# Patient Record
Sex: MALE | Race: WHITE | NOT HISPANIC OR LATINO | Employment: OTHER | URBAN - METROPOLITAN AREA
[De-identification: names, ages, dates, MRNs, and addresses within clinical notes are randomized per-mention and may not be internally consistent; named-entity substitution may affect disease eponyms.]

---

## 2018-01-19 PROCEDURE — 85025 COMPLETE CBC W/AUTO DIFF WBC: CPT

## 2018-01-19 PROCEDURE — 80053 COMPREHEN METABOLIC PANEL: CPT

## 2018-01-20 ENCOUNTER — LAB REQUISITION (OUTPATIENT)
Dept: LAB | Facility: HOSPITAL | Age: 68
End: 2018-01-20

## 2018-01-20 DIAGNOSIS — Z00.00 ROUTINE GENERAL MEDICAL EXAMINATION AT A HEALTH CARE FACILITY: ICD-10-CM

## 2018-01-20 LAB
ALBUMIN SERPL-MCNC: 3.3 G/DL (ref 3.2–4.8)
ALBUMIN/GLOB SERPL: 1.6 G/DL (ref 1.5–2.5)
ALP SERPL-CCNC: 92 U/L (ref 25–100)
ALT SERPL W P-5'-P-CCNC: 17 U/L (ref 7–40)
ANION GAP SERPL CALCULATED.3IONS-SCNC: 4 MMOL/L (ref 3–11)
AST SERPL-CCNC: 13 U/L (ref 0–33)
BASOPHILS # BLD AUTO: 0.02 10*3/MM3 (ref 0–0.2)
BASOPHILS NFR BLD AUTO: 0.3 % (ref 0–1)
BILIRUB SERPL-MCNC: 0.2 MG/DL (ref 0.3–1.2)
BUN BLD-MCNC: 52 MG/DL (ref 9–23)
BUN/CREAT SERPL: 20.8 (ref 7–25)
CALCIUM SPEC-SCNC: 9 MG/DL (ref 8.7–10.4)
CHLORIDE SERPL-SCNC: 115 MMOL/L (ref 99–109)
CO2 SERPL-SCNC: 24 MMOL/L (ref 20–31)
CREAT BLD-MCNC: 2.5 MG/DL (ref 0.6–1.3)
DEPRECATED RDW RBC AUTO: 49.1 FL (ref 37–54)
EOSINOPHIL # BLD AUTO: 0.15 10*3/MM3 (ref 0–0.3)
EOSINOPHIL NFR BLD AUTO: 2 % (ref 0–3)
ERYTHROCYTE [DISTWIDTH] IN BLOOD BY AUTOMATED COUNT: 14.4 % (ref 11.3–14.5)
GFR SERPL CREATININE-BSD FRML MDRD: 26 ML/MIN/1.73
GFR SERPL CREATININE-BSD FRML MDRD: 31 ML/MIN/1.73
GLOBULIN UR ELPH-MCNC: 2.1 GM/DL
GLUCOSE BLD-MCNC: 98 MG/DL (ref 70–100)
HCT VFR BLD AUTO: 35.7 % (ref 38.9–50.9)
HGB BLD-MCNC: 11.5 G/DL (ref 13.1–17.5)
IMM GRANULOCYTES # BLD: 0.14 10*3/MM3 (ref 0–0.03)
IMM GRANULOCYTES NFR BLD: 1.8 % (ref 0–0.6)
LYMPHOCYTES # BLD AUTO: 1.84 10*3/MM3 (ref 0.6–4.8)
LYMPHOCYTES NFR BLD AUTO: 24.1 % (ref 24–44)
MCH RBC QN AUTO: 30.3 PG (ref 27–31)
MCHC RBC AUTO-ENTMCNC: 32.2 G/DL (ref 32–36)
MCV RBC AUTO: 93.9 FL (ref 80–99)
MONOCYTES # BLD AUTO: 1.26 10*3/MM3 (ref 0–1)
MONOCYTES NFR BLD AUTO: 16.5 % (ref 0–12)
NEUTROPHILS # BLD AUTO: 4.22 10*3/MM3 (ref 1.5–8.3)
NEUTROPHILS NFR BLD AUTO: 55.3 % (ref 41–71)
PLATELET # BLD AUTO: 75 10*3/MM3 (ref 150–450)
PMV BLD AUTO: 11.6 FL (ref 6–12)
POTASSIUM BLD-SCNC: 5.3 MMOL/L (ref 3.5–5.5)
PROT SERPL-MCNC: 5.4 G/DL (ref 5.7–8.2)
RBC # BLD AUTO: 3.8 10*6/MM3 (ref 4.2–5.76)
SODIUM BLD-SCNC: 143 MMOL/L (ref 132–146)
WBC NRBC COR # BLD: 7.63 10*3/MM3 (ref 3.5–10.8)

## 2018-04-16 ENCOUNTER — LAB REQUISITION (OUTPATIENT)
Dept: LAB | Facility: HOSPITAL | Age: 68
End: 2018-04-16

## 2018-04-16 DIAGNOSIS — Z00.00 ROUTINE GENERAL MEDICAL EXAMINATION AT A HEALTH CARE FACILITY: ICD-10-CM

## 2018-04-16 PROCEDURE — 85007 BL SMEAR W/DIFF WBC COUNT: CPT

## 2018-04-16 PROCEDURE — 81003 URINALYSIS AUTO W/O SCOPE: CPT

## 2018-04-16 PROCEDURE — 80053 COMPREHEN METABOLIC PANEL: CPT

## 2018-04-16 PROCEDURE — 85025 COMPLETE CBC W/AUTO DIFF WBC: CPT

## 2018-04-17 LAB
ALBUMIN SERPL-MCNC: 2.9 G/DL (ref 3.5–5.2)
ALBUMIN/GLOB SERPL: 1.4 G/DL
ALP SERPL-CCNC: 80 U/L (ref 39–117)
ALT SERPL W P-5'-P-CCNC: 19 U/L (ref 1–41)
ANION GAP SERPL CALCULATED.3IONS-SCNC: 9.4 MMOL/L
AST SERPL-CCNC: 12 U/L (ref 1–40)
BASOPHILS # BLD AUTO: 0.02 10*3/MM3 (ref 0–0.2)
BASOPHILS NFR BLD AUTO: 0.5 % (ref 0–1.5)
BILIRUB SERPL-MCNC: <0.2 MG/DL (ref 0.1–1.2)
BILIRUB UR QL STRIP: NEGATIVE
BUN BLD-MCNC: 34 MG/DL (ref 8–23)
BUN/CREAT SERPL: 18.6 (ref 7–25)
CALCIUM SPEC-SCNC: 8.7 MG/DL (ref 8.6–10.5)
CHLORIDE SERPL-SCNC: 103 MMOL/L (ref 98–107)
CLARITY UR: CLEAR
CO2 SERPL-SCNC: 28.6 MMOL/L (ref 22–29)
COLOR UR: YELLOW
CREAT BLD-MCNC: 1.83 MG/DL (ref 0.76–1.27)
DEPRECATED RDW RBC AUTO: 47.5 FL (ref 37–54)
EOSINOPHIL # BLD AUTO: 0.11 10*3/MM3 (ref 0–0.7)
EOSINOPHIL NFR BLD AUTO: 2.6 % (ref 0.3–6.2)
ERYTHROCYTE [DISTWIDTH] IN BLOOD BY AUTOMATED COUNT: 13.9 % (ref 11.5–14.5)
GFR SERPL CREATININE-BSD FRML MDRD: 37 ML/MIN/1.73
GFR SERPL CREATININE-BSD FRML MDRD: 45 ML/MIN/1.73
GLOBULIN UR ELPH-MCNC: 2.1 GM/DL
GLUCOSE BLD-MCNC: 105 MG/DL (ref 65–99)
GLUCOSE UR STRIP-MCNC: NEGATIVE MG/DL
HCT VFR BLD AUTO: 35.2 % (ref 40.4–52.2)
HGB BLD-MCNC: 11.2 G/DL (ref 13.7–17.6)
HGB UR QL STRIP.AUTO: NEGATIVE
IMM GRANULOCYTES # BLD: 0.23 10*3/MM3 (ref 0–0.03)
IMM GRANULOCYTES NFR BLD: 5.5 % (ref 0–0.5)
KETONES UR QL STRIP: NEGATIVE
LEUKOCYTE ESTERASE UR QL STRIP.AUTO: NEGATIVE
LYMPHOCYTES # BLD AUTO: 1.67 10*3/MM3 (ref 0.9–4.8)
LYMPHOCYTES NFR BLD AUTO: 40 % (ref 19.6–45.3)
MCH RBC QN AUTO: 30.4 PG (ref 27–32.7)
MCHC RBC AUTO-ENTMCNC: 31.8 G/DL (ref 32.6–36.4)
MCV RBC AUTO: 95.4 FL (ref 79.8–96.2)
MONOCYTES # BLD AUTO: 0.46 10*3/MM3 (ref 0.2–1.2)
MONOCYTES NFR BLD AUTO: 11 % (ref 5–12)
NEUTROPHILS # BLD AUTO: 1.68 10*3/MM3 (ref 1.9–8.1)
NEUTROPHILS NFR BLD AUTO: 40.4 % (ref 42.7–76)
NITRITE UR QL STRIP: NEGATIVE
PH UR STRIP.AUTO: 5.5 [PH] (ref 5–8)
PLAT MORPH BLD: NORMAL
PLATELET # BLD AUTO: 134 10*3/MM3 (ref 140–500)
PMV BLD AUTO: 11.2 FL (ref 6–12)
POTASSIUM BLD-SCNC: 5.9 MMOL/L (ref 3.5–5.2)
PROT SERPL-MCNC: 5 G/DL (ref 6–8.5)
PROT UR QL STRIP: NEGATIVE
RBC # BLD AUTO: 3.69 10*6/MM3 (ref 4.6–6)
RBC MORPH BLD: NORMAL
SODIUM BLD-SCNC: 141 MMOL/L (ref 136–145)
SP GR UR STRIP: 1.01 (ref 1–1.03)
UROBILINOGEN UR QL STRIP: NORMAL
WBC MORPH BLD: NORMAL
WBC NRBC COR # BLD: 4.17 10*3/MM3 (ref 4.5–10.7)

## 2018-04-19 ENCOUNTER — HOSPITAL ENCOUNTER (INPATIENT)
Facility: HOSPITAL | Age: 68
LOS: 7 days | Discharge: NURSING, REHAB OR LONG-TERM CARE | End: 2018-04-26
Attending: FAMILY MEDICINE | Admitting: FAMILY MEDICINE

## 2018-04-19 DIAGNOSIS — Z74.09 IMPAIRED FUNCTIONAL MOBILITY, BALANCE, GAIT, AND ENDURANCE: Primary | ICD-10-CM

## 2018-04-19 PROBLEM — A41.9 SEPSIS AFFECTING SKIN: Status: ACTIVE | Noted: 2018-04-19

## 2018-04-19 PROBLEM — G93.41 ACUTE METABOLIC ENCEPHALOPATHY: Status: ACTIVE | Noted: 2018-04-19

## 2018-04-19 PROBLEM — L03.115 CELLULITIS OF LEG, RIGHT: Status: ACTIVE | Noted: 2018-04-19

## 2018-04-19 PROBLEM — I10 CHRONIC HYPERTENSION: Status: ACTIVE | Noted: 2018-04-19

## 2018-04-19 PROBLEM — F20.89 OTHER SCHIZOPHRENIA (HCC): Status: ACTIVE | Noted: 2018-04-19

## 2018-04-19 PROBLEM — A41.9 SEPSIS DUE TO CELLULITIS (HCC): Status: ACTIVE | Noted: 2018-04-19

## 2018-04-19 PROBLEM — R33.9 URINARY RETENTION: Status: ACTIVE | Noted: 2018-04-19

## 2018-04-19 PROBLEM — L03.90 SEPSIS DUE TO CELLULITIS (HCC): Status: ACTIVE | Noted: 2018-04-19

## 2018-04-19 PROBLEM — J96.02 ACUTE RESPIRATORY FAILURE WITH HYPERCAPNIA (HCC): Status: ACTIVE | Noted: 2018-04-19

## 2018-04-19 LAB
ARTERIAL PATENCY WRIST A: POSITIVE
ATMOSPHERIC PRESS: 742 MMHG
BASE EXCESS BLDA CALC-SCNC: -1.8 MMOL/L
BDY SITE: ABNORMAL
HCO3 BLDA-SCNC: 24 MMOL/L (ref 22–28)
HGB BLDA-MCNC: 10.4 G/DL (ref 12–18)
HOROWITZ INDEX BLD+IHG-RTO: 21 %
INR PPP: 1.01 (ref 0.9–1.1)
MODALITY: ABNORMAL
PCO2 BLDA: 44.4 MM HG (ref 35–45)
PH BLDA: 7.34 PH UNITS (ref 7.3–7.5)
PO2 BLDA: 80.2 MM HG (ref 75–100)
PROTHROMBIN TIME: 11.3 SECONDS (ref 9.3–12.1)
SAO2 % BLDCOA: 95.7 %

## 2018-04-19 PROCEDURE — 83735 ASSAY OF MAGNESIUM: CPT | Performed by: FAMILY MEDICINE

## 2018-04-19 PROCEDURE — 87081 CULTURE SCREEN ONLY: CPT | Performed by: FAMILY MEDICINE

## 2018-04-19 PROCEDURE — 36600 WITHDRAWAL OF ARTERIAL BLOOD: CPT

## 2018-04-19 PROCEDURE — 80164 ASSAY DIPROPYLACETIC ACD TOT: CPT | Performed by: FAMILY MEDICINE

## 2018-04-19 PROCEDURE — 94660 CPAP INITIATION&MGMT: CPT

## 2018-04-19 PROCEDURE — 82550 ASSAY OF CK (CPK): CPT | Performed by: FAMILY MEDICINE

## 2018-04-19 PROCEDURE — 94799 UNLISTED PULMONARY SVC/PX: CPT

## 2018-04-19 PROCEDURE — 85610 PROTHROMBIN TIME: CPT | Performed by: FAMILY MEDICINE

## 2018-04-19 PROCEDURE — 82805 BLOOD GASES W/O2 SATURATION: CPT

## 2018-04-19 PROCEDURE — 80053 COMPREHEN METABOLIC PANEL: CPT | Performed by: FAMILY MEDICINE

## 2018-04-19 PROCEDURE — 99223 1ST HOSP IP/OBS HIGH 75: CPT | Performed by: FAMILY MEDICINE

## 2018-04-19 RX ORDER — DIPHENHYDRAMINE HCL 25 MG
25 CAPSULE ORAL DAILY PRN
COMMUNITY
End: 2018-04-26 | Stop reason: HOSPADM

## 2018-04-19 RX ORDER — DIVALPROEX SODIUM 250 MG/1
500 TABLET, DELAYED RELEASE ORAL 2 TIMES DAILY
Status: DISCONTINUED | OUTPATIENT
Start: 2018-04-20 | End: 2018-04-20

## 2018-04-19 RX ORDER — GUAIFENESIN 600 MG/1
1200 TABLET, EXTENDED RELEASE ORAL 2 TIMES DAILY
Status: ON HOLD | COMMUNITY
End: 2018-04-26

## 2018-04-19 RX ORDER — RISPERIDONE 1 MG/1
1 TABLET ORAL EVERY 12 HOURS SCHEDULED
Status: DISCONTINUED | OUTPATIENT
Start: 2018-04-20 | End: 2018-04-20

## 2018-04-19 RX ORDER — BENZTROPINE MESYLATE 1 MG/1
1 TABLET ORAL 2 TIMES DAILY
COMMUNITY
End: 2018-04-26 | Stop reason: HOSPADM

## 2018-04-19 RX ORDER — HYDRALAZINE HYDROCHLORIDE 20 MG/ML
20 INJECTION INTRAMUSCULAR; INTRAVENOUS EVERY 4 HOURS PRN
Status: DISCONTINUED | OUTPATIENT
Start: 2018-04-19 | End: 2018-04-25

## 2018-04-19 RX ORDER — RISPERIDONE 2 MG/1
2 TABLET ORAL 2 TIMES DAILY
COMMUNITY
End: 2018-04-26 | Stop reason: HOSPADM

## 2018-04-19 RX ORDER — GUAIFENESIN 600 MG/1
1200 TABLET, EXTENDED RELEASE ORAL EVERY 12 HOURS SCHEDULED
Status: DISCONTINUED | OUTPATIENT
Start: 2018-04-20 | End: 2018-04-26 | Stop reason: HOSPADM

## 2018-04-19 RX ORDER — SODIUM CHLORIDE 0.9 % (FLUSH) 0.9 %
1-10 SYRINGE (ML) INJECTION AS NEEDED
Status: DISCONTINUED | OUTPATIENT
Start: 2018-04-19 | End: 2018-04-26 | Stop reason: HOSPADM

## 2018-04-19 RX ORDER — ASPIRIN 81 MG/1
81 TABLET, CHEWABLE ORAL DAILY
Status: DISCONTINUED | OUTPATIENT
Start: 2018-04-20 | End: 2018-04-26 | Stop reason: HOSPADM

## 2018-04-19 RX ORDER — IPRATROPIUM BROMIDE AND ALBUTEROL SULFATE 2.5; .5 MG/3ML; MG/3ML
3 SOLUTION RESPIRATORY (INHALATION)
Status: ON HOLD | COMMUNITY
End: 2021-07-08

## 2018-04-19 RX ORDER — ONDANSETRON 2 MG/ML
4 INJECTION INTRAMUSCULAR; INTRAVENOUS EVERY 6 HOURS PRN
Status: DISCONTINUED | OUTPATIENT
Start: 2018-04-19 | End: 2018-04-26 | Stop reason: HOSPADM

## 2018-04-19 RX ORDER — AMLODIPINE BESYLATE 5 MG/1
5 TABLET ORAL DAILY
COMMUNITY
End: 2018-04-26 | Stop reason: HOSPADM

## 2018-04-19 RX ORDER — AZELASTINE 1 MG/ML
2 SPRAY, METERED NASAL 2 TIMES DAILY PRN
Status: ON HOLD | COMMUNITY
End: 2021-07-07

## 2018-04-19 RX ORDER — GUAIFENESIN/DEXTROMETHORPHAN 100-10MG/5
10 SYRUP ORAL EVERY 6 HOURS PRN
Status: DISCONTINUED | OUTPATIENT
Start: 2018-04-19 | End: 2018-04-26 | Stop reason: HOSPADM

## 2018-04-19 RX ORDER — ALBUTEROL SULFATE 90 UG/1
2 AEROSOL, METERED RESPIRATORY (INHALATION) EVERY 4 HOURS PRN
Status: ON HOLD | COMMUNITY
End: 2021-08-02

## 2018-04-19 RX ORDER — IPRATROPIUM BROMIDE AND ALBUTEROL SULFATE 2.5; .5 MG/3ML; MG/3ML
3 SOLUTION RESPIRATORY (INHALATION)
Status: DISCONTINUED | OUTPATIENT
Start: 2018-04-20 | End: 2018-04-23

## 2018-04-19 RX ORDER — ACETAMINOPHEN 325 MG/1
650 TABLET ORAL EVERY 4 HOURS PRN
COMMUNITY

## 2018-04-19 RX ORDER — QUETIAPINE FUMARATE 100 MG/1
100 TABLET, FILM COATED ORAL 2 TIMES DAILY
COMMUNITY
End: 2018-04-26 | Stop reason: HOSPADM

## 2018-04-19 RX ORDER — BISACODYL 10 MG
10 SUPPOSITORY, RECTAL RECTAL DAILY PRN
Status: ON HOLD | COMMUNITY
End: 2021-07-08

## 2018-04-19 RX ORDER — HYDROXYZINE HYDROCHLORIDE 25 MG/1
25 TABLET, FILM COATED ORAL 2 TIMES DAILY
COMMUNITY
End: 2018-04-26 | Stop reason: HOSPADM

## 2018-04-19 RX ORDER — FERROUS SULFATE 325(65) MG
300 TABLET ORAL 2 TIMES DAILY
Status: ON HOLD | COMMUNITY
End: 2021-07-07

## 2018-04-19 RX ORDER — TRAZODONE HYDROCHLORIDE 100 MG/1
100 TABLET ORAL NIGHTLY
Status: ON HOLD | COMMUNITY
End: 2018-04-26

## 2018-04-19 RX ORDER — BISACODYL 10 MG
10 SUPPOSITORY, RECTAL RECTAL DAILY PRN
Status: DISCONTINUED | OUTPATIENT
Start: 2018-04-19 | End: 2018-04-26 | Stop reason: HOSPADM

## 2018-04-19 RX ORDER — MONTELUKAST SODIUM 10 MG/1
10 TABLET ORAL NIGHTLY
Status: DISCONTINUED | OUTPATIENT
Start: 2018-04-20 | End: 2018-04-25

## 2018-04-19 RX ORDER — ACETAMINOPHEN 325 MG/1
650 TABLET ORAL EVERY 4 HOURS PRN
Status: DISCONTINUED | OUTPATIENT
Start: 2018-04-19 | End: 2018-04-26 | Stop reason: HOSPADM

## 2018-04-19 RX ORDER — IPRATROPIUM BROMIDE AND ALBUTEROL SULFATE 2.5; .5 MG/3ML; MG/3ML
3 SOLUTION RESPIRATORY (INHALATION) EVERY 4 HOURS PRN
Status: DISCONTINUED | OUTPATIENT
Start: 2018-04-19 | End: 2018-04-26 | Stop reason: HOSPADM

## 2018-04-19 RX ORDER — MONTELUKAST SODIUM 10 MG/1
10 TABLET ORAL NIGHTLY
COMMUNITY
End: 2018-04-26 | Stop reason: HOSPADM

## 2018-04-19 RX ORDER — ASPIRIN 81 MG/1
81 TABLET, CHEWABLE ORAL DAILY
Status: ON HOLD | COMMUNITY
End: 2021-07-07

## 2018-04-19 RX ORDER — OLANZAPINE 10 MG/1
10 TABLET ORAL NIGHTLY
COMMUNITY
End: 2018-04-26 | Stop reason: HOSPADM

## 2018-04-19 RX ORDER — METOPROLOL TARTRATE 50 MG/1
50 TABLET, FILM COATED ORAL 2 TIMES DAILY
COMMUNITY
End: 2018-04-26 | Stop reason: HOSPADM

## 2018-04-19 RX ORDER — LORATADINE 10 MG/1
10 CAPSULE, LIQUID FILLED ORAL DAILY
COMMUNITY
End: 2018-04-26 | Stop reason: HOSPADM

## 2018-04-19 RX ORDER — ACETAMINOPHEN 650 MG/1
650 SUPPOSITORY RECTAL EVERY 4 HOURS PRN
Status: DISCONTINUED | OUTPATIENT
Start: 2018-04-19 | End: 2018-04-26 | Stop reason: HOSPADM

## 2018-04-19 RX ORDER — DIVALPROEX SODIUM 500 MG/1
500 TABLET, DELAYED RELEASE ORAL 2 TIMES DAILY
COMMUNITY
End: 2018-04-26 | Stop reason: HOSPADM

## 2018-04-19 RX ORDER — QUETIAPINE FUMARATE 25 MG/1
50 TABLET, FILM COATED ORAL EVERY 12 HOURS SCHEDULED
Status: DISCONTINUED | OUTPATIENT
Start: 2018-04-20 | End: 2018-04-20

## 2018-04-20 ENCOUNTER — APPOINTMENT (OUTPATIENT)
Dept: GENERAL RADIOLOGY | Facility: HOSPITAL | Age: 68
End: 2018-04-20

## 2018-04-20 ENCOUNTER — APPOINTMENT (OUTPATIENT)
Dept: MRI IMAGING | Facility: HOSPITAL | Age: 68
End: 2018-04-20
Attending: INTERNAL MEDICINE

## 2018-04-20 ENCOUNTER — APPOINTMENT (OUTPATIENT)
Dept: ULTRASOUND IMAGING | Facility: HOSPITAL | Age: 68
End: 2018-04-20

## 2018-04-20 LAB
ALBUMIN SERPL-MCNC: 2.5 G/DL (ref 3.5–5)
ALBUMIN SERPL-MCNC: 2.5 G/DL (ref 3.5–5)
ALBUMIN/GLOB SERPL: 1 G/DL (ref 1–2)
ALBUMIN/GLOB SERPL: 1 G/DL (ref 1–2)
ALP SERPL-CCNC: 73 U/L (ref 38–126)
ALP SERPL-CCNC: 87 U/L (ref 38–126)
ALT SERPL W P-5'-P-CCNC: 35 U/L (ref 13–69)
ALT SERPL W P-5'-P-CCNC: 38 U/L (ref 13–69)
AMMONIA BLD-SCNC: <9 UMOL/L (ref 9–30)
ANION GAP SERPL CALCULATED.3IONS-SCNC: 12.6 MMOL/L (ref 10–20)
ANION GAP SERPL CALCULATED.3IONS-SCNC: 9.9 MMOL/L (ref 10–20)
ARTERIAL PATENCY WRIST A: POSITIVE
AST SERPL-CCNC: 21 U/L (ref 15–46)
AST SERPL-CCNC: 30 U/L (ref 15–46)
ATMOSPHERIC PRESS: 744 MMHG
BACTERIA UR QL AUTO: ABNORMAL /HPF
BASE EXCESS BLDA CALC-SCNC: -1.3 MMOL/L
BASOPHILS # BLD AUTO: 0.02 10*3/MM3 (ref 0–0.2)
BASOPHILS NFR BLD AUTO: 0.5 % (ref 0–2.5)
BDY SITE: ABNORMAL
BILIRUB SERPL-MCNC: 0.4 MG/DL (ref 0.2–1.3)
BILIRUB SERPL-MCNC: 0.4 MG/DL (ref 0.2–1.3)
BILIRUB UR QL STRIP: NEGATIVE
BUN BLD-MCNC: 32 MG/DL (ref 7–20)
BUN BLD-MCNC: 35 MG/DL (ref 7–20)
BUN/CREAT SERPL: 16 (ref 6.3–21.9)
BUN/CREAT SERPL: 18.4 (ref 6.3–21.9)
CALCIUM SPEC-SCNC: 8.1 MG/DL (ref 8.4–10.2)
CALCIUM SPEC-SCNC: 8.3 MG/DL (ref 8.4–10.2)
CHLORIDE SERPL-SCNC: 116 MMOL/L (ref 98–107)
CHLORIDE SERPL-SCNC: 118 MMOL/L (ref 98–107)
CK SERPL-CCNC: 47 U/L (ref 30–170)
CLARITY UR: CLEAR
CO2 SERPL-SCNC: 23 MMOL/L (ref 26–30)
CO2 SERPL-SCNC: 24 MMOL/L (ref 26–30)
COLOR UR: ABNORMAL
CREAT BLD-MCNC: 1.9 MG/DL (ref 0.6–1.3)
CREAT BLD-MCNC: 2 MG/DL (ref 0.6–1.3)
DEPRECATED RDW RBC AUTO: 45.6 FL (ref 37–54)
EOSINOPHIL # BLD AUTO: 0.06 10*3/MM3 (ref 0–0.7)
EOSINOPHIL NFR BLD AUTO: 1.4 % (ref 0–7)
ERYTHROCYTE [DISTWIDTH] IN BLOOD BY AUTOMATED COUNT: 13.5 % (ref 11.5–14.5)
GFR SERPL CREATININE-BSD FRML MDRD: 34 ML/MIN/1.73
GFR SERPL CREATININE-BSD FRML MDRD: 36 ML/MIN/1.73
GLOBULIN UR ELPH-MCNC: 2.6 GM/DL
GLOBULIN UR ELPH-MCNC: 2.6 GM/DL
GLUCOSE BLD-MCNC: 58 MG/DL (ref 74–98)
GLUCOSE BLD-MCNC: 78 MG/DL (ref 74–98)
GLUCOSE BLDC GLUCOMTR-MCNC: 103 MG/DL (ref 70–130)
GLUCOSE UR STRIP-MCNC: NEGATIVE MG/DL
HCO3 BLDA-SCNC: 24.1 MMOL/L (ref 22–28)
HCT VFR BLD AUTO: 31.8 % (ref 42–52)
HGB BLD-MCNC: 10 G/DL (ref 14–18)
HGB BLDA-MCNC: 9.8 G/DL (ref 12–18)
HGB UR QL STRIP.AUTO: ABNORMAL
HOROWITZ INDEX BLD+IHG-RTO: 21 %
HYALINE CASTS UR QL AUTO: ABNORMAL /LPF
IMM GRANULOCYTES # BLD: 0.12 10*3/MM3 (ref 0–0.06)
IMM GRANULOCYTES NFR BLD: 2.7 % (ref 0–0.6)
KETONES UR QL STRIP: NEGATIVE
LEUKOCYTE ESTERASE UR QL STRIP.AUTO: ABNORMAL
LYMPHOCYTES # BLD AUTO: 1.2 10*3/MM3 (ref 0.6–3.4)
LYMPHOCYTES NFR BLD AUTO: 27.1 % (ref 10–50)
MAGNESIUM SERPL-MCNC: 1.7 MG/DL (ref 1.6–2.3)
MAGNESIUM SERPL-MCNC: 1.7 MG/DL (ref 1.6–2.3)
MCH RBC QN AUTO: 29.6 PG (ref 27–31)
MCHC RBC AUTO-ENTMCNC: 31.4 G/DL (ref 30–37)
MCV RBC AUTO: 94.1 FL (ref 80–94)
MODALITY: ABNORMAL
MONOCYTES # BLD AUTO: 0.78 10*3/MM3 (ref 0–0.9)
MONOCYTES NFR BLD AUTO: 17.6 % (ref 0–12)
NEUTROPHILS # BLD AUTO: 2.25 10*3/MM3 (ref 2–6.9)
NEUTROPHILS NFR BLD AUTO: 50.7 % (ref 37–80)
NITRITE UR QL STRIP: NEGATIVE
NRBC BLD MANUAL-RTO: 0.9 /100 WBC (ref 0–0)
PCO2 BLDA: 42.7 MM HG (ref 35–45)
PH BLDA: 7.36 PH UNITS (ref 7.3–7.5)
PH UR STRIP.AUTO: 6.5 [PH] (ref 5–8)
PLATELET # BLD AUTO: 83 10*3/MM3 (ref 130–400)
PMV BLD AUTO: 11.2 FL (ref 6–12)
PO2 BLDA: 68.2 MM HG (ref 75–100)
POTASSIUM BLD-SCNC: 5.4 MMOL/L (ref 3.5–5.1)
POTASSIUM BLD-SCNC: 5.6 MMOL/L (ref 3.5–5.1)
POTASSIUM BLD-SCNC: 5.9 MMOL/L (ref 3.5–5.1)
PROT SERPL-MCNC: 5.1 G/DL (ref 6.3–8.2)
PROT SERPL-MCNC: 5.1 G/DL (ref 6.3–8.2)
PROT UR QL STRIP: NEGATIVE
RBC # BLD AUTO: 3.38 10*6/MM3 (ref 4.7–6.1)
RBC # UR: ABNORMAL /HPF
REF LAB TEST METHOD: ABNORMAL
SAO2 % BLDCOA: 93.6 %
SODIUM BLD-SCNC: 143 MMOL/L (ref 137–145)
SODIUM BLD-SCNC: 149 MMOL/L (ref 137–145)
SP GR UR STRIP: 1.01 (ref 1–1.03)
SQUAMOUS #/AREA URNS HPF: ABNORMAL /HPF
TSH SERPL DL<=0.05 MIU/L-ACNC: 1.82 MIU/ML (ref 0.47–4.68)
UROBILINOGEN UR QL STRIP: ABNORMAL
VALPROATE SERPL-MCNC: 27.3 MCG/ML (ref 50–100)
WBC NRBC COR # BLD: 4.43 10*3/MM3 (ref 4.8–10.8)
WBC UR QL AUTO: ABNORMAL /HPF

## 2018-04-20 PROCEDURE — 80053 COMPREHEN METABOLIC PANEL: CPT | Performed by: FAMILY MEDICINE

## 2018-04-20 PROCEDURE — 81001 URINALYSIS AUTO W/SCOPE: CPT | Performed by: INTERNAL MEDICINE

## 2018-04-20 PROCEDURE — 84443 ASSAY THYROID STIM HORMONE: CPT | Performed by: INTERNAL MEDICINE

## 2018-04-20 PROCEDURE — 84132 ASSAY OF SERUM POTASSIUM: CPT | Performed by: INTERNAL MEDICINE

## 2018-04-20 PROCEDURE — 85025 COMPLETE CBC W/AUTO DIFF WBC: CPT | Performed by: FAMILY MEDICINE

## 2018-04-20 PROCEDURE — 25010000002 VANCOMYCIN PER 500 MG: Performed by: FAMILY MEDICINE

## 2018-04-20 PROCEDURE — 99233 SBSQ HOSP IP/OBS HIGH 50: CPT | Performed by: INTERNAL MEDICINE

## 2018-04-20 PROCEDURE — 94799 UNLISTED PULMONARY SVC/PX: CPT

## 2018-04-20 PROCEDURE — 36600 WITHDRAWAL OF ARTERIAL BLOOD: CPT

## 2018-04-20 PROCEDURE — 74018 RADEX ABDOMEN 1 VIEW: CPT

## 2018-04-20 PROCEDURE — 25010000002 PIPERACILLIN SOD-TAZOBACTAM PER 1 G: Performed by: FAMILY MEDICINE

## 2018-04-20 PROCEDURE — 76775 US EXAM ABDO BACK WALL LIM: CPT

## 2018-04-20 PROCEDURE — 70551 MRI BRAIN STEM W/O DYE: CPT

## 2018-04-20 PROCEDURE — 94660 CPAP INITIATION&MGMT: CPT

## 2018-04-20 PROCEDURE — 71045 X-RAY EXAM CHEST 1 VIEW: CPT

## 2018-04-20 PROCEDURE — 82140 ASSAY OF AMMONIA: CPT | Performed by: INTERNAL MEDICINE

## 2018-04-20 PROCEDURE — 83735 ASSAY OF MAGNESIUM: CPT | Performed by: FAMILY MEDICINE

## 2018-04-20 PROCEDURE — 82805 BLOOD GASES W/O2 SATURATION: CPT

## 2018-04-20 PROCEDURE — 25010000002 ENOXAPARIN PER 10 MG: Performed by: FAMILY MEDICINE

## 2018-04-20 PROCEDURE — 70250 X-RAY EXAM OF SKULL: CPT

## 2018-04-20 PROCEDURE — 87086 URINE CULTURE/COLONY COUNT: CPT | Performed by: INTERNAL MEDICINE

## 2018-04-20 PROCEDURE — 94640 AIRWAY INHALATION TREATMENT: CPT

## 2018-04-20 PROCEDURE — 25010000002 MAGNESIUM SULFATE IN D5W 1G/100ML (PREMIX) 1-5 GM/100ML-% SOLUTION: Performed by: FAMILY MEDICINE

## 2018-04-20 PROCEDURE — 82962 GLUCOSE BLOOD TEST: CPT

## 2018-04-20 RX ORDER — ZIPRASIDONE MESYLATE 20 MG/ML
10 INJECTION, POWDER, LYOPHILIZED, FOR SOLUTION INTRAMUSCULAR EVERY 6 HOURS PRN
Status: DISCONTINUED | OUTPATIENT
Start: 2018-04-20 | End: 2018-04-22

## 2018-04-20 RX ORDER — DEXTROSE AND SODIUM CHLORIDE 5; .45 G/100ML; G/100ML
50 INJECTION, SOLUTION INTRAVENOUS CONTINUOUS
Status: DISCONTINUED | OUTPATIENT
Start: 2018-04-20 | End: 2018-04-24

## 2018-04-20 RX ORDER — MAGNESIUM SULFATE 1 G/100ML
1 INJECTION INTRAVENOUS ONCE
Status: COMPLETED | OUTPATIENT
Start: 2018-04-20 | End: 2018-04-20

## 2018-04-20 RX ADMIN — MAGNESIUM SULFATE HEPTAHYDRATE 1 G: 1 INJECTION, SOLUTION INTRAVENOUS at 07:23

## 2018-04-20 RX ADMIN — VANCOMYCIN HYDROCHLORIDE 1500 MG: 500 INJECTION, POWDER, LYOPHILIZED, FOR SOLUTION INTRAVENOUS at 22:33

## 2018-04-20 RX ADMIN — TAZOBACTAM SODIUM AND PIPERACILLIN SODIUM 3.38 G: 375; 3 INJECTION, SOLUTION INTRAVENOUS at 09:04

## 2018-04-20 RX ADMIN — MONTELUKAST SODIUM 10 MG: 10 TABLET, FILM COATED ORAL at 00:36

## 2018-04-20 RX ADMIN — QUETIAPINE FUMARATE 50 MG: 25 TABLET ORAL at 00:36

## 2018-04-20 RX ADMIN — TAZOBACTAM SODIUM AND PIPERACILLIN SODIUM 3.38 G: 375; 3 INJECTION, SOLUTION INTRAVENOUS at 17:00

## 2018-04-20 RX ADMIN — VALPROATE SODIUM 500 MG: 100 INJECTION, SOLUTION INTRAVENOUS at 16:00

## 2018-04-20 RX ADMIN — ENOXAPARIN SODIUM 40 MG: 40 INJECTION SUBCUTANEOUS at 00:36

## 2018-04-20 RX ADMIN — DEXTROSE AND SODIUM CHLORIDE 125 ML/HR: 5; 450 INJECTION, SOLUTION INTRAVENOUS at 07:50

## 2018-04-20 RX ADMIN — IPRATROPIUM BROMIDE AND ALBUTEROL SULFATE 3 ML: .5; 3 SOLUTION RESPIRATORY (INHALATION) at 20:37

## 2018-04-20 RX ADMIN — IPRATROPIUM BROMIDE AND ALBUTEROL SULFATE 3 ML: .5; 3 SOLUTION RESPIRATORY (INHALATION) at 00:00

## 2018-04-20 RX ADMIN — RISPERIDONE 1 MG: 1 TABLET ORAL at 00:36

## 2018-04-20 RX ADMIN — IPRATROPIUM BROMIDE AND ALBUTEROL SULFATE 3 ML: .5; 3 SOLUTION RESPIRATORY (INHALATION) at 06:54

## 2018-04-20 RX ADMIN — IPRATROPIUM BROMIDE AND ALBUTEROL SULFATE 3 ML: .5; 3 SOLUTION RESPIRATORY (INHALATION) at 13:06

## 2018-04-20 RX ADMIN — DIVALPROEX SODIUM 500 MG: 250 TABLET, DELAYED RELEASE ORAL at 00:36

## 2018-04-20 RX ADMIN — VANCOMYCIN HYDROCHLORIDE 1000 MG: 1 INJECTION, POWDER, LYOPHILIZED, FOR SOLUTION INTRAVENOUS at 00:34

## 2018-04-20 RX ADMIN — TAZOBACTAM SODIUM AND PIPERACILLIN SODIUM 3.38 G: 375; 3 INJECTION, SOLUTION INTRAVENOUS at 02:31

## 2018-04-20 RX ADMIN — GUAIFENESIN 1200 MG: 600 TABLET, EXTENDED RELEASE ORAL at 00:36

## 2018-04-20 NOTE — PROGRESS NOTES
Discharge Planning Assessment   [place]     Patient Name: Braulio Schwartz  MRN: [id number]  Today's Date: 4/20/2018    Admit Date: 4/19/2018          Discharge Needs Assessment     Row Name 04/20/18 1043       Living Environment    Name(s) of Who Lives With Patient [facility]    Unique Family Situation state dor[place] rosen michele [phone]    Primary Care Provided by other (see comments)    Provides Primary Care For no one, unable/limited ability to care for self    Caregiving Concerns ltc       Discharge Needs Assessment    Readmission Within the Last 30 Days no previous admission in last 30 days    Concerns to be Addressed discharge planning            Discharge Plan     Row Name 04/20/18 1044       Plan    Plan confirmed patient is ltc at [facility]. state doron jhaveri michele [phone]        Destination     No service coordination in this encounter.      Durable Medical Equipment     No service coordination in this encounter.      Dialysis/Infusion     No service coordination in this encounter.      Home Medical Care     No service coordination in this encounter.      Social Care     No service coordination in this encounter.                Demographic Summary     Row Name 04/20/18 1042       General Information    Admission Type inpatient    Arrived From long term care    Referral Source admission list    Reason for Consult discharge planning    Preferred Language English            Functional Status     Row Name 04/20/18 1043       Functional Status, IADL    Medications other (see comments)    Meal Preparation other (see comments)    Housekeeping other (see comments)    Laundry other (see comments)    Shopping other (see comments)       Employment/    Employment Status disabled            Psychosocial    No documentation.           Abuse/Neglect    No documentation.           Legal    No documentation.           Substance Abuse    No documentation.           Patient  Forms    No documentation.         Junie Fagan

## 2018-04-20 NOTE — PROGRESS NOTES
"Pharmacokinetic Initial Note - Vancomycin    Braulio Schwartz is a 67 y.o. male  180.3 cm (71\") 87.9 kg (193 lb 11.2 oz)    Indication for use: sepsis/cellulitis      Results from last 7 days     Lab Units 04/19/18  2334 04/16/18  2100   WBC 10*3/mm3 --  4.17*   CREATININE mg/dL 1.90* 1.83*      Estimated Creatinine Clearance: 47.5 mL/min (by C-G formula based on SCr of 1.9 mg/dL (H)).  Temp Readings from Last 1 Encounters:   04/20/18 97.3 °F (36.3 °C) (Axillary)       Culture results  Microbiology Results (last 10 days)       ** No results found for the last 240 hours. **            Other Antimicrobials   Piperacillin-tazobactam 3.375 g every 8 hour, extended infusion (first dose given at other facility)    Assessment/Plan  Initiated Vancomycin 1000 mg IVPB once (1000 mg given at another facility)  followed by 1500 mg Q24H. Vancomycin trough ordered for 4/22/2018 @2130.  Pharmacy will monitor renal function and adjust dose accordingly.    Paty Pinto RPH  04/20/18 2:25 AM    "

## 2018-04-20 NOTE — PROGRESS NOTES
[facility]    Name:  Braulio Schwartz   Age:  67 y.o.  Sex:  male  :  1950  MRN:  [id number]   Visit Number:  [id number]  Admission Date:  2018  Date Of Service:  18  Primary Care Physician:  Yandel Steward MD     LOS: 1 day :  Patient Care Team:  Yandel Steward MD as PCP - General (Internal Medicine):    History taken from:     chart    Chief Complaint:      Metabolic encephalopathy    Subjective     Interval History:     Patient seen today.  Reviewed history and physical, x-rays, lab work, nursing care on this patient.    Patient came from [facility] emergency room via a nursing home as he was found asleep on the floor.  He was noted to be hypothermic with a temperature 90.  He was given  3 L boluses his systolic blood pressure was in the 80s.  His blood pressure is now stable.  He was also noted to have a PCO2 of 64 on admission.  He was placed on BiPAP, PCO2 is now normal.  He was given multiple doses of Narcan without any result.  Urine drug screen was positive for tricyclic antidepressants.  He also had urinary retention.  Ruby catheter was placed for this.  Will check a renal ultrasound.  He was placed on a bear hugger/warming blanket.  His temperature has improved.  Vancomycin and Zosyn were initiated.  He appears to have a right anterior lower extremity redness and swelling.  CT of the head, chest, abdomen were all negative yesterday.  Lactic acid was normal.    He was initially in restraints but is not requiring this at present.  He is very somnolent, though awakens to voice for about 5 seconds.  MRI was ordered this morning, this was negative.  He is on many medications that could sedate him including several antipsychotics, will hold all of these.  They can be added back and his mental status improves.  Patient is also noted to have significant dental caries, this could be the source of his infection as well.  Blood  cultures are pending at present.  He has been afebrile since he has been here.  Unable to obtain any information from the patient given his mental status at present.    Review of Systems:     Unable to obtain any information from the patient given his mental status at present.      Objective     Vital Signs:    Temp:  [94.3 °F (34.6 °C)-97.4 °F (36.3 °C)] 97 °F (36.1 °C)  Heart Rate:  [66-78] 77  Resp:  [11-18] 13  BP: ()/() 110/72  FiO2 (%):  [21 %] 21 %    Physical Exam:      General Appearance:    Somnolent.  Response to pain and voice at present.     Head:    Normocephalic, without obvious abnormality, atraumatic   Eyes:            Lids and lashes normal, conjunctivae and sclerae normal, no   icterus, no pallor, corneas clear, PERRLA   Ears:    Ears appear intact with no abnormalities noted   Throat:   No oral lesions, no thrush, oral mucosa moist   Neck:   No adenopathy, supple, trachea midline, no thyromegaly, no   carotid bruit, no JVD   Back:     No kyphosis present, no scoliosis present, no skin lesions,      erythema or scars, no tenderness to percussion or                   palpation,   range of motion normal   Lungs:     Clear to auscultation,respirations regular, even and                  unlabored    Heart:    Regular rhythm and normal rate, normal S1 and S2, no            murmur, no gallop, no rub, no click   Chest Wall:    No abnormalities observed   Abdomen:     Normal bowel sounds, no masses, no organomegaly, soft        non-tender, non-distended, no guarding, no rebound                tenderness   Rectal:     Deferred   Extremities:   Moves all extremities,no edema, no cyanosis,             Redness noted on anterior of right shin.     Pulses:   Pulses palpable and equal bilaterally   Skin:   No bleeding, bruising or rash   Lymph nodes:   No palpable adenopathy   Neurologic:   Cranial nerves 2 - 12 grossly intact, sensation intact, DTR       present and equal bilaterally        Results  Review:      I reviewed the patient's new clinical results.    Labs:    Lab Results (last 24 hours)     Procedure Component Value Units Date/Time    Blood Gas, Arterial [[id number]]  (Abnormal) Collected:  04/20/18 0941    Specimen:  Arterial Blood Updated:  04/20/18 0941     Site Arterial: right radial     Gonzalo's Test Positive     pH, Arterial 7.361 pH units      pCO2, Arterial 42.7 mm Hg      pO2, Arterial 68.2 (L) mm Hg      HCO3, Arterial 24.1 mmol/L      Base Excess, Arterial -1.3 mmol/L      O2 Saturation, Arterial 93.6 %      Hemoglobin, Blood Gas 9.8 (L) g/dL      Barometric Pressure for Blood Gas 744 mmHg      Modality Room Air     FIO2 21 %     POC Glucose Once [[id number]]  (Normal) Collected:  04/20/18 0926    Specimen:  Blood Updated:  04/20/18 0930     Glucose 103 mg/dL      Comment: Serial Number: [id number]:  [id number]       Comprehensive Metabolic Panel [[id number]]  (Abnormal) Collected:  04/20/18 0409    Specimen:  Blood Updated:  04/20/18 0543     Glucose 58 (L) mg/dL      BUN 32 (H) mg/dL      Creatinine 2.00 (H) mg/dL      Sodium 149 (H) mmol/L      Potassium 5.6 (C) mmol/L      Chloride 118 (H) mmol/L      CO2 24.0 (L) mmol/L      Calcium 8.3 (L) mg/dL      Total Protein 5.1 (L) g/dL      Albumin 2.50 (L) g/dL      ALT (SGPT) 35 U/L      AST (SGOT) 21 U/L      Alkaline Phosphatase 87 U/L      Total Bilirubin 0.4 mg/dL      eGFR Non African Amer 34 (L) mL/min/1.73      Globulin 2.6 gm/dL      A/G Ratio 1.0 g/dL      BUN/Creatinine Ratio 16.0     Anion Gap 12.6 mmol/L     Narrative:       Abnormal estimated GFR should be followed by more specific studies to confirm end stage chronic renal disease. The equation used for calculation may not be accurate for patients less than 19 years old, greater than 70 years old, patients at extremes of weight, malnutrition, or with acute renal dysfunction.    Magnesium [[id number]]  (Normal) Collected:  04/20/18 0409    Specimen:  Blood Updated:  04/20/18  0534     Magnesium 1.7 mg/dL     CBC & Differential [[id number]] Collected:  04/20/18 0409    Specimen:  Blood Updated:  04/20/18 0526    Narrative:       The following orders were created for panel order CBC & Differential.  Procedure                               Abnormality         Status                     ---------                               -----------         ------                     CBC Auto Differential[[id number]]        Abnormal            Final result                 Please view results for these tests on the individual orders.    CBC Auto Differential [[id number]]  (Abnormal) Collected:  04/20/18 0409    Specimen:  Blood Updated:  04/20/18 0526     WBC 4.43 (L) 10*3/mm3      RBC 3.38 (L) 10*6/mm3      Hemoglobin 10.0 (L) g/dL      Hematocrit 31.8 (L) %      MCV 94.1 (H) fL      MCH 29.6 pg      MCHC 31.4 g/dL      RDW 13.5 %      RDW-SD 45.6 fl      MPV 11.2 fL      Platelets 83 (L) 10*3/mm3      Neutrophil % 50.7 %      Lymphocyte % 27.1 %      Monocyte % 17.6 (H) %      Eosinophil % 1.4 %      Basophil % 0.5 %      Immature Grans % 2.7 (H) %      Neutrophils, Absolute 2.25 10*3/mm3      Lymphocytes, Absolute 1.20 10*3/mm3      Monocytes, Absolute 0.78 10*3/mm3      Eosinophils, Absolute 0.06 10*3/mm3      Basophils, Absolute 0.02 10*3/mm3      Immature Grans, Absolute 0.12 (H) 10*3/mm3      nRBC 0.9 (H) /100 WBC     VRE Culture - Swab, Per Rectum [[id number]] Collected:  04/19/18 2338    Specimen:  Swab from Per Rectum Updated:  04/20/18 0054    MRSA Screen Culture - Swab, Nares [[id number]] Collected:  04/19/18 2338    Specimen:  Swab from Nares Updated:  04/20/18 0054    Acinetobacter Screen - Swab, Nares [[id number]] Collected:  04/19/18 2337    Specimen:  Swab from Axilla, Left Updated:  04/20/18 0054    Magnesium [[id number]]  (Normal) Collected:  04/19/18 2334    Specimen:  Blood Updated:  04/20/18 0006     Magnesium 1.7 mg/dL      Comment: Specimen hemolyzed.  Results may be affected.        Comprehensive Metabolic Panel [[id number]]  (Abnormal) Collected:  04/19/18 2334    Specimen:  Blood Updated:  04/20/18 0006     Glucose 78 mg/dL      BUN 35 (H) mg/dL      Comment: Specimen hemolyzed. Results may be affected.        Creatinine 1.90 (H) mg/dL      Sodium 143 mmol/L      Potassium 5.9 (C) mmol/L      Chloride 116 (H) mmol/L      CO2 23.0 (L) mmol/L      Calcium 8.1 (L) mg/dL      Total Protein 5.1 (L) g/dL      Albumin 2.50 (L) g/dL      ALT (SGPT) 38 U/L      Comment: Specimen hemolyzed.  Results may be affected.        AST (SGOT) 30 U/L      Comment: Specimen hemolyzed.  Results may be affected.        Alkaline Phosphatase 73 U/L      Comment: Specimen hemolyzed. Results may be affected.        Total Bilirubin 0.4 mg/dL      eGFR Non African Amer 36 (L) mL/min/1.73      Globulin 2.6 gm/dL      A/G Ratio 1.0 g/dL      BUN/Creatinine Ratio 18.4     Anion Gap 9.9 (L) mmol/L     Narrative:       Abnormal estimated GFR should be followed by more specific studies to confirm end stage chronic renal disease. The equation used for calculation may not be accurate for patients less than 19 years old, greater than 70 years old, patients at extremes of weight, malnutrition, or with acute renal dysfunction.    CK [[id number]]  (Normal) Collected:  04/19/18 2334    Specimen:  Blood Updated:  04/20/18 0002     Creatine Kinase 47 U/L     Valproic Acid Level, Total [[id number]]  (Abnormal) Collected:  04/19/18 2334    Specimen:  Blood Updated:  04/20/18 0002     Valproic Acid 27.3 (L) mcg/mL     Protime-INR [[id number]]  (Normal) Collected:  04/19/18 2334    Specimen:  Blood Updated:  04/19/18 2359     Protime 11.3 Seconds      INR 1.01    Blood Gas, Arterial [[id number]]  (Abnormal) Collected:  04/19/18 2354    Specimen:  Arterial Blood Updated:  04/19/18 2353     Site Arterial: left radial     Gonzalo's Test Positive     pH, Arterial 7.341 pH units      pCO2, Arterial 44.4 mm Hg      pO2, Arterial 80.2 mm Hg      HCO3,  Arterial 24.0 mmol/L      Base Excess, Arterial -1.8 mmol/L      O2 Saturation, Arterial 95.7 %      Hemoglobin, Blood Gas 10.4 (L) g/dL      Barometric Pressure for Blood Gas 742 mmHg      Modality BiPap     FIO2 21 %            Radiology:    Imaging Results (last 24 hours)     Procedure Component Value Units Date/Time    MRI Brain Without Contrast [[id number]] Collected:  04/20/18 1136     Updated:  04/20/18 1305    Narrative:       PROCEDURE: MRI BRAIN WO CONTRAST-     HISTORY: Syncope/fainting     PROCEDURE: Multiplanar multisequence imaging of the brain was performed  without the use of intravenous contrast.     COMPARISON: None.     FINDINGS: There is generalized cerebral volume loss. No significant  white matter lesions are identified. There is no mass, mass effect or  midline shift. There is no hydrocephalus. There are no areas of  restricted diffusion.      The midbrain, arelis, cerebellum and craniocervical junction are  unremarkable. The sella and pituitary gland are within normal limits.  The major intracranial vasculature demonstrates the expected flow  related signal. There is mucosal thickening in the left maxillary sinus  and there is a small mastoid effusions.       Impression:       No acute intracranial abnormality.             This report was finalized on 4/20/2018 11:38 AM by Leonela Tapia M.D..    XR Abdomen KUB [[id number]] Collected:  04/20/18 1117     Updated:  04/20/18 1120    Narrative:       PROCEDURE: XR ABDOMEN KUB-     HISTORY: PRE MRI     COMPARISON: None.     FINDINGS: An AP view of the abdomen and pelvis demonstrates an  unremarkable bowel gas pattern with no evidence of obstruction. There is  stool throughout the colon which may reflect constipation. There is no  evidence of an implanted device or radiopaque foreign body.       Impression:       No radiopaque foreign bodies.             This report was finalized on 4/20/2018 11:18 AM by Leonela Tapia M.D..    XR Skull  Lateral & Ap [[id number]] Collected:  04/20/18 1116     Updated:  04/20/18 1119    Narrative:       PROCEDURE: XR SKULL LATERAL AND AP-     HISTORY: PRE MRI     FINDINGS:  2 views were obtained. There is an opacity in the left  maxillary sinus which may reflect mucosal thickening. There is no  evidence of air-fluid level. There is no evidence of an acute, displaced  fracture or dislocation of the visualized bony architecture.   There are  no radiopaque foreign bodies.       Impression:       No radiopaque foreign bodies.            This report was finalized on 4/20/2018 11:17 AM by Leonela Tapia M.D..    XR Chest 1 View [[id number]] Collected:  04/20/18 0806     Updated:  04/20/18 0809    Narrative:       PROCEDURE: XR CHEST 1 VW-     HISTORY: respiratory failure, altered mentation, COPD     COMPARISON: None.     FINDINGS: The heart is normal in size. The mediastinum is unremarkable.  There are low lung volumes. No focal opacities were effusions are  identified. There is no pneumothorax.  There are no acute osseous  abnormalities.           Impression:       No acute cardiopulmonary process.     Continued followup is recommended.     This report was finalized on 4/20/2018 8:07 AM by Leonela Tapia M.D..          Medication Review:       aspirin 81 mg Oral Daily   enoxaparin 40 mg Subcutaneous Q24H   guaiFENesin 1,200 mg Oral Q12H   ipratropium-albuterol 3 mL Nebulization Q6H - RT   montelukast 10 mg Oral Nightly   piperacillin-tazobactam 3.375 g Intravenous Q8H   valproate sodium 500 mg Intravenous Q12H   vancomycin 1,500 mg Intravenous Q24H         dextrose 5 % and sodium chloride 0.45 % 125 mL/hr Last Rate: 125 mL/hr (04/20/18 0750)   Pharmacy to dose vancomycin     Pharmacy Consult         Assessment/Plan     Problem List Items Addressed This Visit     None      Visit Diagnoses    None.             Sepsis affecting skin, with shock.    Dental caries    Acute metabolic encephalopathy, may be related to  sepsis versus medication.  This is multifactorial.    Acute respiratory failure with hypercapnia, resolved   schizophrenia    Chronic hypertension    Urinary retention    Cellulitis of leg, right  Hyperkalemia  Suspect LARISSA  CKD baseline creatinine 1.9-2.2  Hypothermia, improved  Bradycardia, improved  Possible fall prior to admission, unwitnessed and found down on floor  Polypharmacy  Leukopenia  Chronic thrombocytopenia  Chronic tremors  Debility  COPD  Peripheral vascular disease       Plan:    We will hold Seroquel and Risperdal at the present time.  Give Geodon when necessary.  MRI was ordered this morning which was negative.  We'll check ammonia, TSH, B12, folate.  Recheck potassium.  ABG was checked as well, patient is no longer hypercapnic.  He with vancomycin and Zosyn for the cellulitis on the leg.  Patient may have a dental infection as well.  Continue on IV fluids.  We'll check lab work in the a.m. recheck urinalysis.  Continue to monitor in the intensive care unit.  Wear BiPAP at night.  Would add back antipsychotics as the patient wakes up.  We'll place him on IV Depakote at the present time.  Depakote level was within normal limits.  Continue to hold olanzapine, trazodone, Benadryl, hydroxyzine.  These may need to be added back slowly as the patient wakes up.  Will order renal ultrasound.  Patient is a vargas of the [facility] and is a full code.  Further recommendations will depend on the clinical course.    Gus Marie,   04/20/18  1:30 PM

## 2018-04-20 NOTE — PLAN OF CARE
Problem: Patient Care Overview  Goal: Plan of Care Review  Outcome: Ongoing (interventions implemented as appropriate)      Problem: Sepsis/Septic Shock (Adult)  Goal: Signs and Symptoms of Listed Potential Problems Will be Absent, Minimized or Managed (Sepsis/Septic Shock)  Outcome: Ongoing (interventions implemented as appropriate)   04/20/18 0254   Goal/Outcome Evaluation   Problems Assessed (Sepsis) all   Problems Present (Sepsis) (IMPROVEMENT SEEN SEE VITAL SIGNS)       Problem: Restraint, Nonbehavioral (Nonviolent)  Goal: Rationale and Justification  Outcome: Outcome(s) achieved Date Met: 04/20/18 04/20/18 0254   Restraint, Nonbehavioral (Nonviolent)   Rationale and Justification prevent harm to self;prevent line/tube removal;failure of less restrictive safety measures     Goal: Nonbehavioral (Nonviolent) Restraint: Absence of Injury/Harm  Outcome: Ongoing (interventions implemented as appropriate)      Problem: Fall Risk (Adult)  Goal: Identify Related Risk Factors and Signs and Symptoms  Outcome: Outcome(s) achieved Date Met: 04/20/18 04/20/18 0254   Fall Risk (Adult)   Related Risk Factors (Fall Risk) environment unfamiliar;sleep pattern alteration;sensory deficits;neuro disease/injury;inadequate lighting;homeostatic imbalance;history of falls;gait/mobility problems;fatigue/slow reaction;depression/anxiety;culprit medication(s)   Signs and Symptoms (Fall Risk) presence of risk factors     Goal: Absence of Fall  Outcome: Ongoing (interventions implemented as appropriate)   04/20/18 0254   Fall Risk (Adult)   Absence of Fall making progress toward outcome  (NO FALLS THIS SHIFT)       Problem: Skin Injury Risk (Adult)  Goal: Identify Related Risk Factors and Signs and Symptoms  Outcome: Outcome(s) achieved Date Met: 04/20/18 04/20/18 0254   Skin Injury Risk (Adult)   Related Risk Factors (Skin Injury Risk) advanced age;body weight extremes;cognitive impairment;critical care admission;edema;fluid intake  inadequate;infection;mechanical forces;medical devices;mobility impaired;moisture;nutritional deficiencies;tissue perfusion altered     Goal: Skin Health and Integrity  Outcome: Ongoing (interventions implemented as appropriate)   04/20/18 0254   Skin Injury Risk (Adult)   Skin Health and Integrity making progress toward outcome       Problem: NPPV/CPAP (Adult)  Goal: Signs and Symptoms of Listed Potential Problems Will be Absent, Minimized or Managed (NPPV/CPAP)  Outcome: Ongoing (interventions implemented as appropriate)   04/20/18 0254   Goal/Outcome Evaluation   Problems Assessed (NPPV/CPAP) all   Problems Present (NPPV/CPAP) dry mucous membranes

## 2018-04-20 NOTE — H&P
[facility]   HISTORY AND PHYSICAL      Name:  Braulio Schwartz   Age:  67 y.o.  Sex:  male  :  1950  MRN:  [id number]   Visit Number:  [id number]  Admission Date:  2018  Date Of Service:  18  Primary Care Physician:  Yandel Steward MD    Chief Complaint: altered mental status        History Of Presenting Illness:  Patient is a 67-year-old  gentleman with paranoid schizophrenia and psychosis, chronic hypertension, COPD, CKD, vargas of the [facility], who presented to [facility] emergency room today for altered mental status.  The patient cannot provide history so it is obtained from the medical chart.  Reportedly the nursing home bound the patient asleep on the floor, unknown if he fell or just lay down to sleep.  His temperature was found to be 90° and EMS was called to transfer the patient to the emergency room.  Normally, the patient enjoys reading books and can walk some without assistance.  He would not answer any questions and was found to be extremely hypersomnolent but did respond to pain and did attempt to speak but his words were garbled.  He was having apnea spells and was placed on a CPAP.    In the emergency room, the patient's vital signs show initially hypotension in the 70s.  His temperature was 90.  He was initially started on 2 L bolus with improvement of his systolic blood pressure to the 80s and bradycardia in 50s.  He was then given a third liter for sepsis weight-based bolus.  He was placed on a bear hugger/warming blanket.  He was changed from CPAP to BiPAP.  Initial ABG was pH 7.26/64/116/28.2.  With adjustment of settings, repeat ABG showed pH 7.29/83/115 on 21% FiO2 with BiPAP settings of 20/8.  The patient started to become more awake and was pulling off the CPAP.  He was started on bilateral soft wrist restraints.  A Ruby catheter was placed within the immediate 1800 mL's out +375 prior to transfer.  His EKG showed sinus 52 with  left bundle branch block with nonspecific ST changes.GCS documented as 10. He had an area of right anterior lower extremity with warmth, erythema no drainage with cellulitis. Vancomycin and Zosyn were initiated after blood cultures were obtained.  He was given Narcan without improvement.  CT head, chest, and abdomen were negative. Labs showed WBC low at 3, hemoglobin 10, creatinine 2.0, potassium 5.9. UDS positive for TCA. Lactic acid 1.8, CK normal. UA negative. Baseline hemoglobin was 10 and baseline creatinine is 1.9-2.2. His blood pressure improved to 90s systolic and heart rate 80s. Patient required ICU care, so he was transferred to hospitalist service at [facility].     On arrival, blood pressure 116 systolic with heart rate 80s sinus. He is no longer in restraints, not currently pulling off the mask, but was starting to pull at landers catheter and blankets readjusted. He was awake, quickly falling back to sleep, tried to speak but speech was garbled. He was trying to follow commands for muscle strength testing. He moves his head and opens eyes easily to your voice. Temp has improved to 97. He is currently getting his 4th liter normal saline.         Review Of Systems: Unable to obtain from patient due to encephalopathy     General ROS: Patient denies any fevers, chills or loss of consciousness. Denies  generalized weakness.   Psychological ROS: Denies any hallucinations and delusions.  Ophthalmic ROS: Denies any diplopia or transient loss of vision.  ENT ROS: Denies sore throat, nasal congestion or ear pain.   Allergy and Immunology ROS: Denies rash or itching.  Hematological and Lymphatic ROS: Denies neck swelling or easy bleeding.  Endocrine ROS: Denies any recent unintentional weight gain or loss.  Breast ROS: Denies any pain or swelling.  Respiratory ROS: Denies cough or shortness of breath.   Cardiovascular ROS: Denies chest pain or palpitations. No history of exertional chest  pain.  Gastrointestinal ROS: Denies nausea and vomiting. Denies any abdominal pain. No diarrhea.  Genito-Urinary ROS: Denies dysuria or hematuria.  Musculoskeletal ROS: denies chronic back pain. No muscle pain. No calf pain.   Neurological ROS: Denies any focal weakness. No loss of consciousness. Denies any numbness. Denies neck pain.   Dermatological ROS: Denies any redness or pruritis.       Past Medical History: CK D, COPD, suspected sleep apnea, chronic hypertension, anxiety, dysphagia, history of sepsis, generalized muscle weakness, unspecified psychosis with auditory hallucinations, paranoid schizophrenia, vitamin D deficiency, thrombocytopenia      Past Surgical history: Right knee surgery replacement      Social History:  Pediatric History   Patient Guardian Status   • Not on file.     Other Topics Concern   • Not on file     Social History [facility]. Lives at [facility]. Unknown history of tobacco, alcohol, or drug use       Family History:Unknown    No family history on file.    Allergies:      Haldol [haloperidol] and Ibuprofen    Home Medications: See list from nursing facility:  Norvasc, Provera, aspirin, Singulair, vitamin D, benztropine, iron, Seroquel, Risperdal, divalproex, hydroxyzine, metoprolol, olanzapine, trazodone, DuoNeb    Prior to Admission Medications     None             ED Medications:See history    Medications - No data to display    Vital Signs:    Temp:  [97.4 °F (36.3 °C)] 97.4 °F (36.3 °C)  Heart Rate:  [67-69] 67  Resp:  [17-18] 17  BP: (111-114)/(78-79) 111/78                   04/19/18 2201  --  67  17  111/78  NPPV/NIV  95   04/19/18 2153  97.4 (36.3)  69  18  114/79  NPPV/NIV  93       Physical Exam:      General Appearance:    Alert and cooperative as possible, no acute distress, limited speech garbled, on continuous bipap without visible apnea   Head:    Atraumatic and normocephalic, without obvious defect.   Eyes:            PERRLA,  conjunctivae and sclerae normal, no icterus. No pallor. Extra-occular movements are within normal limits.   Ears:    Ears appear intact with no abnormalities noted.   Throat:   No oral lesions, no thrush, oral mucosa dry   Neck:   Supple, trachea midline, no thyromegaly, no carotid bruit.   Back:     No kyphoscoliosis present. No tenderness to palpation,   range of motion normal.   Lungs:     Chest shape is normal. Breath sounds heard bilaterally equally reduced.  No crackles or wheezing. No Pleural rub or bronchial breathing.      Heart:    Normal S1 and S2, no murmur, no gallop, no rub. No JVD   Abdomen:     Normal bowel sounds, no masses, no organomegaly. Soft        non-tender, non-distended, no guarding, no rebound                Tenderness, obese   Extremities:   Moves all extremities well,trace edema, no cyanosis, no             clubbing   Pulses:   Pulses palpable and equal bilaterally   Skin:   No bleeding, bruising or rash ; right angie medial lower leg warm to touch and erythematous without discharge; diffuse feet/lower legs/toes appear to have chronic vascular changes with diffuse flaking and bilateral diffuse toenail onychomycosis   Lymph nodes:   No palpable adenopathy   Neurologic:  No current tremor, increased agitation, sensation intact, Motor power is 4/5 and equal bilaterally. Difficult to test due to cooperation difficulty due to sleepiness       EKG:    See history    Labs: see history     Lab Results (last 24 hours)     ** No results found for the last 24 hours. **          Radiology:    Imaging Results (last 72 hours)     ** No results found for the last 72 hours. **          Assessment:  Principal Problem:    Sepsis affecting skin  Active Problems:    Acute metabolic encephalopathy    Acute respiratory failure with hypercapnia    Other schizophrenia    Chronic hypertension    Urinary retention    Cellulitis of leg, right  Hyperkalemia  Suspect OCA  CKD baseline creatinine  1.9-2.2  Hypothermia, improved  Bradycardia, improved  Possible fall prior to admission, unwitnessed and found down on floor  Polypharmacy  Leukopenia  Chronic thrombocytopenia  Chronic tremors  Debility  COPD  Peripheral vascular disease          Plan:    Admit to ICU. Continue normal saline. Monitor hypotension closely. Currently he is improved and will not require vasopressor therapy. Consider MRI tomorrow if not further clinically improved.  He takes many psychotic medications. With hypotension, sedation, urinary retention, will reduce antipsychotics for now until clinically improved. Discussed with nursing to closely monitor. Will hold off and not restart the restraints at this time. Keep the newly placed landers catheter. He will no longer require warming blanket now. Continue Zosyn, Vancomycin. Repeat labs and monitor potassium ,electrolytes.     Hold off on the metoprolol and norvasc and benztropine tonight. Consider restarting lower dose metoprolol tomorrow, depending on clinical improvement.    Hold off on the olanzepine, trazodone, benadryl, and hydroxyzine tonight. Reduce the risperdal for renal failure. Reduce the seroquel with CKD and hypersomnolence. Further daily titration of his medications will likely be needed.     Follow cultures from Tamika and Primitivo, as well as final radiologic readings.     Prognosis poor. Condition guarded to critical. He is vargas of state. Full code status ordered. Pepcid. Lovenox. Expect 2-3 days will be needed.     Berenice Marie DO  04/19/18  10:22 PM    Addendum: Patient pulling at mask and tubes and lines. I examined him at bedside. Bilateral soft wrist restraints placed.

## 2018-04-20 NOTE — PLAN OF CARE
Problem: Patient Care Overview  Goal: Plan of Care Review  Outcome: Ongoing (interventions implemented as appropriate)      Problem: Sepsis/Septic Shock (Adult)  Goal: Signs and Symptoms of Listed Potential Problems Will be Absent, Minimized or Managed (Sepsis/Septic Shock)  Outcome: Ongoing (interventions implemented as appropriate)      Problem: Fall Risk (Adult)  Goal: Absence of Fall  Outcome: Ongoing (interventions implemented as appropriate)

## 2018-04-20 NOTE — PROGRESS NOTES
Adult Nutrition  Assessment/PES    Patient Name:  Braulio Schwartz  YOB: 1950  MRN: [id number]  Admit Date:  4/19/2018    Assessment Date:  4/20/2018    Comments:    Rec #1: Consider adding cardiac and renal modifications to current diet order; Encouraging intake.  Rec #2: Consider renal MVI with minerals daily.   Will follow patient. Consult RD PRN.           Adult Nutrition Assessment     Row Name 04/20/18 1347       Nutrition Prescription PO    Current PO Diet (P)  Soft Texture    Texture (P)  Ground    Fluid Consistency (P)  Thin    Row Name 04/20/18 1346       Calculation Measurements    Weight Used For Calculations (P)  87.9 kg (193 lb 12.6 oz)       Estimated/Assessed Needs    Additional Documentation (P)  Fluid Requirements (Group);Reading-St. Jeor Equation (Group);Protein Requirements (Group);Calorie Requirements (Group)       Calorie Requirements    Estimated Calorie Need Method (P)  Reading-St Jeor    Estimated Calorie Requirement Comment (P)  ~2965-3558       KCAL/KG    14 Kcal/Kg (kcal) (P)  1230.6    15 Kcal/Kg (kcal) (P)  1318.5    18 Kcal/Kg (kcal) (P)  1582.2    20 Kcal/Kg (kcal) (P)  1758    25 Kcal/Kg (kcal) (P)  2197.5    30 Kcal/Kg (kcal) (P)  2637    35 Kcal/Kg (kcal) (P)  3076.5    40 Kcal/Kg (kcal) (P)  3516    45 Kcal/Kg (kcal) (P)  3955.5    50 Kcal/Kg (kcal) (P)  4395       Reading-St. Jeor Equation    RMR (Reading-St. Jeor Equation) (P)  1681.13       Protein Requirements    Est Protein Requirement Amount (gms/kg) (P)  0.6 gm protein   ~53-70    Estimated Protein Requirements (gms/day) (P)  52.74       Fluid Requirements    Estimated Fluid Requirement Method (P)  Boston-Segar Formula    Emigdio-Segar Method (over 20 kg) (P)  3258       PO Evaluation    Number of Days PO Intake Evaluated (P)  Insufficient Data    Row Name 04/20/18 1348       Labs/Procedures/Meds    Lab Results Reviewed (P)  reviewed, pertinent    Lab Results Comments (P)  High: Na, K+, Chloride, Cr, BUN   "Low: GLUC, Ca, GFR, TP, Alb        Physical Findings    Skin (P)  other (see comments)   right lower leg blisters    Row Name 04/20/18 1344       Anthropometrics    Height Method (P)  stated    Height (P)  180.3 cm (71\")    Current Weight Method (P)  measured    Weight (P)  87.9 kg (193 lb 12.6 oz)       Ideal Body Weight (IBW)    Ideal Body Weight (IBW) (kg) (P)  79.27    % Ideal Body Weight (P)  110.88       Body Mass Index (BMI)    BMI (kg/m2) (P)  27.08    BMI Assessment (P)  BMI 25-29.9: overweight       IBW Adjustment, Para/Tetraplegia    5% Adjustment, Para (IBW) (P)  75.31    10% Adjustment, Para (IBW) (P)  71.34    10% Adjustment, Tetra (IBW) (P)  71.34    15% Adjustment, Tetra (IBW) (P)  67.38    Row Name 04/20/18 1338       Reason for Assessment    Reason For Assessment (P)  diagnosis/disease state    Diagnosis (P)  neurologic conditions;renal disease;pulmonary disease;cardiac disease;other (see comments)   Sepsis, hypokalemia, PVD, schizophrenia, psychosis with auditory halllucinations, HTN, AMS, dysphagia, Vit D def, resp failure, metabolic encephalopathy, cellulitis, leukopenia, tremor, susp. sleep apnea, thrombocytopenia     Identified At Risk by Screening Criteria (P)  BMI          Problem/Interventions:        Problem 1     Row Name 04/20/18 1348       Nutrition Diagnoses Problem 1    Problem 1 (P)  Overweight/Obesity    Etiology (related to) (P)  Factors Affecting Nutrition    Food Habit/Preferences (P)  Large Meals    Signs/Symptoms (evidenced by) (P)  BMI    BMI (P)  25 - 29.9            Problem 2     Row Name 04/20/18 1349       Nutrition Diagnoses Problem 2    Problem 2 (P)  Altered Nutrition Related to Labs    Etiology (related to) (P)  Medical Diagnosis    Infectious Disease (P)  Sepsis    Pulmonary/Critical Care (P)  COPD    Renal (P)  CKD    Signs/Symptoms (evidenced by) (P)  Biochemical    Labs Reviewed (P)  Done    Specific Labs Noted (P)  " Albumin;BUN;Chloride;Creatinine;Glucose;GFR;K+;Na+            Problem 3     Row Name 04/20/18 1357       Nutrition Diagnoses Problem 3    Problem 3 (P)  Swallowing Difficulty    Etiology (related to) (P)  Medical Diagnosis    Neurological (P)  Dysphagia    Signs/Symptoms (evidenced by) (P)  Other (comment)   diet soft texture; ground                Intervention Goal     Row Name 04/20/18 1351       Intervention Goal    General (P)  Meet nutritional needs for age/condition;Disease management/therapy;Improved nutrition related lab(s)    PO (P)  Meet estimated needs;Establish PO;PO intake (%)    PO Intake % (P)  50 %    Weight (P)  No significant weight loss            Nutrition Intervention     Row Name 04/20/18 1351       Nutrition Intervention    RD/Tech Action (P)  Encourage intake;Follow Tx progress;Recommend/ordered    Recommended/Ordered (P)  Diet            Nutrition Prescription     Row Name 04/20/18 1354       Nutrition Prescription PO    PO Prescription (P)  Begin/change diet   consider adding renal and cardiac modifications to current diet order    Begin/Change Diet to (P)  Soft Texture    Texture (P)  Ground    Fluid Consistency (P)  Thin    Common Modifiers (P)  Cardiac;Renal    New PO Prescription Ordered? (P)  No, recommended       Other Orders    Supplement (P)  Vitamin mineral supplement    Supplement Ordered? (P)  No, recommended            Education/Evaluation     Row Name 04/20/18 4059       Education    Education (P)  Education not appropriate at this time    Please explain (P)  Other (comment)   patient very somnolent - schizophrenia, pyschosis with auditory hallucinations       Monitor/Evaluation    Monitor (P)  Per protocol;I&O;PO intake;Pertinent labs;Weight        Electronically signed by:  Sabi Jaramillo  04/20/18 1:58 PM

## 2018-04-21 LAB
ALBUMIN SERPL-MCNC: 2.4 G/DL (ref 3.5–5)
ANION GAP SERPL CALCULATED.3IONS-SCNC: 17 MMOL/L (ref 10–20)
BASOPHILS # BLD AUTO: 0.03 10*3/MM3 (ref 0–0.2)
BASOPHILS NFR BLD AUTO: 0.7 % (ref 0–2.5)
BUN BLD-MCNC: 26 MG/DL (ref 7–20)
BUN/CREAT SERPL: 10.8 (ref 6.3–21.9)
CALCIUM SPEC-SCNC: 8.6 MG/DL (ref 8.4–10.2)
CHLORIDE SERPL-SCNC: 118 MMOL/L (ref 98–107)
CO2 SERPL-SCNC: 26 MMOL/L (ref 26–30)
CREAT BLD-MCNC: 2.4 MG/DL (ref 0.6–1.3)
DEPRECATED RDW RBC AUTO: 50.1 FL (ref 37–54)
EOSINOPHIL # BLD AUTO: 0.04 10*3/MM3 (ref 0–0.7)
EOSINOPHIL NFR BLD AUTO: 1 % (ref 0–7)
ERYTHROCYTE [DISTWIDTH] IN BLOOD BY AUTOMATED COUNT: 14.2 % (ref 11.5–14.5)
GFR SERPL CREATININE-BSD FRML MDRD: 27 ML/MIN/1.73
GLUCOSE BLD-MCNC: 92 MG/DL (ref 74–98)
HCT VFR BLD AUTO: 30.9 % (ref 42–52)
HGB BLD-MCNC: 9.7 G/DL (ref 14–18)
IMM GRANULOCYTES # BLD: 0.19 10*3/MM3 (ref 0–0.06)
IMM GRANULOCYTES NFR BLD: 4.7 % (ref 0–0.6)
LYMPHOCYTES # BLD AUTO: 1.58 10*3/MM3 (ref 0.6–3.4)
LYMPHOCYTES NFR BLD AUTO: 39.3 % (ref 10–50)
MAGNESIUM SERPL-MCNC: 2 MG/DL (ref 1.6–2.3)
MCH RBC QN AUTO: 30.4 PG (ref 27–31)
MCHC RBC AUTO-ENTMCNC: 31.4 G/DL (ref 30–37)
MCV RBC AUTO: 96.9 FL (ref 80–94)
MONOCYTES # BLD AUTO: 0.77 10*3/MM3 (ref 0–0.9)
MONOCYTES NFR BLD AUTO: 19.2 % (ref 0–12)
NEUTROPHILS # BLD AUTO: 1.41 10*3/MM3 (ref 2–6.9)
NEUTROPHILS NFR BLD AUTO: 35.1 % (ref 37–80)
NRBC BLD MANUAL-RTO: 1.7 /100 WBC (ref 0–0)
PHOSPHATE SERPL-MCNC: 4.6 MG/DL (ref 2.5–4.5)
PLATELET # BLD AUTO: 85 10*3/MM3 (ref 130–400)
PMV BLD AUTO: 10.7 FL (ref 6–12)
POTASSIUM BLD-SCNC: 5 MMOL/L (ref 3.5–5.1)
RBC # BLD AUTO: 3.19 10*6/MM3 (ref 4.7–6.1)
SODIUM BLD-SCNC: 156 MMOL/L (ref 137–145)
WBC NRBC COR # BLD: 4.02 10*3/MM3 (ref 4.8–10.8)

## 2018-04-21 PROCEDURE — 94660 CPAP INITIATION&MGMT: CPT

## 2018-04-21 PROCEDURE — 94799 UNLISTED PULMONARY SVC/PX: CPT

## 2018-04-21 PROCEDURE — 82607 VITAMIN B-12: CPT | Performed by: INTERNAL MEDICINE

## 2018-04-21 PROCEDURE — 25010000002 ENOXAPARIN PER 10 MG: Performed by: FAMILY MEDICINE

## 2018-04-21 PROCEDURE — 25010000002 VANCOMYCIN PER 500 MG: Performed by: FAMILY MEDICINE

## 2018-04-21 PROCEDURE — 99233 SBSQ HOSP IP/OBS HIGH 50: CPT | Performed by: INTERNAL MEDICINE

## 2018-04-21 PROCEDURE — 25010000002 ZIPRASIDONE MESYLATE PER 10 MG: Performed by: INTERNAL MEDICINE

## 2018-04-21 PROCEDURE — 25010000002 PIPERACILLIN SOD-TAZOBACTAM PER 1 G: Performed by: FAMILY MEDICINE

## 2018-04-21 PROCEDURE — 85025 COMPLETE CBC W/AUTO DIFF WBC: CPT | Performed by: INTERNAL MEDICINE

## 2018-04-21 PROCEDURE — 82746 ASSAY OF FOLIC ACID SERUM: CPT | Performed by: INTERNAL MEDICINE

## 2018-04-21 PROCEDURE — 80069 RENAL FUNCTION PANEL: CPT | Performed by: INTERNAL MEDICINE

## 2018-04-21 PROCEDURE — 83735 ASSAY OF MAGNESIUM: CPT | Performed by: INTERNAL MEDICINE

## 2018-04-21 RX ORDER — DEXTROSE MONOHYDRATE 50 MG/ML
125 INJECTION, SOLUTION INTRAVENOUS CONTINUOUS
Status: ACTIVE | OUTPATIENT
Start: 2018-04-21 | End: 2018-04-22

## 2018-04-21 RX ADMIN — NOREPINEPHRINE BITARTRATE 0.02 MCG/KG/MIN: 1 INJECTION INTRAVENOUS at 11:11

## 2018-04-21 RX ADMIN — DEXTROSE MONOHYDRATE 125 ML/HR: 50 INJECTION, SOLUTION INTRAVENOUS at 06:19

## 2018-04-21 RX ADMIN — ENOXAPARIN SODIUM 40 MG: 40 INJECTION SUBCUTANEOUS at 01:00

## 2018-04-21 RX ADMIN — IPRATROPIUM BROMIDE AND ALBUTEROL SULFATE 3 ML: .5; 3 SOLUTION RESPIRATORY (INHALATION) at 07:40

## 2018-04-21 RX ADMIN — TAZOBACTAM SODIUM AND PIPERACILLIN SODIUM 3.38 G: 375; 3 INJECTION, SOLUTION INTRAVENOUS at 11:12

## 2018-04-21 RX ADMIN — IPRATROPIUM BROMIDE AND ALBUTEROL SULFATE 3 ML: .5; 3 SOLUTION RESPIRATORY (INHALATION) at 01:06

## 2018-04-21 RX ADMIN — IPRATROPIUM BROMIDE AND ALBUTEROL SULFATE 3 ML: .5; 3 SOLUTION RESPIRATORY (INHALATION) at 12:49

## 2018-04-21 RX ADMIN — ZIPRASIDONE MESYLATE 10 MG: 20 INJECTION, POWDER, LYOPHILIZED, FOR SOLUTION INTRAMUSCULAR at 23:58

## 2018-04-21 RX ADMIN — VANCOMYCIN HYDROCHLORIDE 1500 MG: 500 INJECTION, POWDER, LYOPHILIZED, FOR SOLUTION INTRAVENOUS at 22:24

## 2018-04-21 RX ADMIN — VALPROATE SODIUM 500 MG: 100 INJECTION, SOLUTION INTRAVENOUS at 15:08

## 2018-04-21 RX ADMIN — IPRATROPIUM BROMIDE AND ALBUTEROL SULFATE 3 ML: .5; 3 SOLUTION RESPIRATORY (INHALATION) at 19:32

## 2018-04-21 RX ADMIN — TAZOBACTAM SODIUM AND PIPERACILLIN SODIUM 3.38 G: 375; 3 INJECTION, SOLUTION INTRAVENOUS at 17:33

## 2018-04-21 RX ADMIN — VALPROATE SODIUM 500 MG: 100 INJECTION, SOLUTION INTRAVENOUS at 03:18

## 2018-04-21 RX ADMIN — TAZOBACTAM SODIUM AND PIPERACILLIN SODIUM 3.38 G: 375; 3 INJECTION, SOLUTION INTRAVENOUS at 02:24

## 2018-04-21 NOTE — PLAN OF CARE
Problem: NPPV/CPAP (Adult)  Intervention: Monitor and Manage Anxiety Related to NPPV/CPAP   04/20/18 2054   Interventions   Trust Relationship/Rapport care explained;choices provided     Intervention: Prevent Aspiration During Therapy   04/20/18 2054   Positioning   Head of Bed (HOB) HOB elevated       Goal: Signs and Symptoms of Listed Potential Problems Will be Absent, Minimized or Managed (NPPV/CPAP)  Outcome: Ongoing (interventions implemented as appropriate)   04/20/18 2054   Goal/Outcome Evaluation   Problems Assessed (NPPV/CPAP) hypoxia/hypoxemia;dry mucous membranes;situational response;skin breakdown   Problems Present (NPPV/CPAP) none

## 2018-04-21 NOTE — PLAN OF CARE
Problem: Patient Care Overview  Goal: Plan of Care Review  Outcome: Ongoing (interventions implemented as appropriate)   04/21/18 1728   Coping/Psychosocial   Plan of Care Reviewed With patient   Plan of Care Review   Progress no change       Problem: NPPV/CPAP (Adult)  Goal: Signs and Symptoms of Listed Potential Problems Will be Absent, Minimized or Managed (NPPV/CPAP)  Outcome: Ongoing (interventions implemented as appropriate)   04/21/18 1728   Goal/Outcome Evaluation   Problems Assessed (NPPV/CPAP) hypoxia/hypoxemia   Problems Present (NPPV/CPAP) hypoxia/hypoxemia

## 2018-04-21 NOTE — PROGRESS NOTES
[facility]    Name:  Braulio Schwartz   Age:  67 y.o.  Sex:  male  :  1950  MRN:  [id number]   Visit Number:  [id number]  Admission Date:  2018  Date Of Service:  18  Primary Care Physician:  Yandel Steward MD     LOS: 2 days :  Patient Care Team:  Yandel Steward MD as PCP - General (Internal Medicine):    History taken from:     chart    Chief Complaint:      Metabolic encephalopathy    Subjective     Interval History:     Patient seen today.  Reviewed history and physical, x-rays, lab work, nursing care on this patient.    Patient came from [facility] emergency room via a nursing home as he was found asleep on the floor.  He was noted to be hypothermic with a temperature 90.  He was given  3 L boluses his systolic blood pressure was in the 80s.  His blood pressure is now stable.  He was also noted to have a PCO2 of 64 on admission.  He was placed on BiPAP, PCO2 is now normal.  He was given multiple doses of Narcan without any result.  Urine drug screen was positive for tricyclic antidepressants.  He also had urinary retention.  Ruby catheter was placed for this.  Will check a renal ultrasound.  He was placed on a bear hugger/warming blanket.  His temperature has improved.  Vancomycin and Zosyn were initiated.  He appears to have a right anterior lower extremity redness and swelling.  CT of the head, chest, abdomen were all negative yesterday.  Lactic acid was normal.    He was initially in restraints but is not requiring this at present.  He is very somnolent, though awakens to voice for about 5 seconds.  MRI was negative.  He is on many medications that could sedate him including several antipsychotics, will hold all of these.  They can be added back and his mental status improves.  Patient is also noted to have significant dental caries, this could be the source of his infection as well.  Blood cultures are pending at present.   He has been afebrile since he has been here.  Unable to obtain any information from the patient given his mental status at present.  Today the patient has been seen by me and he is still very lethargic and the would open eyes on stimulation but the goes back to sleep.  No other verbal response    Also stated that his pressures are still today morning have been in the 80s and he is on has enough IV fluids and hydration and still staying on the lower range.    Review of Systems:     Unable to obtain any information from the patient given his mental status at present.      Objective     Vital Signs:    Temp:  [97 °F (36.1 °C)-98.9 °F (37.2 °C)] 97.5 °F (36.4 °C)  Heart Rate:  [67-90] 72  Resp:  [10-19] 18  BP: ()/(48-88) 91/56  FiO2 (%):  [21 %] 21 %    Physical Exam:      General Appearance:    Somnolent.  Response to pain and voice at present.     Head:    Normocephalic, without obvious abnormality, atraumatic   Eyes:            Lids and lashes normal, conjunctivae and sclerae normal, no   icterus, no pallor, corneas clear, PERRLA   Ears:    Ears appear intact with no abnormalities noted   Throat:   No oral lesions, no thrush, oral mucosa moist   Neck:   No adenopathy, supple, trachea midline, no thyromegaly, no   carotid bruit, no JVD   Back:     No kyphosis present, no scoliosis present, no skin lesions,      erythema or scars, no tenderness to percussion or                   palpation,   range of motion normal   Lungs:     Clear to auscultation,respirations regular, even and                  unlabored    Heart:    Regular rhythm and normal rate, normal S1 and S2, no            murmur, no gallop, no rub, no click   Chest Wall:    No abnormalities observed   Abdomen:     Normal bowel sounds, no masses, no organomegaly, soft        non-tender, non-distended, no guarding, no rebound                tenderness   Rectal:     Deferred   Extremities:   Moves all extremities,no edema, no cyanosis,             Redness  noted on anterior of right shin.     Pulses:   Pulses palpable and equal bilaterally   Skin:   No bleeding, bruising or rash   Lymph nodes:   No palpable adenopathy   Neurologic:   Cranial nerves 2 - 12 grossly intact, sensation intact, DTR       present and equal bilaterally        Results Review:      I reviewed the patient's new clinical results.    Labs:    Lab Results (last 24 hours)     Procedure Component Value Units Date/Time    Vitamin B12 [[id number]] Collected:  04/21/18 0436    Specimen:  Blood Updated:  04/21/18 0630    Folate [[id number]] Collected:  04/21/18 0436    Specimen:  Blood Updated:  04/21/18 0630    Renal Function Panel [[id number]]  (Abnormal) Collected:  04/21/18 0436    Specimen:  Blood Updated:  04/21/18 0530     Glucose 92 mg/dL      BUN 26 (H) mg/dL      Creatinine 2.40 (H) mg/dL      Sodium 156 (C) mmol/L      Potassium 5.0 mmol/L      Chloride 118 (H) mmol/L      CO2 26.0 mmol/L      Calcium 8.6 mg/dL      Albumin 2.40 (L) g/dL      Phosphorus 4.6 (H) mg/dL      Anion Gap 17.0 mmol/L      BUN/Creatinine Ratio 10.8     eGFR Non African Amer 27 (L) mL/min/1.73     Narrative:       Abnormal estimated GFR should be followed by more specific studies to confirm end stage chronic renal disease. The equation used for calculation may not be accurate for patients less than 19 years old, greater than 70 years old, patients at extremes of weight, malnutrition, or with acute renal dysfunction.    Magnesium [[id number]]  (Normal) Collected:  04/21/18 0436    Specimen:  Blood Updated:  04/21/18 0529     Magnesium 2.0 mg/dL     CBC & Differential [[id number]] Collected:  04/21/18 0436    Specimen:  Blood Updated:  04/21/18 0504    Narrative:       The following orders were created for panel order CBC & Differential.  Procedure                               Abnormality         Status                     ---------                               -----------         ------                     CBC Auto  Differential[[id number]]        Abnormal            Final result                 Please view results for these tests on the individual orders.    CBC Auto Differential [[id number]]  (Abnormal) Collected:  04/21/18 0436    Specimen:  Blood Updated:  04/21/18 0504     WBC 4.02 (L) 10*3/mm3      RBC 3.19 (L) 10*6/mm3      Hemoglobin 9.7 (L) g/dL      Hematocrit 30.9 (L) %      MCV 96.9 (H) fL      MCH 30.4 pg      MCHC 31.4 g/dL      RDW 14.2 %      RDW-SD 50.1 fl      MPV 10.7 fL      Platelets 85 (L) 10*3/mm3      Neutrophil % 35.1 (L) %      Lymphocyte % 39.3 %      Monocyte % 19.2 (H) %      Eosinophil % 1.0 %      Basophil % 0.7 %      Immature Grans % 4.7 (H) %      Neutrophils, Absolute 1.41 (L) 10*3/mm3      Lymphocytes, Absolute 1.58 10*3/mm3      Monocytes, Absolute 0.77 10*3/mm3      Eosinophils, Absolute 0.04 10*3/mm3      Basophils, Absolute 0.03 10*3/mm3      Immature Grans, Absolute 0.19 (H) 10*3/mm3      nRBC 1.7 (H) /100 WBC     Urinalysis, Microscopic Only - Urine, Clean Catch [[id number]]  (Abnormal) Collected:  04/20/18 1556    Specimen:  Urine from Urine, Clean Catch Updated:  04/20/18 1632     RBC, UA 3-5 (A) /HPF      WBC, UA 6-12 (A) /HPF      Bacteria, UA None Seen /HPF      Squamous Epithelial Cells, UA None Seen /HPF      Hyaline Casts, UA None Seen /LPF      Methodology Manual Light Microscopy    Urinalysis With / Culture If Indicated - Urine, Clean Catch [[id number]]  (Abnormal) Collected:  04/20/18 1556    Specimen:  Urine from Urine, Clean Catch Updated:  04/20/18 1623     Color, UA Straw     Appearance, UA Clear     pH, UA 6.5     Specific Gravity, UA 1.015     Glucose, UA Negative     Ketones, UA Negative     Bilirubin, UA Negative     Blood, UA Trace (A)     Protein, UA Negative     Leuk Esterase, UA Small (1+) (A)     Nitrite, UA Negative     Urobilinogen, UA 0.2 E.U./dL    Urine Culture - Urine, Urine, Clean Catch [[id number]] Collected:  04/20/18 1556    Specimen:  Urine from Urine,  Clean Catch Updated:  04/20/18 1617    TSH [[id number]]  (Normal) Collected:  04/20/18 1352    Specimen:  Blood Updated:  04/20/18 1516     TSH 1.820 mIU/mL     Ammonia [[id number]]  (Abnormal) Collected:  04/20/18 1351    Specimen:  Blood Updated:  04/20/18 1414     Ammonia <9 (L) umol/L     Potassium [[id number]]  (Abnormal) Collected:  04/20/18 1351    Specimen:  Blood Updated:  04/20/18 1412     Potassium 5.4 (H) mmol/L            Radiology:    Imaging Results (last 24 hours)     Procedure Component Value Units Date/Time    US Renal Limited [[id number]] Collected:  04/21/18 0950     Updated:  04/21/18 0950    Narrative:       RENAL ULTRASOUND     INDICATION: Urinary retention.     FINDINGS: The sonographer notes that the examination was technically  difficult with suboptimal visualization of the kidneys. On the provided  images, the kidneys are largely obscured by artifact. Limited  visualization demonstrates no obvious hydronephrosis. Exact size  measurement is difficult due to the limitations but the right kidney  measures approximately 10 cm in length and the left kidney measures  approximately 10.9 cm. No obvious perinephric fluid collection, cyst or  obvious solid renal mass. There is probable renal cortical thinning  bilaterally raising the question of chronic medical renal disease.       Impression:       Limited examination of the kidneys without obvious  hydronephrosis.    MRI Brain Without Contrast [[id number]] Collected:  04/20/18 1136     Updated:  04/20/18 1305    Narrative:       PROCEDURE: MRI BRAIN WO CONTRAST-     HISTORY: Syncope/fainting     PROCEDURE: Multiplanar multisequence imaging of the brain was performed  without the use of intravenous contrast.     COMPARISON: None.     FINDINGS: There is generalized cerebral volume loss. No significant  white matter lesions are identified. There is no mass, mass effect or  midline shift. There is no hydrocephalus. There are no areas of  restricted  diffusion.      The midbrain, arelis, cerebellum and craniocervical junction are  unremarkable. The sella and pituitary gland are within normal limits.  The major intracranial vasculature demonstrates the expected flow  related signal. There is mucosal thickening in the left maxillary sinus  and there is a small mastoid effusions.       Impression:       No acute intracranial abnormality.             This report was finalized on 4/20/2018 11:38 AM by Leonela Tapia M.D..    XR Abdomen KUB [[id number]] Collected:  04/20/18 1117     Updated:  04/20/18 1120    Narrative:       PROCEDURE: XR ABDOMEN KUB-     HISTORY: PRE MRI     COMPARISON: None.     FINDINGS: An AP view of the abdomen and pelvis demonstrates an  unremarkable bowel gas pattern with no evidence of obstruction. There is  stool throughout the colon which may reflect constipation. There is no  evidence of an implanted device or radiopaque foreign body.       Impression:       No radiopaque foreign bodies.             This report was finalized on 4/20/2018 11:18 AM by Leonela Tapia M.D..    XR Skull Lateral & Ap [[id number]] Collected:  04/20/18 1116     Updated:  04/20/18 1119    Narrative:       PROCEDURE: XR SKULL LATERAL AND AP-     HISTORY: PRE MRI     FINDINGS:  2 views were obtained. There is an opacity in the left  maxillary sinus which may reflect mucosal thickening. There is no  evidence of air-fluid level. There is no evidence of an acute, displaced  fracture or dislocation of the visualized bony architecture.   There are  no radiopaque foreign bodies.       Impression:       No radiopaque foreign bodies.            This report was finalized on 4/20/2018 11:17 AM by Leonela Tapia M.D..          Medication Review:       aspirin 81 mg Oral Daily   enoxaparin 40 mg Subcutaneous Q24H   guaiFENesin 1,200 mg Oral Q12H   ipratropium-albuterol 3 mL Nebulization Q6H - RT   montelukast 10 mg Oral Nightly   piperacillin-tazobactam 3.375 g  Intravenous Q8H   valproate sodium 500 mg Intravenous Q12H   vancomycin 1,500 mg Intravenous Q24H         dextrose 125 mL/hr Last Rate: 125 mL/hr (04/21/18 0619)   dextrose 5 % and sodium chloride 0.45 % 125 mL/hr Last Rate: Stopped (04/21/18 0620)   Pharmacy to dose vancomycin     Pharmacy Consult         Assessment/Plan     Problem List Items Addressed This Visit     None      Visit Diagnoses    None.             Sepsis affecting skin, with shock.    Dental caries    Acute metabolic encephalopathy, may be related to sepsis versus medication.  This is multifactorial.    Acute respiratory failure with hypercapnia, resolved   schizophrenia    Chronic hypertension    Urinary retention    Cellulitis of leg, right  Hyperkalemia  Suspect LARISSA  CKD baseline creatinine 1.9-2.2  Hypothermia, improved  Bradycardia, improved  Possible fall prior to admission, unwitnessed and found down on floor  Polypharmacy  Leukopenia  Chronic thrombocytopenia  Chronic tremors  Debility  COPD  Peripheral vascular disease       Plan:  Patient has been admitted because of acute metabolic encephalopathy which could be multifactorial.  He is being treated with sepsis as well as being withheld on on his antipsychotic medications.  Patient has not been appropriately responding and his MRI has been negative.  At present would to start on Levophed to maintain systolic pressure more than 100 as it is ranging in the 80s and still low cerebral perfusion could be causing his pathology.  He is a temperature he is getting corrected with the hyperthermic blanket and the the current antibiotics supportive care.  I'll woke up till now has been negative will continue very close follow-up and the for the next 2448 hrs.  If he does not respond though we will have to speak with the state  as he is a state of the wart about her further DNR status as it would be futile to code him.    Kyree Oviedo MD  04/21/18  10:23 AM

## 2018-04-21 NOTE — PLAN OF CARE
Problem: Patient Care Overview  Goal: Plan of Care Review  Outcome: Ongoing (interventions implemented as appropriate)      Problem: Sepsis/Septic Shock (Adult)  Goal: Signs and Symptoms of Listed Potential Problems Will be Absent, Minimized or Managed (Sepsis/Septic Shock)  Outcome: Ongoing (interventions implemented as appropriate)      Problem: Fall Risk (Adult)  Goal: Absence of Fall  Outcome: Ongoing (interventions implemented as appropriate)      Problem: Skin Injury Risk (Adult)  Goal: Skin Health and Integrity  Outcome: Ongoing (interventions implemented as appropriate)

## 2018-04-21 NOTE — PLAN OF CARE
Problem: NPPV/CPAP (Adult)  Goal: Signs and Symptoms of Listed Potential Problems Will be Absent, Minimized or Managed (NPPV/CPAP)  Outcome: Ongoing (interventions implemented as appropriate)   04/21/18 0244   Goal/Outcome Evaluation   Problems Assessed (NPPV/CPAP) all   Problems Present (NPPV/CPAP) dry mucous membranes;situational response;other (see comments)  (Pt does not tolerate bipap. Only worn 1 hour this shift.)

## 2018-04-22 LAB
ACINETOBACTER SCREEN CX: NORMAL
ALBUMIN SERPL-MCNC: 2.6 G/DL (ref 3.5–5)
ALBUMIN/GLOB SERPL: 1 G/DL (ref 1–2)
ALP SERPL-CCNC: 109 U/L (ref 38–126)
ALT SERPL W P-5'-P-CCNC: 29 U/L (ref 13–69)
ANION GAP SERPL CALCULATED.3IONS-SCNC: 12.2 MMOL/L (ref 10–20)
AST SERPL-CCNC: 20 U/L (ref 15–46)
BACTERIA SPEC AEROBE CULT: NO GROWTH
BILIRUB SERPL-MCNC: 0.3 MG/DL (ref 0.2–1.3)
BUN BLD-MCNC: 22 MG/DL (ref 7–20)
BUN/CREAT SERPL: 8.5 (ref 6.3–21.9)
CALCIUM SPEC-SCNC: 9.1 MG/DL (ref 8.4–10.2)
CHLORIDE SERPL-SCNC: 119 MMOL/L (ref 98–107)
CO2 SERPL-SCNC: 27 MMOL/L (ref 26–30)
CREAT BLD-MCNC: 2.6 MG/DL (ref 0.6–1.3)
DEPRECATED RDW RBC AUTO: 50.6 FL (ref 37–54)
ERYTHROCYTE [DISTWIDTH] IN BLOOD BY AUTOMATED COUNT: 14.3 % (ref 11.5–14.5)
GFR SERPL CREATININE-BSD FRML MDRD: 25 ML/MIN/1.73
GLOBULIN UR ELPH-MCNC: 2.7 GM/DL
GLUCOSE BLD-MCNC: 84 MG/DL (ref 74–98)
HCT VFR BLD AUTO: 31.1 % (ref 42–52)
HGB BLD-MCNC: 9.7 G/DL (ref 14–18)
MCH RBC QN AUTO: 30.2 PG (ref 27–31)
MCHC RBC AUTO-ENTMCNC: 31.2 G/DL (ref 30–37)
MCV RBC AUTO: 96.9 FL (ref 80–94)
PLATELET # BLD AUTO: 78 10*3/MM3 (ref 130–400)
PMV BLD AUTO: 10.8 FL (ref 6–12)
POTASSIUM BLD-SCNC: 5.2 MMOL/L (ref 3.5–5.1)
PROT SERPL-MCNC: 5.3 G/DL (ref 6.3–8.2)
RBC # BLD AUTO: 3.21 10*6/MM3 (ref 4.7–6.1)
SODIUM BLD-SCNC: 153 MMOL/L (ref 137–145)
VANCOMYCIN TROUGH SERPL-MCNC: 27.23 MCG/ML (ref 5–15)
WBC NRBC COR # BLD: 3.85 10*3/MM3 (ref 4.8–10.8)

## 2018-04-22 PROCEDURE — 94660 CPAP INITIATION&MGMT: CPT

## 2018-04-22 PROCEDURE — 25010000002 PIPERACILLIN SOD-TAZOBACTAM PER 1 G: Performed by: FAMILY MEDICINE

## 2018-04-22 PROCEDURE — 99232 SBSQ HOSP IP/OBS MODERATE 35: CPT | Performed by: INTERNAL MEDICINE

## 2018-04-22 PROCEDURE — 25010000002 ENOXAPARIN PER 10 MG: Performed by: FAMILY MEDICINE

## 2018-04-22 PROCEDURE — 94799 UNLISTED PULMONARY SVC/PX: CPT

## 2018-04-22 PROCEDURE — 25010000002 VANCOMYCIN PER 500 MG: Performed by: FAMILY MEDICINE

## 2018-04-22 PROCEDURE — 80053 COMPREHEN METABOLIC PANEL: CPT | Performed by: INTERNAL MEDICINE

## 2018-04-22 PROCEDURE — 85027 COMPLETE CBC AUTOMATED: CPT | Performed by: INTERNAL MEDICINE

## 2018-04-22 PROCEDURE — 80202 ASSAY OF VANCOMYCIN: CPT | Performed by: FAMILY MEDICINE

## 2018-04-22 RX ORDER — WHITE PETROLATUM 450 MG/G
1 STICK TOPICAL
Status: DISCONTINUED | OUTPATIENT
Start: 2018-04-22 | End: 2018-04-26 | Stop reason: HOSPADM

## 2018-04-22 RX ADMIN — IPRATROPIUM BROMIDE AND ALBUTEROL SULFATE 3 ML: .5; 3 SOLUTION RESPIRATORY (INHALATION) at 12:12

## 2018-04-22 RX ADMIN — VALPROATE SODIUM 500 MG: 100 INJECTION, SOLUTION INTRAVENOUS at 14:41

## 2018-04-22 RX ADMIN — GUAIFENESIN 1200 MG: 600 TABLET, EXTENDED RELEASE ORAL at 21:34

## 2018-04-22 RX ADMIN — BISACODYL 10 MG: 10 SUPPOSITORY RECTAL at 17:35

## 2018-04-22 RX ADMIN — GUAIFENESIN AND DEXTROMETHORPHAN 10 ML: 100; 10 SYRUP ORAL at 21:20

## 2018-04-22 RX ADMIN — DEXTROSE AND SODIUM CHLORIDE 125 ML/HR: 5; 450 INJECTION, SOLUTION INTRAVENOUS at 15:22

## 2018-04-22 RX ADMIN — ENOXAPARIN SODIUM 40 MG: 40 INJECTION SUBCUTANEOUS at 01:14

## 2018-04-22 RX ADMIN — TAZOBACTAM SODIUM AND PIPERACILLIN SODIUM 3.38 G: 375; 3 INJECTION, SOLUTION INTRAVENOUS at 01:14

## 2018-04-22 RX ADMIN — MONTELUKAST SODIUM 10 MG: 10 TABLET, FILM COATED ORAL at 21:20

## 2018-04-22 RX ADMIN — TAZOBACTAM SODIUM AND PIPERACILLIN SODIUM 3.38 G: 375; 3 INJECTION, SOLUTION INTRAVENOUS at 11:19

## 2018-04-22 RX ADMIN — IPRATROPIUM BROMIDE AND ALBUTEROL SULFATE 3 ML: .5; 3 SOLUTION RESPIRATORY (INHALATION) at 06:39

## 2018-04-22 RX ADMIN — Medication 1 EACH: at 11:27

## 2018-04-22 RX ADMIN — Medication 1 EACH: at 14:41

## 2018-04-22 RX ADMIN — TAZOBACTAM SODIUM AND PIPERACILLIN SODIUM 3.38 G: 375; 3 INJECTION, SOLUTION INTRAVENOUS at 17:35

## 2018-04-22 RX ADMIN — IPRATROPIUM BROMIDE AND ALBUTEROL SULFATE 3 ML: .5; 3 SOLUTION RESPIRATORY (INHALATION) at 18:26

## 2018-04-22 RX ADMIN — VALPROATE SODIUM 500 MG: 100 INJECTION, SOLUTION INTRAVENOUS at 04:38

## 2018-04-22 RX ADMIN — IPRATROPIUM BROMIDE AND ALBUTEROL SULFATE 3 ML: .5; 3 SOLUTION RESPIRATORY (INHALATION) at 01:20

## 2018-04-22 RX ADMIN — Medication 1 EACH: at 15:38

## 2018-04-22 NOTE — PLAN OF CARE
Problem: Patient Care Overview  Goal: Plan of Care Review  Outcome: Ongoing (interventions implemented as appropriate)   04/22/18 1212   Coping/Psychosocial   Plan of Care Reviewed With patient   Plan of Care Review   Progress improving   OTHER   Outcome Summary VSS, weaned from 3 to 2L NC, not awake at this time       Problem: Sepsis/Septic Shock (Adult)  Goal: Signs and Symptoms of Listed Potential Problems Will be Absent, Minimized or Managed (Sepsis/Septic Shock)  Outcome: Ongoing (interventions implemented as appropriate)      Problem: Fall Risk (Adult)  Goal: Absence of Fall  Outcome: Ongoing (interventions implemented as appropriate)      Problem: Skin Injury Risk (Adult)  Goal: Skin Health and Integrity  Outcome: Ongoing (interventions implemented as appropriate)

## 2018-04-22 NOTE — PROGRESS NOTES
[facility]    Name:  Braulio Schwartz   Age:  67 y.o.  Sex:  male  :  1950  MRN:  [id number]   Visit Number:  [id number]  Admission Date:  2018  Date Of Service:  18  Primary Care Physician:  Yandel Steward MD     LOS: 3 days :  Patient Care Team:  Yandel Steward MD as PCP - General (Internal Medicine):    History taken from:     chart    Chief Complaint:      Metabolic encephalopathy    Subjective     Interval History:     Patient seen today.  Reviewed history and physical, x-rays, lab work, nursing care on this patient.    Patient came from [facility] emergency room via a nursing home as he was found asleep on the floor.  He was noted to be hypothermic with a temperature 90.  He was given  3 L boluses his systolic blood pressure was in the 80s.  His blood pressure is now stable.  He was also noted to have a PCO2 of 64 on admission.  He was placed on BiPAP, PCO2 is now normal.  He was given multiple doses of Narcan without any result.  Urine drug screen was positive for tricyclic antidepressants.  He also had urinary retention.  Ruby catheter was placed for this.    He was placed on a bear hugger/warming blanket.  His temperature has improved.  Vancomycin and Zosyn were initiated.  He appears to have a right anterior lower extremity redness and swelling.  CT of the head, chest, abdomen were all negative yesterday.  Lactic acid was normal.    He was initially in restraints but is not requiring this at present.  He is very somnolent, though awakens to voice for about 5 seconds.  MRI was negative.  He is on many medications that could sedate him including several antipsychotics, will hold all of these.  They can be added back and his mental status improves.  Patient is also noted to have significant dental caries, this could be the source of his infection as well.  Blood cultures are pending at present.  He has been afebrile since he  has been here.  Unable to obtain any information from the patient given his mental status at present.  Today the patient has been seen by me and he is still very lethargic and the would open eyes on stimulation but the goes back to sleep.  No other verbal response    He was started on Levophed to yesterday as his pressures were in the 80s and have been stable and he's been able to be tapered off the Levophed.  His pressures are staying in the 110 range systolic at present.    Review of Systems:     Unable to obtain any information from the patient given his mental status at present.      Objective     Vital Signs:    Temp:  [97.6 °F (36.4 °C)-98.9 °F (37.2 °C)] 97.6 °F (36.4 °C)  Heart Rate:  [57-94] 64  Resp:  [14-20] 17  BP: ()/(53-88) 92/55    Physical Exam:      General Appearance:    Somnolent.  Response to pain and voice at present.     Head:    Normocephalic, without obvious abnormality, atraumatic   Eyes:            Lids and lashes normal, conjunctivae and sclerae normal, no   icterus, no pallor, corneas clear, PERRLA   Ears:    Ears appear intact with no abnormalities noted   Throat:   No oral lesions, no thrush, oral mucosa moist   Neck:   No adenopathy, supple, trachea midline, no thyromegaly, no   carotid bruit, no JVD   Back:     No kyphosis present, no scoliosis present, no skin lesions,      erythema or scars, no tenderness to percussion or                   palpation,   range of motion normal   Lungs:     Clear to auscultation,respirations regular, even and                  unlabored    Heart:    Regular rhythm and normal rate, normal S1 and S2, no            murmur, no gallop, no rub, no click   Chest Wall:    No abnormalities observed   Abdomen:     Normal bowel sounds, no masses, no organomegaly, soft        non-tender, non-distended, no guarding, no rebound                tenderness   Rectal:     Deferred   Extremities:   Moves all extremities,no edema, no cyanosis,             Redness  noted on anterior of right shin.     Pulses:   Pulses palpable and equal bilaterally   Skin:   No bleeding, bruising or rash   Lymph nodes:   No palpable adenopathy   Neurologic:   Cranial nerves 2 - 12 grossly intact, sensation intact, DTR       present and equal bilaterally        Results Review:      I reviewed the patient's new clinical results.    Labs:    Lab Results (last 24 hours)     Procedure Component Value Units Date/Time    Acinetobacter Screen - Swab, Nares [[id number]]  (Normal) Collected:  04/19/18 2337    Specimen:  Swab from Axilla, Left Updated:  04/22/18 0807     ACINETOBACTER SCREEN CX No Acinetobacter isolated    Urine Culture - Urine, Urine, Clean Catch [[id number]]  (Normal) Collected:  04/20/18 1556    Specimen:  Urine from Urine, Clean Catch Updated:  04/22/18 0545     Urine Culture No growth    Comprehensive Metabolic Panel [[id number]]  (Abnormal) Collected:  04/22/18 0451    Specimen:  Blood Updated:  04/22/18 0539     Glucose 84 mg/dL      BUN 22 (H) mg/dL      Creatinine 2.60 (H) mg/dL      Sodium 153 (C) mmol/L      Potassium 5.2 (H) mmol/L      Chloride 119 (H) mmol/L      CO2 27.0 mmol/L      Calcium 9.1 mg/dL      Total Protein 5.3 (L) g/dL      Albumin 2.60 (L) g/dL      ALT (SGPT) 29 U/L      AST (SGOT) 20 U/L      Alkaline Phosphatase 109 U/L      Total Bilirubin 0.3 mg/dL      eGFR Non African Amer 25 (L) mL/min/1.73      Globulin 2.7 gm/dL      A/G Ratio 1.0 g/dL      BUN/Creatinine Ratio 8.5     Anion Gap 12.2 mmol/L     Narrative:       Abnormal estimated GFR should be followed by more specific studies to confirm end stage chronic renal disease. The equation used for calculation may not be accurate for patients less than 19 years old, greater than 70 years old, patients at extremes of weight, malnutrition, or with acute renal dysfunction.    CBC (No Diff) [[id number]]  (Abnormal) Collected:  04/22/18 0451    Specimen:  Blood Updated:  04/22/18 0512     WBC 3.85 (L) 10*3/mm3       RBC 3.21 (L) 10*6/mm3      Hemoglobin 9.7 (L) g/dL      Hematocrit 31.1 (L) %      MCV 96.9 (H) fL      MCH 30.2 pg      MCHC 31.2 g/dL      RDW 14.3 %      RDW-SD 50.6 fl      MPV 10.8 fL      Platelets 78 (L) 10*3/mm3            Radiology:    Imaging Results (last 24 hours)     Procedure Component Value Units Date/Time    US Renal Limited [[id number]] Collected:  04/21/18 0950     Updated:  04/21/18 1028    Narrative:       RENAL ULTRASOUND     INDICATION: Urinary retention.     FINDINGS: The sonographer notes that the examination was technically  difficult with suboptimal visualization of the kidneys. On the provided  images, the kidneys are largely obscured by artifact. Limited  visualization demonstrates no obvious hydronephrosis. Exact size  measurement is difficult due to the limitations but the right kidney  measures approximately 10 cm in length and the left kidney measures  approximately 10.9 cm. No obvious perinephric fluid collection, cyst or  obvious solid renal mass. There is probable renal cortical thinning  bilaterally raising the question of chronic medical renal disease.       Impression:       Limited examination of the kidneys without obvious  hydronephrosis.     This report was finalized on 4/21/2018 10:26 AM by [place] MD.          Medication Review:       aspirin 81 mg Oral Daily   enoxaparin 40 mg Subcutaneous Q24H   guaiFENesin 1,200 mg Oral Q12H   ipratropium-albuterol 3 mL Nebulization Q6H - RT   montelukast 10 mg Oral Nightly   piperacillin-tazobactam 3.375 g Intravenous Q8H   valproate sodium 500 mg Intravenous Q12H   vancomycin 1,500 mg Intravenous Q24H         dextrose 5 % and sodium chloride 0.45 % 125 mL/hr Last Rate: Stopped (04/21/18 0620)   norepinephrine 0.02-0.3 mcg/kg/min Last Rate: 0.02 mcg/kg/min (04/21/18 1111)   Pharmacy to dose vancomycin     Pharmacy Consult         Assessment/Plan     Problem List Items Addressed This Visit     None      Visit Diagnoses    None.              Sepsis affecting skin, with shock.    Dental caries    Acute metabolic encephalopathy, may be related to sepsis versus medication.  This is multifactorial.    Acute respiratory failure with hypercapnia, resolved   schizophrenia    Chronic hypertension    Urinary retention    Cellulitis of leg, right  Hyperkalemia  Suspect LARISSA  CKD baseline creatinine 1.9-2.2  Hypothermia, improved  Bradycardia, improved  Possible fall prior to admission, unwitnessed and found down on floor  Polypharmacy  Leukopenia  Chronic thrombocytopenia  Chronic tremors  Debility  COPD  Peripheral vascular disease       Plan:  Patient has been admitted because of acute metabolic encephalopathy which could be multifactorial.  He is being treated with sepsis as well as being withheld on on his antipsychotic medications.  Patient has not been appropriately responding and his MRI has been negative.  At present he is off the Levophed and his pressures are stable now.  Also his temperature has pain in the normal range now.  Admitted to wake up and drink her juice yesterday but has not been able to take any other oral medications today.  Though of note he did get his Geodon last night and will stop that also completely and will also try to taper Depakote and see if the minimal effect of the medications that he has been on.  Continue his current antibiotic IV fluids and other supportive care.  If he does not respond though we will have to speak with the state  as he is a state of the wart about her further DNR status as it would be futile to code him.    Also of note his a platelet count has dropped to 74,008 was 85,000 yesterday.  So will stop the Lovenox for DVT prophylaxis and continue with the tia Oviedo MD  04/22/18  10:05 AM

## 2018-04-22 NOTE — PLAN OF CARE
Problem: NPPV/CPAP (Adult)  Goal: Signs and Symptoms of Listed Potential Problems Will be Absent, Minimized or Managed (NPPV/CPAP)  Outcome: Ongoing (interventions implemented as appropriate)   04/22/18 0649   Goal/Outcome Evaluation   Problems Assessed (NPPV/CPAP) all   Problems Present (NPPV/CPAP) other (see comments)  (Pt does not tolerate bipap.)

## 2018-04-22 NOTE — PLAN OF CARE
Problem: NPPV/CPAP (Adult)  Goal: Signs and Symptoms of Listed Potential Problems Will be Absent, Minimized or Managed (NPPV/CPAP)  Outcome: Ongoing (interventions implemented as appropriate)   04/22/18 8597   Goal/Outcome Evaluation   Problems Assessed (NPPV/CPAP) all   Problems Present (NPPV/CPAP) none

## 2018-04-23 ENCOUNTER — APPOINTMENT (OUTPATIENT)
Dept: GENERAL RADIOLOGY | Facility: HOSPITAL | Age: 68
End: 2018-04-23
Attending: FAMILY MEDICINE

## 2018-04-23 LAB
ALBUMIN SERPL-MCNC: 2.3 G/DL (ref 3.5–5)
ALBUMIN/GLOB SERPL: 0.9 G/DL (ref 1–2)
ALP SERPL-CCNC: 93 U/L (ref 38–126)
ALT SERPL W P-5'-P-CCNC: 30 U/L (ref 13–69)
ANION GAP SERPL CALCULATED.3IONS-SCNC: 12.6 MMOL/L (ref 10–20)
AST SERPL-CCNC: 16 U/L (ref 15–46)
BILIRUB SERPL-MCNC: 0.2 MG/DL (ref 0.2–1.3)
BUN BLD-MCNC: 19 MG/DL (ref 7–20)
BUN/CREAT SERPL: 8.3 (ref 6.3–21.9)
CALCIUM SPEC-SCNC: 8.7 MG/DL (ref 8.4–10.2)
CHLORIDE SERPL-SCNC: 117 MMOL/L (ref 98–107)
CO2 SERPL-SCNC: 25 MMOL/L (ref 26–30)
CREAT BLD-MCNC: 2.3 MG/DL (ref 0.6–1.3)
DEPRECATED RDW RBC AUTO: 49.4 FL (ref 37–54)
ERYTHROCYTE [DISTWIDTH] IN BLOOD BY AUTOMATED COUNT: 14.1 % (ref 11.5–14.5)
FOLATE SERPL-MCNC: 5.78 NG/ML
GFR SERPL CREATININE-BSD FRML MDRD: 29 ML/MIN/1.73
GLOBULIN UR ELPH-MCNC: 2.6 GM/DL
GLUCOSE BLD-MCNC: 112 MG/DL (ref 74–98)
GLUCOSE BLDC GLUCOMTR-MCNC: 100 MG/DL (ref 70–130)
GLUCOSE BLDC GLUCOMTR-MCNC: 122 MG/DL (ref 70–130)
GLUCOSE BLDC GLUCOMTR-MCNC: 139 MG/DL (ref 70–130)
GLUCOSE BLDC GLUCOMTR-MCNC: 150 MG/DL (ref 70–130)
HCT VFR BLD AUTO: 29.9 % (ref 42–52)
HGB BLD-MCNC: 9.4 G/DL (ref 14–18)
MCH RBC QN AUTO: 30.2 PG (ref 27–31)
MCHC RBC AUTO-ENTMCNC: 31.4 G/DL (ref 30–37)
MCV RBC AUTO: 96.1 FL (ref 80–94)
MRSA SPEC QL CULT: NORMAL
PLATELET # BLD AUTO: 64 10*3/MM3 (ref 130–400)
PMV BLD AUTO: 10.9 FL (ref 6–12)
POTASSIUM BLD-SCNC: 4.6 MMOL/L (ref 3.5–5.1)
PROT SERPL-MCNC: 4.9 G/DL (ref 6.3–8.2)
RBC # BLD AUTO: 3.11 10*6/MM3 (ref 4.7–6.1)
SODIUM BLD-SCNC: 150 MMOL/L (ref 137–145)
VANCOMYCIN SERPL-MCNC: 25.29 MCG/ML (ref 5–40)
VIT B12 BLD-MCNC: >1000 PG/ML (ref 239–931)
VRE SPEC QL CULT: NORMAL
WBC NRBC COR # BLD: 3.97 10*3/MM3 (ref 4.8–10.8)

## 2018-04-23 PROCEDURE — 25010000002 ONDANSETRON PER 1 MG: Performed by: FAMILY MEDICINE

## 2018-04-23 PROCEDURE — 71045 X-RAY EXAM CHEST 1 VIEW: CPT

## 2018-04-23 PROCEDURE — 82962 GLUCOSE BLOOD TEST: CPT

## 2018-04-23 PROCEDURE — 87040 BLOOD CULTURE FOR BACTERIA: CPT | Performed by: FAMILY MEDICINE

## 2018-04-23 PROCEDURE — 94660 CPAP INITIATION&MGMT: CPT

## 2018-04-23 PROCEDURE — 80053 COMPREHEN METABOLIC PANEL: CPT | Performed by: INTERNAL MEDICINE

## 2018-04-23 PROCEDURE — 25010000002 METHYLPREDNISOLONE PER 40 MG: Performed by: FAMILY MEDICINE

## 2018-04-23 PROCEDURE — 94799 UNLISTED PULMONARY SVC/PX: CPT

## 2018-04-23 PROCEDURE — 80202 ASSAY OF VANCOMYCIN: CPT | Performed by: INTERNAL MEDICINE

## 2018-04-23 PROCEDURE — 25010000002 AZITHROMYCIN: Performed by: FAMILY MEDICINE

## 2018-04-23 PROCEDURE — 25010000002 PIPERACILLIN SOD-TAZOBACTAM PER 1 G: Performed by: FAMILY MEDICINE

## 2018-04-23 PROCEDURE — 85027 COMPLETE CBC AUTOMATED: CPT | Performed by: INTERNAL MEDICINE

## 2018-04-23 PROCEDURE — 99232 SBSQ HOSP IP/OBS MODERATE 35: CPT | Performed by: FAMILY MEDICINE

## 2018-04-23 RX ORDER — IPRATROPIUM BROMIDE AND ALBUTEROL SULFATE 2.5; .5 MG/3ML; MG/3ML
3 SOLUTION RESPIRATORY (INHALATION)
Status: DISCONTINUED | OUTPATIENT
Start: 2018-04-23 | End: 2018-04-25

## 2018-04-23 RX ORDER — METHYLPREDNISOLONE SODIUM SUCCINATE 40 MG/ML
40 INJECTION, POWDER, LYOPHILIZED, FOR SOLUTION INTRAMUSCULAR; INTRAVENOUS EVERY 12 HOURS
Status: DISCONTINUED | OUTPATIENT
Start: 2018-04-23 | End: 2018-04-25

## 2018-04-23 RX ORDER — DEXTROSE MONOHYDRATE 25 G/50ML
25 INJECTION, SOLUTION INTRAVENOUS
Status: DISCONTINUED | OUTPATIENT
Start: 2018-04-23 | End: 2018-04-26 | Stop reason: HOSPADM

## 2018-04-23 RX ORDER — NICOTINE POLACRILEX 4 MG
1 LOZENGE BUCCAL
Status: DISCONTINUED | OUTPATIENT
Start: 2018-04-23 | End: 2018-04-26 | Stop reason: HOSPADM

## 2018-04-23 RX ORDER — IPRATROPIUM BROMIDE AND ALBUTEROL SULFATE 2.5; .5 MG/3ML; MG/3ML
3 SOLUTION RESPIRATORY (INHALATION)
Status: DISCONTINUED | OUTPATIENT
Start: 2018-04-23 | End: 2018-04-23

## 2018-04-23 RX ADMIN — IPRATROPIUM BROMIDE AND ALBUTEROL SULFATE 3 ML: .5; 3 SOLUTION RESPIRATORY (INHALATION) at 00:00

## 2018-04-23 RX ADMIN — TAZOBACTAM SODIUM AND PIPERACILLIN SODIUM 3.38 G: 375; 3 INJECTION, SOLUTION INTRAVENOUS at 10:00

## 2018-04-23 RX ADMIN — ASPIRIN 81 MG 81 MG: 81 TABLET ORAL at 09:00

## 2018-04-23 RX ADMIN — METHYLPREDNISOLONE SODIUM SUCCINATE 40 MG: 40 INJECTION, POWDER, FOR SOLUTION INTRAMUSCULAR; INTRAVENOUS at 14:00

## 2018-04-23 RX ADMIN — DEXTROSE AND SODIUM CHLORIDE 100 ML/HR: 5; 450 INJECTION, SOLUTION INTRAVENOUS at 13:33

## 2018-04-23 RX ADMIN — ONDANSETRON 4 MG: 2 INJECTION INTRAMUSCULAR; INTRAVENOUS at 19:17

## 2018-04-23 RX ADMIN — IPRATROPIUM BROMIDE AND ALBUTEROL SULFATE 3 ML: .5; 3 SOLUTION RESPIRATORY (INHALATION) at 18:56

## 2018-04-23 RX ADMIN — TAZOBACTAM SODIUM AND PIPERACILLIN SODIUM 3.38 G: 375; 3 INJECTION, SOLUTION INTRAVENOUS at 01:22

## 2018-04-23 RX ADMIN — DEXTROSE AND SODIUM CHLORIDE 100 ML/HR: 5; 450 INJECTION, SOLUTION INTRAVENOUS at 01:22

## 2018-04-23 RX ADMIN — IPRATROPIUM BROMIDE AND ALBUTEROL SULFATE 3 ML: .5; 3 SOLUTION RESPIRATORY (INHALATION) at 13:36

## 2018-04-23 RX ADMIN — GUAIFENESIN 1200 MG: 600 TABLET, EXTENDED RELEASE ORAL at 20:55

## 2018-04-23 RX ADMIN — IPRATROPIUM BROMIDE AND ALBUTEROL SULFATE 3 ML: .5; 3 SOLUTION RESPIRATORY (INHALATION) at 07:28

## 2018-04-23 RX ADMIN — AZITHROMYCIN 500 MG: 500 INJECTION, POWDER, LYOPHILIZED, FOR SOLUTION INTRAVENOUS at 14:30

## 2018-04-23 RX ADMIN — TAZOBACTAM SODIUM AND PIPERACILLIN SODIUM 3.38 G: 375; 3 INJECTION, SOLUTION INTRAVENOUS at 18:21

## 2018-04-23 RX ADMIN — MONTELUKAST SODIUM 10 MG: 10 TABLET, FILM COATED ORAL at 20:55

## 2018-04-23 RX ADMIN — VALPROATE SODIUM 500 MG: 100 INJECTION, SOLUTION INTRAVENOUS at 09:00

## 2018-04-23 RX ADMIN — GUAIFENESIN 1200 MG: 600 TABLET, EXTENDED RELEASE ORAL at 09:00

## 2018-04-23 NOTE — PLAN OF CARE
Problem: Patient Care Overview  Goal: Individualization and Mutuality   18   Individualization   Patient Specific Preferences likes coke to drink       Problem: Sepsis/Septic Shock (Adult)  Goal: Signs and Symptoms of Listed Potential Problems Will be Absent, Minimized or Managed (Sepsis/Septic Shock)  Outcome: Ongoing (interventions implemented as appropriate)   18 1212   Goal/Outcome Evaluation   Problems Assessed (Sepsis) all   Problems Present (Sepsis) situational response       Problem: Fall Risk (Adult)  Intervention: Monitor/Assist with Self Care   18 0300   Activity   Activity Assistance Provided assistance, 2 people     Intervention: Reduce Risk/Promote Restraint Free Environment   18 0200 18 0300   Safety Management   Environmental Safety Modification --  assistive device/personal items within reach;clutter free environment maintained;room organization consistent   Safety Promotion/Fall Prevention --  fall prevention program maintained;safety round/check completed   Prevent  Drop/Fall   Safety/Security Measures bed alarm set --      Intervention: Review Medications/Identify Contributors to Fall Risk   18 0413   Safety Management   Medication Review/Management medications reviewed       Goal: Absence of Fall  Outcome: Ongoing (interventions implemented as appropriate)   18 0413   Fall Risk (Adult)   Absence of Fall making progress toward outcome       Problem: Skin Injury Risk (Adult)  Goal: Skin Health and Integrity  Outcome: Ongoing (interventions implemented as appropriate)   18   Skin Injury Risk (Adult)   Skin Health and Integrity making progress toward outcome

## 2018-04-23 NOTE — PROGRESS NOTES
Adult Nutrition  Assessment/PES    Patient Name:  Braulio Schwartz  YOB: 1950  MRN: [id number]  Admit Date:  4/19/2018    Assessment Date:  4/23/2018    Comments:  Rec. #1: Continue with diet as ordered. RD added Nepro BID. RD to follow pt. Consult RD PRN.           Adult Nutrition Assessment     Row Name 04/23/18 1355       Nutrition Prescription PO    Current PO Diet Pureed    Fluid Consistency Thin       PO Evaluation    Number of Days PO Intake Evaluated Insufficient Data    Row Name 04/23/18 1346       Reason for Assessment    Reason For Assessment follow-up protocol    Diagnosis --   Schizophrenia, CKD, Sepsis, Dysphagia, Metabolic encephalopathy          Problem/Interventions:        Problem 1     Row Name 04/23/18 1356       Nutrition Diagnoses Problem 1    Problem 1 Overweight/Obesity    Etiology (related to) Factors Affecting Nutrition    Food Habit/Preferences Large Meals    Signs/Symptoms (evidenced by) BMI    BMI 25 - 29.9            Problem 2     Row Name 04/23/18 1356       Nutrition Diagnoses Problem 2    Problem 2 Altered Nutrition Related to Labs    Etiology (related to) Medical Diagnosis    Renal CKD    Signs/Symptoms (evidenced by) Biochemical    Labs Reviewed Done    Specific Labs Noted Chloride;Creatinine;Na+            Problem 3     Row Name 04/23/18 1356       Nutrition Diagnoses Problem 3    Problem 3 Swallowing Difficulty    Etiology (related to) Medical Diagnosis    Neurological Dysphagia    Signs/Symptoms (evidenced by) Other (comment)   Continued need for texture altered solids                Intervention Goal     Row Name 04/23/18 1357       Intervention Goal    General Meet nutritional needs for age/condition    PO PO intake (%)    PO Intake % 50 %            Nutrition Intervention     Row Name 04/23/18 1351       Nutrition Intervention    RD/Tech Action Recommend/ordered;Supplement provided;Follow Tx progress    Recommended/Ordered Supplement            Nutrition  Prescription     Row Name 04/23/18 1357       Nutrition Prescription PO    PO Prescription Begin/change supplement   Continue with current diet as ordered    Supplement Nepro    Supplement Frequency 2 times a day    New PO Prescription Ordered? Yes            Education/Evaluation     Row Name 04/23/18 1359       Education    Education Education not appropriate at this time    Please explain --   Pt. with schizophrenia diagnosis with auditory hallucinations noted       Monitor/Evaluation    Monitor Per protocol;PO intake;Supplement intake;Pertinent labs;Weight;Skin status        Electronically signed by:  Lottie Bhatt RD  04/23/18 2:00 PM

## 2018-04-23 NOTE — PROGRESS NOTES
[facility]    Name:  Braulio Schwartz   Age:  67 y.o.  Sex:  male  :  1950  MRN:  [id number]   Visit Number:  [id number]  Admission Date:  2018  Date Of Service:  18  Primary Care Physician:  Yandel Steward MD     LOS: 4 days :      Chief Complaint:  More alert. Follow up septic shock        Subjective / Interval History: Patient seen this morning. He was admitted prior with sepsis, thought to be cellulitis but the cellulitis did not seem significant. The patient was altered prior to evaluation with current illness and may have aspirated. Here, is is requiring pureed diet. Chest xray repeated compared to admission, shows bilateral pneumonia, and that appears significant enough to have led to his septic shock.     He is requiring levophed. Patient more awake, alert, and laughing and smiling. He is cooperative. No acute events occurred overnight.      Review of Systems:  Difficult to obtain    General ROS: Patient denies any fevers, chills or loss of consciousness.  Ophthalmic ROS: Denies any diplopia or transient loss of vision.  ENT ROS: Denies sore throat, nasal congestion or ear pain.   Respiratory ROS: Denies cough or shortness of breath.  Cardiovascular ROS: Denies chest pain or palpitations. No history of exertional chest pain.   Gastrointestinal ROS: Denies nausea and vomiting. Denies any abdominal pain. No diarrhea.  Genito-Urinary ROS: Denies dysuria or hematuria.  Musculoskeletal ROS: Denies chronic back pain. No muscle pain. No calf pain.  Neurological ROS: Denies any focal weakness. No loss of consciousness. Denies any numbness. Denies neck pain.   Dermatological ROS: Denies any redness or pruritis.    Vital Signs:    Temp:  [97.3 °F (36.3 °C)-97.8 °F (36.6 °C)] 97.4 °F (36.3 °C)  Heart Rate:  [51-78] 56  Resp:  [11-18] 14  BP: (100-148)/(61-90) 121/68    Intake and output:    I/O last 3 completed shifts:  In: 3980 [P.O.:462;  I.V.:3518]  Out: 2920 [Urine:2920]  No intake/output data recorded.    Physical Examination:    General Appearance:    Alert and cooperative, not in any acute distress.   Head:    Atraumatic and normocephalic, without obvious abnormality.   Eyes:            PERRLA, conjunctivae and sclerae normal, no Icterus. No pallor. Extra-occular movements are within normal limits.   Throat:   No oral lesions, no thrush, oral mucosa moist.   Neck:   Supple, trachea midline, no thyromegaly   Lungs:     Chest shape is normal. Breath sounds heard bilaterally equally.  No crackles or wheezing. No Pleural rub or bronchial breathing.    Heart:    Normal S1 and S2, no murmur, no gallop   Abdomen:     Normal bowel sounds, no masses, no organomegaly. Soft        non-tender, non-distended, no guarding, no rebound                tenderness   Extremities:   Moves all extremities well, trace edema, no cyanosis, no             clubbing   Skin:   No bleeding, bruising or rash; stable right greater than left bilateral venous stasis with mild cellulitis right lower leg    Neurologic:   No tremor, Gross sensation intact, Motor power is 4/5 and equal bilaterally. Diffuse muscle atrophy   Laboratory results:    Lab Results (last 24 hours)     Procedure Component Value Units Date/Time    POC Glucose Once [[id number]]  (Abnormal) Collected:  04/23/18 1043    Specimen:  Blood Updated:  04/23/18 1050     Glucose 150 (H) mg/dL      Comment: Serial Number: [id number]:  [id number]       VRE Culture - Swab, Per Rectum [[id number]]  (Normal) Collected:  04/19/18 2338    Specimen:  Swab from Per Rectum Updated:  04/23/18 1031     VRE SCREEN CX No Vancomycin Resistant Enterococcus Isolated    POC Glucose Once [[id number]]  (Abnormal) Collected:  04/23/18 0836    Specimen:  Blood Updated:  04/23/18 0840     Glucose 139 (H) mg/dL      Comment: Serial Number: [id number]:  [id number]       MRSA Screen Culture - Swab, Nares [[id number]]  (Normal) Collected:   04/19/18 2338    Specimen:  Swab from Nares Updated:  04/23/18 0823     MRSA SCREEN CX No Methicillin Resistant Staphylococcus aureus isolated    Vitamin B12 [[id number]]  (Abnormal) Collected:  04/21/18 0436    Specimen:  Blood Updated:  04/23/18 0751     Vitamin B-12 >1,000 (H) pg/mL     Folate [[id number]]  (Normal) Collected:  04/21/18 0436    Specimen:  Blood Updated:  04/23/18 0745     Folate 5.78 ng/mL     Vancomycin, Random [[id number]]  (Normal) Collected:  04/23/18 0358    Specimen:  Blood Updated:  04/23/18 0626     Vancomycin Random 25.29 mcg/mL     Comprehensive Metabolic Panel [[id number]]  (Abnormal) Collected:  04/23/18 0358    Specimen:  Blood Updated:  04/23/18 0551     Glucose 112 (H) mg/dL      BUN 19 mg/dL      Creatinine 2.30 (H) mg/dL      Sodium 150 (H) mmol/L      Potassium 4.6 mmol/L      Chloride 117 (H) mmol/L      CO2 25.0 (L) mmol/L      Calcium 8.7 mg/dL      Total Protein 4.9 (L) g/dL      Albumin 2.30 (L) g/dL      ALT (SGPT) 30 U/L      AST (SGOT) 16 U/L      Alkaline Phosphatase 93 U/L      Total Bilirubin 0.2 mg/dL      eGFR Non African Amer 29 (L) mL/min/1.73      Globulin 2.6 gm/dL      A/G Ratio 0.9 (L) g/dL      BUN/Creatinine Ratio 8.3     Anion Gap 12.6 mmol/L     Narrative:       Abnormal estimated GFR should be followed by more specific studies to confirm end stage chronic renal disease. The equation used for calculation may not be accurate for patients less than 19 years old, greater than 70 years old, patients at extremes of weight, malnutrition, or with acute renal dysfunction.    CBC (No Diff) [[id number]]  (Abnormal) Collected:  04/23/18 0358    Specimen:  Blood Updated:  04/23/18 0423     WBC 3.97 (L) 10*3/mm3      RBC 3.11 (L) 10*6/mm3      Hemoglobin 9.4 (L) g/dL      Hematocrit 29.9 (L) %      MCV 96.1 (H) fL      MCH 30.2 pg      MCHC 31.4 g/dL      RDW 14.1 %      RDW-SD 49.4 fl      MPV 10.9 fL      Platelets 64 (L) 10*3/mm3     Vancomycin, Trough [[id number]]   (Abnormal) Collected:  04/22/18 2128    Specimen:  Blood Updated:  04/22/18 2233     Vancomycin Trough 27.23 (C) mcg/mL           I have reviewed the patient's laboratory results.    Radiology results:    Imaging Results (last 24 hours)     ** No results found for the last 24 hours. **          I have reviewed the patient's radiology reports.    Medication Review:     I have reviewed the patients active and prn medications.     Assessment:  1. Septic shock secondary to pneumonia, prior to admission, requiring vasopressor therapy  2. Bilateral aspiration pneumonia, prior to admission, not seen until hydrated, stable  3. Right leg cellulitis, improved, unlikely source of sepsis  4. Acute metabolic and toxic encephalopathy, multifactoral with sepsis and pneumonia, as well as polypharmacy for psychosis chronic  5. Acute on chronic kidney injury, improving  6. Urinary retention, requiring landers catheter placement  7. Chronic debility  8. Chronic paranoid schizophrenia without acute psychosis current  9. Hypersomnolence, avoid sedation  10. Acute repiratory failure with hypercapnia, prior to admission, improved  11. Hypothermia with sepsis, treated  12. Hyperglycemia, with A1c pending  13. Thrombocytopenia, with history of the same chronically      Plan:  Continue ICU monitoring, with levophed drip and warming blanket. With aspiration pneumonia noted, solumedrol added with zosyn continued, added azithromycin. Will hold off on further vancomycin at this time. Continue to monitor daily labs and titrate accordingly. Duonebs, oxygen continued. Blood culture VENKATA repeated. Added fingerstick glucose monitoring with sliding scale insulin as needed. Currently the patient is more awake, and improved after being taken off all antipsychotic medications at this time. Due to sedation, would continue to avoid further medications including geodon as this previously caused severe daytime sleepiness. Currently the patient is laughing and  happy and cooperating with treatment plan. He is still requiring bedrest in current state, so recommended continue landers catheter.     Continue PT, OT, Speech therapy. Patient is slowly improving, but expect he will still require another 2-3 days inpatient stay.    Continue to hold lovenox, contraindicated with thrombocytopenia.     Medication risks and benefits were discussed in detail. Patient reported satisfaction with care delivered and treatment plan.     Berenice Marie DO  04/23/18  11:15 AM

## 2018-04-23 NOTE — PROGRESS NOTES
"Pharmacokinetic Follow-up Note - Vancomycin     Braulio Schwartz is a 67 y.o. male  180.3 cm (71\") 86 kg (189 lb 9.6 oz)     Indication for use: sepsis/cellulitis      Results from last 7 days     Lab Units 04/22/18  0451 04/21/18  0436 04/20/18  0409   WBC 10*3/mm3 3.85* 4.02* 4.43*   CREATININE mg/dL 2.60* 2.40* 2.00*      Estimated Creatinine Clearance: 34 mL/min (by C-G formula based on SCr of 2.6 mg/dL (H)).  Temp Readings from Last 1 Encounters:   04/23/18 97.8 °F (36.6 °C) (Axillary)     Lab Results   Component Value Date    [facility] 27.23 (C) 04/22/2018       Microbiology:  Microbiology Results (last 10 days)       Procedure Component Value - Date/Time    Urine Culture - Urine, Urine, Clean Catch [[id number]]  (Normal) Collected:  04/20/18 1556    Lab Status:  Final result Specimen:  Urine from Urine, Clean Catch Updated:  04/22/18 0545     Urine Culture No growth    Acinetobacter Screen - Swab, Nares [[id number]]  (Normal) Collected:  04/19/18 2337    Lab Status:  Final result Specimen:  Swab from Axilla, Left Updated:  04/22/18 0807     ACINETOBACTER SCREEN CX No Acinetobacter isolated            Current Vancomycin Dose:  1500 mg IVPB every 24 hours, day 3 of therapy.    Other Antimicrobials: Piperacillin-tazobactam 3.375 g every 8 hours, extended infusion    Assessment/Plan:  Vancomycin 1500 mg iv every 36 hours with random vancomycin level  4/23/ 2018 with am labs prior to beginning. Pharmacy will continue to monitor renal function and adjust dose accordingly.    Paty Pinto RPH   04/23/18 2:20 AM    "

## 2018-04-23 NOTE — PLAN OF CARE
Problem: Patient Care Overview  Goal: Plan of Care Review  Outcome: Ongoing (interventions implemented as appropriate)   04/23/18 3138   Coping/Psychosocial   Plan of Care Reviewed With patient     Goal: Individualization and Mutuality  Outcome: Ongoing (interventions implemented as appropriate)      Problem: Sepsis/Septic Shock (Adult)  Goal: Signs and Symptoms of Listed Potential Problems Will be Absent, Minimized or Managed (Sepsis/Septic Shock)  Outcome: Ongoing (interventions implemented as appropriate)      Problem: Fall Risk (Adult)  Goal: Absence of Fall  Outcome: Ongoing (interventions implemented as appropriate)

## 2018-04-23 NOTE — PLAN OF CARE
Problem: NPPV/CPAP (Adult)  Goal: Signs and Symptoms of Listed Potential Problems Will be Absent, Minimized or Managed (NPPV/CPAP)   04/23/18 8690   Goal/Outcome Evaluation   Problems Assessed (NPPV/CPAP) all   Problems Present (NPPV/CPAP) situational response   Pt has refused bipap for 2 days

## 2018-04-24 ENCOUNTER — APPOINTMENT (OUTPATIENT)
Dept: CARDIOLOGY | Facility: HOSPITAL | Age: 68
End: 2018-04-24
Attending: FAMILY MEDICINE

## 2018-04-24 ENCOUNTER — APPOINTMENT (OUTPATIENT)
Dept: GENERAL RADIOLOGY | Facility: HOSPITAL | Age: 68
End: 2018-04-24

## 2018-04-24 LAB
ALBUMIN SERPL-MCNC: 2.7 G/DL (ref 3.5–5)
ALBUMIN/GLOB SERPL: 1 G/DL (ref 1–2)
ALP SERPL-CCNC: 106 U/L (ref 38–126)
ALT SERPL W P-5'-P-CCNC: 30 U/L (ref 13–69)
ANION GAP SERPL CALCULATED.3IONS-SCNC: 14.5 MMOL/L (ref 10–20)
ANION GAP SERPL CALCULATED.3IONS-SCNC: 17.2 MMOL/L (ref 10–20)
AST SERPL-CCNC: 16 U/L (ref 15–46)
BASOPHILS # BLD AUTO: 0.03 10*3/MM3 (ref 0–0.2)
BASOPHILS NFR BLD AUTO: 0.6 % (ref 0–2.5)
BH CV ECHO MEAS - EF(MOD-BP): 60 %
BH CV ECHO MEAS - RAP SYSTOLE: 10 MMHG
BH CV ECHO MEAS - RVSP: 48 MMHG
BH CV ECHO MEAS - TAPSE (>1.6): 1.5 CM2
BILIRUB SERPL-MCNC: 0.4 MG/DL (ref 0.2–1.3)
BUN BLD-MCNC: 15 MG/DL (ref 7–20)
BUN BLD-MCNC: 16 MG/DL (ref 7–20)
BUN/CREAT SERPL: 6.2 (ref 6.3–21.9)
BUN/CREAT SERPL: 6.3 (ref 6.3–21.9)
CALCIUM SPEC-SCNC: 8.9 MG/DL (ref 8.4–10.2)
CALCIUM SPEC-SCNC: 9 MG/DL (ref 8.4–10.2)
CHLORIDE SERPL-SCNC: 112 MMOL/L (ref 98–107)
CHLORIDE SERPL-SCNC: 116 MMOL/L (ref 98–107)
CO2 SERPL-SCNC: 25 MMOL/L (ref 26–30)
CO2 SERPL-SCNC: 26 MMOL/L (ref 26–30)
CREAT BLD-MCNC: 2.4 MG/DL (ref 0.6–1.3)
CREAT BLD-MCNC: 2.6 MG/DL (ref 0.6–1.3)
DEPRECATED RDW RBC AUTO: 50.4 FL (ref 37–54)
EOSINOPHIL # BLD AUTO: 0.16 10*3/MM3 (ref 0–0.7)
EOSINOPHIL NFR BLD AUTO: 3.1 % (ref 0–7)
ERYTHROCYTE [DISTWIDTH] IN BLOOD BY AUTOMATED COUNT: 14.2 % (ref 11.5–14.5)
GFR SERPL CREATININE-BSD FRML MDRD: 25 ML/MIN/1.73
GFR SERPL CREATININE-BSD FRML MDRD: 27 ML/MIN/1.73
GLOBULIN UR ELPH-MCNC: 2.8 GM/DL
GLUCOSE BLD-MCNC: 102 MG/DL (ref 74–98)
GLUCOSE BLD-MCNC: 138 MG/DL (ref 74–98)
GLUCOSE BLDC GLUCOMTR-MCNC: 105 MG/DL (ref 70–130)
GLUCOSE BLDC GLUCOMTR-MCNC: 128 MG/DL (ref 70–130)
GLUCOSE BLDC GLUCOMTR-MCNC: 143 MG/DL (ref 70–130)
GLUCOSE BLDC GLUCOMTR-MCNC: 183 MG/DL (ref 70–130)
HBA1C MFR BLD: 5.7 % (ref 3–6)
HCT VFR BLD AUTO: 31.1 % (ref 42–52)
HGB BLD-MCNC: 9.8 G/DL (ref 14–18)
IMM GRANULOCYTES # BLD: 0.41 10*3/MM3 (ref 0–0.06)
IMM GRANULOCYTES NFR BLD: 7.9 % (ref 0–0.6)
LV EF 2D ECHO EST: 60 %
LYMPHOCYTES # BLD AUTO: 1.06 10*3/MM3 (ref 0.6–3.4)
LYMPHOCYTES NFR BLD AUTO: 20.4 % (ref 10–50)
MAGNESIUM SERPL-MCNC: 1.7 MG/DL (ref 1.6–2.3)
MAXIMAL PREDICTED HEART RATE: 154 BPM
MCH RBC QN AUTO: 30.5 PG (ref 27–31)
MCHC RBC AUTO-ENTMCNC: 31.5 G/DL (ref 30–37)
MCV RBC AUTO: 96.9 FL (ref 80–94)
MONOCYTES # BLD AUTO: 0.37 10*3/MM3 (ref 0–0.9)
MONOCYTES NFR BLD AUTO: 7.1 % (ref 0–12)
NEUTROPHILS # BLD AUTO: 3.16 10*3/MM3 (ref 2–6.9)
NEUTROPHILS NFR BLD AUTO: 60.9 % (ref 37–80)
NRBC BLD MANUAL-RTO: 0.4 /100 WBC (ref 0–0)
PLATELET # BLD AUTO: 78 10*3/MM3 (ref 130–400)
PMV BLD AUTO: 11.9 FL (ref 6–12)
POTASSIUM BLD-SCNC: 5.2 MMOL/L (ref 3.5–5.1)
POTASSIUM BLD-SCNC: 5.5 MMOL/L (ref 3.5–5.1)
PROT SERPL-MCNC: 5.5 G/DL (ref 6.3–8.2)
RBC # BLD AUTO: 3.21 10*6/MM3 (ref 4.7–6.1)
RBC MORPH BLD: NORMAL
SMALL PLATELETS BLD QL SMEAR: NORMAL
SODIUM BLD-SCNC: 150 MMOL/L (ref 137–145)
SODIUM BLD-SCNC: 150 MMOL/L (ref 137–145)
STRESS TARGET HR: 131 BPM
TOXIC GRANULATION: NORMAL
WBC NRBC COR # BLD: 5.19 10*3/MM3 (ref 4.8–10.8)

## 2018-04-24 PROCEDURE — 93306 TTE W/DOPPLER COMPLETE: CPT | Performed by: INTERNAL MEDICINE

## 2018-04-24 PROCEDURE — 63710000001 INSULIN ASPART PER 5 UNITS: Performed by: FAMILY MEDICINE

## 2018-04-24 PROCEDURE — 80053 COMPREHEN METABOLIC PANEL: CPT | Performed by: INTERNAL MEDICINE

## 2018-04-24 PROCEDURE — 93306 TTE W/DOPPLER COMPLETE: CPT

## 2018-04-24 PROCEDURE — 25010000002 FUROSEMIDE PER 20 MG: Performed by: FAMILY MEDICINE

## 2018-04-24 PROCEDURE — 83036 HEMOGLOBIN GLYCOSYLATED A1C: CPT | Performed by: FAMILY MEDICINE

## 2018-04-24 PROCEDURE — 94799 UNLISTED PULMONARY SVC/PX: CPT

## 2018-04-24 PROCEDURE — 99232 SBSQ HOSP IP/OBS MODERATE 35: CPT | Performed by: FAMILY MEDICINE

## 2018-04-24 PROCEDURE — 83735 ASSAY OF MAGNESIUM: CPT | Performed by: FAMILY MEDICINE

## 2018-04-24 PROCEDURE — 85025 COMPLETE CBC W/AUTO DIFF WBC: CPT | Performed by: FAMILY MEDICINE

## 2018-04-24 PROCEDURE — 25010000002 PIPERACILLIN SOD-TAZOBACTAM PER 1 G: Performed by: FAMILY MEDICINE

## 2018-04-24 PROCEDURE — 71045 X-RAY EXAM CHEST 1 VIEW: CPT

## 2018-04-24 PROCEDURE — P9046 ALBUMIN (HUMAN), 25%, 20 ML: HCPCS | Performed by: FAMILY MEDICINE

## 2018-04-24 PROCEDURE — 25010000002 ALBUMIN HUMAN 25% PER 50 ML: Performed by: FAMILY MEDICINE

## 2018-04-24 PROCEDURE — 25010000002 AZITHROMYCIN: Performed by: FAMILY MEDICINE

## 2018-04-24 PROCEDURE — 85007 BL SMEAR W/DIFF WBC COUNT: CPT | Performed by: FAMILY MEDICINE

## 2018-04-24 PROCEDURE — 25010000002 METHYLPREDNISOLONE PER 40 MG: Performed by: FAMILY MEDICINE

## 2018-04-24 PROCEDURE — 94660 CPAP INITIATION&MGMT: CPT

## 2018-04-24 PROCEDURE — 82962 GLUCOSE BLOOD TEST: CPT

## 2018-04-24 RX ORDER — QUETIAPINE FUMARATE 25 MG/1
25 TABLET, FILM COATED ORAL NIGHTLY
Status: DISCONTINUED | OUTPATIENT
Start: 2018-04-24 | End: 2018-04-26 | Stop reason: HOSPADM

## 2018-04-24 RX ORDER — ALBUMIN (HUMAN) 12.5 G/50ML
25 SOLUTION INTRAVENOUS ONCE
Status: COMPLETED | OUTPATIENT
Start: 2018-04-24 | End: 2018-04-24

## 2018-04-24 RX ORDER — DIVALPROEX SODIUM 250 MG/1
500 TABLET, EXTENDED RELEASE ORAL DAILY
Status: DISCONTINUED | OUTPATIENT
Start: 2018-04-25 | End: 2018-04-26 | Stop reason: HOSPADM

## 2018-04-24 RX ORDER — TAMSULOSIN HYDROCHLORIDE 0.4 MG/1
0.4 CAPSULE ORAL DAILY
Status: DISCONTINUED | OUTPATIENT
Start: 2018-04-24 | End: 2018-04-26 | Stop reason: HOSPADM

## 2018-04-24 RX ORDER — FUROSEMIDE 10 MG/ML
20 INJECTION INTRAMUSCULAR; INTRAVENOUS ONCE
Status: COMPLETED | OUTPATIENT
Start: 2018-04-24 | End: 2018-04-24

## 2018-04-24 RX ORDER — BISACODYL 10 MG
10 SUPPOSITORY, RECTAL RECTAL DAILY PRN
Status: DISCONTINUED | OUTPATIENT
Start: 2018-04-24 | End: 2018-04-26 | Stop reason: HOSPADM

## 2018-04-24 RX ADMIN — AZITHROMYCIN 500 MG: 500 INJECTION, POWDER, LYOPHILIZED, FOR SOLUTION INTRAVENOUS at 15:25

## 2018-04-24 RX ADMIN — TAZOBACTAM SODIUM AND PIPERACILLIN SODIUM 3.38 G: 375; 3 INJECTION, SOLUTION INTRAVENOUS at 01:59

## 2018-04-24 RX ADMIN — FUROSEMIDE 20 MG: 10 INJECTION, SOLUTION INTRAVENOUS at 09:12

## 2018-04-24 RX ADMIN — INSULIN ASPART 2 UNITS: 100 INJECTION, SOLUTION INTRAVENOUS; SUBCUTANEOUS at 11:59

## 2018-04-24 RX ADMIN — GUAIFENESIN 1200 MG: 600 TABLET, EXTENDED RELEASE ORAL at 09:12

## 2018-04-24 RX ADMIN — ALBUMIN HUMAN 25 G: 0.25 SOLUTION INTRAVENOUS at 09:09

## 2018-04-24 RX ADMIN — IPRATROPIUM BROMIDE AND ALBUTEROL SULFATE 3 ML: .5; 3 SOLUTION RESPIRATORY (INHALATION) at 07:05

## 2018-04-24 RX ADMIN — MONTELUKAST SODIUM 10 MG: 10 TABLET, FILM COATED ORAL at 22:00

## 2018-04-24 RX ADMIN — METHYLPREDNISOLONE SODIUM SUCCINATE 40 MG: 40 INJECTION, POWDER, FOR SOLUTION INTRAMUSCULAR; INTRAVENOUS at 15:25

## 2018-04-24 RX ADMIN — IPRATROPIUM BROMIDE AND ALBUTEROL SULFATE 3 ML: .5; 3 SOLUTION RESPIRATORY (INHALATION) at 19:19

## 2018-04-24 RX ADMIN — TAMSULOSIN HYDROCHLORIDE 0.4 MG: 0.4 CAPSULE ORAL at 17:56

## 2018-04-24 RX ADMIN — Medication 400 MG: at 22:00

## 2018-04-24 RX ADMIN — TAZOBACTAM SODIUM AND PIPERACILLIN SODIUM 3.38 G: 375; 3 INJECTION, SOLUTION INTRAVENOUS at 10:44

## 2018-04-24 RX ADMIN — QUETIAPINE FUMARATE 25 MG: 25 TABLET ORAL at 22:00

## 2018-04-24 RX ADMIN — DEXTROSE AND SODIUM CHLORIDE 100 ML/HR: 5; 450 INJECTION, SOLUTION INTRAVENOUS at 00:05

## 2018-04-24 RX ADMIN — GUAIFENESIN 1200 MG: 600 TABLET, EXTENDED RELEASE ORAL at 21:59

## 2018-04-24 RX ADMIN — IPRATROPIUM BROMIDE AND ALBUTEROL SULFATE 3 ML: .5; 3 SOLUTION RESPIRATORY (INHALATION) at 00:25

## 2018-04-24 RX ADMIN — IPRATROPIUM BROMIDE AND ALBUTEROL SULFATE 3 ML: .5; 3 SOLUTION RESPIRATORY (INHALATION) at 13:14

## 2018-04-24 RX ADMIN — VALPROATE SODIUM 500 MG: 100 INJECTION, SOLUTION INTRAVENOUS at 09:16

## 2018-04-24 RX ADMIN — TAZOBACTAM SODIUM AND PIPERACILLIN SODIUM 3.38 G: 375; 3 INJECTION, SOLUTION INTRAVENOUS at 17:57

## 2018-04-24 RX ADMIN — ASPIRIN 81 MG 81 MG: 81 TABLET ORAL at 09:12

## 2018-04-24 RX ADMIN — METHYLPREDNISOLONE SODIUM SUCCINATE 40 MG: 40 INJECTION, POWDER, FOR SOLUTION INTRAMUSCULAR; INTRAVENOUS at 01:59

## 2018-04-24 NOTE — PLAN OF CARE
Problem: Patient Care Overview  Goal: Plan of Care Review   04/24/18 4507   Coping/Psychosocial   Plan of Care Reviewed With patient   Plan of Care Review   Progress improving   OTHER   Outcome Summary Pt moved from ICU to med-surg floor. Pt sleeping, but alert and able to answer questions. Pt did request a sprite to drink. Low heart rate regularly, MD aware. Will continue to monitor.

## 2018-04-24 NOTE — PLAN OF CARE
Problem: Patient Care Overview  Goal: Individualization and Mutuality  Outcome: Ongoing (interventions implemented as appropriate)      Problem: Fall Risk (Adult)  Goal: Absence of Fall  Outcome: Ongoing (interventions implemented as appropriate)

## 2018-04-24 NOTE — PROGRESS NOTES
[facility]    Name:  Braulio Schwartz   Age:  67 y.o.  Sex:  male  :  1950  MRN:  [id number]   Visit Number:  [id number]  Admission Date:  2018  Date Of Service:  18  Primary Care Physician:  Yandel Steward MD     LOS: 5 days :      Chief Complaint:  No complaints.  Follow up septic shock and pneumonia        Subjective / Interval History:   The patient was seen this morning.  He was awake and smiling and interacts appropriately.  He does not state any complaints.  He denies fever, chills, chest pain, shortness of breath.  His blood pressure has now improved and he has been weaned off Levophed drip.  He has had intermittent bradycardia since first developing septic shock.  He is slightly more bradycardic to the 40s today but he is without pauses and without symptoms.  The patient reports being chronically wheelchair-bound and normally spends his days reading. He was previously on many medications for psychosis and schizophrenia which had to be stopped here with acute on chronic kidney disease and hypersomnolence.     The patient has remains happy and cooperative. He has improved blood pressure today. He is mildly edematous. Chest xray yesterday with confirmed pneumonia. He is in no acute distress. He is eating and drinking some on his own.           Review of Systems:  Difficult to obtain daily    General ROS: Patient denies any fevers, chills or loss of consciousness.  Ophthalmic ROS: Denies any diplopia or transient loss of vision.  ENT ROS: Denies sore throat, nasal congestion or ear pain.   Respiratory ROS: Denies cough or shortness of breath.  Cardiovascular ROS: Denies chest pain or palpitations. No history of exertional chest pain.   Gastrointestinal ROS: Denies nausea and vomiting. Denies any abdominal pain. No diarrhea.  Genito-Urinary ROS: Denies dysuria or hematuria.  Musculoskeletal ROS: Denies chronic back pain. No muscle pain. No  calf pain.  Neurological ROS: Denies any focal weakness. No loss of consciousness. Denies any numbness. Denies neck pain.   Dermatological ROS: Denies any redness or pruritis.    Vital Signs:    Temp:  [97.4 °F (36.3 °C)-98.7 °F (37.1 °C)] 97.4 °F (36.3 °C)  Heart Rate:  [44-80] 44  Resp:  [9-16] 12  BP: ()/(58-84) 135/76    Intake and output:    I/O last 3 completed shifts:  In: 4449 [P.O.:342; I.V.:3207; Other:900]  Out: 1845 [Urine:1845]  I/O this shift:  In: 220 [P.O.:220]  Out: -     Physical Examination:    General Appearance:    Alert and cooperative, not in any acute distress.   Head:    Atraumatic and normocephalic, without obvious abnormality.   Eyes:            PERRLA, conjunctivae and sclerae normal, EOMI. Poor eye contact   Throat:   No oral lesions, no thrush, oral mucosa moist.   Neck:   Supple, trachea midline, no thyromegaly   Lungs:     Chest shape is normal. Breath sounds heard bilaterally equally.  No crackles or wheezing. No Pleural rub or bronchial breathing.    Heart:    Normal S1 and S2, no murmur, no gallop   Abdomen:     Soft, pos bowel sounds, nontender   Extremities:   Moves all extremities well, 1+= edema, no cyanosis, no             clubbing   Skin:   No bleeding, bruising or rash; improved right anterior lower leg erythema   Neurologic:  Diffuse muscle atrophy, with bilateral foot deformity, no tremor   Laboratory results:    Lab Results (last 24 hours)     Procedure Component Value Units Date/Time    POC Glucose Once [[id number]]  (Abnormal) Collected:  04/24/18 1122    Specimen:  Blood Updated:  04/24/18 1133     Glucose 183 (H) mg/dL      Comment: Serial Number: [id number]:  [id number]       Comprehensive Metabolic Panel [[id number]]  (Abnormal) Collected:  04/24/18 0623    Specimen:  Blood Updated:  04/24/18 0715     Glucose 138 (H) mg/dL      BUN 15 mg/dL      Creatinine 2.40 (H) mg/dL      Sodium 150 (H) mmol/L      Potassium 5.5 (H) mmol/L      Chloride 116 (H) mmol/L       CO2 25.0 (L) mmol/L      Calcium 8.9 mg/dL      Total Protein 5.5 (L) g/dL      Albumin 2.70 (L) g/dL      ALT (SGPT) 30 U/L      AST (SGOT) 16 U/L      Alkaline Phosphatase 106 U/L      Total Bilirubin 0.4 mg/dL      eGFR Non African Amer 27 (L) mL/min/1.73      Globulin 2.8 gm/dL      A/G Ratio 1.0 g/dL      BUN/Creatinine Ratio 6.3     Anion Gap 14.5 mmol/L     Narrative:       Abnormal estimated GFR should be followed by more specific studies to confirm end stage chronic renal disease. The equation used for calculation may not be accurate for patients less than 19 years old, greater than 70 years old, patients at extremes of weight, malnutrition, or with acute renal dysfunction.    POC Glucose Once [[id number]]  (Abnormal) Collected:  04/24/18 0639    Specimen:  Blood Updated:  04/24/18 0647     Glucose 143 (H) mg/dL      Comment: Serial Number: [id number]:  [id number]       CBC & Differential [[id number]] Collected:  04/24/18 0414    Specimen:  Blood Updated:  04/24/18 0529    Narrative:       The following orders were created for panel order CBC & Differential.  Procedure                               Abnormality         Status                     ---------                               -----------         ------                     Scan Slide[[id number]]                                       Final result               CBC Auto Differential[[id number]]        Abnormal            Final result                 Please view results for these tests on the individual orders.    CBC Auto Differential [[id number]]  (Abnormal) Collected:  04/24/18 0414    Specimen:  Blood Updated:  04/24/18 0529     WBC 5.19 10*3/mm3      RBC 3.21 (L) 10*6/mm3      Hemoglobin 9.8 (L) g/dL      Hematocrit 31.1 (L) %      MCV 96.9 (H) fL      MCH 30.5 pg      MCHC 31.5 g/dL      RDW 14.2 %      RDW-SD 50.4 fl      MPV 11.9 fL      Platelets 78 (L) 10*3/mm3      Neutrophil % 60.9 %      Lymphocyte % 20.4 %      Monocyte % 7.1 %       Eosinophil % 3.1 %      Basophil % 0.6 %      Immature Grans % 7.9 (H) %      Neutrophils, Absolute 3.16 10*3/mm3      Lymphocytes, Absolute 1.06 10*3/mm3      Monocytes, Absolute 0.37 10*3/mm3      Eosinophils, Absolute 0.16 10*3/mm3      Basophils, Absolute 0.03 10*3/mm3      Immature Grans, Absolute 0.41 (H) 10*3/mm3      nRBC 0.4 (H) /100 WBC     Scan Slide [[id number]] Collected:  04/24/18 0414    Specimen:  Blood Updated:  04/24/18 0529     RBC Morphology Normal     Toxic Granulation Slight/1+     Platelet Estimate Decreased    Hemoglobin A1c [[id number]] Collected:  04/24/18 0414    Specimen:  Blood Updated:  04/24/18 0454    Blood Culture With VENKATA - Blood, [[id number]]  (Normal) Collected:  04/23/18 1444    Specimen:  Blood from Arm, Left Updated:  04/24/18 0307     Blood Culture No growth at less than 24 hours    Blood Culture With VENKATA - Blood, [[id number]]  (Normal) Collected:  04/23/18 1453    Specimen:  Blood from Hand, Left Updated:  04/24/18 0307     Blood Culture No growth at less than 24 hours    POC Glucose Once [[id number]]  (Normal) Collected:  04/23/18 2038    Specimen:  Blood Updated:  04/23/18 2046     Glucose 122 mg/dL      Comment: Serial Number: [id number]:  [id number]       POC Glucose Once [[id number]]  (Normal) Collected:  04/23/18 1555    Specimen:  Blood Updated:  04/23/18 1600     Glucose 100 mg/dL      Comment: Serial Number: [id number]:  [id number]             I have reviewed the patient's laboratory results.    Radiology results:    Imaging Results (last 24 hours)     ** No results found for the last 24 hours. **          I have reviewed the patient's radiology reports.    Medication Review:     I have reviewed the patients active and prn medications.     Assessment:  1. Septic shock secondary to pneumonia, prior to admission, requiring vasopressor therapy, improved and weaned off vasopressor  2. Bilateral aspiration pneumonia, prior to admission, not seen until hydrated,  stable  3. Right leg cellulitis, improved  4. Acute metabolic and toxic encephalopathy, multifactoral with sepsis and pneumonia, as well as polypharmacy for psychosis chronic, improved  5. Acute on chronic kidney injury, improved  6. Urinary retention, requiring landers catheter placement  7. Chronic debility  8. Chronic paranoid schizophrenia stable  9. Hypersomnolence, avoid sedation, improved   10. Acute repiratory failure with hypercapnia, prior to admission, improved  11. Hypothermia with sepsis, treated and resolved  12. Hyperglycemia, with A1c 5.7, stress induced   13. Thrombocytopenia, with history of the same chronically  14. Bradycardia with suspect sick sinus without pauses    Plan:  He is improved to be transferred to the medical floor with telemetry. Continue aspiration precautions. See if PT can assist with any ambulation, though expect he may remain limited to wheelchair and bedside chair. He has had urinary retention, but will start flomax and give voiding trial in AM. His fluids were reduced and he was given albumin and lasix today. Check daily labs. He has clinically improved. He remains awake and alert. With chronic psychosis, to prevent acute psychosis will restart lower dose of seroquel tonight and see how he does.     Continue zosyn, azithromycin. Cultures negative thus far.     He is improved and consider discharge back to the nursing facility tomorrow.    Medication risks and benefits were discussed in detail. Patient reported satisfaction with care delivered and treatment plan.  He is [facility] of the [facility].     Berenice Marie DO  04/24/18  2:20 PM

## 2018-04-24 NOTE — PLAN OF CARE
Problem: NPPV/CPAP (Adult)  Goal: Signs and Symptoms of Listed Potential Problems Will be Absent, Minimized or Managed (NPPV/CPAP)  Outcome: Ongoing (interventions implemented as appropriate)   04/24/18 2491   Goal/Outcome Evaluation   Problems Assessed (NPPV/CPAP) all   Problems Present (NPPV/CPAP) none

## 2018-04-24 NOTE — DISCHARGE INSTR - OTHER ORDERS
If you have any questions about your recovery, please call the [facility] Nurse Call Center at [phone]. A registered nurse is available 24 hours a day 7 days a week to assist you.

## 2018-04-24 NOTE — PROGRESS NOTES
Continued Stay Note  JUAN Simoen     Patient Name: Braulio Schwartz  MRN: [id number]  Today's Date: 4/24/2018    Admit Date: 4/19/2018          Discharge Plan     Row Name 04/24/18 1009       Plan    Plan notified [facility] to return on wednesday.              Discharge Codes    No documentation.       Expected Discharge Date and Time     Expected Discharge Date Expected Discharge Time    Apr 25, 2018             Junie Fagan

## 2018-04-25 LAB
ANION GAP SERPL CALCULATED.3IONS-SCNC: 14.9 MMOL/L (ref 10–20)
BASOPHILS # BLD AUTO: 0.04 10*3/MM3 (ref 0–0.2)
BASOPHILS NFR BLD AUTO: 0.7 % (ref 0–2.5)
BUN BLD-MCNC: 20 MG/DL (ref 7–20)
BUN/CREAT SERPL: 8.3 (ref 6.3–21.9)
CALCIUM SPEC-SCNC: 9 MG/DL (ref 8.4–10.2)
CHLORIDE SERPL-SCNC: 113 MMOL/L (ref 98–107)
CO2 SERPL-SCNC: 27 MMOL/L (ref 26–30)
CREAT BLD-MCNC: 2.4 MG/DL (ref 0.6–1.3)
D-LACTATE SERPL-SCNC: 1 MMOL/L (ref 0.5–2)
DEPRECATED RDW RBC AUTO: 47.8 FL (ref 37–54)
EOSINOPHIL # BLD AUTO: 0.01 10*3/MM3 (ref 0–0.7)
EOSINOPHIL NFR BLD AUTO: 0.2 % (ref 0–7)
ERYTHROCYTE [DISTWIDTH] IN BLOOD BY AUTOMATED COUNT: 13.7 % (ref 11.5–14.5)
GFR SERPL CREATININE-BSD FRML MDRD: 27 ML/MIN/1.73
GLUCOSE BLD-MCNC: 113 MG/DL (ref 74–98)
GLUCOSE BLDC GLUCOMTR-MCNC: 119 MG/DL (ref 70–130)
GLUCOSE BLDC GLUCOMTR-MCNC: 124 MG/DL (ref 70–130)
GLUCOSE BLDC GLUCOMTR-MCNC: 125 MG/DL (ref 70–130)
GLUCOSE BLDC GLUCOMTR-MCNC: 171 MG/DL (ref 70–130)
HCT VFR BLD AUTO: 29.5 % (ref 42–52)
HGB BLD-MCNC: 9.3 G/DL (ref 14–18)
IMM GRANULOCYTES # BLD: 0.55 10*3/MM3 (ref 0–0.06)
IMM GRANULOCYTES NFR BLD: 10.3 % (ref 0–0.6)
LYMPHOCYTES # BLD AUTO: 1.05 10*3/MM3 (ref 0.6–3.4)
LYMPHOCYTES NFR BLD AUTO: 19.6 % (ref 10–50)
MAGNESIUM SERPL-MCNC: 1.7 MG/DL (ref 1.6–2.3)
MCH RBC QN AUTO: 30.1 PG (ref 27–31)
MCHC RBC AUTO-ENTMCNC: 31.5 G/DL (ref 30–37)
MCV RBC AUTO: 95.5 FL (ref 80–94)
MONOCYTES # BLD AUTO: 0.41 10*3/MM3 (ref 0–0.9)
MONOCYTES NFR BLD AUTO: 7.6 % (ref 0–12)
NEUTROPHILS # BLD AUTO: 3.3 10*3/MM3 (ref 2–6.9)
NEUTROPHILS NFR BLD AUTO: 61.6 % (ref 37–80)
NRBC BLD MANUAL-RTO: 0.4 /100 WBC (ref 0–0)
PLAT MORPH BLD: NORMAL
PLATELET # BLD AUTO: 61 10*3/MM3 (ref 130–400)
PMV BLD AUTO: 11.5 FL (ref 6–12)
POTASSIUM BLD-SCNC: 4.9 MMOL/L (ref 3.5–5.1)
RBC # BLD AUTO: 3.09 10*6/MM3 (ref 4.7–6.1)
RBC MORPH BLD: NORMAL
SMALL PLATELETS BLD QL SMEAR: NORMAL
SODIUM BLD-SCNC: 150 MMOL/L (ref 137–145)
WBC MORPH BLD: NORMAL
WBC NRBC COR # BLD: 5.36 10*3/MM3 (ref 4.8–10.8)

## 2018-04-25 PROCEDURE — 25010000002 PIPERACILLIN SOD-TAZOBACTAM PER 1 G: Performed by: FAMILY MEDICINE

## 2018-04-25 PROCEDURE — 63710000001 PREDNISONE PER 1 MG: Performed by: FAMILY MEDICINE

## 2018-04-25 PROCEDURE — 94799 UNLISTED PULMONARY SVC/PX: CPT

## 2018-04-25 PROCEDURE — 97162 PT EVAL MOD COMPLEX 30 MIN: CPT

## 2018-04-25 PROCEDURE — 83735 ASSAY OF MAGNESIUM: CPT | Performed by: FAMILY MEDICINE

## 2018-04-25 PROCEDURE — 85007 BL SMEAR W/DIFF WBC COUNT: CPT | Performed by: INTERNAL MEDICINE

## 2018-04-25 PROCEDURE — 25010000002 AZITHROMYCIN: Performed by: FAMILY MEDICINE

## 2018-04-25 PROCEDURE — 25010000002 METHYLPREDNISOLONE PER 40 MG: Performed by: FAMILY MEDICINE

## 2018-04-25 PROCEDURE — 63710000001 INSULIN ASPART PER 5 UNITS: Performed by: FAMILY MEDICINE

## 2018-04-25 PROCEDURE — 85025 COMPLETE CBC W/AUTO DIFF WBC: CPT | Performed by: INTERNAL MEDICINE

## 2018-04-25 PROCEDURE — 82962 GLUCOSE BLOOD TEST: CPT

## 2018-04-25 PROCEDURE — 99232 SBSQ HOSP IP/OBS MODERATE 35: CPT | Performed by: FAMILY MEDICINE

## 2018-04-25 PROCEDURE — 80048 BASIC METABOLIC PNL TOTAL CA: CPT | Performed by: INTERNAL MEDICINE

## 2018-04-25 PROCEDURE — 83605 ASSAY OF LACTIC ACID: CPT | Performed by: INTERNAL MEDICINE

## 2018-04-25 RX ORDER — FLUDROCORTISONE ACETATE 0.1 MG/1
100 TABLET ORAL DAILY
Status: DISCONTINUED | OUTPATIENT
Start: 2018-04-25 | End: 2018-04-26 | Stop reason: HOSPADM

## 2018-04-25 RX ORDER — IPRATROPIUM BROMIDE AND ALBUTEROL SULFATE 2.5; .5 MG/3ML; MG/3ML
3 SOLUTION RESPIRATORY (INHALATION)
Status: DISCONTINUED | OUTPATIENT
Start: 2018-04-25 | End: 2018-04-26 | Stop reason: HOSPADM

## 2018-04-25 RX ORDER — PREDNISONE 20 MG/1
20 TABLET ORAL
Status: DISCONTINUED | OUTPATIENT
Start: 2018-04-25 | End: 2018-04-26 | Stop reason: HOSPADM

## 2018-04-25 RX ADMIN — Medication 400 MG: at 20:48

## 2018-04-25 RX ADMIN — GUAIFENESIN 1200 MG: 600 TABLET, EXTENDED RELEASE ORAL at 09:12

## 2018-04-25 RX ADMIN — TAZOBACTAM SODIUM AND PIPERACILLIN SODIUM 3.38 G: 375; 3 INJECTION, SOLUTION INTRAVENOUS at 11:31

## 2018-04-25 RX ADMIN — IPRATROPIUM BROMIDE AND ALBUTEROL SULFATE 3 ML: .5; 3 SOLUTION RESPIRATORY (INHALATION) at 23:17

## 2018-04-25 RX ADMIN — INSULIN ASPART 2 UNITS: 100 INJECTION, SOLUTION INTRAVENOUS; SUBCUTANEOUS at 17:09

## 2018-04-25 RX ADMIN — TAZOBACTAM SODIUM AND PIPERACILLIN SODIUM 3.38 G: 375; 3 INJECTION, SOLUTION INTRAVENOUS at 17:09

## 2018-04-25 RX ADMIN — TAZOBACTAM SODIUM AND PIPERACILLIN SODIUM 3.38 G: 375; 3 INJECTION, SOLUTION INTRAVENOUS at 02:04

## 2018-04-25 RX ADMIN — PREDNISONE 20 MG: 20 TABLET ORAL at 09:13

## 2018-04-25 RX ADMIN — IPRATROPIUM BROMIDE AND ALBUTEROL SULFATE 3 ML: .5; 3 SOLUTION RESPIRATORY (INHALATION) at 00:30

## 2018-04-25 RX ADMIN — DIVALPROEX SODIUM 500 MG: 250 TABLET, EXTENDED RELEASE ORAL at 09:13

## 2018-04-25 RX ADMIN — IPRATROPIUM BROMIDE AND ALBUTEROL SULFATE 3 ML: .5; 3 SOLUTION RESPIRATORY (INHALATION) at 19:19

## 2018-04-25 RX ADMIN — IPRATROPIUM BROMIDE AND ALBUTEROL SULFATE 3 ML: .5; 3 SOLUTION RESPIRATORY (INHALATION) at 13:01

## 2018-04-25 RX ADMIN — METHYLPREDNISOLONE SODIUM SUCCINATE 40 MG: 40 INJECTION, POWDER, FOR SOLUTION INTRAMUSCULAR; INTRAVENOUS at 02:04

## 2018-04-25 RX ADMIN — Medication 400 MG: at 09:13

## 2018-04-25 RX ADMIN — QUETIAPINE FUMARATE 25 MG: 25 TABLET ORAL at 20:48

## 2018-04-25 RX ADMIN — ASPIRIN 81 MG 81 MG: 81 TABLET ORAL at 09:12

## 2018-04-25 RX ADMIN — ACETAMINOPHEN 650 MG: 325 TABLET, FILM COATED ORAL at 17:08

## 2018-04-25 RX ADMIN — IPRATROPIUM BROMIDE AND ALBUTEROL SULFATE 3 ML: .5; 3 SOLUTION RESPIRATORY (INHALATION) at 07:10

## 2018-04-25 RX ADMIN — GUAIFENESIN 1200 MG: 600 TABLET, EXTENDED RELEASE ORAL at 20:48

## 2018-04-25 RX ADMIN — FLUDROCORTISONE ACETATE 100 MCG: 0.1 TABLET ORAL at 09:13

## 2018-04-25 RX ADMIN — TAMSULOSIN HYDROCHLORIDE 0.4 MG: 0.4 CAPSULE ORAL at 09:13

## 2018-04-25 RX ADMIN — AZITHROMYCIN 500 MG: 500 INJECTION, POWDER, LYOPHILIZED, FOR SOLUTION INTRAVENOUS at 14:48

## 2018-04-25 NOTE — PROGRESS NOTES
[facility]    Name:  Braulio Schwartz   Age:  67 y.o.  Sex:  male  :  1950  MRN:  [id number]   Visit Number:  [id number]  Admission Date:  2018  Date Of Service:  18  Primary Care Physician:  Yandel Steward MD     LOS: 6 days :      Chief Complaint:  No complaints.  Follow up septic shock and pneumonia        Subjective / Interval History:   The patient was seen this morning, sitting up awake in bed smiling. He had recurrent hypothermia overnight but sepsis has resolved since. Blood pressure remains low normal, with anticipated orthostasis with  PT today fludrocortisone added. With improvement today and normalization of his body temperature, he has improved heart rate today.     Overall he remains improved after surviving severe septic shock, with pneumonia and right lower leg cellulitis. The patient denies chest pain, shortness of breath, abdominal pain. He is happy and smiling and reading the newspaper.      Review of Systems:     General ROS: Patient denies any fevers, chills or loss of consciousness.  Ophthalmic ROS: Denies any diplopia or transient loss of vision.  ENT ROS: Denies sore throat, nasal congestion or ear pain.   Respiratory ROS: Denies cough or shortness of breath.  Cardiovascular ROS: Denies chest pain or palpitations. No history of exertional chest pain.   Gastrointestinal ROS: Denies nausea and vomiting. Denies any abdominal pain. No diarrhea.  Genito-Urinary ROS: Denies dysuria or hematuria.  Musculoskeletal ROS: Denies chronic back pain. No muscle pain. No calf pain.  Neurological ROS: Denies any focal weakness. No loss of consciousness. Denies any numbness. Denies neck pain.   Dermatological ROS: Denies any redness or pruritis.    Vital Signs:    Temp:  [94.6 °F (34.8 °C)-97.9 °F (36.6 °C)] 97.4 °F (36.3 °C)  Heart Rate:  [42-70] 68  Resp:  [14-20] 19  BP: (100-132)/(55-82) 100/55    Intake and output:    I/O last 3  completed shifts:  In: 3633 [P.O.:1242; I.V.:2291; IV Piggyback:100]  Out: 2375 [Urine:2375]  I/O this shift:  In: 360 [P.O.:360]  Out: 1150 [Urine:1150]    Physical Examination:    General Appearance:    Smiling. Sitting up in bed. Alert and cooperative, not in any acute distress.   Head:    Atraumatic and normocephalic, without obvious abnormality.   Eyes:            PERRLA, conjunctivae and sclerae normal, EOMI. Improved eye contact today.    Throat:   No oral lesions, no thrush, oral mucosa moist.. Poor oral dentition   Neck:   Supple, trachea midline, no thyromegaly   Lungs:    Reduced breath sounds bilaterally, but clear without crackles, rhonchi, or wheezing.    Heart:    Normal S1 and S2, no murmur, no gallop   Abdomen:     Soft, pos bowel sounds, nontender   Extremities:   Moves all 4 extremities. Bilateral chronic foot deformity. Diffuse muscle atrophy   Skin:   No bleeding, bruising or rash; right anterior lower leg cellulitis improved.   Neurologic:  Diffuse muscle atrophy, no acute weakness, no tremor     Laboratory results:    Lab Results (last 24 hours)     Procedure Component Value Units Date/Time    POC Glucose Once [[id number]]  (Normal) Collected:  04/25/18 1121    Specimen:  Blood Updated:  04/25/18 1130     Glucose 119 mg/dL      Comment: Serial Number: [id number]:  [id number]       POC Glucose Once [[id number]]  (Normal) Collected:  04/25/18 0555    Specimen:  Blood Updated:  04/25/18 0606     Glucose 125 mg/dL      Comment: Serial Number: [id number]:  [id number]       CBC & Differential [[id number]] Collected:  04/25/18 0523    Specimen:  Blood Updated:  04/25/18 0605    Narrative:       The following orders were created for panel order CBC & Differential.  Procedure                               Abnormality         Status                     ---------                               -----------         ------                     Scan Slide[[id number]]                                        Final result               CBC Auto Differential[[id number]]        Abnormal            Final result                 Please view results for these tests on the individual orders.    Scan Slide [[id number]] Collected:  04/25/18 0523    Specimen:  Blood Updated:  04/25/18 0605     RBC Morphology Normal     WBC Morphology Normal     Platelet Morphology Normal     Platelet Estimate --     Comment: decreased       CBC Auto Differential [[id number]]  (Abnormal) Collected:  04/25/18 0523    Specimen:  Blood Updated:  04/25/18 0604     WBC 5.36 10*3/mm3      RBC 3.09 (L) 10*6/mm3      Hemoglobin 9.3 (L) g/dL      Hematocrit 29.5 (L) %      MCV 95.5 (H) fL      MCH 30.1 pg      MCHC 31.5 g/dL      RDW 13.7 %      RDW-SD 47.8 fl      MPV 11.5 fL      Platelets 61 (L) 10*3/mm3      Neutrophil % 61.6 %      Lymphocyte % 19.6 %      Monocyte % 7.6 %      Eosinophil % 0.2 %      Basophil % 0.7 %      Immature Grans % 10.3 (H) %      Neutrophils, Absolute 3.30 10*3/mm3      Lymphocytes, Absolute 1.05 10*3/mm3      Monocytes, Absolute 0.41 10*3/mm3      Eosinophils, Absolute 0.01 10*3/mm3      Basophils, Absolute 0.04 10*3/mm3      Immature Grans, Absolute 0.55 (H) 10*3/mm3      nRBC 0.4 (H) /100 WBC     Magnesium [[id number]]  (Normal) Collected:  04/25/18 0523    Specimen:  Blood Updated:  04/25/18 0551     Magnesium 1.7 mg/dL     Basic Metabolic Panel [[id number]]  (Abnormal) Collected:  04/25/18 0523    Specimen:  Blood Updated:  04/25/18 0551     Glucose 113 (H) mg/dL      BUN 20 mg/dL      Creatinine 2.40 (H) mg/dL      Sodium 150 (H) mmol/L      Potassium 4.9 mmol/L      Chloride 113 (H) mmol/L      CO2 27.0 mmol/L      Calcium 9.0 mg/dL      eGFR Non African Amer 27 (L) mL/min/1.73      BUN/Creatinine Ratio 8.3     Anion Gap 14.9 mmol/L     Narrative:       Abnormal estimated GFR should be followed by more specific studies to confirm end stage chronic renal disease. The equation used for calculation may not be accurate for  patients less than 19 years old, greater than 70 years old, patients at extremes of weight, malnutrition, or with acute renal dysfunction.    Lactic Acid, Plasma [[id number]]  (Normal) Collected:  04/25/18 0523    Specimen:  Blood Updated:  04/25/18 0550     Lactate 1.0 mmol/L     POC Glucose Once [[id number]]  (Normal) Collected:  04/24/18 1957    Specimen:  Blood Updated:  04/24/18 2131     Glucose 128 mg/dL      Comment: Serial Number: [id number]:  [id number]       POC Glucose Once [[id number]]  (Normal) Collected:  04/24/18 1716    Specimen:  Blood Updated:  04/24/18 1720     Glucose 105 mg/dL      Comment: Serial Number: [id number]:  [id number]       Basic Metabolic Panel [[id number]]  (Abnormal) Collected:  04/24/18 1627    Specimen:  Blood Updated:  04/24/18 1651     Glucose 102 (H) mg/dL      BUN 16 mg/dL      Creatinine 2.60 (H) mg/dL      Sodium 150 (H) mmol/L      Potassium 5.2 (H) mmol/L      Chloride 112 (H) mmol/L      CO2 26.0 mmol/L      Calcium 9.0 mg/dL      eGFR Non African Amer 25 (L) mL/min/1.73      BUN/Creatinine Ratio 6.2 (L)     Anion Gap 17.2 mmol/L     Narrative:       Abnormal estimated GFR should be followed by more specific studies to confirm end stage chronic renal disease. The equation used for calculation may not be accurate for patients less than 19 years old, greater than 70 years old, patients at extremes of weight, malnutrition, or with acute renal dysfunction.    Magnesium [[id number]]  (Normal) Collected:  04/24/18 1627    Specimen:  Blood Updated:  04/24/18 1651     Magnesium 1.7 mg/dL     Hemoglobin A1c [[id number]]  (Normal) Collected:  04/24/18 0414    Specimen:  Blood Updated:  04/24/18 1601     Hemoglobin A1C 5.7 %     Narrative:       Expected HgbA1C results:     3% to 6% HgbA1C      HgbA1C                 Estimated Average Glucose     5%                              97 mg/dl   6%                             126 mg/dl   7%                             154 mg/dl   8%                              183 mg/dl   9%                             212 mg/dl  >10%                           >240 mg/dl      Blood Culture With VENKATA - Blood, [[id number]]  (Normal) Collected:  04/23/18 1444    Specimen:  Blood from Arm, Left Updated:  04/24/18 1515     Blood Culture No growth at 24 hours    Blood Culture With VENKATA - Blood, [[id number]]  (Normal) Collected:  04/23/18 1453    Specimen:  Blood from Hand, Left Updated:  04/24/18 1515     Blood Culture No growth at 24 hours          I have reviewed the patient's laboratory results.    Radiology results:    Imaging Results (last 24 hours)     Procedure Component Value Units Date/Time    XR Chest 1 View [[id number]] Collected:  04/24/18 1650     Updated:  04/24/18 1651    Narrative:       FINAL REPORT    CLINICAL HISTORY:  respiratory failure, pneumonia, bradycardia    FINDINGS:  The heart is mildly enlarged.  The mediastinum is within normal  limits.  The lungs are underinflated.  There is hazy opacity in  the left lung base probably due to a combination of atelectasis  and a small pleural effusion.  There is no pneumothorax.  The  bony thorax is intact.      Impression:       Left base atelectasis and a small left pleural effusion.    Authenticated by Agustin Burleson MD on 04/24/2018 04:50:09 PM          I have reviewed the patient's radiology reports.    Medication Review:     I have reviewed the patients active and prn medications.     Assessment:  1. Severe Septic shock secondary to pneumonia, prior to admission, requiring vasopressor therapy, improved and weaned off vasopressor  2. Bilateral aspiration pneumonia, prior to admission, not seen until hydrated, improving  3. Right leg cellulitis, improved, prior to admission, uncertain organism  4. Acute metabolic and toxic encephalopathy, multifactoral with sepsis and pneumonia, as well as polypharmacy for psychosis chronic, improved  5. Acute on chronic kidney injury, improved and stable  6. Urinary  retention, requiring landers catheter placement, given voiding trial 4-25-18  7. Chronic debility, mostly wheelchair bound  8. Chronic paranoid schizophrenia stable  9. Hypersomnolence, avoid sedation, resolved  10. Acute repiratory failure with hypercapnia, prior to admission, improved  11. Hypothermia with sepsis, recurrent with resolved sepsis  12. Hyperglycemia, with A1c 5.7, stress induced   13. Thrombocytopenia, with history of the same chronically  14. Bradycardia with suspect sick sinus without pauses, stable   15. Orthostasis    Plan:  He remains improved now. With low temp last night, will monitor for another day. Continue to keep him covered with blankets, monitoring blood pressure and heart rate. Fludrocortisone added. Continue very low dose seroquel and continue to hold off on the  Many other antipsychotics he had prior to admission, as they are currently unnecessary and contraindicated with his renal failure.     PT evaluation pending to see if patient may qualify for any acute  Rehab services.     Voiding trial today, with removal of landers catheter attempted.     Continue zosyn, azithromycin with negative culture, to complete zosyn tomorrow.       Anticipate discharge back to his nursing facility tomorrow.   He is [facility] of the [facility].     Berenice Marie,   04/25/18  1:39 PM

## 2018-04-25 NOTE — PLAN OF CARE
Problem: Patient Care Overview  Goal: Plan of Care Review  Outcome: Ongoing (interventions implemented as appropriate)   04/25/18 1524   Coping/Psychosocial   Plan of Care Reviewed With patient   Plan of Care Review   Progress improving       Problem: NPPV/CPAP (Adult)  Goal: Signs and Symptoms of Listed Potential Problems Will be Absent, Minimized or Managed (NPPV/CPAP)  Outcome: Ongoing (interventions implemented as appropriate)   04/25/18 1524   Goal/Outcome Evaluation   Problems Assessed (NPPV/CPAP) all   Problems Present (NPPV/CPAP) none

## 2018-04-25 NOTE — PLAN OF CARE
Problem: Sepsis/Septic Shock (Adult)  Goal: Signs and Symptoms of Listed Potential Problems Will be Absent, Minimized or Managed (Sepsis/Septic Shock)  Outcome: Ongoing (interventions implemented as appropriate)   04/25/18 0242   Goal/Outcome Evaluation   Problems Assessed (Sepsis) infection progression;situational response   Problems Present (Sepsis) infection progression;situational response       Problem: Fall Risk (Adult)  Goal: Absence of Fall  Outcome: Ongoing (interventions implemented as appropriate)   04/25/18 0242   Fall Risk (Adult)   Absence of Fall making progress toward outcome       Problem: Skin Injury Risk (Adult)  Goal: Skin Health and Integrity  Outcome: Ongoing (interventions implemented as appropriate)   04/25/18 0242   Skin Injury Risk (Adult)   Skin Health and Integrity making progress toward outcome

## 2018-04-25 NOTE — PLAN OF CARE
Problem: Patient Care Overview  Goal: Plan of Care Review  Outcome: Ongoing (interventions implemented as appropriate)    Goal: Discharge Needs Assessment  Outcome: Ongoing (interventions implemented as appropriate)      Problem: Sepsis/Septic Shock (Adult)  Goal: Signs and Symptoms of Listed Potential Problems Will be Absent, Minimized or Managed (Sepsis/Septic Shock)  Outcome: Ongoing (interventions implemented as appropriate)      Problem: Fall Risk (Adult)  Goal: Absence of Fall  Outcome: Ongoing (interventions implemented as appropriate)      Problem: Skin Injury Risk (Adult)  Goal: Skin Health and Integrity  Outcome: Ongoing (interventions implemented as appropriate)      Problem: NPPV/CPAP (Adult)  Goal: Signs and Symptoms of Listed Potential Problems Will be Absent, Minimized or Managed (NPPV/CPAP)  Outcome: Ongoing (interventions implemented as appropriate)

## 2018-04-26 VITALS
HEART RATE: 66 BPM | HEIGHT: 71 IN | RESPIRATION RATE: 18 BRPM | WEIGHT: 206 LBS | BODY MASS INDEX: 28.84 KG/M2 | DIASTOLIC BLOOD PRESSURE: 72 MMHG | OXYGEN SATURATION: 100 % | TEMPERATURE: 97.6 F | SYSTOLIC BLOOD PRESSURE: 124 MMHG

## 2018-04-26 LAB
ALBUMIN SERPL-MCNC: 2.6 G/DL (ref 3.5–5)
ANION GAP SERPL CALCULATED.3IONS-SCNC: 13.1 MMOL/L (ref 10–20)
BUN BLD-MCNC: 25 MG/DL (ref 7–20)
BUN/CREAT SERPL: 10 (ref 6.3–21.9)
CALCIUM SPEC-SCNC: 8.7 MG/DL (ref 8.4–10.2)
CHLORIDE SERPL-SCNC: 114 MMOL/L (ref 98–107)
CO2 SERPL-SCNC: 27 MMOL/L (ref 26–30)
CREAT BLD-MCNC: 2.5 MG/DL (ref 0.6–1.3)
DEPRECATED RDW RBC AUTO: 49.4 FL (ref 37–54)
ERYTHROCYTE [DISTWIDTH] IN BLOOD BY AUTOMATED COUNT: 13.9 % (ref 11.5–14.5)
GFR SERPL CREATININE-BSD FRML MDRD: 26 ML/MIN/1.73
GLUCOSE BLD-MCNC: 78 MG/DL (ref 74–98)
GLUCOSE BLDC GLUCOMTR-MCNC: 128 MG/DL (ref 70–130)
GLUCOSE BLDC GLUCOMTR-MCNC: 136 MG/DL (ref 70–130)
HCT VFR BLD AUTO: 28.5 % (ref 42–52)
HGB BLD-MCNC: 8.9 G/DL (ref 14–18)
LYMPHOCYTES # BLD MANUAL: 1.89 10*3/MM3 (ref 0.6–3.4)
LYMPHOCYTES NFR BLD MANUAL: 12 % (ref 0–12)
LYMPHOCYTES NFR BLD MANUAL: 41 % (ref 10–50)
MAGNESIUM SERPL-MCNC: 1.9 MG/DL (ref 1.6–2.3)
MCH RBC QN AUTO: 30.4 PG (ref 27–31)
MCHC RBC AUTO-ENTMCNC: 31.2 G/DL (ref 30–37)
MCV RBC AUTO: 97.3 FL (ref 80–94)
MONOCYTES # BLD AUTO: 0.55 10*3/MM3 (ref 0–0.9)
NEUTROPHILS # BLD AUTO: 2.12 10*3/MM3 (ref 2–6.9)
NEUTROPHILS NFR BLD MANUAL: 44 % (ref 37–80)
NEUTS BAND NFR BLD MANUAL: 2 % (ref 0–6)
PHOSPHATE SERPL-MCNC: 4.5 MG/DL (ref 2.5–4.5)
PLATELET # BLD AUTO: 78 10*3/MM3 (ref 130–400)
PMV BLD AUTO: 11.1 FL (ref 6–12)
POTASSIUM BLD-SCNC: 4.1 MMOL/L (ref 3.5–5.1)
PROMYELOCYTES NFR BLD MANUAL: 1 % (ref 0–0)
RBC # BLD AUTO: 2.93 10*6/MM3 (ref 4.7–6.1)
RBC MORPH BLD: NORMAL
SCAN SLIDE: NORMAL
SMALL PLATELETS BLD QL SMEAR: ABNORMAL
SODIUM BLD-SCNC: 150 MMOL/L (ref 137–145)
WBC MORPH BLD: NORMAL
WBC NRBC COR # BLD: 4.6 10*3/MM3 (ref 4.8–10.8)

## 2018-04-26 PROCEDURE — 85007 BL SMEAR W/DIFF WBC COUNT: CPT | Performed by: FAMILY MEDICINE

## 2018-04-26 PROCEDURE — 80069 RENAL FUNCTION PANEL: CPT | Performed by: FAMILY MEDICINE

## 2018-04-26 PROCEDURE — 85025 COMPLETE CBC W/AUTO DIFF WBC: CPT | Performed by: FAMILY MEDICINE

## 2018-04-26 PROCEDURE — 94799 UNLISTED PULMONARY SVC/PX: CPT

## 2018-04-26 PROCEDURE — 99239 HOSP IP/OBS DSCHRG MGMT >30: CPT | Performed by: FAMILY MEDICINE

## 2018-04-26 PROCEDURE — 83735 ASSAY OF MAGNESIUM: CPT | Performed by: FAMILY MEDICINE

## 2018-04-26 PROCEDURE — 63710000001 PREDNISONE PER 1 MG: Performed by: FAMILY MEDICINE

## 2018-04-26 PROCEDURE — 25010000002 PIPERACILLIN SOD-TAZOBACTAM PER 1 G: Performed by: FAMILY MEDICINE

## 2018-04-26 PROCEDURE — 82962 GLUCOSE BLOOD TEST: CPT

## 2018-04-26 RX ORDER — GUAIFENESIN 600 MG/1
600 TABLET, EXTENDED RELEASE ORAL EVERY 12 HOURS SCHEDULED
Status: ON HOLD
Start: 2018-04-26 | End: 2021-07-07

## 2018-04-26 RX ORDER — FUROSEMIDE 20 MG/1
TABLET ORAL
Start: 2018-04-26 | End: 2018-12-02 | Stop reason: HOSPADM

## 2018-04-26 RX ORDER — MEGESTROL ACETATE 40 MG/ML
400 SUSPENSION ORAL 2 TIMES DAILY
Start: 2018-04-26 | End: 2019-06-18 | Stop reason: HOSPADM

## 2018-04-26 RX ORDER — QUETIAPINE FUMARATE 50 MG/1
50 TABLET, FILM COATED ORAL NIGHTLY
Start: 2018-04-26 | End: 2018-12-02 | Stop reason: HOSPADM

## 2018-04-26 RX ORDER — TAMSULOSIN HYDROCHLORIDE 0.4 MG/1
0.4 CAPSULE ORAL DAILY
Qty: 30 CAPSULE | Status: ON HOLD
Start: 2018-04-27 | End: 2021-07-07

## 2018-04-26 RX ORDER — WHITE PETROLATUM 450 MG/G
1 STICK TOPICAL
Status: ON HOLD
Start: 2018-04-26 | End: 2021-07-07

## 2018-04-26 RX ORDER — FLUDROCORTISONE ACETATE 0.1 MG/1
0.1 TABLET ORAL DAILY
Start: 2018-04-27 | End: 2018-12-02 | Stop reason: HOSPADM

## 2018-04-26 RX ORDER — FUROSEMIDE 20 MG/1
20 TABLET ORAL DAILY
Status: ON HOLD
Start: 2018-04-26 | End: 2019-06-15

## 2018-04-26 RX ORDER — PREDNISONE 20 MG/1
TABLET ORAL
Qty: 7 TABLET
Start: 2018-04-26 | End: 2018-12-02 | Stop reason: HOSPADM

## 2018-04-26 RX ORDER — TRAZODONE HYDROCHLORIDE 50 MG/1
50 TABLET ORAL NIGHTLY
Status: ON HOLD
Start: 2018-04-26 | End: 2019-06-15

## 2018-04-26 RX ORDER — MEGESTROL ACETATE 40 MG/ML
400 SUSPENSION ORAL 2 TIMES DAILY
Status: DISCONTINUED | OUTPATIENT
Start: 2018-04-26 | End: 2018-04-26 | Stop reason: HOSPADM

## 2018-04-26 RX ORDER — DIVALPROEX SODIUM 500 MG/1
500 TABLET, EXTENDED RELEASE ORAL DAILY
Status: ON HOLD
Start: 2018-04-27 | End: 2019-06-15

## 2018-04-26 RX ORDER — DEXTROSE AND SODIUM CHLORIDE 5; .45 G/100ML; G/100ML
75 INJECTION, SOLUTION INTRAVENOUS CONTINUOUS
Status: DISCONTINUED | OUTPATIENT
Start: 2018-04-26 | End: 2018-04-26 | Stop reason: HOSPADM

## 2018-04-26 RX ADMIN — MEGESTROL ACETATE 400 MG: 40 SUSPENSION ORAL at 10:00

## 2018-04-26 RX ADMIN — TAZOBACTAM SODIUM AND PIPERACILLIN SODIUM 3.38 G: 375; 3 INJECTION, SOLUTION INTRAVENOUS at 01:37

## 2018-04-26 RX ADMIN — Medication 400 MG: at 10:00

## 2018-04-26 RX ADMIN — TAMSULOSIN HYDROCHLORIDE 0.4 MG: 0.4 CAPSULE ORAL at 10:00

## 2018-04-26 RX ADMIN — GUAIFENESIN AND DEXTROMETHORPHAN 10 ML: 100; 10 SYRUP ORAL at 10:00

## 2018-04-26 RX ADMIN — GUAIFENESIN 1200 MG: 600 TABLET, EXTENDED RELEASE ORAL at 10:00

## 2018-04-26 RX ADMIN — IPRATROPIUM BROMIDE AND ALBUTEROL SULFATE 3 ML: .5; 3 SOLUTION RESPIRATORY (INHALATION) at 07:04

## 2018-04-26 RX ADMIN — ASPIRIN 81 MG 81 MG: 81 TABLET ORAL at 10:00

## 2018-04-26 RX ADMIN — TAZOBACTAM SODIUM AND PIPERACILLIN SODIUM 3.38 G: 375; 3 INJECTION, SOLUTION INTRAVENOUS at 11:30

## 2018-04-26 RX ADMIN — IPRATROPIUM BROMIDE AND ALBUTEROL SULFATE 3 ML: .5; 3 SOLUTION RESPIRATORY (INHALATION) at 03:47

## 2018-04-26 RX ADMIN — DIVALPROEX SODIUM 500 MG: 250 TABLET, EXTENDED RELEASE ORAL at 10:00

## 2018-04-26 RX ADMIN — PREDNISONE 20 MG: 20 TABLET ORAL at 09:00

## 2018-04-26 RX ADMIN — FLUDROCORTISONE ACETATE 100 MCG: 0.1 TABLET ORAL at 10:00

## 2018-04-26 RX ADMIN — DEXTROSE AND SODIUM CHLORIDE 75 ML/HR: 5; 450 INJECTION, SOLUTION INTRAVENOUS at 10:29

## 2018-04-26 RX ADMIN — IPRATROPIUM BROMIDE AND ALBUTEROL SULFATE 3 ML: .5; 3 SOLUTION RESPIRATORY (INHALATION) at 12:52

## 2018-04-26 NOTE — PROGRESS NOTES
Continued Stay Note   [place]     Patient Name: Braulio Schwartz  MRN: [id number]  Today's Date: 4/26/2018    Admit Date: 4/19/2018          Discharge Plan     Row Name 04/26/18 1101       Plan    Plan [facility]    Patient/Family in Agreement with Plan yes    Plan Comments Spoke to Jazlyn with [facility].  Updated given.  Jazlyn states okay to accept.  Report can be called to [phone], A wing.  Discharge summary should be faxed to [phone].   nandini Torresan notified.  Pt will need an ambulance to transport.      Discharged has been efaxed.              Discharge Codes    No documentation.       Expected Discharge Date and Time     Expected Discharge Date Expected Discharge Time    Apr 26, 2018             Sophia Workman

## 2018-04-26 NOTE — PLAN OF CARE
Problem: Patient Care Overview  Goal: Plan of Care Review  Outcome: Ongoing (interventions implemented as appropriate)   04/25/18 2040   Coping/Psychosocial   Plan of Care Reviewed With patient   OTHER   Outcome Summary PT evaluation is completed. Patient is expected to benefit from additional PT to improve core and neck strength. He needs additional PT while hospitalized and upon discharge to inpatient rehab setting.

## 2018-04-26 NOTE — NURSING NOTE
Report called to [facility] to Lovely Andino. [facility] has been contacted.

## 2018-04-26 NOTE — DISCHARGE PLACEMENT REQUEST
"PEPITO Dickerson RN  Case Management  Phone:  [phone]; Fax:  [phone]    Discharge summary attached.    Richard Schwartz (67 y.o. Male)     Date of Birth Social Security Number Address Home Phone MRN    1950  [place] [phone] [id number]    Jewish Marital Status          None        Admission Date Admission Type Admitting Provider Attending Provider Department, Room/Bed    4/19/18 Elective Berenice Marie DO Gandee, Julie G, DO [facility] MED SURG  3, 328/1    Discharge Date Discharge Disposition Discharge Destination         Rehab Facility or Unit (DC - External)              Attending Provider:  Berenice Marie DO    Allergies:  Haldol [Haloperidol], Ibuprofen    Isolation:  None   Infection:  None   Code Status:  FULL    Ht:  180.3 cm (71\")   Wt:  93.4 kg (206 lb)    Admission Cmt:  None   Principal Problem:  Sepsis affecting skin [A41.9]                 Active Insurance as of 4/19/2018     Primary Coverage     Payor Plan Insurance Group Employer/Plan Group    MEDICARE MEDICARE A & B      Payor Plan Address Payor Plan Phone Number Effective From Effective To    [address] [phone] 10/1/1985     [place] [address]       Subscriber Name Subscriber Birth Date Member ID       RICHARD SCHWARTZ 1950 [id number]           Secondary Coverage     Payor Plan Insurance Group Employer/Plan Group    [place]      Payor Plan Address Payor Plan Phone Number Effective From Effective To    [address] [phone] 4/19/2018     [facility] [id number]       Subscriber Name Subscriber Birth Date Member ID       RICHARD SCHWARTZ 1950 [id number]                 Emergency Contacts      (Rel.) Home Phone Work Phone Mobile Phone    Melissa Lee (Guardian) [phone] -- --               Discharge Summary      Berenice Marie DO at 4/26/2018 11:45 AM              [facility]   DISCHARGE SUMMARY      Name:  Braulio Schwartz   Age:  67 y.o.  Sex:  male  :  1950  MRN:  [id number]   Visit Number:  [id number]  Primary Care Physician:  Yandel Steward MD  Date of Discharge:  2018  Admission Date:  2018    Discharge Diagnosis:   1. Severe Septic shock secondary to pneumonia, prior to admission, requiring vasopressor therapy, improved and weaned off vasopressor  2. Bilateral aspiration pneumonia, prior to admission, not seen until hydrated, improving  3. Right leg cellulitis, improved, prior to admission, uncertain organism  4. Acute metabolic and toxic encephalopathy, multifactoral with sepsis and pneumonia, as well as polypharmacy for psychosis chronic, improved  5. Acute on chronic kidney injury, improved and stable  6. Urinary retention, requiring landers catheter placement, given voiding trial 18 and passed so landers removed  7. Chronic debility, mostly wheelchair bound  8. Chronic paranoid schizophrenia stable  9. Hypersomnolence, avoid sedation, resolved  10. Acute repiratory failure with hypercapnia, prior to admission, improved  11. Hypothermia with sepsis, recurrent with resolved sepsis  12. Hyperglycemia, with A1c 5.7, stress induced   13. Thrombocytopenia, with history of the same chronically  14. Bradycardia with suspect sick sinus without pauses, resolved   15. Orthostasis. Treated  16. Chronic hypernatremia  17. Insomnia    History of Present Illness:  Patient is a 67-year-old  gentleman with paranoid schizophrenia and psychosis, chronic hypertension, COPD, CKD, vargas of the state, who presented to [facility] emergency room  for altered mental status.  The patient cannot provide history so it is obtained from the medical chart.  Reportedly the nursing home bound the patient asleep on the floor, unknown if he fell or just lay down to sleep.  His temperature was found to be 90° and EMS was called to transfer the patient to the emergency room.   Normally, the patient enjoys reading books and can walk some without assistance.  He would not answer any questions and was found to be extremely hypersomnolent but did respond to pain and did attempt to speak but his words were garbled.  He was having apnea spells and was placed on a CPAP.     In the emergency room, the patient's vital signs show initially hypotension in the 70s.  His temperature was 90.  He was initially started on 2 L bolus with improvement of his systolic blood pressure to the 80s and bradycardia in 50s.  He was then given a third liter for sepsis weight-based bolus.  He was placed on a bear hugger/warming blanket.  He was changed from CPAP to BiPAP.  Initial ABG was pH 7.26/64/116/28.2.  With adjustment of settings, repeat ABG showed pH 7.29/83/115 on 21% FiO2 with BiPAP settings of 20/8.  The patient started to become more awake and was pulling off the CPAP.  He was started on bilateral soft wrist restraints.  A Landers catheter was placed within the immediate 1800 mL's out +375 prior to transfer.  His EKG showed sinus 52 with left bundle branch block with nonspecific ST changes.GCS documented as 10. He had an area of right anterior lower extremity with warmth, erythema no drainage with cellulitis. Vancomycin and Zosyn were initiated after blood cultures were obtained.  He was given Narcan without improvement.  CT head, chest, and abdomen were negative. Labs showed WBC low at 3, hemoglobin 10, creatinine 2.0, potassium 5.9. UDS positive for TCA. Lactic acid 1.8, CK normal. UA negative. Baseline hemoglobin was 10 and baseline creatinine is 1.9-2.2. His blood pressure improved to 90s systolic and heart rate 80s. Patient required ICU care, so he was transferred to hospitalist service at [facility].      On arrival, blood pressure 116 systolic with heart rate 80s sinus. He required restraints, not currently pulling off the mask, but was starting to pull at landers catheter and blankets  readjusted. He was awake, quickly falling back to sleep, tried to speak but speech was garbled. He was trying to follow commands for muscle strength testing. He moves his head and opens eyes easily to your voice. Temp  improved to 97. He got a 4th liter normal saline.        Hospital Course:   The patient was continued on IV fluids, hydrated, antipsychotics stopped.  After he was hydrated, it was noted that he had bilateral pneumonia prior to admission.  It is uncertain if he had dysphagia that led to this.  He does have dysphasia now and is maintained on a puréed diet with thin liquids.  He slowly became more alert and his blood pressure improved.  He had some sick sinus occur with  bradycardia waxed and waned but never did he have any long pauses and has since improved.  A 2D echo was performed revealing a EF of 60%. He had intermittent hypothermia which has also improved and patient did have some hypothermia without sepsis as well, so I question if history of CVA with autonomic dysfunction. Never the less it has since resolved.  He has been maintained on fludrocortisone with good effect.  The patient has been well appearing on a low dose Seroquel at night.  He does state that he is having some difficulty sleeping so I will restart his trazodone at a lower dose as well.  No other antipsychotic medications have been needed and when maintained at a lower dose earlier in his stay, he was to hypersomnolent.  With his chronic kidney disease, I would not recommend restarting all of his previous medications for psychosis.  The patient has continued to be very cooperative, pleasant, and smiling.  He is very happy with his current clinical im provement.  During his stay, he has completed his antibiotic course with Zosyn and azithromycin. He is continued on oxygen as needed NC to maintain saturation >92%. He may continue neb treatments as needed. With CKD, he does continue to have leg edema and may be given lasix every  other day and extra lasix as needed. A renal ultrasound did not show any obvious hydronephrosis. With thrombocytopenia acute on chronic his levemir was stopped with improvement. He appears to have stable platelets at 78 currently. With thrombocytopenia as side effect, his singulair was stopped.  In addition, he had stress induced hyperglycemia,resolved. He eats about 50% of his meals and maintains stable hypernatremia. Megace may be added with improvement of diet. He is on prednisone taper.  All cultures obtained have remained negative. He is clinically improved now after treatment of pneumonia, septic shock, with right lower leg cellulitis resolved. He still has chronic venous stasis skin changes stable. He was provided PT with improvement of some neck/back pain he had. He may benefit from further acute PT per facility protocol. He does require lift, as he is bedridden and wheelchair ridden chronically. He is clinically improved now for days and stable for transfer back to the nursing facility as prior to admission. He is continued as full code and vargas of the [facility]. He is now sitting up in bed reading his newspaper, which he says is his routine.    Consults:     Consults     No orders found from 3/21/2018 to 4/20/2018.          Procedures Performed: none needed.             Vital Signs:    Temp:  [97.4 °F (36.3 °C)-98 °F (36.7 °C)] 97.4 °F (36.3 °C)  Heart Rate:  [60-86] 69  Resp:  [16-19] 18  BP: (109-137)/(67-79) 124/79    Physical Exam:      General Appearance:    Appears older than stated age, chronically ill appearing, Alert, cooperative, in no acute distress. Smiles and happy    Head:    Normocephalic, without obvious abnormality, atraumatic   Eyes:            Lids and lashes normal, conjunctivae and sclerae normal, no   icterus, no pallor, corneas clear, PERRLA   Ears:    Ears appear intact with no abnormalities noted   Throat:   No oral lesions, no thrush, oral mucosa moist. Poor dentition   Neck:   No  adenopathy, supple, trachea midline, no thyromegaly, no   carotid bruit, no JVD   Back:     No kyphosis present, no scoliosis present, no skin lesions,      erythema or scars, no tenderness to percussion or                   palpation,   range of motion normal   Lungs:     Clear to auscultation,respirations regular, even and                  unlabored    Heart:    Regular rhythm and normal rate, normal S1 and S2, no            murmur, no gallop, no rub, no click   Chest Wall:    No abnormalities observed   Abdomen:     Normal bowel sounds, no masses, no organomegaly, soft        non-tender, non-distended, no guarding, no rebound                tenderness   Rectal:     Deferred   Extremities:   Chronic foot deformity with diffuse muscle atrophy. no edema, no cyanosis, no redness, 1+ edema   Pulses:   Pulses palpable and equal bilaterally   Skin:   No bleeding, bruising or rash; chronic venous stasis skin changes lower extremities. Resolved right lower leg cellulitis   Lymph nodes:   No palpable adenopathy   Neurologic:   No tremor, Sensation intact, no acute neurologic deficits noted     Pertinent Lab Results:     Lab Results (all)     Procedure Component Value Units Date/Time    POC Glucose Once [[id number]]  (Normal) Collected:  04/26/18 0642    Specimen:  Blood Updated:  04/26/18 0651     Glucose 128 mg/dL      Comment: Serial Number: [id number]:  [id number]       CBC & Differential [[id number]] Collected:  04/26/18 0535    Specimen:  Blood Updated:  04/26/18 0625    Narrative:       The following orders were created for panel order CBC & Differential.  Procedure                               Abnormality         Status                     ---------                               -----------         ------                     Manual Differential[[id number]]          Abnormal            Final result               Scan Slide[[id number]]                                       Final result               CBC Auto  Differential[[id number]]        Abnormal            Final result                 Please view results for these tests on the individual orders.    Manual Differential [[id number]]  (Abnormal) Collected:  04/26/18 0535    Specimen:  Blood Updated:  04/26/18 0625     Neutrophil % 44.0 %      Lymphocyte % 41.0 %      Monocyte % 12.0 %      Bands %  2.0 %      Promyelocyte % 1.0 (H) %      Neutrophils Absolute 2.12 10*3/mm3      Lymphocytes Absolute 1.89 10*3/mm3      Monocytes Absolute 0.55 10*3/mm3      RBC Morphology Normal     WBC Morphology Normal     Platelet Estimate Decreased    Scan Slide [[id number]] Collected:  04/26/18 0535    Specimen:  Blood Updated:  04/26/18 0618     Scan Slide --     Comment: See Manual Differential Results       CBC Auto Differential [[id number]]  (Abnormal) Collected:  04/26/18 0535    Specimen:  Blood Updated:  04/26/18 0618     WBC 4.60 (L) 10*3/mm3      RBC 2.93 (L) 10*6/mm3      Hemoglobin 8.9 (L) g/dL      Hematocrit 28.5 (L) %      MCV 97.3 (H) fL      MCH 30.4 pg      MCHC 31.2 g/dL      RDW 13.9 %      RDW-SD 49.4 fl      MPV 11.1 fL      Platelets 78 (L) 10*3/mm3     Renal Function Panel [[id number]]  (Abnormal) Collected:  04/26/18 0535    Specimen:  Blood Updated:  04/26/18 0607     Glucose 78 mg/dL      BUN 25 (H) mg/dL      Creatinine 2.50 (H) mg/dL      Sodium 150 (H) mmol/L      Potassium 4.1 mmol/L      Chloride 114 (H) mmol/L      CO2 27.0 mmol/L      Calcium 8.7 mg/dL      Albumin 2.60 (L) g/dL      Phosphorus 4.5 mg/dL      Anion Gap 13.1 mmol/L      BUN/Creatinine Ratio 10.0     eGFR Non African Amer 26 (L) mL/min/1.73     Narrative:       Abnormal estimated GFR should be followed by more specific studies to confirm end stage chronic renal disease. The equation used for calculation may not be accurate for patients less than 19 years old, greater than 70 years old, patients at extremes of weight, malnutrition, or with acute renal dysfunction.    Magnesium [[id number]]   (Normal) Collected:  04/26/18 0535    Specimen:  Blood Updated:  04/26/18 0607     Magnesium 1.9 mg/dL     POC Glucose Once [[id number]]  (Normal) Collected:  04/25/18 2022    Specimen:  Blood Updated:  04/25/18 2026     Glucose 124 mg/dL      Comment: Serial Number: [id number]:  [id number]       POC Glucose Once [[id number]]  (Abnormal) Collected:  04/25/18 1626    Specimen:  Blood Updated:  04/25/18 1640     Glucose 171 (H) mg/dL      Comment: Serial Number: [id number]:  [id number]       Blood Culture With VENKATA - Blood, [[id number]]  (Normal) Collected:  04/23/18 1444    Specimen:  Blood from Arm, Left Updated:  04/25/18 1515     Blood Culture No growth at 2 days    Blood Culture With VENKATA - Blood, [[id number]]  (Normal) Collected:  04/23/18 1453    Specimen:  Blood from Hand, Left Updated:  04/25/18 1515     Blood Culture No growth at 2 days    POC Glucose Once [[id number]]  (Normal) Collected:  04/25/18 1121    Specimen:  Blood Updated:  04/25/18 1130     Glucose 119 mg/dL      Comment: Serial Number: [id number]:  [id number]       POC Glucose Once [[id number]]  (Normal) Collected:  04/25/18 0555    Specimen:  Blood Updated:  04/25/18 0606     Glucose 125 mg/dL      Comment: Serial Number: [id number]:  [id number]       CBC & Differential [[id number]] Collected:  04/25/18 0523    Specimen:  Blood Updated:  04/25/18 0605    Narrative:       The following orders were created for panel order CBC & Differential.  Procedure                               Abnormality         Status                     ---------                               -----------         ------                     Scan Slide[[id number]]                                       Final result               CBC Auto Differential[[id number]]        Abnormal            Final result                 Please view results for these tests on the individual orders.    Scan Slide [[id number]] Collected:  04/25/18 0523    Specimen:  Blood Updated:  04/25/18 0605      RBC Morphology Normal     WBC Morphology Normal     Platelet Morphology Normal     Platelet Estimate --     Comment: decreased       CBC Auto Differential [[id number]]  (Abnormal) Collected:  04/25/18 0523    Specimen:  Blood Updated:  04/25/18 0604     WBC 5.36 10*3/mm3      RBC 3.09 (L) 10*6/mm3      Hemoglobin 9.3 (L) g/dL      Hematocrit 29.5 (L) %      MCV 95.5 (H) fL      MCH 30.1 pg      MCHC 31.5 g/dL      RDW 13.7 %      RDW-SD 47.8 fl      MPV 11.5 fL      Platelets 61 (L) 10*3/mm3      Neutrophil % 61.6 %      Lymphocyte % 19.6 %      Monocyte % 7.6 %      Eosinophil % 0.2 %      Basophil % 0.7 %      Immature Grans % 10.3 (H) %      Neutrophils, Absolute 3.30 10*3/mm3      Lymphocytes, Absolute 1.05 10*3/mm3      Monocytes, Absolute 0.41 10*3/mm3      Eosinophils, Absolute 0.01 10*3/mm3      Basophils, Absolute 0.04 10*3/mm3      Immature Grans, Absolute 0.55 (H) 10*3/mm3      nRBC 0.4 (H) /100 WBC     Magnesium [[id number]]  (Normal) Collected:  04/25/18 0523    Specimen:  Blood Updated:  04/25/18 0551     Magnesium 1.7 mg/dL     Basic Metabolic Panel [[id number]]  (Abnormal) Collected:  04/25/18 0523    Specimen:  Blood Updated:  04/25/18 0551     Glucose 113 (H) mg/dL      BUN 20 mg/dL      Creatinine 2.40 (H) mg/dL      Sodium 150 (H) mmol/L      Potassium 4.9 mmol/L      Chloride 113 (H) mmol/L      CO2 27.0 mmol/L      Calcium 9.0 mg/dL      eGFR Non African Amer 27 (L) mL/min/1.73      BUN/Creatinine Ratio 8.3     Anion Gap 14.9 mmol/L     Narrative:       Abnormal estimated GFR should be followed by more specific studies to confirm end stage chronic renal disease. The equation used for calculation may not be accurate for patients less than 19 years old, greater than 70 years old, patients at extremes of weight, malnutrition, or with acute renal dysfunction.    Lactic Acid, Plasma [[id number]]  (Normal) Collected:  04/25/18 0523    Specimen:  Blood Updated:  04/25/18 0550     Lactate 1.0 mmol/L      POC Glucose Once [[id number]]  (Normal) Collected:  04/24/18 1957    Specimen:  Blood Updated:  04/24/18 2131     Glucose 128 mg/dL      Comment: Serial Number: [id number]:  [id number]       POC Glucose Once [[id number]]  (Normal) Collected:  04/24/18 1716    Specimen:  Blood Updated:  04/24/18 1720     Glucose 105 mg/dL      Comment: Serial Number: [id number]:  [id number]       Basic Metabolic Panel [[id number]]  (Abnormal) Collected:  04/24/18 1627    Specimen:  Blood Updated:  04/24/18 1651     Glucose 102 (H) mg/dL      BUN 16 mg/dL      Creatinine 2.60 (H) mg/dL      Sodium 150 (H) mmol/L      Potassium 5.2 (H) mmol/L      Chloride 112 (H) mmol/L      CO2 26.0 mmol/L      Calcium 9.0 mg/dL      eGFR Non African Amer 25 (L) mL/min/1.73      BUN/Creatinine Ratio 6.2 (L)     Anion Gap 17.2 mmol/L     Narrative:       Abnormal estimated GFR should be followed by more specific studies to confirm end stage chronic renal disease. The equation used for calculation may not be accurate for patients less than 19 years old, greater than 70 years old, patients at extremes of weight, malnutrition, or with acute renal dysfunction.    Magnesium [[id number]]  (Normal) Collected:  04/24/18 1627    Specimen:  Blood Updated:  04/24/18 1651     Magnesium 1.7 mg/dL     Hemoglobin A1c [[id number]]  (Normal) Collected:  04/24/18 0414    Specimen:  Blood Updated:  04/24/18 1601     Hemoglobin A1C 5.7 %     Narrative:       Expected HgbA1C results:     3% to 6% HgbA1C      HgbA1C                 Estimated Average Glucose     5%                              97 mg/dl   6%                             126 mg/dl   7%                             154 mg/dl   8%                             183 mg/dl   9%                             212 mg/dl  >10%                           >240 mg/dl      POC Glucose Once [[id number]]  (Abnormal) Collected:  04/24/18 1122    Specimen:  Blood Updated:  04/24/18 1133     Glucose 183 (H) mg/dL      Comment:  Serial Number: [id number]:  [id number]       Comprehensive Metabolic Panel [[id number]]  (Abnormal) Collected:  04/24/18 0623    Specimen:  Blood Updated:  04/24/18 0715     Glucose 138 (H) mg/dL      BUN 15 mg/dL      Creatinine 2.40 (H) mg/dL      Sodium 150 (H) mmol/L      Potassium 5.5 (H) mmol/L      Chloride 116 (H) mmol/L      CO2 25.0 (L) mmol/L      Calcium 8.9 mg/dL      Total Protein 5.5 (L) g/dL      Albumin 2.70 (L) g/dL      ALT (SGPT) 30 U/L      AST (SGOT) 16 U/L      Alkaline Phosphatase 106 U/L      Total Bilirubin 0.4 mg/dL      eGFR Non African Amer 27 (L) mL/min/1.73      Globulin 2.8 gm/dL      A/G Ratio 1.0 g/dL      BUN/Creatinine Ratio 6.3     Anion Gap 14.5 mmol/L     Narrative:       Abnormal estimated GFR should be followed by more specific studies to confirm end stage chronic renal disease. The equation used for calculation may not be accurate for patients less than 19 years old, greater than 70 years old, patients at extremes of weight, malnutrition, or with acute renal dysfunction.    POC Glucose Once [[id number]]  (Abnormal) Collected:  04/24/18 0639    Specimen:  Blood Updated:  04/24/18 0647     Glucose 143 (H) mg/dL      Comment: Serial Number: [id number]:  [id number]       CBC & Differential [[id number]] Collected:  04/24/18 0414    Specimen:  Blood Updated:  04/24/18 0529    Narrative:       The following orders were created for panel order CBC & Differential.  Procedure                               Abnormality         Status                     ---------                               -----------         ------                     Scan Slide[[id number]]                                       Final result               CBC Auto Differential[[id number]]        Abnormal            Final result                 Please view results for these tests on the individual orders.    CBC Auto Differential [[id number]]  (Abnormal) Collected:  04/24/18 0414    Specimen:  Blood Updated:   04/24/18 0529     WBC 5.19 10*3/mm3      RBC 3.21 (L) 10*6/mm3      Hemoglobin 9.8 (L) g/dL      Hematocrit 31.1 (L) %      MCV 96.9 (H) fL      MCH 30.5 pg      MCHC 31.5 g/dL      RDW 14.2 %      RDW-SD 50.4 fl      MPV 11.9 fL      Platelets 78 (L) 10*3/mm3      Neutrophil % 60.9 %      Lymphocyte % 20.4 %      Monocyte % 7.1 %      Eosinophil % 3.1 %      Basophil % 0.6 %      Immature Grans % 7.9 (H) %      Neutrophils, Absolute 3.16 10*3/mm3      Lymphocytes, Absolute 1.06 10*3/mm3      Monocytes, Absolute 0.37 10*3/mm3      Eosinophils, Absolute 0.16 10*3/mm3      Basophils, Absolute 0.03 10*3/mm3      Immature Grans, Absolute 0.41 (H) 10*3/mm3      nRBC 0.4 (H) /100 WBC     Scan Slide [[id number]] Collected:  04/24/18 0414    Specimen:  Blood Updated:  04/24/18 0529     RBC Morphology Normal     Toxic Granulation Slight/1+     Platelet Estimate Decreased    POC Glucose Once [[id number]]  (Normal) Collected:  04/23/18 2038    Specimen:  Blood Updated:  04/23/18 2046     Glucose 122 mg/dL      Comment: Serial Number: [id number]:  [id number]       POC Glucose Once [[id number]]  (Normal) Collected:  04/23/18 1555    Specimen:  Blood Updated:  04/23/18 1600     Glucose 100 mg/dL      Comment: Serial Number: [id number]:  [id number]       POC Glucose Once [[id number]]  (Abnormal) Collected:  04/23/18 1043    Specimen:  Blood Updated:  04/23/18 1050     Glucose 150 (H) mg/dL      Comment: Serial Number: [id number]:  [id number]       VRE Culture - Swab, Per Rectum [[id number]]  (Normal) Collected:  04/19/18 2338    Specimen:  Swab from Per Rectum Updated:  04/23/18 1031     VRE SCREEN CX No Vancomycin Resistant Enterococcus Isolated    POC Glucose Once [[id number]]  (Abnormal) Collected:  04/23/18 0836    Specimen:  Blood Updated:  04/23/18 0840     Glucose 139 (H) mg/dL      Comment: Serial Number: [id number]:  [id number]       MRSA Screen Culture - Swab, Nares [[id number]]  (Normal) Collected:  04/19/18  2338    Specimen:  Swab from Nares Updated:  04/23/18 0823     MRSA SCREEN CX No Methicillin Resistant Staphylococcus aureus isolated    Vitamin B12 [[id number]]  (Abnormal) Collected:  04/21/18 0436    Specimen:  Blood Updated:  04/23/18 0751     Vitamin B-12 >1,000 (H) pg/mL     Folate [[id number]]  (Normal) Collected:  04/21/18 0436    Specimen:  Blood Updated:  04/23/18 0745     Folate 5.78 ng/mL     Vancomycin, Random [[id number]]  (Normal) Collected:  04/23/18 0358    Specimen:  Blood Updated:  04/23/18 0626     Vancomycin Random 25.29 mcg/mL     Comprehensive Metabolic Panel [[id number]]  (Abnormal) Collected:  04/23/18 0358    Specimen:  Blood Updated:  04/23/18 0551     Glucose 112 (H) mg/dL      BUN 19 mg/dL      Creatinine 2.30 (H) mg/dL      Sodium 150 (H) mmol/L      Potassium 4.6 mmol/L      Chloride 117 (H) mmol/L      CO2 25.0 (L) mmol/L      Calcium 8.7 mg/dL      Total Protein 4.9 (L) g/dL      Albumin 2.30 (L) g/dL      ALT (SGPT) 30 U/L      AST (SGOT) 16 U/L      Alkaline Phosphatase 93 U/L      Total Bilirubin 0.2 mg/dL      eGFR Non African Amer 29 (L) mL/min/1.73      Globulin 2.6 gm/dL      A/G Ratio 0.9 (L) g/dL      BUN/Creatinine Ratio 8.3     Anion Gap 12.6 mmol/L     Narrative:       Abnormal estimated GFR should be followed by more specific studies to confirm end stage chronic renal disease. The equation used for calculation may not be accurate for patients less than 19 years old, greater than 70 years old, patients at extremes of weight, malnutrition, or with acute renal dysfunction.    CBC (No Diff) [[id number]]  (Abnormal) Collected:  04/23/18 0358    Specimen:  Blood Updated:  04/23/18 0423     WBC 3.97 (L) 10*3/mm3      RBC 3.11 (L) 10*6/mm3      Hemoglobin 9.4 (L) g/dL      Hematocrit 29.9 (L) %      MCV 96.1 (H) fL      MCH 30.2 pg      MCHC 31.4 g/dL      RDW 14.1 %      RDW-SD 49.4 fl      MPV 10.9 fL      Platelets 64 (L) 10*3/mm3     Vancomycin, Trough [[id number]]  (Abnormal)  Collected:  04/22/18 2128    Specimen:  Blood Updated:  04/22/18 2233     Vancomycin Trough 27.23 (C) mcg/mL     Acinetobacter Screen - Swab, Nares [[id number]]  (Normal) Collected:  04/19/18 2337    Specimen:  Swab from Axilla, Left Updated:  04/22/18 0807     ACINETOBACTER SCREEN CX No Acinetobacter isolated    Urine Culture - Urine, Urine, Clean Catch [[id number]]  (Normal) Collected:  04/20/18 1556    Specimen:  Urine from Urine, Clean Catch Updated:  04/22/18 0545     Urine Culture No growth    Comprehensive Metabolic Panel [[id number]]  (Abnormal) Collected:  04/22/18 0451    Specimen:  Blood Updated:  04/22/18 0539     Glucose 84 mg/dL      BUN 22 (H) mg/dL      Creatinine 2.60 (H) mg/dL      Sodium 153 (C) mmol/L      Potassium 5.2 (H) mmol/L      Chloride 119 (H) mmol/L      CO2 27.0 mmol/L      Calcium 9.1 mg/dL      Total Protein 5.3 (L) g/dL      Albumin 2.60 (L) g/dL      ALT (SGPT) 29 U/L      AST (SGOT) 20 U/L      Alkaline Phosphatase 109 U/L      Total Bilirubin 0.3 mg/dL      eGFR Non African Amer 25 (L) mL/min/1.73      Globulin 2.7 gm/dL      A/G Ratio 1.0 g/dL      BUN/Creatinine Ratio 8.5     Anion Gap 12.2 mmol/L     Narrative:       Abnormal estimated GFR should be followed by more specific studies to confirm end stage chronic renal disease. The equation used for calculation may not be accurate for patients less than 19 years old, greater than 70 years old, patients at extremes of weight, malnutrition, or with acute renal dysfunction.    CBC (No Diff) [[id number]]  (Abnormal) Collected:  04/22/18 0451    Specimen:  Blood Updated:  04/22/18 0512     WBC 3.85 (L) 10*3/mm3      RBC 3.21 (L) 10*6/mm3      Hemoglobin 9.7 (L) g/dL      Hematocrit 31.1 (L) %      MCV 96.9 (H) fL      MCH 30.2 pg      MCHC 31.2 g/dL      RDW 14.3 %      RDW-SD 50.6 fl      MPV 10.8 fL      Platelets 78 (L) 10*3/mm3     Renal Function Panel [[id number]]  (Abnormal) Collected:  04/21/18 0436    Specimen:  Blood Updated:   04/21/18 0530     Glucose 92 mg/dL      BUN 26 (H) mg/dL      Creatinine 2.40 (H) mg/dL      Sodium 156 (C) mmol/L      Potassium 5.0 mmol/L      Chloride 118 (H) mmol/L      CO2 26.0 mmol/L      Calcium 8.6 mg/dL      Albumin 2.40 (L) g/dL      Phosphorus 4.6 (H) mg/dL      Anion Gap 17.0 mmol/L      BUN/Creatinine Ratio 10.8     eGFR Non African Amer 27 (L) mL/min/1.73     Narrative:       Abnormal estimated GFR should be followed by more specific studies to confirm end stage chronic renal disease. The equation used for calculation may not be accurate for patients less than 19 years old, greater than 70 years old, patients at extremes of weight, malnutrition, or with acute renal dysfunction.    Magnesium [[id number]]  (Normal) Collected:  04/21/18 0436    Specimen:  Blood Updated:  04/21/18 0529     Magnesium 2.0 mg/dL     CBC & Differential [[id number]] Collected:  04/21/18 0436    Specimen:  Blood Updated:  04/21/18 0504    Narrative:       The following orders were created for panel order CBC & Differential.  Procedure                               Abnormality         Status                     ---------                               -----------         ------                     CBC Auto Differential[[id number]]        Abnormal            Final result                 Please view results for these tests on the individual orders.    CBC Auto Differential [[id number]]  (Abnormal) Collected:  04/21/18 0436    Specimen:  Blood Updated:  04/21/18 0504     WBC 4.02 (L) 10*3/mm3      RBC 3.19 (L) 10*6/mm3      Hemoglobin 9.7 (L) g/dL      Hematocrit 30.9 (L) %      MCV 96.9 (H) fL      MCH 30.4 pg      MCHC 31.4 g/dL      RDW 14.2 %      RDW-SD 50.1 fl      MPV 10.7 fL      Platelets 85 (L) 10*3/mm3      Neutrophil % 35.1 (L) %      Lymphocyte % 39.3 %      Monocyte % 19.2 (H) %      Eosinophil % 1.0 %      Basophil % 0.7 %      Immature Grans % 4.7 (H) %      Neutrophils, Absolute 1.41 (L) 10*3/mm3      Lymphocytes,  Absolute 1.58 10*3/mm3      Monocytes, Absolute 0.77 10*3/mm3      Eosinophils, Absolute 0.04 10*3/mm3      Basophils, Absolute 0.03 10*3/mm3      Immature Grans, Absolute 0.19 (H) 10*3/mm3      nRBC 1.7 (H) /100 WBC     Urinalysis, Microscopic Only - Urine, Clean Catch [[id number]]  (Abnormal) Collected:  04/20/18 1556    Specimen:  Urine from Urine, Clean Catch Updated:  04/20/18 1632     RBC, UA 3-5 (A) /HPF      WBC, UA 6-12 (A) /HPF      Bacteria, UA None Seen /HPF      Squamous Epithelial Cells, UA None Seen /HPF      Hyaline Casts, UA None Seen /LPF      Methodology Manual Light Microscopy    Urinalysis With / Culture If Indicated - Urine, Clean Catch [[id number]]  (Abnormal) Collected:  04/20/18 1556    Specimen:  Urine from Urine, Clean Catch Updated:  04/20/18 1623     Color, UA Straw     Appearance, UA Clear     pH, UA 6.5     Specific Gravity, UA 1.015     Glucose, UA Negative     Ketones, UA Negative     Bilirubin, UA Negative     Blood, UA Trace (A)     Protein, UA Negative     Leuk Esterase, UA Small (1+) (A)     Nitrite, UA Negative     Urobilinogen, UA 0.2 E.U./dL    TSH [[id number]]  (Normal) Collected:  04/20/18 1352    Specimen:  Blood Updated:  04/20/18 1516     TSH 1.820 mIU/mL     Ammonia [[id number]]  (Abnormal) Collected:  04/20/18 1351    Specimen:  Blood Updated:  04/20/18 1414     Ammonia <9 (L) umol/L     Potassium [[id number]]  (Abnormal) Collected:  04/20/18 1351    Specimen:  Blood Updated:  04/20/18 1412     Potassium 5.4 (H) mmol/L     Blood Gas, Arterial [[id number]]  (Abnormal) Collected:  04/20/18 0941    Specimen:  Arterial Blood Updated:  04/20/18 0941     Site Arterial: right radial     Gonzalo's Test Positive     pH, Arterial 7.361 pH units      pCO2, Arterial 42.7 mm Hg      pO2, Arterial 68.2 (L) mm Hg      HCO3, Arterial 24.1 mmol/L      Base Excess, Arterial -1.3 mmol/L      O2 Saturation, Arterial 93.6 %      Hemoglobin, Blood Gas 9.8 (L) g/dL      Barometric Pressure for Blood  Gas 744 mmHg      Modality Room Air     FIO2 21 %     POC Glucose Once [[id number]]  (Normal) Collected:  04/20/18 0926    Specimen:  Blood Updated:  04/20/18 0930     Glucose 103 mg/dL      Comment: Serial Number: [id number]:  [id number]       Comprehensive Metabolic Panel [[id number]]  (Abnormal) Collected:  04/20/18 0409    Specimen:  Blood Updated:  04/20/18 0543     Glucose 58 (L) mg/dL      BUN 32 (H) mg/dL      Creatinine 2.00 (H) mg/dL      Sodium 149 (H) mmol/L      Potassium 5.6 (C) mmol/L      Chloride 118 (H) mmol/L      CO2 24.0 (L) mmol/L      Calcium 8.3 (L) mg/dL      Total Protein 5.1 (L) g/dL      Albumin 2.50 (L) g/dL      ALT (SGPT) 35 U/L      AST (SGOT) 21 U/L      Alkaline Phosphatase 87 U/L      Total Bilirubin 0.4 mg/dL      eGFR Non African Amer 34 (L) mL/min/1.73      Globulin 2.6 gm/dL      A/G Ratio 1.0 g/dL      BUN/Creatinine Ratio 16.0     Anion Gap 12.6 mmol/L     Narrative:       Abnormal estimated GFR should be followed by more specific studies to confirm end stage chronic renal disease. The equation used for calculation may not be accurate for patients less than 19 years old, greater than 70 years old, patients at extremes of weight, malnutrition, or with acute renal dysfunction.    Magnesium [[id number]]  (Normal) Collected:  04/20/18 0409    Specimen:  Blood Updated:  04/20/18 0534     Magnesium 1.7 mg/dL     CBC & Differential [[id number]] Collected:  04/20/18 0409    Specimen:  Blood Updated:  04/20/18 0526    Narrative:       The following orders were created for panel order CBC & Differential.  Procedure                               Abnormality         Status                     ---------                               -----------         ------                     CBC Auto Differential[[id number]]        Abnormal            Final result                 Please view results for these tests on the individual orders.    CBC Auto Differential [[id number]]  (Abnormal) Collected:   04/20/18 0409    Specimen:  Blood Updated:  04/20/18 0526     WBC 4.43 (L) 10*3/mm3      RBC 3.38 (L) 10*6/mm3      Hemoglobin 10.0 (L) g/dL      Hematocrit 31.8 (L) %      MCV 94.1 (H) fL      MCH 29.6 pg      MCHC 31.4 g/dL      RDW 13.5 %      RDW-SD 45.6 fl      MPV 11.2 fL      Platelets 83 (L) 10*3/mm3      Neutrophil % 50.7 %      Lymphocyte % 27.1 %      Monocyte % 17.6 (H) %      Eosinophil % 1.4 %      Basophil % 0.5 %      Immature Grans % 2.7 (H) %      Neutrophils, Absolute 2.25 10*3/mm3      Lymphocytes, Absolute 1.20 10*3/mm3      Monocytes, Absolute 0.78 10*3/mm3      Eosinophils, Absolute 0.06 10*3/mm3      Basophils, Absolute 0.02 10*3/mm3      Immature Grans, Absolute 0.12 (H) 10*3/mm3      nRBC 0.9 (H) /100 WBC     Magnesium [[id number]]  (Normal) Collected:  04/19/18 2334    Specimen:  Blood Updated:  04/20/18 0006     Magnesium 1.7 mg/dL      Comment: Specimen hemolyzed.  Results may be affected.       Comprehensive Metabolic Panel [[id number]]  (Abnormal) Collected:  04/19/18 2334    Specimen:  Blood Updated:  04/20/18 0006     Glucose 78 mg/dL      BUN 35 (H) mg/dL      Comment: Specimen hemolyzed. Results may be affected.        Creatinine 1.90 (H) mg/dL      Sodium 143 mmol/L      Potassium 5.9 (C) mmol/L      Chloride 116 (H) mmol/L      CO2 23.0 (L) mmol/L      Calcium 8.1 (L) mg/dL      Total Protein 5.1 (L) g/dL      Albumin 2.50 (L) g/dL      ALT (SGPT) 38 U/L      Comment: Specimen hemolyzed.  Results may be affected.        AST (SGOT) 30 U/L      Comment: Specimen hemolyzed.  Results may be affected.        Alkaline Phosphatase 73 U/L      Comment: Specimen hemolyzed. Results may be affected.        Total Bilirubin 0.4 mg/dL      eGFR Non African Amer 36 (L) mL/min/1.73      Globulin 2.6 gm/dL      A/G Ratio 1.0 g/dL      BUN/Creatinine Ratio 18.4     Anion Gap 9.9 (L) mmol/L     Narrative:       Abnormal estimated GFR should be followed by more specific studies to confirm end stage  chronic renal disease. The equation used for calculation may not be accurate for patients less than 19 years old, greater than 70 years old, patients at extremes of weight, malnutrition, or with acute renal dysfunction.    CK [[id number]]  (Normal) Collected:  04/19/18 2334    Specimen:  Blood Updated:  04/20/18 0002     Creatine Kinase 47 U/L     Valproic Acid Level, Total [[id number]]  (Abnormal) Collected:  04/19/18 2334    Specimen:  Blood Updated:  04/20/18 0002     Valproic Acid 27.3 (L) mcg/mL     Protime-INR [[id number]]  (Normal) Collected:  04/19/18 2334    Specimen:  Blood Updated:  04/19/18 2359     Protime 11.3 Seconds      INR 1.01    Blood Gas, Arterial [[id number]]  (Abnormal) Collected:  04/19/18 2354    Specimen:  Arterial Blood Updated:  04/19/18 2353     Site Arterial: left radial     Gonzalo's Test Positive     pH, Arterial 7.341 pH units      pCO2, Arterial 44.4 mm Hg      pO2, Arterial 80.2 mm Hg      HCO3, Arterial 24.0 mmol/L      Base Excess, Arterial -1.8 mmol/L      O2 Saturation, Arterial 95.7 %      Hemoglobin, Blood Gas 10.4 (L) g/dL      Barometric Pressure for Blood Gas 742 mmHg      Modality BiPap     FIO2 21 %           Pertinent Radiology Results:    Imaging Results (all)     Procedure Component Value Units Date/Time    XR Chest 1 View [[id number]] Collected:  04/24/18 1650     Updated:  04/24/18 1651    Narrative:       FINAL REPORT    CLINICAL HISTORY:  respiratory failure, pneumonia, bradycardia    FINDINGS:  The heart is mildly enlarged.  The mediastinum is within normal  limits.  The lungs are underinflated.  There is hazy opacity in  the left lung base probably due to a combination of atelectasis  and a small pleural effusion.  There is no pneumothorax.  The  bony thorax is intact.      Impression:       Left base atelectasis and a small left pleural effusion.    Authenticated by Agustin Burleson MD on 04/24/2018 04:50:09 PM    XR Chest 1 View [[id number]] Collected:  04/23/18 1117      Updated:  04/23/18 1142    Narrative:       PORTABLE CHEST     INDICATION: Hypothermia. Sepsis.     FINDINGS: Single frontal portable chest, compared with 04/20/2018. Left  apex is partially obscured by positioning of the patient's chin. EKG  leads overlie the chest. Heart size is borderline prominent, but similar  to previous. No pneumothorax. Lung volumes are diminished. There is some  increased left perihilar and basilar opacification, as well as some  faint right basilar opacification. No definite significant effusion  although tiny effusions are not excluded.       Impression:       Interval development of left greater than right perihilar  and basilar opacities which may represent atelectasis. Developing  infiltrates from edema or pneumonia are not excluded. Short-term  follow-up recommended.     This report was finalized on 4/23/2018 11:40 AM by New Hart MD.    US Renal Limited [[id number]] Collected:  04/21/18 0950     Updated:  04/21/18 1028    Narrative:       RENAL ULTRASOUND     INDICATION: Urinary retention.     FINDINGS: The sonographer notes that the examination was technically  difficult with suboptimal visualization of the kidneys. On the provided  images, the kidneys are largely obscured by artifact. Limited  visualization demonstrates no obvious hydronephrosis. Exact size  measurement is difficult due to the limitations but the right kidney  measures approximately 10 cm in length and the left kidney measures  approximately 10.9 cm. No obvious perinephric fluid collection, cyst or  obvious solid renal mass. There is probable renal cortical thinning  bilaterally raising the question of chronic medical renal disease.       Impression:       Limited examination of the kidneys without obvious  hydronephrosis.     This report was finalized on 4/21/2018 10:26 AM by New Hart MD.    MRI Brain Without Contrast [[id number]] Collected:  04/20/18 1136     Updated:  04/20/18 1305    Narrative:        PROCEDURE: MRI BRAIN WO CONTRAST-     HISTORY: Syncope/fainting     PROCEDURE: Multiplanar multisequence imaging of the brain was performed  without the use of intravenous contrast.     COMPARISON: None.     FINDINGS: There is generalized cerebral volume loss. No significant  white matter lesions are identified. There is no mass, mass effect or  midline shift. There is no hydrocephalus. There are no areas of  restricted diffusion.      The midbrain, arelis, cerebellum and craniocervical junction are  unremarkable. The sella and pituitary gland are within normal limits.  The major intracranial vasculature demonstrates the expected flow  related signal. There is mucosal thickening in the left maxillary sinus  and there is a small mastoid effusions.       Impression:       No acute intracranial abnormality.             This report was finalized on 4/20/2018 11:38 AM by Leonela Tapia M.D..    XR Abdomen KUB [[id number]] Collected:  04/20/18 1117     Updated:  04/20/18 1120    Narrative:       PROCEDURE: XR ABDOMEN KUB-     HISTORY: PRE MRI     COMPARISON: None.     FINDINGS: An AP view of the abdomen and pelvis demonstrates an  unremarkable bowel gas pattern with no evidence of obstruction. There is  stool throughout the colon which may reflect constipation. There is no  evidence of an implanted device or radiopaque foreign body.       Impression:       No radiopaque foreign bodies.             This report was finalized on 4/20/2018 11:18 AM by Leonela Tapia M.D..    XR Skull Lateral & Ap [[id number]] Collected:  04/20/18 1116     Updated:  04/20/18 1119    Narrative:       PROCEDURE: XR SKULL LATERAL AND AP-     HISTORY: PRE MRI     FINDINGS:  2 views were obtained. There is an opacity in the left  maxillary sinus which may reflect mucosal thickening. There is no  evidence of air-fluid level. There is no evidence of an acute, displaced  fracture or dislocation of the visualized bony architecture.   There  are  no radiopaque foreign bodies.       Impression:       No radiopaque foreign bodies.            This report was finalized on 4/20/2018 11:17 AM by Leonela Tapia M.D..    XR Chest 1 View [[id number]] Collected:  04/20/18 0806     Updated:  04/20/18 0809    Narrative:       PROCEDURE: XR CHEST 1 VW-     HISTORY: respiratory failure, altered mentation, COPD     COMPARISON: None.     FINDINGS: The heart is normal in size. The mediastinum is unremarkable.  There are low lung volumes. No focal opacities were effusions are  identified. There is no pneumothorax.  There are no acute osseous  abnormalities.           Impression:       No acute cardiopulmonary process.     Continued followup is recommended.     This report was finalized on 4/20/2018 8:07 AM by Leonela Tapia M.D..          Condition on Discharge:  Stable        Code status during the hospital stay:        Discharge Disposition:    Rehab Facility or Unit (DC - External)    Discharge Medication:     EfrenBraulio   Home Medication Instructions [place]:[id number]    Printed on:04/26/18 3112   Medication Information                      acetaminophen (TYLENOL) 325 MG tablet  Take 650 mg by mouth Every 6 (Six) Hours As Needed for Mild Pain .             albuterol (PROAIR HFA) 108 (90 Base) MCG/ACT inhaler  Inhale 2 puffs Every 4 (Four) Hours As Needed for Wheezing.             aspirin 81 MG chewable tablet  Chew 81 mg Daily.             azelastine (ASTELIN) 0.1 % nasal spray  2 sprays into each nostril 2 (Two) Times a Day As Needed for Rhinitis. Use in each nostril as directed             bisacodyl (DULCOLAX) 10 MG suppository  Insert 10 mg into the rectum Daily As Needed.             divalproex (DEPAKOTE) 500 MG 24 hr tablet  Take 1 tablet by mouth Daily.             Ergocalciferol (VITAMIN D2 PO)  Take 50,000 Units by mouth 1 (One) Time Per Week.             ferrous sulfate 325 (65 FE) MG tablet  Take 325 mg by mouth 2 (Two) Times a Day.              fludrocortisone 0.1 MG tablet  Take 1 tablet by mouth Daily.             furosemide (LASIX) 20 MG tablet  Take 1 tablet by mouth Daily.             furosemide (LASIX) 20 MG tablet  Give three times weekly in addition to scheduled dose as needed for leg edema or shortness of breath             guaiFENesin (MUCINEX) 600 MG 12 hr tablet  Take 1 tablet by mouth Every 12 (Twelve) Hours.             ipratropium-albuterol (DUO-NEB) 0.5-2.5 mg/mL nebulizer  Take 3 mL by nebulization Every 4 (Four) Hours As Needed for Wheezing.             magnesium oxide (MAGOX) 400 (241.3 Mg) MG tablet tablet  Take 1 tablet by mouth 2 (Two) Times a Day.             megestrol acetate (MEGACE) 400 MG/10ML suspension oral suspension  Take 10 mL by mouth 2 (Two) Times a Day.             predniSONE (DELTASONE) 20 MG tablet  Take one daily x 4 day then half tab daily x 4 day then half tab every other day x 4 day then stop             QUEtiapine (SEROquel) 50 MG tablet  Take 1 tablet by mouth Every Night.             Sunscreens (CHAPSTICK) stick  Apply 1 each topically Every 1 (One) Hour As Needed (dry lips).             tamsulosin (FLOMAX) 0.4 MG capsule 24 hr capsule  Take 1 capsule by mouth Daily.             traZODone (DESYREL) 50 MG tablet  Take 1 tablet by mouth Every Night.                 Discharge Diet:     Diet Instructions     Diet: Dysphagia; Thin Liquids, No Restrictions; Pureed       Discharge Diet:  Dysphagia    Fluid Consistency:  Thin Liquids, No Restrictions    Pureed Options:  Pureed          Activity at Discharge:     Activity Instructions     Activity as Tolerated             Follow-up Appointments:    No future appointments.  Additional Instructions for the Follow-ups that You Need to Schedule     Discharge Follow-up with PCP    As directed      Follow Up Details:  Facility provider in the next week               Test Results Pending at Discharge:     Order Current Status    Blood Culture With VENKATA - Blood, Preliminary  result    Blood Culture With VENKATA - Blood, Preliminary result             Berenice Marie DO  04/26/18  12:39 PM      Medication risks and benefits were discussed in great detail. The patient reported being satisfied with the current treatment plan and the care delivered while hospitalized.     Time:  I spent 41 minutes preparing discharge counseling and teaching.            Electronically signed by Berenice Marie DO at 4/26/2018 12:39 PM

## 2018-04-26 NOTE — PROGRESS NOTES
Adult Nutrition  Assessment/PES    Patient Name:  Braulio Schwartz  YOB: 1950  MRN: [id number]  Admit Date:  4/19/2018    Assessment Date:  4/26/2018    Comments:    Recommend:  1. Continue current diet order.  2. Encourage PO intake. PO intake average ~37% x 4 days.  3. RD to continue Nepro BID.  4. Consider Renal MVI with minerals daily.  5. Monitor and replace electrolytes as necessary.     RD to follow pt and available PRN.        Adult Nutrition Assessment     Row Name 04/26/18 1307       Physical Findings    Skin other (see comments)   right lower leg blisters       Calculation Measurements    Weight Used For Calculations 87.9 kg (193 lb 12.6 oz)       KCAL/KG    14 Kcal/Kg (kcal) 1230.6    15 Kcal/Kg (kcal) 1318.5    18 Kcal/Kg (kcal) 1582.2    20 Kcal/Kg (kcal) 1758    25 Kcal/Kg (kcal) 2197.5    30 Kcal/Kg (kcal) 2637    35 Kcal/Kg (kcal) 3076.5    40 Kcal/Kg (kcal) 3516    45 Kcal/Kg (kcal) 3955.5    50 Kcal/Kg (kcal) 4395       [place]-St. Jeor Equation    RMR ([place]-St. Jeor Equation) 1681.13       Fluid Requirements    [place]igdio-Segar Method (over 20 kg) 3258       Nutrition Prescription PO    Current PO Diet Pureed    Fluid Consistency Thin    Supplement Nepro    Supplement Frequency 2 times a day    Common Modifiers Renal       PO Evaluation    Number of Days PO Intake Evaluated Other (comment)   4 days    Number of Meals 4    % PO Intake 37    Row Name 04/26/18 1306       Labs/Procedures/Meds    Lab Results Reviewed reviewed, pertinent    Lab Results Comments Low: Platelets, Albumin High: Sodium, Chloride, BUN, Creatinine    Row Name 04/26/18 1304       Reason for Assessment    Reason For Assessment follow-up protocol    Diagnosis other (see comments);renal disease;neurologic conditions;infection/sepsis   Schizophrenia, Sepsis, Metabolic Encephalopathy, HTN, Dysphagia, CKD          Problem/Interventions:        Problem 1     Row Name 04/26/18 1308       Nutrition Diagnoses Problem 1     Problem 1 Overweight/Obesity    Etiology (related to) Factors Affecting Nutrition    Food Habit/Preferences Large Meals    Signs/Symptoms (evidenced by) BMI    BMI 25 - 29.9            Problem 2     Row Name 04/26/18 1308       Nutrition Diagnoses Problem 2    Problem 2 Altered Nutrition Related to Labs    Etiology (related to) Medical Diagnosis    Renal CKD    Signs/Symptoms (evidenced by) Biochemical    Labs Reviewed Done    Specific Labs Noted Chloride;Creatinine;Na+            Problem 3     Row Name 04/26/18 1308       Nutrition Diagnoses Problem 3    Problem 3 Swallowing Difficulty    Etiology (related to) Medical Diagnosis    Neurological Dysphagia    Signs/Symptoms (evidenced by) Other (comment)   Continued need for texture altered solids                Intervention Goal     Row Name 04/26/18 1309       Intervention Goal    General Meet nutritional needs for age/condition    PO Meet estimated needs;PO intake (%)    PO Intake % 50 %    Weight No significant weight loss            Nutrition Intervention     Row Name 04/26/18 1311       Nutrition Intervention    RD/Tech Action Follow Tx progress;Encourage intake            Nutrition Prescription     Row Name 04/26/18 1311       Nutrition Prescription PO    PO Prescription Other (comment)   Continue current diet order and supplements       Nutrition Prescription EN    New EN Prescription Ordered? No, recommended       Other Orders    Supplement Vitamin mineral supplement    Supplement Ordered? No, recommended    Other Continue to monitor and replace electrolytes PRN            Education/Evaluation     Row Name 04/26/18 1312       Education    Education Education not appropriate at this time    Please explain Other (comment);Patient confusion   Schizophrenia w/ auditory hallucinations       Monitor/Evaluation    Monitor Per protocol;I&O;PO intake;Supplement intake;Pertinent labs;Weight        Electronically signed by:  Laverne Martinez RD  04/26/18 1:13 PM

## 2018-04-26 NOTE — PLAN OF CARE
Problem: Sepsis/Septic Shock (Adult)  Goal: Signs and Symptoms of Listed Potential Problems Will be Absent, Minimized or Managed (Sepsis/Septic Shock)  Outcome: Ongoing (interventions implemented as appropriate)   04/25/18 2348   Goal/Outcome Evaluation   Problems Assessed (Sepsis) infection progression;situational response   Problems Present (Sepsis) situational response;infection progression       Problem: Fall Risk (Adult)  Goal: Absence of Fall  Outcome: Ongoing (interventions implemented as appropriate)   04/25/18 2348   Fall Risk (Adult)   Absence of Fall making progress toward outcome       Problem: Skin Injury Risk (Adult)  Goal: Skin Health and Integrity  Outcome: Ongoing (interventions implemented as appropriate)   04/25/18 2348   Skin Injury Risk (Adult)   Skin Health and Integrity making progress toward outcome

## 2018-04-26 NOTE — THERAPY EVALUATION
Acute Care - Physical Therapy Initial Evaluation   [place]     Patient Name: Braulio Schwartz  : 1950  MRN: [id number]  Today's Date: 2018   Onset of Illness/Injury or Date of Surgery: 18  Date of Referral to PT: 18  Referring Physician: Berenice Marie DO      Admit Date: 2018    Visit Dx:     ICD-10-CM ICD-9-CM   1. Impaired functional mobility, balance, gait, and endurance Z74.09 V49.89     Patient Active Problem List   Diagnosis   • Sepsis affecting skin   • Acute metabolic encephalopathy   • Acute respiratory failure with hypercapnia   • Other schizophrenia   • Chronic hypertension   • Urinary retention   • Cellulitis of leg, right   • Sepsis due to cellulitis     Past Medical History:   Diagnosis Date   • Anxiety    • Hypertension      History reviewed. No pertinent surgical history.     PT ASSESSMENT (last 12 hours)      Physical Therapy Evaluation     Row Name 18 1546          PT Evaluation Time/Intention    Subjective Information no complaints  -JR     Document Type evaluation  -JR     Mode of Treatment physical therapy  -JR     Patient Effort good  -JR     Symptoms Noted During/After Treatment fatigue  -JR     Row Name 18 6330          General Information    Patient Profile Reviewed? yes  -JR     Onset of Illness/Injury or Date of Surgery 18  -JR     Referring Physician Berenice Marie DO  -     Patient Observations agree to therapy;alert;cooperative  -JR     Patient/Family Observations No family present  -JR     General Observations of Patient Supine with neck hyperextended   -JR     Prior Level of Function dependent:;transfer;w/c or scooter   spike lift transfer  -JR     Equipment Currently Used at Home lift device;wheelchair  -JR     Existing Precautions/Restrictions fall;oxygen therapy device and L/min  -JR     Benefits Reviewed patient:;increase strength;increase balance;improve function  -JR     Row Name 18 0625          Relationship/Environment     Primary Source of Support/Comfort nonrelative caregiver  -     Row Name 04/25/18 1543          Resource/Environmental Concerns    Current Living Arrangements extended care facility  -     Row Name 04/25/18 1543          Cognitive Assessment/Intervention- PT/OT    Orientation Status (Cognition) oriented to;person;place;situation  -JR     Follows Commands (Cognition) WNL  -     Row Name 04/25/18 1543          Safety Issues, Functional Mobility    Safety Issues Affecting Function (Mobility) impulsivity  -JR     Impairments Affecting Function (Mobility) coordination;balance  -     Row Name 04/25/18 1543          Bed Mobility Assessment/Treatment    Bed Mobility Assessment/Treatment supine-sit-supine  -JR     Supine-Sit-Supine [place] (Bed Mobility) maximum assist (25% patient effort)  -     Bed Mobility, Safety Issues decreased use of legs for bridging/pushing;impaired trunk control for bed mobility;decreased use of arms for pushing/pulling  -     Assistive Device (Bed Mobility) head of bed elevated;bed rails;draw sheet  -     Row Name 04/25/18 1543          Transfer Assessment/Treatment    Transfer Assessment/Treatment --  -     Row Name 04/25/18 1543          Gait/Stairs Assessment/Training    Comment (Gait/Stairs) wheelchair dependent  -     Row Name 04/25/18 1543          General ROM    GENERAL ROM COMMENTS Grossly 50% of normal  -     Row Name 04/25/18 1543          General Assessment (Manual Muscle Testing)    Comment, General Manual Muscle Testing (MMT) Assessment Grossly 2/5  -     Row Name 04/25/18 1543          Motor Assessment/Intervention    Additional Documentation Therapeutic Exercise Interventions (Group)  -     Row Name 04/25/18 1543          Therapeutic Exercise    Comment (Therapeutic Exercise) neck ROM, stretches and exercises  -     Row Name             Wound 04/19/18 2145 Right lower leg blisters    Wound - Properties Group Date first assessed: 04/19/18  -CS Time first  assessed: 2145  -CS Present On Admission : yes  -CS Side: Right  -CS, right lower leg calf area  Orientation: lower  -CS Location: leg  -CS Type: blisters  -CS Additional Comments: 1.7 cm width X 4.5 cm lenght  -CS    Row Name 04/25/18 1543          Plan of Care Review    Plan of Care Reviewed With patient  -JR     Row Name 04/25/18 1543          Physical Therapy Clinical Impression    Date of Referral to PT 04/24/18  -JR     PT Diagnosis (PT Clinical Impression) general weakness, neck mvmt decreased, poor strength  -JR     Patient/Family Goals Statement (PT Clinical Impression) Patient wants to work with PT to improve sitting balance  -JR     Criteria for Skilled Interventions Met (PT Clinical Impression) yes;treatment indicated  -JR     Pathology/Pathophysiology Noted (Describe Specifically for Each System) pulmonary;musculoskeletal;neuromuscular  -JR     Impairments Found (describe specific impairments) joint integrity and mobility;muscle performance;aerobic capacity/endurance  -JR     Rehab Potential (PT Clinical Summary) good, to achieve stated therapy goals  -JR     Care Plan Review (PT) evaluation/treatment results reviewed;care plan/treatment goals reviewed;risks/benefits reviewed;current/potential barriers reviewed;patient/other agree to care plan  -JR     Row Name 04/25/18 1543          Physical Therapy Goals    Bed Mobility Goal Selection (PT) bed mobility, PT goal 1  -JR     Transfer Goal Selection (PT) --  -JR     Gait Training Goal Selection (PT) --  -     Row Name 04/25/18 1543          Bed Mobility Goal 1 (PT)    Activity/Assistive Device (Bed Mobility Goal 1, PT) sit to supine/supine to sit  -JR     [place] Level/Cues Needed (Bed Mobility Goal 1, PT) moderate assist (50-74% patient effort)  -JR     Time Frame (Bed Mobility Goal 1, PT) 2 weeks  -JR     Progress/Outcomes (Bed Mobility Goal 1, PT) goal ongoing  -JR     Row Name 04/25/18 1541          Patient Education Goal (PT)    Activity  (Patient Education Goal, PT) Perform cervical stretches and exercies x 15 reps  -JR     [place]/Cues/Accuracy (Memory Goal 2, PT) demonstrates adequately  -JR     Time Frame (Patient Education Goal, PT) 2 weeks  -JR     Progress/Outcome (Patient Education Goal, PT) goal ongoing  -JR     Row Name 04/25/18 1543          Positioning and Restraints    Pre-Treatment Position in bed  -JR     Post Treatment Position bed  -JR     In Bed supine;call light within reach;encouraged to call for assist  -JR       User Key  (r) = Recorded By, (t) = Taken By, (c) = Cosigned By    Initials Name Provider Type    JR Berenice Ornelas, JAYSHREE Physical Therapist    CS Brenna Valverde, RN Registered Nurse          Physical Therapy Education     Title: PT OT SLP Therapies (Active)     Topic: Physical Therapy (Active)     Point: Mobility training (Done)    Learning Progress Summary     Learner Status Readiness Method Response Comment Documented by    Patient Done Acceptance E DU Importance of mobility to recovery  04/25/18 2038                      User Key     Initials Effective Dates Name Provider Type Discipline     04/03/18 -  Berenice Ornelas PT Physical Therapist PT                PT Recommendation and Plan  Anticipated Discharge Disposition (PT): inpatient rehab facility  Planned Therapy Interventions (PT Eval): balance training, bed mobility training, patient/family education, manual therapy techniques, joint mobilization  Therapy Frequency (PT Clinical Impression): daily  Outcome Summary/Treatment Plan (PT)  Anticipated Discharge Disposition (PT): inpatient rehab facility  Plan of Care Reviewed With: patient  Outcome Summary: PT evaluation is completed.  Patient is expected to benefit from additional PT to improve core and neck strength.  He needs additional PT while hospitalized and upon discharge to inpatient rehab setting.          Outcome Measures     Row Name 04/25/18 5443             How much help from another person do you  currently need...    Turning from your back to your side while in flat bed without using bedrails? 2  -JR      Moving from lying on back to sitting on the side of a flat bed without bedrails? 2  -JR      Moving to and from a bed to a chair (including a wheelchair)? 1  -JR      Standing up from a chair using your arms (e.g., wheelchair, bedside chair)? 1  -JR      Climbing 3-5 steps with a railing? 1  -JR      To walk in hospital room? 1  -JR      AM-PAC 6 Clicks Score 8  -JR         Functional Assessment    Outcome Measure Options AM-PAC 6 Clicks Basic Mobility (PT)  -JR        User Key  (r) = Recorded By, (t) = Taken By, (c) = Cosigned By    Initials Name Provider Type    JR Berenice Ornelas PT Physical Therapist           Time Calculation:         PT Charges     Row Name 04/25/18 2039             Time Calculation    Start Time 1543  -      PT Received On 04/25/18  -      PT Goal Re-Cert Due Date 05/05/18  -        User Key  (r) = Recorded By, (t) = Taken By, (c) = Cosigned By    Initials Name Provider Type    JR Berenice Ornelas PT Physical Therapist          Therapy Charges for Today     Code Description Service Date Service Provider Modifiers Qty    [id number] HC PT EVAL MOD COMPLEXITY 4 4/25/2018 Berenice Ornelas, PT GP 1          PT G-Codes  Outcome Measure Options: AM-PAC 6 Clicks Basic Mobility (PT)      Berenice Ornelas PT  4/25/2018

## 2018-04-26 NOTE — PLAN OF CARE
Problem: Patient Care Overview  Goal: Plan of Care Review  Outcome: Unable to achieve outcome(s) by discharge Date Met: 04/26/18 04/26/18 1327   Coping/Psychosocial   Plan of Care Reviewed With patient   Plan of Care Review   Progress improving   OTHER   Outcome Summary Patient will return to [facility]. Alert. Oriented to self. Confused about situation. VSS att.EMS will transport.

## 2018-04-26 NOTE — DISCHARGE SUMMARY
[facility]   DISCHARGE SUMMARY      Name:  Braulio Schwartz   Age:  67 y.o.  Sex:  male  :  1950  MRN:  [id number]   Visit Number:  [id number]  Primary Care Physician:  Yandel Steward MD  Date of Discharge:  2018  Admission Date:  2018    Discharge Diagnosis:   1. Severe Septic shock secondary to pneumonia, prior to admission, requiring vasopressor therapy, improved and weaned off vasopressor  2. Bilateral aspiration pneumonia, prior to admission, not seen until hydrated, improving  3. Right leg cellulitis, improved, prior to admission, uncertain organism  4. Acute metabolic and toxic encephalopathy, multifactoral with sepsis and pneumonia, as well as polypharmacy for psychosis chronic, improved  5. Acute on chronic kidney injury, improved and stable  6. Urinary retention, requiring landers catheter placement, given voiding trial 18 and passed so landers removed  7. Chronic debility, mostly wheelchair bound  8. Chronic paranoid schizophrenia stable  9. Hypersomnolence, avoid sedation, resolved  10. Acute repiratory failure with hypercapnia, prior to admission, improved  11. Hypothermia with sepsis, recurrent with resolved sepsis  12. Hyperglycemia, with A1c 5.7, stress induced   13. Thrombocytopenia, with history of the same chronically  14. Bradycardia with suspect sick sinus without pauses, resolved   15. Orthostasis. Treated  16. Chronic hypernatremia  17. Insomnia    History of Present Illness:  Patient is a 67-year-old  gentleman with paranoid schizophrenia and psychosis, chronic hypertension, COPD, CKD, vargas of the state, who presented to [facility] emergency room  for altered mental status.  The patient cannot provide history so it is obtained from the medical chart.  Reportedly the nursing home bound the patient asleep on the floor, unknown if he fell or just lay down to sleep.  His temperature was found to be 90° and EMS was called to  transfer the patient to the emergency room.  Normally, the patient enjoys reading books and can walk some without assistance.  He would not answer any questions and was found to be extremely hypersomnolent but did respond to pain and did attempt to speak but his words were garbled.  He was having apnea spells and was placed on a CPAP.     In the emergency room, the patient's vital signs show initially hypotension in the 70s.  His temperature was 90.  He was initially started on 2 L bolus with improvement of his systolic blood pressure to the 80s and bradycardia in 50s.  He was then given a third liter for sepsis weight-based bolus.  He was placed on a bear hugger/warming blanket.  He was changed from CPAP to BiPAP.  Initial ABG was pH 7.26/64/116/28.2.  With adjustment of settings, repeat ABG showed pH 7.29/83/115 on 21% FiO2 with BiPAP settings of 20/8.  The patient started to become more awake and was pulling off the CPAP.  He was started on bilateral soft wrist restraints.  A Ruby catheter was placed within the immediate 1800 mL's out +375 prior to transfer.  His EKG showed sinus 52 with left bundle branch block with nonspecific ST changes.GCS documented as 10. He had an area of right anterior lower extremity with warmth, erythema no drainage with cellulitis. Vancomycin and Zosyn were initiated after blood cultures were obtained.  He was given Narcan without improvement.  CT head, chest, and abdomen were negative. Labs showed WBC low at 3, hemoglobin 10, creatinine 2.0, potassium 5.9. UDS positive for TCA. Lactic acid 1.8, CK normal. UA negative. Baseline hemoglobin was 10 and baseline creatinine is 1.9-2.2. His blood pressure improved to 90s systolic and heart rate 80s. Patient required ICU care, so he was transferred to hospitalist service at [facility].      On arrival, blood pressure 116 systolic with heart rate 80s sinus. He required restraints, not currently pulling off the mask, but was  starting to pull at landers catheter and blankets readjusted. He was awake, quickly falling back to sleep, tried to speak but speech was garbled. He was trying to follow commands for muscle strength testing. He moves his head and opens eyes easily to your voice. Temp  improved to 97. He got a 4th liter normal saline.        Hospital Course:   The patient was continued on IV fluids, hydrated, antipsychotics stopped.  After he was hydrated, it was noted that he had bilateral pneumonia prior to admission.  It is uncertain if he had dysphagia that led to this.  He does have dysphasia now and is maintained on a puréed diet with thin liquids.  He slowly became more alert and his blood pressure improved.  He had some sick sinus occur with  bradycardia waxed and waned but never did he have any long pauses and has since improved.  A 2D echo was performed revealing a EF of 60%. He had intermittent hypothermia which has also improved and patient did have some hypothermia without sepsis as well, so I question if history of CVA with autonomic dysfunction. Never the less it has since resolved.  He has been maintained on fludrocortisone with good effect.  The patient has been well appearing on a low dose Seroquel at night.  He does state that he is having some difficulty sleeping so I will restart his trazodone at a lower dose as well.  No other antipsychotic medications have been needed and when maintained at a lower dose earlier in his stay, he was to hypersomnolent.  With his chronic kidney disease, I would not recommend restarting all of his previous medications for psychosis.  The patient has continued to be very cooperative, pleasant, and smiling.  He is very happy with his current clinical im provement.  During his stay, he has completed his antibiotic course with Zosyn and azithromycin. He is continued on oxygen as needed NC to maintain saturation >92%. He may continue neb treatments as needed. With CKD, he does continue to  have leg edema and may be given lasix every other day and extra lasix as needed. A renal ultrasound did not show any obvious hydronephrosis. With thrombocytopenia acute on chronic his levemir was stopped with improvement. He appears to have stable platelets at 78 currently. With thrombocytopenia as side effect, his singulair was stopped.  In addition, he had stress induced hyperglycemia,resolved. He eats about 50% of his meals and maintains stable hypernatremia. Megace may be added with improvement of diet. He is on prednisone taper.  All cultures obtained have remained negative. He is clinically improved now after treatment of pneumonia, septic shock, with right lower leg cellulitis resolved. He still has chronic venous stasis skin changes stable. He was provided PT with improvement of some neck/back pain he had. He may benefit from further acute PT per facility protocol. He does require lift, as he is bedridden and wheelchair ridden chronically. He is clinically improved now for days and stable for transfer back to the nursing facility as prior to admission. He is continued as full code and vargas of the [facility]. He is now sitting up in bed reading his newspaper, which he says is his routine.    Consults:     Consults     No orders found from 3/21/2018 to 4/20/2018.          Procedures Performed: none needed.             Vital Signs:    Temp:  [97.4 °F (36.3 °C)-98 °F (36.7 °C)] 97.4 °F (36.3 °C)  Heart Rate:  [60-86] 69  Resp:  [16-19] 18  BP: (109-137)/(67-79) 124/79    Physical Exam:      General Appearance:    Appears older than stated age, chronically ill appearing, Alert, cooperative, in no acute distress. Smiles and happy    Head:    Normocephalic, without obvious abnormality, atraumatic   Eyes:            Lids and lashes normal, conjunctivae and sclerae normal, no   icterus, no pallor, corneas clear, PERRLA   Ears:    Ears appear intact with no abnormalities noted   Throat:   No oral lesions, no thrush, oral  mucosa moist. Poor dentition   Neck:   No adenopathy, supple, trachea midline, no thyromegaly, no   carotid bruit, no JVD   Back:     No kyphosis present, no scoliosis present, no skin lesions,      erythema or scars, no tenderness to percussion or                   palpation,   range of motion normal   Lungs:     Clear to auscultation,respirations regular, even and                  unlabored    Heart:    Regular rhythm and normal rate, normal S1 and S2, no            murmur, no gallop, no rub, no click   Chest Wall:    No abnormalities observed   Abdomen:     Normal bowel sounds, no masses, no organomegaly, soft        non-tender, non-distended, no guarding, no rebound                tenderness   Rectal:     Deferred   Extremities:   Chronic foot deformity with diffuse muscle atrophy. no edema, no cyanosis, no redness, 1+ edema   Pulses:   Pulses palpable and equal bilaterally   Skin:   No bleeding, bruising or rash; chronic venous stasis skin changes lower extremities. Resolved right lower leg cellulitis   Lymph nodes:   No palpable adenopathy   Neurologic:   No tremor, Sensation intact, no acute neurologic deficits noted     Pertinent Lab Results:     Lab Results (all)     Procedure Component Value Units Date/Time    POC Glucose Once [[id number]]  (Normal) Collected:  04/26/18 0642    Specimen:  Blood Updated:  04/26/18 0651     Glucose 128 mg/dL      Comment: Serial Number: [id number]:  [id number]       CBC & Differential [[id number]] Collected:  04/26/18 0535    Specimen:  Blood Updated:  04/26/18 0625    Narrative:       The following orders were created for panel order CBC & Differential.  Procedure                               Abnormality         Status                     ---------                               -----------         ------                     Manual Differential[[id number]]          Abnormal            Final result               Scan Slide[[id number]]                                        Final result               CBC Auto Differential[[id number]]        Abnormal            Final result                 Please view results for these tests on the individual orders.    Manual Differential [[id number]]  (Abnormal) Collected:  04/26/18 0535    Specimen:  Blood Updated:  04/26/18 0625     Neutrophil % 44.0 %      Lymphocyte % 41.0 %      Monocyte % 12.0 %      Bands %  2.0 %      Promyelocyte % 1.0 (H) %      Neutrophils Absolute 2.12 10*3/mm3      Lymphocytes Absolute 1.89 10*3/mm3      Monocytes Absolute 0.55 10*3/mm3      RBC Morphology Normal     WBC Morphology Normal     Platelet Estimate Decreased    Scan Slide [[id number]] Collected:  04/26/18 0535    Specimen:  Blood Updated:  04/26/18 0618     Scan Slide --     Comment: See Manual Differential Results       CBC Auto Differential [[id number]]  (Abnormal) Collected:  04/26/18 0535    Specimen:  Blood Updated:  04/26/18 0618     WBC 4.60 (L) 10*3/mm3      RBC 2.93 (L) 10*6/mm3      Hemoglobin 8.9 (L) g/dL      Hematocrit 28.5 (L) %      MCV 97.3 (H) fL      MCH 30.4 pg      MCHC 31.2 g/dL      RDW 13.9 %      RDW-SD 49.4 fl      MPV 11.1 fL      Platelets 78 (L) 10*3/mm3     Renal Function Panel [[id number]]  (Abnormal) Collected:  04/26/18 0535    Specimen:  Blood Updated:  04/26/18 0607     Glucose 78 mg/dL      BUN 25 (H) mg/dL      Creatinine 2.50 (H) mg/dL      Sodium 150 (H) mmol/L      Potassium 4.1 mmol/L      Chloride 114 (H) mmol/L      CO2 27.0 mmol/L      Calcium 8.7 mg/dL      Albumin 2.60 (L) g/dL      Phosphorus 4.5 mg/dL      Anion Gap 13.1 mmol/L      BUN/Creatinine Ratio 10.0     eGFR Non African Amer 26 (L) mL/min/1.73     Narrative:       Abnormal estimated GFR should be followed by more specific studies to confirm end stage chronic renal disease. The equation used for calculation may not be accurate for patients less than 19 years old, greater than 70 years old, patients at extremes of weight, malnutrition, or with acute renal  dysfunction.    Magnesium [[id number]]  (Normal) Collected:  04/26/18 0535    Specimen:  Blood Updated:  04/26/18 0607     Magnesium 1.9 mg/dL     POC Glucose Once [[id number]]  (Normal) Collected:  04/25/18 2022    Specimen:  Blood Updated:  04/25/18 2026     Glucose 124 mg/dL      Comment: Serial Number: [id number]:  [id number]       POC Glucose Once [[id number]]  (Abnormal) Collected:  04/25/18 1626    Specimen:  Blood Updated:  04/25/18 1640     Glucose 171 (H) mg/dL      Comment: Serial Number: [id number]:  [id number]       Blood Culture With VENKATA - Blood, [[id number]]  (Normal) Collected:  04/23/18 1444    Specimen:  Blood from Arm, Left Updated:  04/25/18 1515     Blood Culture No growth at 2 days    Blood Culture With VENKATA - Blood, [[id number]]  (Normal) Collected:  04/23/18 1453    Specimen:  Blood from Hand, Left Updated:  04/25/18 1515     Blood Culture No growth at 2 days    POC Glucose Once [[id number]]  (Normal) Collected:  04/25/18 1121    Specimen:  Blood Updated:  04/25/18 1130     Glucose 119 mg/dL      Comment: Serial Number: [id number]:  [id number]       POC Glucose Once [[id number]]  (Normal) Collected:  04/25/18 0555    Specimen:  Blood Updated:  04/25/18 0606     Glucose 125 mg/dL      Comment: Serial Number: [id number]:  [id number]       CBC & Differential [[id number]] Collected:  04/25/18 0523    Specimen:  Blood Updated:  04/25/18 0605    Narrative:       The following orders were created for panel order CBC & Differential.  Procedure                               Abnormality         Status                     ---------                               -----------         ------                     Scan Slide[[id number]]                                       Final result               CBC Auto Differential[[id number]]        Abnormal            Final result                 Please view results for these tests on the individual orders.    Scan Slide [[id number]] Collected:  04/25/18 0523     Specimen:  Blood Updated:  04/25/18 0605     RBC Morphology Normal     WBC Morphology Normal     Platelet Morphology Normal     Platelet Estimate --     Comment: decreased       CBC Auto Differential [[id number]]  (Abnormal) Collected:  04/25/18 0523    Specimen:  Blood Updated:  04/25/18 0604     WBC 5.36 10*3/mm3      RBC 3.09 (L) 10*6/mm3      Hemoglobin 9.3 (L) g/dL      Hematocrit 29.5 (L) %      MCV 95.5 (H) fL      MCH 30.1 pg      MCHC 31.5 g/dL      RDW 13.7 %      RDW-SD 47.8 fl      MPV 11.5 fL      Platelets 61 (L) 10*3/mm3      Neutrophil % 61.6 %      Lymphocyte % 19.6 %      Monocyte % 7.6 %      Eosinophil % 0.2 %      Basophil % 0.7 %      Immature Grans % 10.3 (H) %      Neutrophils, Absolute 3.30 10*3/mm3      Lymphocytes, Absolute 1.05 10*3/mm3      Monocytes, Absolute 0.41 10*3/mm3      Eosinophils, Absolute 0.01 10*3/mm3      Basophils, Absolute 0.04 10*3/mm3      Immature Grans, Absolute 0.55 (H) 10*3/mm3      nRBC 0.4 (H) /100 WBC     Magnesium [[id number]]  (Normal) Collected:  04/25/18 0523    Specimen:  Blood Updated:  04/25/18 0551     Magnesium 1.7 mg/dL     Basic Metabolic Panel [[id number]]  (Abnormal) Collected:  04/25/18 0523    Specimen:  Blood Updated:  04/25/18 0551     Glucose 113 (H) mg/dL      BUN 20 mg/dL      Creatinine 2.40 (H) mg/dL      Sodium 150 (H) mmol/L      Potassium 4.9 mmol/L      Chloride 113 (H) mmol/L      CO2 27.0 mmol/L      Calcium 9.0 mg/dL      eGFR Non African Amer 27 (L) mL/min/1.73      BUN/Creatinine Ratio 8.3     Anion Gap 14.9 mmol/L     Narrative:       Abnormal estimated GFR should be followed by more specific studies to confirm end stage chronic renal disease. The equation used for calculation may not be accurate for patients less than 19 years old, greater than 70 years old, patients at extremes of weight, malnutrition, or with acute renal dysfunction.    Lactic Acid, Plasma [[id number]]  (Normal) Collected:  04/25/18 0523    Specimen:  Blood Updated:   04/25/18 0550     Lactate 1.0 mmol/L     POC Glucose Once [[id number]]  (Normal) Collected:  04/24/18 1957    Specimen:  Blood Updated:  04/24/18 2131     Glucose 128 mg/dL      Comment: Serial Number: [id number]:  [id number]       POC Glucose Once [[id number]]  (Normal) Collected:  04/24/18 1716    Specimen:  Blood Updated:  04/24/18 1720     Glucose 105 mg/dL      Comment: Serial Number: [id number]:  [id number]       Basic Metabolic Panel [[id number]]  (Abnormal) Collected:  04/24/18 1627    Specimen:  Blood Updated:  04/24/18 1651     Glucose 102 (H) mg/dL      BUN 16 mg/dL      Creatinine 2.60 (H) mg/dL      Sodium 150 (H) mmol/L      Potassium 5.2 (H) mmol/L      Chloride 112 (H) mmol/L      CO2 26.0 mmol/L      Calcium 9.0 mg/dL      eGFR Non African Amer 25 (L) mL/min/1.73      BUN/Creatinine Ratio 6.2 (L)     Anion Gap 17.2 mmol/L     Narrative:       Abnormal estimated GFR should be followed by more specific studies to confirm end stage chronic renal disease. The equation used for calculation may not be accurate for patients less than 19 years old, greater than 70 years old, patients at extremes of weight, malnutrition, or with acute renal dysfunction.    Magnesium [[id number]]  (Normal) Collected:  04/24/18 1627    Specimen:  Blood Updated:  04/24/18 1651     Magnesium 1.7 mg/dL     Hemoglobin A1c [[id number]]  (Normal) Collected:  04/24/18 0414    Specimen:  Blood Updated:  04/24/18 1601     Hemoglobin A1C 5.7 %     Narrative:       Expected HgbA1C results:     3% to 6% HgbA1C      HgbA1C                 Estimated Average Glucose     5%                              97 mg/dl   6%                             126 mg/dl   7%                             154 mg/dl   8%                             183 mg/dl   9%                             212 mg/dl  >10%                           >240 mg/dl      POC Glucose Once [[id number]]  (Abnormal) Collected:  04/24/18 1122    Specimen:  Blood Updated:  04/24/18 1133      Glucose 183 (H) mg/dL      Comment: Serial Number: [id number]:  [id number]       Comprehensive Metabolic Panel [[id number]]  (Abnormal) Collected:  04/24/18 0623    Specimen:  Blood Updated:  04/24/18 0715     Glucose 138 (H) mg/dL      BUN 15 mg/dL      Creatinine 2.40 (H) mg/dL      Sodium 150 (H) mmol/L      Potassium 5.5 (H) mmol/L      Chloride 116 (H) mmol/L      CO2 25.0 (L) mmol/L      Calcium 8.9 mg/dL      Total Protein 5.5 (L) g/dL      Albumin 2.70 (L) g/dL      ALT (SGPT) 30 U/L      AST (SGOT) 16 U/L      Alkaline Phosphatase 106 U/L      Total Bilirubin 0.4 mg/dL      eGFR Non African Amer 27 (L) mL/min/1.73      Globulin 2.8 gm/dL      A/G Ratio 1.0 g/dL      BUN/Creatinine Ratio 6.3     Anion Gap 14.5 mmol/L     Narrative:       Abnormal estimated GFR should be followed by more specific studies to confirm end stage chronic renal disease. The equation used for calculation may not be accurate for patients less than 19 years old, greater than 70 years old, patients at extremes of weight, malnutrition, or with acute renal dysfunction.    POC Glucose Once [[id number]]  (Abnormal) Collected:  04/24/18 0639    Specimen:  Blood Updated:  04/24/18 0647     Glucose 143 (H) mg/dL      Comment: Serial Number: [id number]:  [id number]       CBC & Differential [[id number]] Collected:  04/24/18 0414    Specimen:  Blood Updated:  04/24/18 0529    Narrative:       The following orders were created for panel order CBC & Differential.  Procedure                               Abnormality         Status                     ---------                               -----------         ------                     Scan Slide[[id number]]                                       Final result               CBC Auto Differential[[id number]]        Abnormal            Final result                 Please view results for these tests on the individual orders.    CBC Auto Differential [[id number]]  (Abnormal) Collected:  04/24/18 0414     Specimen:  Blood Updated:  04/24/18 0529     WBC 5.19 10*3/mm3      RBC 3.21 (L) 10*6/mm3      Hemoglobin 9.8 (L) g/dL      Hematocrit 31.1 (L) %      MCV 96.9 (H) fL      MCH 30.5 pg      MCHC 31.5 g/dL      RDW 14.2 %      RDW-SD 50.4 fl      MPV 11.9 fL      Platelets 78 (L) 10*3/mm3      Neutrophil % 60.9 %      Lymphocyte % 20.4 %      Monocyte % 7.1 %      Eosinophil % 3.1 %      Basophil % 0.6 %      Immature Grans % 7.9 (H) %      Neutrophils, Absolute 3.16 10*3/mm3      Lymphocytes, Absolute 1.06 10*3/mm3      Monocytes, Absolute 0.37 10*3/mm3      Eosinophils, Absolute 0.16 10*3/mm3      Basophils, Absolute 0.03 10*3/mm3      Immature Grans, Absolute 0.41 (H) 10*3/mm3      nRBC 0.4 (H) /100 WBC     Scan Slide [[id number]] Collected:  04/24/18 0414    Specimen:  Blood Updated:  04/24/18 0529     RBC Morphology Normal     Toxic Granulation Slight/1+     Platelet Estimate Decreased    POC Glucose Once [[id number]]  (Normal) Collected:  04/23/18 2038    Specimen:  Blood Updated:  04/23/18 2046     Glucose 122 mg/dL      Comment: Serial Number: [id number]:  [id number]       POC Glucose Once [[id number]]  (Normal) Collected:  04/23/18 1555    Specimen:  Blood Updated:  04/23/18 1600     Glucose 100 mg/dL      Comment: Serial Number: [id number]:  [id number]       POC Glucose Once [[id number]]  (Abnormal) Collected:  04/23/18 1043    Specimen:  Blood Updated:  04/23/18 1050     Glucose 150 (H) mg/dL      Comment: Serial Number: [id number]:  [id number]       VRE Culture - Swab, Per Rectum [[id number]]  (Normal) Collected:  04/19/18 2338    Specimen:  Swab from Per Rectum Updated:  04/23/18 1031     VRE SCREEN CX No Vancomycin Resistant Enterococcus Isolated    POC Glucose Once [[id number]]  (Abnormal) Collected:  04/23/18 0836    Specimen:  Blood Updated:  04/23/18 0840     Glucose 139 (H) mg/dL      Comment: Serial Number: [id number]:  [id number]       MRSA Screen Culture - Swab, Nares [[id number]]   (Normal) Collected:  04/19/18 2338    Specimen:  Swab from Nares Updated:  04/23/18 0823     MRSA SCREEN CX No Methicillin Resistant Staphylococcus aureus isolated    Vitamin B12 [[id number]]  (Abnormal) Collected:  04/21/18 0436    Specimen:  Blood Updated:  04/23/18 0751     Vitamin B-12 >1,000 (H) pg/mL     Folate [[id number]]  (Normal) Collected:  04/21/18 0436    Specimen:  Blood Updated:  04/23/18 0745     Folate 5.78 ng/mL     Vancomycin, Random [[id number]]  (Normal) Collected:  04/23/18 0358    Specimen:  Blood Updated:  04/23/18 0626     Vancomycin Random 25.29 mcg/mL     Comprehensive Metabolic Panel [[id number]]  (Abnormal) Collected:  04/23/18 0358    Specimen:  Blood Updated:  04/23/18 0551     Glucose 112 (H) mg/dL      BUN 19 mg/dL      Creatinine 2.30 (H) mg/dL      Sodium 150 (H) mmol/L      Potassium 4.6 mmol/L      Chloride 117 (H) mmol/L      CO2 25.0 (L) mmol/L      Calcium 8.7 mg/dL      Total Protein 4.9 (L) g/dL      Albumin 2.30 (L) g/dL      ALT (SGPT) 30 U/L      AST (SGOT) 16 U/L      Alkaline Phosphatase 93 U/L      Total Bilirubin 0.2 mg/dL      eGFR Non African Amer 29 (L) mL/min/1.73      Globulin 2.6 gm/dL      A/G Ratio 0.9 (L) g/dL      BUN/Creatinine Ratio 8.3     Anion Gap 12.6 mmol/L     Narrative:       Abnormal estimated GFR should be followed by more specific studies to confirm end stage chronic renal disease. The equation used for calculation may not be accurate for patients less than 19 years old, greater than 70 years old, patients at extremes of weight, malnutrition, or with acute renal dysfunction.    CBC (No Diff) [[id number]]  (Abnormal) Collected:  04/23/18 0358    Specimen:  Blood Updated:  04/23/18 0423     WBC 3.97 (L) 10*3/mm3      RBC 3.11 (L) 10*6/mm3      Hemoglobin 9.4 (L) g/dL      Hematocrit 29.9 (L) %      MCV 96.1 (H) fL      MCH 30.2 pg      MCHC 31.4 g/dL      RDW 14.1 %      RDW-SD 49.4 fl      MPV 10.9 fL      Platelets 64 (L) 10*3/mm3     Vancomycin,  Trough [[id number]]  (Abnormal) Collected:  04/22/18 2128    Specimen:  Blood Updated:  04/22/18 2233     Vancomycin Trough 27.23 (C) mcg/mL     Acinetobacter Screen - Swab, Nares [[id number]]  (Normal) Collected:  04/19/18 2337    Specimen:  Swab from Axilla, Left Updated:  04/22/18 0807     ACINETOBACTER SCREEN CX No Acinetobacter isolated    Urine Culture - Urine, Urine, Clean Catch [[id number]]  (Normal) Collected:  04/20/18 1556    Specimen:  Urine from Urine, Clean Catch Updated:  04/22/18 0545     Urine Culture No growth    Comprehensive Metabolic Panel [[id number]]  (Abnormal) Collected:  04/22/18 0451    Specimen:  Blood Updated:  04/22/18 0539     Glucose 84 mg/dL      BUN 22 (H) mg/dL      Creatinine 2.60 (H) mg/dL      Sodium 153 (C) mmol/L      Potassium 5.2 (H) mmol/L      Chloride 119 (H) mmol/L      CO2 27.0 mmol/L      Calcium 9.1 mg/dL      Total Protein 5.3 (L) g/dL      Albumin 2.60 (L) g/dL      ALT (SGPT) 29 U/L      AST (SGOT) 20 U/L      Alkaline Phosphatase 109 U/L      Total Bilirubin 0.3 mg/dL      eGFR Non African Amer 25 (L) mL/min/1.73      Globulin 2.7 gm/dL      A/G Ratio 1.0 g/dL      BUN/Creatinine Ratio 8.5     Anion Gap 12.2 mmol/L     Narrative:       Abnormal estimated GFR should be followed by more specific studies to confirm end stage chronic renal disease. The equation used for calculation may not be accurate for patients less than 19 years old, greater than 70 years old, patients at extremes of weight, malnutrition, or with acute renal dysfunction.    CBC (No Diff) [[id number]]  (Abnormal) Collected:  04/22/18 0451    Specimen:  Blood Updated:  04/22/18 0512     WBC 3.85 (L) 10*3/mm3      RBC 3.21 (L) 10*6/mm3      Hemoglobin 9.7 (L) g/dL      Hematocrit 31.1 (L) %      MCV 96.9 (H) fL      MCH 30.2 pg      MCHC 31.2 g/dL      RDW 14.3 %      RDW-SD 50.6 fl      MPV 10.8 fL      Platelets 78 (L) 10*3/mm3     Renal Function Panel [[id number]]  (Abnormal) Collected:  04/21/18 0433     Specimen:  Blood Updated:  04/21/18 0530     Glucose 92 mg/dL      BUN 26 (H) mg/dL      Creatinine 2.40 (H) mg/dL      Sodium 156 (C) mmol/L      Potassium 5.0 mmol/L      Chloride 118 (H) mmol/L      CO2 26.0 mmol/L      Calcium 8.6 mg/dL      Albumin 2.40 (L) g/dL      Phosphorus 4.6 (H) mg/dL      Anion Gap 17.0 mmol/L      BUN/Creatinine Ratio 10.8     eGFR Non African Amer 27 (L) mL/min/1.73     Narrative:       Abnormal estimated GFR should be followed by more specific studies to confirm end stage chronic renal disease. The equation used for calculation may not be accurate for patients less than 19 years old, greater than 70 years old, patients at extremes of weight, malnutrition, or with acute renal dysfunction.    Magnesium [[id number]]  (Normal) Collected:  04/21/18 0436    Specimen:  Blood Updated:  04/21/18 0529     Magnesium 2.0 mg/dL     CBC & Differential [[id number]] Collected:  04/21/18 0436    Specimen:  Blood Updated:  04/21/18 0504    Narrative:       The following orders were created for panel order CBC & Differential.  Procedure                               Abnormality         Status                     ---------                               -----------         ------                     CBC Auto Differential[[id number]]        Abnormal            Final result                 Please view results for these tests on the individual orders.    CBC Auto Differential [[id number]]  (Abnormal) Collected:  04/21/18 0436    Specimen:  Blood Updated:  04/21/18 0504     WBC 4.02 (L) 10*3/mm3      RBC 3.19 (L) 10*6/mm3      Hemoglobin 9.7 (L) g/dL      Hematocrit 30.9 (L) %      MCV 96.9 (H) fL      MCH 30.4 pg      MCHC 31.4 g/dL      RDW 14.2 %      RDW-SD 50.1 fl      MPV 10.7 fL      Platelets 85 (L) 10*3/mm3      Neutrophil % 35.1 (L) %      Lymphocyte % 39.3 %      Monocyte % 19.2 (H) %      Eosinophil % 1.0 %      Basophil % 0.7 %      Immature Grans % 4.7 (H) %      Neutrophils, Absolute 1.41 (L)  10*3/mm3      Lymphocytes, Absolute 1.58 10*3/mm3      Monocytes, Absolute 0.77 10*3/mm3      Eosinophils, Absolute 0.04 10*3/mm3      Basophils, Absolute 0.03 10*3/mm3      Immature Grans, Absolute 0.19 (H) 10*3/mm3      nRBC 1.7 (H) /100 WBC     Urinalysis, Microscopic Only - Urine, Clean Catch [[id number]]  (Abnormal) Collected:  04/20/18 1556    Specimen:  Urine from Urine, Clean Catch Updated:  04/20/18 1632     RBC, UA 3-5 (A) /HPF      WBC, UA 6-12 (A) /HPF      Bacteria, UA None Seen /HPF      Squamous Epithelial Cells, UA None Seen /HPF      Hyaline Casts, UA None Seen /LPF      Methodology Manual Light Microscopy    Urinalysis With / Culture If Indicated - Urine, Clean Catch [[id number]]  (Abnormal) Collected:  04/20/18 1556    Specimen:  Urine from Urine, Clean Catch Updated:  04/20/18 1623     Color, UA Straw     Appearance, UA Clear     pH, UA 6.5     Specific Gravity, UA 1.015     Glucose, UA Negative     Ketones, UA Negative     Bilirubin, UA Negative     Blood, UA Trace (A)     Protein, UA Negative     Leuk Esterase, UA Small (1+) (A)     Nitrite, UA Negative     Urobilinogen, UA 0.2 E.U./dL    TSH [[id number]]  (Normal) Collected:  04/20/18 1352    Specimen:  Blood Updated:  04/20/18 1516     TSH 1.820 mIU/mL     Ammonia [[id number]]  (Abnormal) Collected:  04/20/18 1351    Specimen:  Blood Updated:  04/20/18 1414     Ammonia <9 (L) umol/L     Potassium [[id number]]  (Abnormal) Collected:  04/20/18 1351    Specimen:  Blood Updated:  04/20/18 1412     Potassium 5.4 (H) mmol/L     Blood Gas, Arterial [[id number]]  (Abnormal) Collected:  04/20/18 0941    Specimen:  Arterial Blood Updated:  04/20/18 0941     Site Arterial: right radial     Gonzalo's Test Positive     pH, Arterial 7.361 pH units      pCO2, Arterial 42.7 mm Hg      pO2, Arterial 68.2 (L) mm Hg      HCO3, Arterial 24.1 mmol/L      Base Excess, Arterial -1.3 mmol/L      O2 Saturation, Arterial 93.6 %      Hemoglobin, Blood Gas 9.8 (L) g/dL       Barometric Pressure for Blood Gas 744 mmHg      Modality Room Air     FIO2 21 %     POC Glucose Once [[id number]]  (Normal) Collected:  04/20/18 0926    Specimen:  Blood Updated:  04/20/18 0930     Glucose 103 mg/dL      Comment: Serial Number: [id number]:  [id number]       Comprehensive Metabolic Panel [[id number]]  (Abnormal) Collected:  04/20/18 0409    Specimen:  Blood Updated:  04/20/18 0543     Glucose 58 (L) mg/dL      BUN 32 (H) mg/dL      Creatinine 2.00 (H) mg/dL      Sodium 149 (H) mmol/L      Potassium 5.6 (C) mmol/L      Chloride 118 (H) mmol/L      CO2 24.0 (L) mmol/L      Calcium 8.3 (L) mg/dL      Total Protein 5.1 (L) g/dL      Albumin 2.50 (L) g/dL      ALT (SGPT) 35 U/L      AST (SGOT) 21 U/L      Alkaline Phosphatase 87 U/L      Total Bilirubin 0.4 mg/dL      eGFR Non African Amer 34 (L) mL/min/1.73      Globulin 2.6 gm/dL      A/G Ratio 1.0 g/dL      BUN/Creatinine Ratio 16.0     Anion Gap 12.6 mmol/L     Narrative:       Abnormal estimated GFR should be followed by more specific studies to confirm end stage chronic renal disease. The equation used for calculation may not be accurate for patients less than 19 years old, greater than 70 years old, patients at extremes of weight, malnutrition, or with acute renal dysfunction.    Magnesium [[id number]]  (Normal) Collected:  04/20/18 0409    Specimen:  Blood Updated:  04/20/18 0534     Magnesium 1.7 mg/dL     CBC & Differential [[id number]] Collected:  04/20/18 0409    Specimen:  Blood Updated:  04/20/18 0526    Narrative:       The following orders were created for panel order CBC & Differential.  Procedure                               Abnormality         Status                     ---------                               -----------         ------                     CBC Auto Differential[[id number]]        Abnormal            Final result                 Please view results for these tests on the individual orders.    CBC Auto Differential  [[id number]]  (Abnormal) Collected:  04/20/18 0409    Specimen:  Blood Updated:  04/20/18 0526     WBC 4.43 (L) 10*3/mm3      RBC 3.38 (L) 10*6/mm3      Hemoglobin 10.0 (L) g/dL      Hematocrit 31.8 (L) %      MCV 94.1 (H) fL      MCH 29.6 pg      MCHC 31.4 g/dL      RDW 13.5 %      RDW-SD 45.6 fl      MPV 11.2 fL      Platelets 83 (L) 10*3/mm3      Neutrophil % 50.7 %      Lymphocyte % 27.1 %      Monocyte % 17.6 (H) %      Eosinophil % 1.4 %      Basophil % 0.5 %      Immature Grans % 2.7 (H) %      Neutrophils, Absolute 2.25 10*3/mm3      Lymphocytes, Absolute 1.20 10*3/mm3      Monocytes, Absolute 0.78 10*3/mm3      Eosinophils, Absolute 0.06 10*3/mm3      Basophils, Absolute 0.02 10*3/mm3      Immature Grans, Absolute 0.12 (H) 10*3/mm3      nRBC 0.9 (H) /100 WBC     Magnesium [[id number]]  (Normal) Collected:  04/19/18 2334    Specimen:  Blood Updated:  04/20/18 0006     Magnesium 1.7 mg/dL      Comment: Specimen hemolyzed.  Results may be affected.       Comprehensive Metabolic Panel [[id number]]  (Abnormal) Collected:  04/19/18 2334    Specimen:  Blood Updated:  04/20/18 0006     Glucose 78 mg/dL      BUN 35 (H) mg/dL      Comment: Specimen hemolyzed. Results may be affected.        Creatinine 1.90 (H) mg/dL      Sodium 143 mmol/L      Potassium 5.9 (C) mmol/L      Chloride 116 (H) mmol/L      CO2 23.0 (L) mmol/L      Calcium 8.1 (L) mg/dL      Total Protein 5.1 (L) g/dL      Albumin 2.50 (L) g/dL      ALT (SGPT) 38 U/L      Comment: Specimen hemolyzed.  Results may be affected.        AST (SGOT) 30 U/L      Comment: Specimen hemolyzed.  Results may be affected.        Alkaline Phosphatase 73 U/L      Comment: Specimen hemolyzed. Results may be affected.        Total Bilirubin 0.4 mg/dL      eGFR Non African Amer 36 (L) mL/min/1.73      Globulin 2.6 gm/dL      A/G Ratio 1.0 g/dL      BUN/Creatinine Ratio 18.4     Anion Gap 9.9 (L) mmol/L     Narrative:       Abnormal estimated GFR should be followed by more  specific studies to confirm end stage chronic renal disease. The equation used for calculation may not be accurate for patients less than 19 years old, greater than 70 years old, patients at extremes of weight, malnutrition, or with acute renal dysfunction.    CK [[id number]]  (Normal) Collected:  04/19/18 2334    Specimen:  Blood Updated:  04/20/18 0002     Creatine Kinase 47 U/L     Valproic Acid Level, Total [[id number]]  (Abnormal) Collected:  04/19/18 2334    Specimen:  Blood Updated:  04/20/18 0002     Valproic Acid 27.3 (L) mcg/mL     Protime-INR [[id number]]  (Normal) Collected:  04/19/18 2334    Specimen:  Blood Updated:  04/19/18 2359     Protime 11.3 Seconds      INR 1.01    Blood Gas, Arterial [[id number]]  (Abnormal) Collected:  04/19/18 2354    Specimen:  Arterial Blood Updated:  04/19/18 2353     Site Arterial: left radial     Gonzalo's Test Positive     pH, Arterial 7.341 pH units      pCO2, Arterial 44.4 mm Hg      pO2, Arterial 80.2 mm Hg      HCO3, Arterial 24.0 mmol/L      Base Excess, Arterial -1.8 mmol/L      O2 Saturation, Arterial 95.7 %      Hemoglobin, Blood Gas 10.4 (L) g/dL      Barometric Pressure for Blood Gas 742 mmHg      Modality BiPap     FIO2 21 %           Pertinent Radiology Results:    Imaging Results (all)     Procedure Component Value Units Date/Time    XR Chest 1 View [[id number]] Collected:  04/24/18 1650     Updated:  04/24/18 1651    Narrative:       FINAL REPORT    CLINICAL HISTORY:  respiratory failure, pneumonia, bradycardia    FINDINGS:  The heart is mildly enlarged.  The mediastinum is within normal  limits.  The lungs are underinflated.  There is hazy opacity in  the left lung base probably due to a combination of atelectasis  and a small pleural effusion.  There is no pneumothorax.  The  bony thorax is intact.      Impression:       Left base atelectasis and a small left pleural effusion.    Authenticated by Agustin Burleson MD on 04/24/2018 04:50:09 PM    XR Chest 1 View  [[id number]] Collected:  04/23/18 1117     Updated:  04/23/18 1142    Narrative:       PORTABLE CHEST     INDICATION: Hypothermia. Sepsis.     FINDINGS: Single frontal portable chest, compared with 04/20/2018. Left  apex is partially obscured by positioning of the patient's chin. EKG  leads overlie the chest. Heart size is borderline prominent, but similar  to previous. No pneumothorax. Lung volumes are diminished. There is some  increased left perihilar and basilar opacification, as well as some  faint right basilar opacification. No definite significant effusion  although tiny effusions are not excluded.       Impression:       Interval development of left greater than right perihilar  and basilar opacities which may represent atelectasis. Developing  infiltrates from edema or pneumonia are not excluded. Short-term  follow-up recommended.     This report was finalized on 4/23/2018 11:40 AM by New Hart MD.    US Renal Limited [[id number]] Collected:  04/21/18 0950     Updated:  04/21/18 1028    Narrative:       RENAL ULTRASOUND     INDICATION: Urinary retention.     FINDINGS: The sonographer notes that the examination was technically  difficult with suboptimal visualization of the kidneys. On the provided  images, the kidneys are largely obscured by artifact. Limited  visualization demonstrates no obvious hydronephrosis. Exact size  measurement is difficult due to the limitations but the right kidney  measures approximately 10 cm in length and the left kidney measures  approximately 10.9 cm. No obvious perinephric fluid collection, cyst or  obvious solid renal mass. There is probable renal cortical thinning  bilaterally raising the question of chronic medical renal disease.       Impression:       Limited examination of the kidneys without obvious  hydronephrosis.     This report was finalized on 4/21/2018 10:26 AM by New Hart MD.    MRI Brain Without Contrast [[id number]] Collected:  04/20/18 1136     Updated:   04/20/18 1305    Narrative:       PROCEDURE: MRI BRAIN WO CONTRAST-     HISTORY: Syncope/fainting     PROCEDURE: Multiplanar multisequence imaging of the brain was performed  without the use of intravenous contrast.     COMPARISON: None.     FINDINGS: There is generalized cerebral volume loss. No significant  white matter lesions are identified. There is no mass, mass effect or  midline shift. There is no hydrocephalus. There are no areas of  restricted diffusion.      The midbrain, arelis, cerebellum and craniocervical junction are  unremarkable. The sella and pituitary gland are within normal limits.  The major intracranial vasculature demonstrates the expected flow  related signal. There is mucosal thickening in the left maxillary sinus  and there is a small mastoid effusions.       Impression:       No acute intracranial abnormality.             This report was finalized on 4/20/2018 11:38 AM by Leonela Tapia M.D..    XR Abdomen KUB [[id number]] Collected:  04/20/18 1117     Updated:  04/20/18 1120    Narrative:       PROCEDURE: XR ABDOMEN KUB-     HISTORY: PRE MRI     COMPARISON: None.     FINDINGS: An AP view of the abdomen and pelvis demonstrates an  unremarkable bowel gas pattern with no evidence of obstruction. There is  stool throughout the colon which may reflect constipation. There is no  evidence of an implanted device or radiopaque foreign body.       Impression:       No radiopaque foreign bodies.             This report was finalized on 4/20/2018 11:18 AM by Leonela Tapia M.D..    XR Skull Lateral & Ap [[id number]] Collected:  04/20/18 1116     Updated:  04/20/18 1119    Narrative:       PROCEDURE: XR SKULL LATERAL AND AP-     HISTORY: PRE MRI     FINDINGS:  2 views were obtained. There is an opacity in the left  maxillary sinus which may reflect mucosal thickening. There is no  evidence of air-fluid level. There is no evidence of an acute, displaced  fracture or dislocation of the visualized  bony architecture.   There are  no radiopaque foreign bodies.       Impression:       No radiopaque foreign bodies.            This report was finalized on 4/20/2018 11:17 AM by Leonela Tapia M.D..    XR Chest 1 View [[id number]] Collected:  04/20/18 0806     Updated:  04/20/18 0809    Narrative:       PROCEDURE: XR CHEST 1 VW-     HISTORY: respiratory failure, altered mentation, COPD     COMPARISON: None.     FINDINGS: The heart is normal in size. The mediastinum is unremarkable.  There are low lung volumes. No focal opacities were effusions are  identified. There is no pneumothorax.  There are no acute osseous  abnormalities.           Impression:       No acute cardiopulmonary process.     Continued followup is recommended.     This report was finalized on 4/20/2018 8:07 AM by Leonela Tapia M.D..          Condition on Discharge:  Stable        Code status during the hospital stay:        Discharge Disposition:    Rehab Facility or Unit (DC - External)    Discharge Medication:     Braulio Schwartz   Home Medication Instructions [place]AD[place]:[id number]    Printed on:04/26/18 1237   Medication Information                      acetaminophen (TYLENOL) 325 MG tablet  Take 650 mg by mouth Every 6 (Six) Hours As Needed for Mild Pain .             albuterol (PROAIR HFA) 108 (90 Base) MCG/ACT inhaler  Inhale 2 puffs Every 4 (Four) Hours As Needed for Wheezing.             aspirin 81 MG chewable tablet  Chew 81 mg Daily.             azelastine (ASTELIN) 0.1 % nasal spray  2 sprays into each nostril 2 (Two) Times a Day As Needed for Rhinitis. Use in each nostril as directed             bisacodyl (DULCOLAX) 10 MG suppository  Insert 10 mg into the rectum Daily As Needed.             divalproex (DEPAKOTE) 500 MG 24 hr tablet  Take 1 tablet by mouth Daily.             Ergocalciferol (VITAMIN D2 PO)  Take 50,000 Units by mouth 1 (One) Time Per Week.             ferrous sulfate 325 (65 FE) MG tablet  Take 325 mg by mouth 2 (Two)  Times a Day.             fludrocortisone 0.1 MG tablet  Take 1 tablet by mouth Daily.             furosemide (LASIX) 20 MG tablet  Take 1 tablet by mouth Daily.             furosemide (LASIX) 20 MG tablet  Give three times weekly in addition to scheduled dose as needed for leg edema or shortness of breath             guaiFENesin (MUCINEX) 600 MG 12 hr tablet  Take 1 tablet by mouth Every 12 (Twelve) Hours.             ipratropium-albuterol (DUO-NEB) 0.5-2.5 mg/mL nebulizer  Take 3 mL by nebulization Every 4 (Four) Hours As Needed for Wheezing.             magnesium oxide (MAGOX) 400 (241.3 Mg) MG tablet tablet  Take 1 tablet by mouth 2 (Two) Times a Day.             megestrol acetate (MEGACE) 400 MG/10ML suspension oral suspension  Take 10 mL by mouth 2 (Two) Times a Day.             predniSONE (DELTASONE) 20 MG tablet  Take one daily x 4 day then half tab daily x 4 day then half tab every other day x 4 day then stop             QUEtiapine (SEROquel) 50 MG tablet  Take 1 tablet by mouth Every Night.             Sunscreens (CHAPSTICK) stick  Apply 1 each topically Every 1 (One) Hour As Needed (dry lips).             tamsulosin (FLOMAX) 0.4 MG capsule 24 hr capsule  Take 1 capsule by mouth Daily.             traZODone (DESYREL) 50 MG tablet  Take 1 tablet by mouth Every Night.                 Discharge Diet:     Diet Instructions     Diet: Dysphagia; Thin Liquids, No Restrictions; Pureed       Discharge Diet:  Dysphagia    Fluid Consistency:  Thin Liquids, No Restrictions    Pureed Options:  Pureed          Activity at Discharge:     Activity Instructions     Activity as Tolerated             Follow-up Appointments:    No future appointments.  Additional Instructions for the Follow-ups that You Need to Schedule     Discharge Follow-up with PCP    As directed      Follow Up Details:  Facility provider in the next week               Test Results Pending at Discharge:     Order Current Status    Blood Culture With VENKATA -  Blood, Preliminary result    Blood Culture With VENKATA - Blood, Preliminary result             Berenice Marie DO  04/26/18  12:39 PM      Medication risks and benefits were discussed in great detail. The patient reported being satisfied with the current treatment plan and the care delivered while hospitalized.     Time:  I spent 41 minutes preparing discharge counseling and teaching.

## 2018-04-27 NOTE — PROGRESS NOTES
Case Management Discharge Note         Destination - Selection Complete     Service Request Status Selected Specialties Address Phone Number Fax Number    [facility] Selected Intermediate Care Facility [address] [place] [address] [phone]      Durable Medical Equipment     No service coordination in this encounter.      Dialysis/Infusion     No service coordination in this encounter.      Home Medical Care     No service coordination in this encounter.      Social Care     No service coordination in this encounter.        Ambulance: Other ([facility])    Final Discharge Disposition Code: 64 - nursing facility under Medicaid

## 2018-04-28 LAB
BACTERIA SPEC AEROBE CULT: NORMAL
BACTERIA SPEC AEROBE CULT: NORMAL

## 2018-05-17 ENCOUNTER — TRANSCRIBE ORDERS (OUTPATIENT)
Dept: ADMINISTRATIVE | Facility: HOSPITAL | Age: 68
End: 2018-05-17

## 2018-05-17 DIAGNOSIS — R13.12 OROPHARYNGEAL DYSPHAGIA: Primary | ICD-10-CM

## 2018-05-23 ENCOUNTER — HOSPITAL ENCOUNTER (OUTPATIENT)
Dept: GENERAL RADIOLOGY | Facility: HOSPITAL | Age: 68
Discharge: HOME | End: 2018-05-23

## 2018-05-25 ENCOUNTER — HOSPITAL ENCOUNTER (OUTPATIENT)
Dept: GENERAL RADIOLOGY | Facility: HOSPITAL | Age: 68
Discharge: HOME | End: 2018-05-25
Admitting: INTERNAL MEDICINE

## 2018-05-25 DIAGNOSIS — R13.12 OROPHARYNGEAL DYSPHAGIA: ICD-10-CM

## 2018-05-25 PROCEDURE — G8997 SWALLOW GOAL STATUS: HCPCS

## 2018-05-25 PROCEDURE — G8996 SWALLOW CURRENT STATUS: HCPCS

## 2018-05-25 PROCEDURE — 74230 X-RAY XM SWLNG FUNCJ C+: CPT

## 2018-05-25 PROCEDURE — G8998 SWALLOW D/C STATUS: HCPCS

## 2018-05-25 PROCEDURE — 92611 MOTION FLUOROSCOPY/SWALLOW: CPT

## 2018-05-25 NOTE — MBS/VFSS/FEES
Outpatient Modified Barium Swallow Study - Speech Language Pathology   Swallow Initial Evaluation JUAN Simeon     Patient Name: Braulio Schwartz  : 1950  MRN: [id number]  Today's Date: 2018               Admit Date: 2018    Visit Dx:     ICD-10-CM ICD-9-CM   1. Oropharyngeal dysphagia R13.12 787.22     Patient Active Problem List   Diagnosis   • Sepsis affecting skin   • Acute metabolic encephalopathy   • Acute respiratory failure with hypercapnia   • Other schizophrenia   • Chronic hypertension   • Urinary retention   • Cellulitis of leg, right   • Sepsis due to cellulitis     Past Medical History:   Diagnosis Date   • Anxiety    • Hypertension      No past surgical history on file.       SWALLOW EVALUATION (last 72 hours)      SLP Adult Swallow Evaluation     Row Name 18 6020                   Rehab Evaluation    Document Type other (see comments)   MBSS  -TM        Total Evaluation Minutes, SLP 17  -TM        Subjective Information no complaints  -TM        Patient Observations alert;cooperative  -TM        Patient/Family Observations pleasant and cooperative  -TM        Patient Effort good  -TM        Symptoms Noted During/After Treatment none  -TM           General Information    Patient Profile Reviewed yes  -TM        Pertinent History Of Current Problem dysphagia (pt. on thickened liquids)  -TM        Current Method of Nutrition regular textures;other (see comments)   thickened liquids (unsure of thickness)  -TM        Precautions/Limitations, Vision WFL;for purposes of eval  -TM        Precautions/Limitations, Hearing WFL;for purposes of eval  -TM        Prior Level of Function-Communication WFL  -TM        Prior Level of Function-Swallowing other (see comments)   pt. on thickened liquids but limited medical hx  -TM        Plans/Goals Discussed with patient  -TM        Barriers to Rehab previous functional deficit  -TM        Patient's Goals for Discharge patient did not state  -TM            Pain Assessment    Additional Documentation Pain Scale: Numbers Pre/Post-Treatment (Group)  -TM           Pain Scale: Numbers Pre/Post-Treatment    Pain Scale: Numbers, Pretreatment 0/10 - no pain  -TM           Oral Motor and Function    Oral Lesions or Structural Abnormalities and/or variants none identified  -TM        Dentition Assessment natural, present and adequate  -TM        Secretion Management WNL/WFL  -TM        Mucosal Quality moist, healthy  -TM        Volitional Swallow WFL  -TM           Oral Musculature and Cranial Nerve Assessment    Oral Motor General Assessment WFL  -TM           General Eating/Swallowing Observations    Respiratory Support Currently in Use room air  -TM        Eating/Swallowing Skills fed by SLP  -TM        Positioning During Eating upright in chair  -TM        Utensils Used spoon;cup;straw  -TM        Consistencies Trialed regular textures;soft textures;thin liquids;nectar/syrup-thick liquids;honey-thick liquids;pudding thick  -TM           Respiratory    Respiratory Status room air  -TM           MBS/VFSS    Utensils Used spoon;straw;cup  -TM        Consistencies Trialed regular textures;soft textures;thin liquids;nectar/syrup-thick liquids;honey-thick liquids;pudding thick  -TM           MBS/VFSS Interpretation    Oral Prep Phase other (see comments)   extended bolus prep time w/solids; decreased bolus control   -TM        Oral Transit Phase WFL  -TM        Oral Residue WFL  -TM        VFSS Summary Oral phase remarkable for extended bolus prep time w/solids, but adequate, and decreased bolus control with thin with premature loss to pharynx resulting in penetration prior to swallow with thin liq.  Pharyngeal phase was remarkable for decreased tongue base retraction with vallecular residue/stasis with honey (mild) and solids (moderate), clearing with liquid wash.  Pt. exhibited deep penetration with thin liquid (prior to swallow) that was deep and pt. was not sensate,  increasing his risk of silent aspiration with thin liquids.  He exhibited trace, superior penetration intermittently with nectar thick liquids during the swallow due to mildly decreased laryngeal elevation, but not a signifcant aspiration risk.  Pt. is recommended the followin. regular diet with nectar-thick liq as frank,  2. meds with applesauce/pudding,  3. aspiration precautions,  4. Speech therapy for strengthening and education.    -TM           Initiation of Pharyngeal Swallow    Initiation of Pharyngeal Swallow bolus in valleculae  -TM        Pharyngeal Phase impaired pharyngeal phase of swallowing  -TM        Penetration Before the Swallow thin liquids;secondary to reduced back of tongue control;secondary to delayed swallow initiation or mistiming  -TM        Penetration During the Swallow nectar-thick liquids;other (see comments);secondary to reduced laryngeal elevation   trace, superior, intermittent  -TM        Response to Penetration deep;shallow;other (see comments)   deep with thin, shallow with nectar  -TM        Response to Aspiration other (see comments)   no sensation w/deep penetration  -TM        Rosenbek's Scale 3--->level 3  -TM        Pharyngeal Residue honey-thick liquids;regular textures;mechanical soft;valleculae;secondary to reduced base of tongue retraction  -TM        Response to Residue cleared residue with liquid wash  -TM        Attempted Compensatory Maneuvers chin tuck  -TM        Response to Attempted Compensatory Maneuvers other (see comments)   somewhat effective w/thin  -TM           SLP Communication to Radiology    Severity Level of Dysphagia mild dysphagia;pharyngeal dysfunction;oral dysfunction  -TM        Consistencies Aspirated/Penetrated penetrated;thin liquids;nectar-thick liquids  -TM           Clinical Impression    SLP Swallowing Diagnosis mild  -TM        Functional Impact risk of aspiration/pneumonia  -TM        Rehab Potential/Prognosis, Swallowing good, to  achieve stated therapy goals  -TM           Recommendations    Therapy Frequency (Swallow) evaluation only  -        SLP Diet Recommendation regular textures;nectar thick liquids  -        Recommended Precautions and Strategies upright posture during/after eating;alternate between small bites of food and sips of liquid  -TM        SLP Rec. for Method of Medication Administration with pudding or applesauce;with thick liquids  -TM        Monitor for Signs of Aspiration notify SLP if any concerns  -TM        Anticipated Dischage Disposition skilled nursing facility  -TM        Demonstrates Need for Referral to Another Service other (see comments)   speech therapy  -          User Key  (r) = Recorded By, (t) = Taken By, (c) = Cosigned By    Initials Name Effective Dates     Jil Aguilar 03/07/18 -         EDUCATION  The patient has been educated in the following areas:   Dysphagia (Swallowing Impairment) Modified Diet Instruction.    SLP Recommendation and Plan  SLP Swallowing Diagnosis: mild  SLP Diet Recommendation: regular textures, nectar thick liquids  Recommended Precautions and Strategies: upright posture during/after eating, alternate between small bites of food and sips of liquid     Monitor for Signs of Aspiration: notify SLP if any concerns        Anticipated Dischage Disposition: skilled nursing facility  Rehab Potential/Prognosis, Swallowing: good, to achieve stated therapy goals  Therapy Frequency (Swallow): evaluation only     Demonstrates Need for Referral to Another Service: other (see comments) (speech therapy)                     Time Calculation:         Time Calculation- SLP     Row Name 05/25/18 1357             Time Calculation- SLP    SLP Start Time 1250  -TM      SLP Stop Time 1307  -      SLP Time Calculation (min) 17 min  -      SLP Received On 05/25/18  -        User Key  (r) = Recorded By, (t) = Taken By, (c) = Cosigned By    Initials Name Provider Type     Jil Aranacalfe  Speech and Language Pathologist          Therapy Charges for Today     Code Description Service Date Service Provider Modifiers Qty    23772361575 HC ST SWALLOWING CURRENT STATUS 5/25/2018 Jil Jeff GN, CI 1    66476406344 HC ST SWALLOWING PROJECTED 5/25/2018 Jil Jeff GN, CI 1    61968702846 HC ST SWALLOWING DISCHARGE 5/25/2018 Jil Sweetwater GN, CI 1    25250774642 HC ST MOTION FLUORO EVAL SWALLOW 6 5/25/2018 Jil Sweetwater GN 1          SLP G-Codes  Functional Limitations: Swallowing  Swallow Current Status (): At least 1 percent but less than 20 percent impaired, limited or restricted  Swallow Goal Status (): At least 1 percent but less than 20 percent impaired, limited or restricted  Swallow Discharge Status (): At least 1 percent but less than 20 percent impaired, limited or restricted    Jil  Sweetwater  5/25/2018

## 2018-09-05 ENCOUNTER — APPOINTMENT (OUTPATIENT)
Dept: CT IMAGING | Facility: HOSPITAL | Age: 68
DRG: 093 | End: 2018-09-05
Payer: MEDICARE

## 2018-09-05 ENCOUNTER — APPOINTMENT (OUTPATIENT)
Dept: MRI IMAGING | Facility: HOSPITAL | Age: 68
DRG: 093 | End: 2018-09-05
Payer: MEDICARE

## 2018-09-05 ENCOUNTER — HOSPITAL ENCOUNTER (INPATIENT)
Facility: HOSPITAL | Age: 68
LOS: 1 days | Discharge: NURSING, REHAB OR LONG-TERM CARE | DRG: 093 | End: 2018-09-06
Attending: EMERGENCY MEDICINE | Admitting: INTERNAL MEDICINE
Payer: MEDICARE

## 2018-09-05 ENCOUNTER — APPOINTMENT (OUTPATIENT)
Dept: ULTRASOUND IMAGING | Facility: HOSPITAL | Age: 68
DRG: 093 | End: 2018-09-05
Payer: MEDICARE

## 2018-09-05 ENCOUNTER — APPOINTMENT (OUTPATIENT)
Dept: GENERAL RADIOLOGY | Facility: HOSPITAL | Age: 68
DRG: 093 | End: 2018-09-05
Payer: MEDICARE

## 2018-09-05 DIAGNOSIS — R47.01 APHASIA: ICD-10-CM

## 2018-09-05 DIAGNOSIS — N18.9 CHRONIC RENAL IMPAIRMENT, UNSPECIFIED CKD STAGE: ICD-10-CM

## 2018-09-05 DIAGNOSIS — R41.82 ALTERED MENTAL STATUS, UNSPECIFIED ALTERED MENTAL STATUS TYPE: Primary | ICD-10-CM

## 2018-09-05 LAB
A/G RATIO: 1 (ref 0.8–2)
ALBUMIN SERPL-MCNC: 3 G/DL (ref 3.4–4.8)
ALP BLD-CCNC: 111 U/L (ref 25–100)
ALT SERPL-CCNC: 9 U/L (ref 4–36)
AMMONIA: 52 MCG/DL (ref 27–102)
ANION GAP SERPL CALCULATED.3IONS-SCNC: 11 MMOL/L (ref 3–16)
AST SERPL-CCNC: 10 U/L (ref 8–33)
BASE EXCESS VENOUS: 2.3 MMOL/L (ref -3–3)
BASOPHILS ABSOLUTE: 0 K/UL (ref 0–0.1)
BASOPHILS RELATIVE PERCENT: 0.4 %
BILIRUB SERPL-MCNC: <0.2 MG/DL (ref 0.3–1.2)
BILIRUBIN URINE: NEGATIVE
BLOOD, URINE: NEGATIVE
BUN BLDV-MCNC: 21 MG/DL (ref 6–20)
CALCIUM SERPL-MCNC: 8.8 MG/DL (ref 8.5–10.5)
CHLORIDE BLD-SCNC: 104 MMOL/L (ref 98–107)
CHP ED QC CHECK: NORMAL
CLARITY: CLEAR
CO2: 25 MMOL/L (ref 20–30)
COLOR: YELLOW
CREAT SERPL-MCNC: 2.1 MG/DL (ref 0.4–1.2)
EOSINOPHILS ABSOLUTE: 0.3 K/UL (ref 0–0.4)
EOSINOPHILS RELATIVE PERCENT: 4.1 %
FIO2: 0.3 %
FOLATE: 8.82 NG/ML
GFR AFRICAN AMERICAN: 38
GFR NON-AFRICAN AMERICAN: 32
GLOBULIN: 3.1 G/DL
GLUCOSE BLD-MCNC: 95 MG/DL (ref 74–106)
GLUCOSE BLD-MCNC: 99 MG/DL
GLUCOSE BLD-MCNC: 99 MG/DL (ref 74–106)
GLUCOSE URINE: NEGATIVE MG/DL
HCO3 VENOUS: 28.8 MMOL/L (ref 23–29)
HCT VFR BLD CALC: 35.1 % (ref 40–54)
HEMOGLOBIN: 10.8 G/DL (ref 13–18)
IMMATURE GRANULOCYTES #: 0.1 K/UL
IMMATURE GRANULOCYTES %: 1 % (ref 0–5)
KETONES, URINE: NEGATIVE MG/DL
LEUKOCYTE ESTERASE, URINE: NEGATIVE
LYMPHOCYTES ABSOLUTE: 1.6 K/UL (ref 1.5–4)
LYMPHOCYTES RELATIVE PERCENT: 19.8 %
MCH RBC QN AUTO: 29.6 PG (ref 27–32)
MCHC RBC AUTO-ENTMCNC: 30.8 G/DL (ref 31–35)
MCV RBC AUTO: 96.2 FL (ref 80–100)
MICROSCOPIC EXAMINATION: NORMAL
MONOCYTES ABSOLUTE: 0.9 K/UL (ref 0.2–0.8)
MONOCYTES RELATIVE PERCENT: 11.3 %
NEUTROPHILS ABSOLUTE: 5.2 K/UL (ref 2–7.5)
NEUTROPHILS RELATIVE PERCENT: 63.4 %
NITRITE, URINE: NEGATIVE
O2 SAT, VEN: 76 %
O2 THERAPY: ABNORMAL
PCO2, VEN: 53.4 MMHG (ref 40–50)
PDW BLD-RTO: 13 % (ref 11–16)
PERFORMED ON: NORMAL
PH UA: 6.5
PH VENOUS: 7.35 (ref 7.35–7.45)
PLATELET # BLD: 171 K/UL (ref 150–400)
PMV BLD AUTO: 10.2 FL (ref 6–10)
PO2, VEN: 45.3 MMHG (ref 25–40)
POTASSIUM REFLEX MAGNESIUM: 4.5 MMOL/L (ref 3.4–5.1)
PRO-BNP: 256 PG/ML (ref 0–1800)
PROTEIN UA: NEGATIVE MG/DL
RBC # BLD: 3.65 M/UL (ref 4.5–6)
SODIUM BLD-SCNC: 140 MMOL/L (ref 136–145)
SPECIFIC GRAVITY UA: 1.01
TCO2 CALC VENOUS: 30 MMOL/L
TOTAL CK: 51 U/L (ref 26–174)
TOTAL PROTEIN: 6.1 G/DL (ref 6.4–8.3)
TROPONIN: <0.3 NG/ML
TSH REFLEX: 0.51 UIU/ML (ref 0.35–5.5)
URINE REFLEX TO CULTURE: NORMAL
URINE TYPE: NORMAL
UROBILINOGEN, URINE: 0.2 E.U./DL
VITAMIN B-12: 643 PG/ML (ref 211–911)
WBC # BLD: 8.2 K/UL (ref 4–11)

## 2018-09-05 PROCEDURE — 94640 AIRWAY INHALATION TREATMENT: CPT

## 2018-09-05 PROCEDURE — 81003 URINALYSIS AUTO W/O SCOPE: CPT

## 2018-09-05 PROCEDURE — G8996 SWALLOW CURRENT STATUS: HCPCS

## 2018-09-05 PROCEDURE — 93005 ELECTROCARDIOGRAM TRACING: CPT

## 2018-09-05 PROCEDURE — G8998 SWALLOW D/C STATUS: HCPCS

## 2018-09-05 PROCEDURE — 82607 VITAMIN B-12: CPT

## 2018-09-05 PROCEDURE — 83880 ASSAY OF NATRIURETIC PEPTIDE: CPT

## 2018-09-05 PROCEDURE — 84443 ASSAY THYROID STIM HORMONE: CPT

## 2018-09-05 PROCEDURE — 92610 EVALUATE SWALLOWING FUNCTION: CPT

## 2018-09-05 PROCEDURE — 2500000003 HC RX 250 WO HCPCS: Performed by: NURSE PRACTITIONER

## 2018-09-05 PROCEDURE — 82550 ASSAY OF CK (CPK): CPT

## 2018-09-05 PROCEDURE — 84484 ASSAY OF TROPONIN QUANT: CPT

## 2018-09-05 PROCEDURE — G9176 SPEECH LANG D/C STATUS: HCPCS

## 2018-09-05 PROCEDURE — 82140 ASSAY OF AMMONIA: CPT

## 2018-09-05 PROCEDURE — 82746 ASSAY OF FOLIC ACID SERUM: CPT

## 2018-09-05 PROCEDURE — 71045 X-RAY EXAM CHEST 1 VIEW: CPT

## 2018-09-05 PROCEDURE — 92523 SPEECH SOUND LANG COMPREHEN: CPT

## 2018-09-05 PROCEDURE — 2580000003 HC RX 258: Performed by: NURSE PRACTITIONER

## 2018-09-05 PROCEDURE — 85025 COMPLETE CBC W/AUTO DIFF WBC: CPT

## 2018-09-05 PROCEDURE — 70551 MRI BRAIN STEM W/O DYE: CPT

## 2018-09-05 PROCEDURE — 94760 N-INVAS EAR/PLS OXIMETRY 1: CPT

## 2018-09-05 PROCEDURE — G9170 MEMORY D/C STATUS: HCPCS

## 2018-09-05 PROCEDURE — 1200000000 HC SEMI PRIVATE

## 2018-09-05 PROCEDURE — 80164 ASSAY DIPROPYLACETIC ACD TOT: CPT

## 2018-09-05 PROCEDURE — 82803 BLOOD GASES ANY COMBINATION: CPT

## 2018-09-05 PROCEDURE — 36415 COLL VENOUS BLD VENIPUNCTURE: CPT

## 2018-09-05 PROCEDURE — 80053 COMPREHEN METABOLIC PANEL: CPT

## 2018-09-05 PROCEDURE — 99285 EMERGENCY DEPT VISIT HI MDM: CPT

## 2018-09-05 PROCEDURE — 93880 EXTRACRANIAL BILAT STUDY: CPT

## 2018-09-05 PROCEDURE — 6360000002 HC RX W HCPCS

## 2018-09-05 PROCEDURE — 70450 CT HEAD/BRAIN W/O DYE: CPT

## 2018-09-05 PROCEDURE — 6370000000 HC RX 637 (ALT 250 FOR IP): Performed by: NURSE PRACTITIONER

## 2018-09-05 PROCEDURE — G9168 MEMORY CURRENT STATUS: HCPCS

## 2018-09-05 PROCEDURE — G9174 SPEECH LANG CURRENT STATUS: HCPCS

## 2018-09-05 PROCEDURE — 6360000002 HC RX W HCPCS: Performed by: NURSE PRACTITIONER

## 2018-09-05 RX ORDER — FLUDROCORTISONE ACETATE 0.1 MG/1
0.1 TABLET ORAL DAILY
COMMUNITY
End: 2019-03-07

## 2018-09-05 RX ORDER — IPRATROPIUM BROMIDE AND ALBUTEROL SULFATE 2.5; .5 MG/3ML; MG/3ML
3 SOLUTION RESPIRATORY (INHALATION) EVERY 4 HOURS PRN
Status: DISCONTINUED | OUTPATIENT
Start: 2018-09-05 | End: 2018-09-06 | Stop reason: HOSPADM

## 2018-09-05 RX ORDER — FUROSEMIDE 20 MG/1
20 TABLET ORAL SEE ADMIN INSTRUCTIONS
COMMUNITY
End: 2018-11-26

## 2018-09-05 RX ORDER — GUAIFENESIN 600 MG/1
600 TABLET, EXTENDED RELEASE ORAL 2 TIMES DAILY
Status: DISCONTINUED | OUTPATIENT
Start: 2018-09-05 | End: 2018-09-06 | Stop reason: HOSPADM

## 2018-09-05 RX ORDER — AMLODIPINE BESYLATE 5 MG/1
5 TABLET ORAL DAILY
Status: DISCONTINUED | OUTPATIENT
Start: 2018-09-05 | End: 2018-09-06 | Stop reason: HOSPADM

## 2018-09-05 RX ORDER — SODIUM CHLORIDE 0.9 % (FLUSH) 0.9 %
10 SYRINGE (ML) INJECTION EVERY 12 HOURS SCHEDULED
Status: DISCONTINUED | OUTPATIENT
Start: 2018-09-05 | End: 2018-09-06 | Stop reason: HOSPADM

## 2018-09-05 RX ORDER — QUETIAPINE FUMARATE 25 MG/1
50 TABLET, FILM COATED ORAL NIGHTLY
Status: ON HOLD | COMMUNITY
End: 2018-09-06 | Stop reason: CLARIF

## 2018-09-05 RX ORDER — ERGOCALCIFEROL 1.25 MG/1
50000 CAPSULE ORAL WEEKLY
Status: DISCONTINUED | OUTPATIENT
Start: 2018-09-06 | End: 2018-09-06 | Stop reason: HOSPADM

## 2018-09-05 RX ORDER — FERROUS SULFATE TAB EC 324 MG (65 MG FE EQUIVALENT) 324 (65 FE) MG
324 TABLET DELAYED RESPONSE ORAL 2 TIMES DAILY
Status: DISCONTINUED | OUTPATIENT
Start: 2018-09-05 | End: 2018-09-06 | Stop reason: HOSPADM

## 2018-09-05 RX ORDER — ASPIRIN 81 MG/1
81 TABLET, CHEWABLE ORAL DAILY
Status: DISCONTINUED | OUTPATIENT
Start: 2018-09-05 | End: 2018-09-06 | Stop reason: HOSPADM

## 2018-09-05 RX ORDER — FLUDROCORTISONE ACETATE 0.1 MG/1
0.1 TABLET ORAL DAILY
Status: DISCONTINUED | OUTPATIENT
Start: 2018-09-05 | End: 2018-09-06 | Stop reason: HOSPADM

## 2018-09-05 RX ORDER — TAMSULOSIN HYDROCHLORIDE 0.4 MG/1
0.4 CAPSULE ORAL DAILY
Status: DISCONTINUED | OUTPATIENT
Start: 2018-09-05 | End: 2018-09-06 | Stop reason: HOSPADM

## 2018-09-05 RX ORDER — SODIUM CHLORIDE 0.9 % (FLUSH) 0.9 %
10 SYRINGE (ML) INJECTION PRN
Status: DISCONTINUED | OUTPATIENT
Start: 2018-09-05 | End: 2018-09-06 | Stop reason: HOSPADM

## 2018-09-05 RX ORDER — DIVALPROEX SODIUM 500 MG/1
500 TABLET, DELAYED RELEASE ORAL DAILY
Status: DISCONTINUED | OUTPATIENT
Start: 2018-09-05 | End: 2018-09-06 | Stop reason: HOSPADM

## 2018-09-05 RX ORDER — MAGNESIUM OXIDE 400 MG/1
400 TABLET ORAL 2 TIMES DAILY
COMMUNITY
End: 2019-06-14

## 2018-09-05 RX ORDER — FUROSEMIDE 20 MG/1
20 TABLET ORAL DAILY
COMMUNITY
End: 2019-03-07

## 2018-09-05 RX ORDER — FAMOTIDINE 20 MG/1
20 TABLET, FILM COATED ORAL DAILY
Status: DISCONTINUED | OUTPATIENT
Start: 2018-09-05 | End: 2018-09-06 | Stop reason: HOSPADM

## 2018-09-05 RX ORDER — ONDANSETRON 2 MG/ML
4 INJECTION INTRAMUSCULAR; INTRAVENOUS EVERY 6 HOURS PRN
Status: DISCONTINUED | OUTPATIENT
Start: 2018-09-05 | End: 2018-09-06 | Stop reason: HOSPADM

## 2018-09-05 RX ORDER — TAMSULOSIN HYDROCHLORIDE 0.4 MG/1
0.4 CAPSULE ORAL DAILY
COMMUNITY

## 2018-09-05 RX ORDER — HYDROXYZINE HYDROCHLORIDE 25 MG/1
25 TABLET, FILM COATED ORAL 2 TIMES DAILY
Status: DISCONTINUED | OUTPATIENT
Start: 2018-09-05 | End: 2018-09-06 | Stop reason: HOSPADM

## 2018-09-05 RX ORDER — LORAZEPAM 2 MG/ML
0.5 INJECTION INTRAMUSCULAR ONCE
Status: COMPLETED | OUTPATIENT
Start: 2018-09-05 | End: 2018-09-05

## 2018-09-05 RX ORDER — LORAZEPAM 2 MG/ML
INJECTION INTRAMUSCULAR
Status: COMPLETED
Start: 2018-09-05 | End: 2018-09-05

## 2018-09-05 RX ADMIN — FERROUS SULFATE TAB EC 324 MG (65 MG FE EQUIVALENT) 324 MG: 324 (65 FE) TABLET DELAYED RESPONSE at 16:21

## 2018-09-05 RX ADMIN — DIVALPROEX SODIUM 500 MG: 500 TABLET, DELAYED RELEASE ORAL at 16:20

## 2018-09-05 RX ADMIN — LORAZEPAM 0.5 MG: 2 INJECTION INTRAMUSCULAR; INTRAVENOUS at 15:28

## 2018-09-05 RX ADMIN — TAMSULOSIN HYDROCHLORIDE 0.4 MG: 0.4 CAPSULE ORAL at 16:20

## 2018-09-05 RX ADMIN — SILVER SULFADIAZINE: 10 CREAM TOPICAL at 20:26

## 2018-09-05 RX ADMIN — Medication 10 ML: at 20:24

## 2018-09-05 RX ADMIN — GUAIFENESIN 600 MG: 600 TABLET, EXTENDED RELEASE ORAL at 16:21

## 2018-09-05 RX ADMIN — LORAZEPAM 0.5 MG: 2 INJECTION INTRAMUSCULAR at 15:28

## 2018-09-05 RX ADMIN — FAMOTIDINE 20 MG: 20 TABLET ORAL at 16:20

## 2018-09-05 RX ADMIN — MAGNESIUM OXIDE TAB 400 MG (241.3 MG ELEMENTAL MG) 400 MG: 400 (241.3 MG) TAB at 20:24

## 2018-09-05 RX ADMIN — HYDROXYZINE HYDROCHLORIDE 25 MG: 25 TABLET, FILM COATED ORAL at 20:24

## 2018-09-05 RX ADMIN — FERROUS SULFATE TAB EC 324 MG (65 MG FE EQUIVALENT) 324 MG: 324 (65 FE) TABLET DELAYED RESPONSE at 20:24

## 2018-09-05 RX ADMIN — AMLODIPINE BESYLATE 5 MG: 5 TABLET ORAL at 16:20

## 2018-09-05 RX ADMIN — ENOXAPARIN SODIUM 40 MG: 40 INJECTION SUBCUTANEOUS at 16:21

## 2018-09-05 RX ADMIN — FLUDROCORTISONE ACETATE 0.1 MG: 0.1 TABLET ORAL at 16:19

## 2018-09-05 RX ADMIN — ASPIRIN 81 MG 81 MG: 81 TABLET ORAL at 16:20

## 2018-09-05 RX ADMIN — HYDROXYZINE HYDROCHLORIDE 25 MG: 25 TABLET, FILM COATED ORAL at 16:20

## 2018-09-05 RX ADMIN — GUAIFENESIN 600 MG: 600 TABLET, EXTENDED RELEASE ORAL at 20:24

## 2018-09-05 RX ADMIN — MAGNESIUM OXIDE TAB 400 MG (241.3 MG ELEMENTAL MG) 400 MG: 400 (241.3 MG) TAB at 16:20

## 2018-09-05 NOTE — PLAN OF CARE
Problem: Falls - Risk of:  Goal: Will remain free from falls  Will remain free from falls   Outcome: Ongoing      Problem: Daily Care:  Goal: Daily care needs are met  Daily care needs are met  Outcome: Ongoing

## 2018-09-05 NOTE — ED NOTES
Mccracken cath unclammped, quick return of 900 ml of urine noted.      Earlene Espinoza RN  09/05/18 5230

## 2018-09-05 NOTE — ED NOTES
Attempt to call report to Med Unit, nurse unavailable and will call ED back asap.      Osmar Whitley RN  09/05/18 4298

## 2018-09-05 NOTE — CONSULTS
Neurology Note    Patient:  Hermila Fabian    YOB: 1950    REFERRING PHYSICIAN:  Dr. Eugene Hernadez:    [facility]    HISTORY OF PRESENT ILLNESS:   The patient is a 78 y.o. male with h/o schizophrenia admitted with slurred speech, somnolence of uncertain cause. His psychoactive meds have been on hold. Per RN, he has been intermittently sleepy and agitated, would cuss, speak loudly. He has received Ativan 0.5 mg IV on call for MRI this pm and has been sleepy since. Past Medical History:  Past Medical History:   Diagnosis Date    Anxiety     Chronic kidney disease     [facility] [facility]     Dysphagia     Hypertension     [facility]anoid schizophrenia ([facility] [address] [facility]     Pneumonia     Psychosis     Schizophrenia ([facility] [address] [facility]     Sepsis ([facility] [address]        Past Surgical History:  Past Surgical History:   Procedure Laterality Date    KNEE SURGERY Right        Social History:   Social History     Social History    Marital status: Legally      Spouse name: N/A    Number of children: N/A    Years of education: N/A     Social History Main Topics    Smoking status: Unknown If Ever Smoked    Smokeless tobacco: None    Alcohol use No    Drug use: Unknown    Sexual activity: Not Asked     Other Topics Concern    None     Social History Narrative    None        Family History:   History reviewed. No pertinent family history. Medications Prior to Admission:    Prior to Admission medications    Medication Sig Start Date End Date Taking?  Authorizing Provider   tamsulosin (FLOMAX) 0.4 MG capsule Take 0.4 mg by mouth daily   Yes Historical Provider, MD   fludrocortisone (FLORINEF) 0.1 MG tablet Take 0.1 mg by mouth daily   Yes Historical Provider, MD   furosemide (LASIX) 20 MG tablet Take 20 mg by mouth 2 times daily   Yes Historical Provider, MD   magnesium oxide (MAG-OX) 400 MG tablet Take 400 mg by mouth 2 times daily   Yes Historical Provider, MD   QUEtiapine (SEROQUEL) 25 MG

## 2018-09-05 NOTE — ED NOTES
Mccracken cath un clamped and quick return of 300 ml of urine noted. Mccracken remains un clamped at this time. Dr Munoz Quant aware of total UOP.      Valerio Ramirez RN  09/05/18 6446

## 2018-09-05 NOTE — PROGRESS NOTES
Speech Language Pathology  Facility/Department: [facility] MED SURG  Initial Speech/Language/Cognitive Assessment    NAME: Amanda Paz  : 1950   MRN: [id number]  ADMISSION DATE: 2018  ADMITTING DIAGNOSIS: has Aphasia on his problem list.  DATE ONSET: 2018    Date of Eval: 2018   Evaluating Therapist: RYLEE Self    RECENT RESULTS  CT OF HEAD/MRI: 2018   Impression       1. No evidence of recent intracranial hemorrhage. 2. Parenchymal volume loss and chronic small vessel ischemic changes in the white matter. 3. Remote lacunar infarction left corona radiata. Primary Complaint: Possible Aphasia. Pain: Not indicated or reported. Assessment:  Cognitive Diagnosis: Mild cognitive disorder      Recommendations:  Requires SLP Intervention: No  Duration/Frequency of Treatment: Not indicated at this time. D/C Recommendations: SNF       Plan:   Goals:  Short-term Goals  Timeframe for Short-term Goals: N/A  Long-term Goals  Timeframe for Long-term Goals: N/A   Patient/family involved in developing goals and treatment plan: N/A    Subjective:   General  Chart Reviewed: Yes  Patient assessed for rehabilitation services?: Yes  Additional Pertinent Hx: Per medical report, hx of schizchophrenia. Family / Caregiver Present: No  Subjective  Subjective: Pt lying in bed eating a sandwich.  Pt pleasant and agreeable to ST eval.  Social/Functional History  Lives With: Other (comment) (SNF)  Type of Home: Facility  Vision  Vision: Within Functional Limits  Hearing  Hearing: Exceptions to [facility]  Hearing Exceptions: Hard of hearing/hearing concerns       Objective:  Oral/Motor  Oral Motor: Exceptions to [facility]  Labial Strength: Reduced  Labial Coordination: Reduced  Lingual Strength: Reduced  Lingual Coordination: Reduced    Auditory Comprehension  Comprehension: Exceptions  Two Step Basic Commands: Mild  Multistep Basic Commands: Mild  Complex/Abstract Commands: Mild  Interfering Components: Hearing; Working memory  Effective Techniques: Extra processing time;Repetition    Reading Comprehension  Reading Status: Unable to assess    Expression  Primary Mode of Expression: Verbal    Verbal Expression  Verbal Expression: Within functional limits    Written Expression  Written Expression: Unable to assess    Motor Speech  Motor Speech: Exceptions to [facility]  Intelligibility: Mild    Pragmatics/Social Functioning  Pragmatics: Within functional limits    Cognition:   Orientation  Overall Orientation Status: Impaired  Orientation Level: Oriented to place; Disoriented to time;Disoriented to situation  Attention  Attention: Within Functional Limits  Memory  Memory: Exceptions to [facility]  Short-term Memory: Moderate  Working Memory: Moderate  Problem Solving  Problem Solving: Exceptions to [facility]  Complex Functional Tasks: Mild  Numeric Reasoning  Numeric Reasoning: Unable to assess  Abstract Reasoning  Abstract Reasoning: Unable to assess  Safety/Judgement  Safety/Judgement: Exceptions to [facility]  Complex Functional Tasks: Moderate    Additional Assessments:   See Dysphagia Eval.       Prognosis:  Speech Therapy Prognosis  Prognosis: Good  Individuals consulted  Consulted and agree with results and recommendations: Patient;RN;Other (add comment) (Consulted ARNP. )    Education: Compensatory strategies. G-Code:  SLP G-Codes  Functional Assessment Tool Used: Informal Cognitive Linguistic Evaluation. Functional Limitations: Other Speech Language Pathology  Memory Current Status (): At least 1 percent but less than 20 percent impaired, limited or restricted  Memory Discharge Status ():  At least 1 percent but less than 20 percent impaired, limited or restricted  Other Speech-Language Pathology Functional Limitation (): 0 percent impaired, limited or restricted  Other Speech-Language Pathology Functional Limitation Discharge Status (): 0 percent impaired, limited or restricted         Therapy

## 2018-09-05 NOTE — ED PROVIDER NOTES
[address] [place] [address] Court  eMERGENCY dEPARTMENT eNCOUnter      Pt Name: Burnell Homans  MRN: [id number]  Yessygfurt: 1950  Date of evaluation: 9/5/2018  Provider: Gab Bhatti, [address] [place] [address]       Chief Complaint   Patient presents with    Aphasia         HISTORY OF PRESENT ILLNESS  (Location/Symptom, Timing/Onset, Context/Setting, Quality, Duration, Modifying Factors, Severity.)   Burnell Homans is a 78 y.o. male who presents to the emergency department from [address] [place] [address] for evaluation of altered mental status as the nurse noted this morning the patient wasn't as talkative as usual at his baseline. He is wheelchair-bound at her facility, no known falls. History is severely limited as the patient is very somnolent and not alert and answering my questions at this time which limits my HPI. Nursing notes were reviewed.     REVIEW OF SYSTEMS    (2-9 systems for level 4, 10 or more for level 5)   ROS:  uunable to fully perform review of systems due to patient's current mental state      PAST MEDICAL HISTORY     Past Medical History:   Diagnosis Date    Anxiety     Chronic kidney disease     [facility] obstructive [facility] disease) ([facility] [address] [facility]     Dysphagia     Hypertension     Pneumonia     Psychosis     Schizophrenia ([facility] [facility]     Sepsis ([facility] [address] [facility]          SURGICAL HISTORY       Past Surgical History:   Procedure Laterality Date    KNEE SURGERY Right          CURRENT MEDICATIONS       Previous Medications    ACETAMINOPHEN (TYLENOL) 325 MG TABLET    Take 650 mg by mouth every 6 hours as needed for Pain or Fever    ALBUTEROL SULFATE  (90 BASE) MCG/ACT INHALER    Inhale 2 puffs into the lungs 4 times daily    AMLODIPINE (NORVASC) 5 MG TABLET    Take 5 mg by mouth daily    ASPIRIN 81 MG TABLET    Take 81 mg by mouth daily    AZELASTINE HCL (ASTELIN NA)    137 mcg by Nasal route 2 times daily as needed 2 puffs in each nostril    BENZTROPINE (COGENTIN) 1 MG TABLET Take 1 mg by mouth 2 times daily    BISACODYL (DULCOLAX) 10 MG SUPPOSITORY    Place 10 mg rectally See Admin Instructions Insert one suppository rectally as needed if no bowel movement in 4 days. DEXTROMETHORPHAN-QUINIDINE (NUEDEXTA) 20-10 MG CAPS PER CAPSULE    Take 1 capsule by mouth daily    DIPHENHYDRAMINE (BENADRYL) 25 MG CAPSULE    Take 25 mg by mouth daily as needed for Itching    DIVALPROEX (DEPAKOTE) 500 MG DR TABLET    Take 500 mg by mouth daily     ERGOCALCIFEROL (ERGOCALCIFEROL) 05139 UNITS CAPSULE    Take 50,000 Units by mouth once a week    FERROUS SULFATE 325 (65 FE) MG TABLET    Take 325 mg by mouth 2 times daily    FLUDROCORTISONE (FLORINEF) 0.1 MG TABLET    Take 0.1 mg by mouth daily    FUROSEMIDE (LASIX) 20 MG TABLET    Take 20 mg by mouth 2 times daily    FUROSEMIDE (LASIX) 20 MG TABLET    Take 20 mg by mouth See Admin Instructions Give one tablet 3 times weekly in addition to scheduled dose as needed. GUAIFENESIN (MUCINEX) 600 MG EXTENDED RELEASE TABLET    Take 600 mg by mouth 2 times daily    HYDROXYZINE (ATARAX) 25 MG TABLET    Take 25 mg by mouth 2 times daily    IPRATROPIUM-ALBUTEROL (DUONEB) 0.5-2.5 (3) MG/3ML SOLN NEBULIZER SOLUTION    Inhale 1 vial into the lungs every 4 hours as needed for Shortness of Breath    MAGNESIUM HYDROXIDE (MILK OF MAGNESIA PO)    Take by mouth Give 30 ml po prn if no bowel movement in 3 days.     MAGNESIUM OXIDE (MAG-OX) 400 MG TABLET    Take 400 mg by mouth 2 times daily    OLANZAPINE (ZYPREXA) 10 MG TABLET    Take 10 mg by mouth nightly    QUETIAPINE (SEROQUEL) 100 MG TABLET    Take 100 mg by mouth 2 times daily    QUETIAPINE (SEROQUEL) 25 MG TABLET    Take 50 mg by mouth nightly    RISPERIDONE (RISPERDAL) 2 MG TABLET    Take 2 mg by mouth 2 times daily    RISPERIDONE MICROSPHERES (RISPERDAL CONSTA) 50 MG INJECTION    Inject 50 mg into the muscle every 14 days    TAMSULOSIN (FLOMAX) 0.4 MG CAPSULE    Take 0.4 mg by mouth daily    TRAZODONE (DESYREL) 100 MG TABLET    Take 50 mg by mouth nightly        ALLERGIES     Haldol [haloperidol lactate] and Ibuprofen    FAMILY HISTORY     History reviewed. No pertinent family history. SOCIAL HISTORY       Social History     Social History    Marital status: Legally      Spouse name: N/A    Number of children: N/A    Years of education: N/A     Social History Main Topics    Smoking status: Unknown If Ever Smoked    Smokeless tobacco: None    Alcohol use No    Drug use: Unknown    Sexual activity: Not Asked     Other Topics Concern    None     Social History Narrative    None         PHYSICAL EXAM    (up to 7 for level 4, 8 or more for level 5)     ED Triage Vitals   BP Temp Temp src Pulse Resp SpO2 Height Weight   -- -- -- -- -- -- -- --       Physical Exam  General :Patient is drowsy, awakens to verbal stimuli but is quick to fall back asleep  HEENT: Pupils are equally round and reactive to light, 3 mm to 2 mm reactive, oropharynx is very dry  Neck: Neck is supple, trachea midline  Cardiac: Heart regular rate, rhythm, no murmurs, rubs, or gallops  Lungs: Lungs are clear to auscultation, there is no wheezing, rhonchi, or rales. There is no use of accessory muscles. Abdomen: Abdomen is soft, nontender, nondistended. There is no firm or pulsatile masses, no rebound rigidity or guarding. Musculoskeletal: trace edema bilateral lower extremities with venous stasis changes to the bilateral legs. Neuro: Patient is somnolent, but does open his eyes to verbal stimuli, he is able to wiggle his toes on command, is able to squeeze my finger. Both of his upper extremities. Is unable to fully follow commands which does limit my neurological examination. Patient when asked questions this trying to speak but incoherent words. Unable to fully follow commands or lift his extremities off the bed which limits my examination.   Dermatology: venous stasis changes to bilateral lower extremities, Skin is warm and Negative mg/dL    Specific Gravity, UA 1.010 1.005 - 1.030    Blood, Urine Negative Negative    pH, UA 6.5 5.0 - 8.0    Protein, UA Negative Negative mg/dL    Urobilinogen, Urine 0.2 <2.0 E.U./dL    Nitrite, Urine Negative Negative    Leukocyte Esterase, Urine Negative Negative    Microscopic Examination Not Indicated     Urine Reflex to Culture Not Indicated     Urine Type Mccracken    POCT Glucose   Result Value Ref Range    Glucose 99 mg/dL    QC OK? y    POCT Glucose   Result Value Ref Range    POC Glucose 99 74 - 106 mg/dl    Performed on ACCU-CHEK         All other labs were within normal range or not returned as of this dictation. EMERGENCY DEPARTMENT COURSE and DIFFERENTIAL DIAGNOSIS/MDM:   Vitals:    Vitals:    09/05/18 0845 09/05/18 0852 09/05/18 0900 09/05/18 0915   BP: 124/79  99/60 123/74   Pulse: 64 65 72 77   Resp:       Temp:  97.4 °F (36.3 °C)     TempSrc:  Axillary     SpO2: 94% 97% 96% 95%   Weight:       Height:           ppatient presents with altered mental status, increased somnolence. History is limited up on arrival is patient is unable to verbalize at this time. He does awaken to verbal stimuli was quickly fall back asleep. On arrival his vital signs stable, no hypoxia, initial blood sugar is 99. Patient does take multiple antipsychotic medications with underlying history of schizophrenia/psychosis. Urine negative, white count stable, hemoglobin stable, patient with chronic renal insufficiency, creatinine 2.3, last check in April was 2.0.x-ray chest negative, CT head with no recent intracranial hemorrhages, some small vvessel ischemic changes, remote lacunar infarction which stable from prior CT head. Given his aphasia and altered mental state we discussed admission to the hospital for neuro checks, evaluation, case discussed with our hospitalist team for admission.              CONSULTS:  None    PROCEDURES:  Procedures    CRITICAL CARE TIME    Total Critical Care time was 0 minutes,

## 2018-09-06 VITALS
RESPIRATION RATE: 16 BRPM | WEIGHT: 200 LBS | DIASTOLIC BLOOD PRESSURE: 81 MMHG | HEART RATE: 66 BPM | OXYGEN SATURATION: 97 % | TEMPERATURE: 97.8 F | SYSTOLIC BLOOD PRESSURE: 131 MMHG | BODY MASS INDEX: 29.62 KG/M2 | HEIGHT: 69 IN

## 2018-09-06 PROBLEM — F20.9 SCHIZOPHRENIA (HCC): Status: ACTIVE | Noted: 2018-09-06

## 2018-09-06 PROBLEM — R41.82 MENTAL STATUS CHANGE: Status: ACTIVE | Noted: 2018-09-06

## 2018-09-06 PROBLEM — N18.4 CHRONIC RENAL FAILURE, STAGE 4 (SEVERE) (HCC): Status: ACTIVE | Noted: 2018-09-06

## 2018-09-06 LAB
ANION GAP SERPL CALCULATED.3IONS-SCNC: 7 MMOL/L (ref 3–16)
BASOPHILS ABSOLUTE: 0 K/UL (ref 0–0.1)
BASOPHILS RELATIVE PERCENT: 0.3 %
BUN BLDV-MCNC: 22 MG/DL (ref 6–20)
CALCIUM SERPL-MCNC: 8.7 MG/DL (ref 8.5–10.5)
CHLORIDE BLD-SCNC: 106 MMOL/L (ref 98–107)
CO2: 30 MMOL/L (ref 20–30)
CREAT SERPL-MCNC: 2.1 MG/DL (ref 0.4–1.2)
EOSINOPHILS ABSOLUTE: 0.4 K/UL (ref 0–0.4)
EOSINOPHILS RELATIVE PERCENT: 5.1 %
GFR AFRICAN AMERICAN: 38
GFR NON-AFRICAN AMERICAN: 32
GLUCOSE BLD-MCNC: 91 MG/DL (ref 74–106)
HCT VFR BLD CALC: 35.2 % (ref 40–54)
HEMOGLOBIN: 10.9 G/DL (ref 13–18)
IMMATURE GRANULOCYTES #: 0.1 K/UL
IMMATURE GRANULOCYTES %: 1.1 % (ref 0–5)
LYMPHOCYTES ABSOLUTE: 1.3 K/UL (ref 1.5–4)
LYMPHOCYTES RELATIVE PERCENT: 17.8 %
MCH RBC QN AUTO: 30.1 PG (ref 27–32)
MCHC RBC AUTO-ENTMCNC: 31 G/DL (ref 31–35)
MCV RBC AUTO: 97.2 FL (ref 80–100)
MONOCYTES ABSOLUTE: 1 K/UL (ref 0.2–0.8)
MONOCYTES RELATIVE PERCENT: 12.8 %
NEUTROPHILS ABSOLUTE: 4.7 K/UL (ref 2–7.5)
NEUTROPHILS RELATIVE PERCENT: 62.9 %
PDW BLD-RTO: 12.9 % (ref 11–16)
PLATELET # BLD: 151 K/UL (ref 150–400)
PMV BLD AUTO: 9.8 FL (ref 6–10)
POTASSIUM REFLEX MAGNESIUM: 4.8 MMOL/L (ref 3.4–5.1)
RBC # BLD: 3.62 M/UL (ref 4.5–6)
SODIUM BLD-SCNC: 143 MMOL/L (ref 136–145)
VALPROIC ACID LEVEL: 9.6 UG/ML (ref 50–100)
WBC # BLD: 7.5 K/UL (ref 4–11)

## 2018-09-06 PROCEDURE — 80048 BASIC METABOLIC PNL TOTAL CA: CPT

## 2018-09-06 PROCEDURE — 6360000002 HC RX W HCPCS: Performed by: NURSE PRACTITIONER

## 2018-09-06 PROCEDURE — 99235 HOSP IP/OBS SAME DATE MOD 70: CPT | Performed by: INTERNAL MEDICINE

## 2018-09-06 PROCEDURE — 85025 COMPLETE CBC W/AUTO DIFF WBC: CPT

## 2018-09-06 PROCEDURE — 2700000000 HC OXYGEN THERAPY PER DAY

## 2018-09-06 PROCEDURE — 2500000003 HC RX 250 WO HCPCS: Performed by: NURSE PRACTITIONER

## 2018-09-06 PROCEDURE — 95816 EEG AWAKE AND DROWSY: CPT

## 2018-09-06 PROCEDURE — 2580000003 HC RX 258: Performed by: NURSE PRACTITIONER

## 2018-09-06 PROCEDURE — 6370000000 HC RX 637 (ALT 250 FOR IP): Performed by: NURSE PRACTITIONER

## 2018-09-06 PROCEDURE — 36415 COLL VENOUS BLD VENIPUNCTURE: CPT

## 2018-09-06 RX ORDER — DIVALPROEX SODIUM 250 MG/1
500 TABLET, EXTENDED RELEASE ORAL 2 TIMES DAILY
COMMUNITY
End: 2019-06-14

## 2018-09-06 RX ORDER — QUETIAPINE FUMARATE 50 MG/1
50 TABLET, EXTENDED RELEASE ORAL NIGHTLY
Status: ON HOLD | COMMUNITY
End: 2018-09-06 | Stop reason: HOSPADM

## 2018-09-06 RX ADMIN — FLUDROCORTISONE ACETATE 0.1 MG: 0.1 TABLET ORAL at 08:55

## 2018-09-06 RX ADMIN — ASPIRIN 81 MG 81 MG: 81 TABLET ORAL at 08:55

## 2018-09-06 RX ADMIN — MAGNESIUM OXIDE TAB 400 MG (241.3 MG ELEMENTAL MG) 400 MG: 400 (241.3 MG) TAB at 08:54

## 2018-09-06 RX ADMIN — HYDROXYZINE HYDROCHLORIDE 25 MG: 25 TABLET, FILM COATED ORAL at 08:55

## 2018-09-06 RX ADMIN — TAMSULOSIN HYDROCHLORIDE 0.4 MG: 0.4 CAPSULE ORAL at 08:54

## 2018-09-06 RX ADMIN — Medication 10 ML: at 08:56

## 2018-09-06 RX ADMIN — AMLODIPINE BESYLATE 5 MG: 5 TABLET ORAL at 08:55

## 2018-09-06 RX ADMIN — FAMOTIDINE 20 MG: 20 TABLET ORAL at 08:55

## 2018-09-06 RX ADMIN — ENOXAPARIN SODIUM 40 MG: 40 INJECTION SUBCUTANEOUS at 08:55

## 2018-09-06 RX ADMIN — GUAIFENESIN 600 MG: 600 TABLET, EXTENDED RELEASE ORAL at 08:55

## 2018-09-06 RX ADMIN — FERROUS SULFATE TAB EC 324 MG (65 MG FE EQUIVALENT) 324 MG: 324 (65 FE) TABLET DELAYED RESPONSE at 08:55

## 2018-09-06 RX ADMIN — ERGOCALCIFEROL 50000 UNITS: 1.25 CAPSULE ORAL at 08:55

## 2018-09-06 RX ADMIN — SILVER SULFADIAZINE: 10 CREAM TOPICAL at 08:56

## 2018-09-06 RX ADMIN — DIVALPROEX SODIUM 500 MG: 500 TABLET, DELAYED RELEASE ORAL at 08:54

## 2018-09-06 ASSESSMENT — PAIN SCALES - PAIN ASSESSMENT IN ADVANCED DEMENTIA (PAINAD)
BODYLANGUAGE: 0
TOTALSCORE: 0
NEGVOCALIZATION: 0
NEGVOCALIZATION: 0
TOTALSCORE: 0
BREATHING: 0
CONSOLABILITY: 0
NEGVOCALIZATION: 0
FACIALEXPRESSION: 0
FACIALEXPRESSION: 0
NEGVOCALIZATION: 0
TOTALSCORE: 0
CONSOLABILITY: 0
FACIALEXPRESSION: 0
CONSOLABILITY: 0
BREATHING: 0
FACIALEXPRESSION: 0
BREATHING: 0
BODYLANGUAGE: 0
BODYLANGUAGE: 0
CONSOLABILITY: 0
BREATHING: 0
BODYLANGUAGE: 0
TOTALSCORE: 0

## 2018-09-06 NOTE — FLOWSHEET NOTE
09/06/18 1215   Neurological   Neuro (WDL) X   Level of Consciousness 0   Orientation Level Oriented to time;Disoriented to place;Oriented to situation;Disoriented to time   Cognition Poor attention/concentration   Language Slurred   Size R Pupil (mm) 3   R Pupil Shape Round   R Pupil Reaction Brisk   Size L Pupil (mm) 3   L Pupil Shape Round   L Pupil Reaction Brisk   R Hand  Moderate   L Hand  Moderate   R Foot Dorsiflexion Weak   L Foot Dorsiflexion Weak   R Foot Plantar Flexion Weak   L Foot Plantar Flexion Weak   RUE Motor Response Responds to command   Sensation RUE Full sensation   LUE Motor Response Responds to command   Sensation LUE Full sensation   RLE Motor Response Responds to command   Sensation RLE Full sensation   LLE Motor Response Responds to command   Sensation LLE Full sensation   Xander Coma Scale   Eye Opening 4   Best Verbal Response 4   Best Motor Response 6   Xander Coma Scale Score 14   Cardiac Monitor   Telemetry Monitor On Yes   Telemetry Audible Yes   Telemetry Alarms Set Yes   Telemetry Box Number 0500   Pt continues to yell out, cursing,  spastically, pt able to be redirected for the most part. Placed call to [facility] [place], spoke with Duarte Valle LPN. Per Duarte Valle that is patients base line \"oh yea sometime you can hear him on the other side of the building\" reports that pt has confusion, intermittently, will have intermittent orientation to place and time.
Pedal Pulse +2   LLE Neurovascular Assessment   Capillary Refill Less than/equal to 3 seconds   Color Red   Temperature Warm   L Pedal Pulse +2   Skin Color/Condition   Skin Color/Condition (WDL) X   Skin Color Pale   Skin Condition/Temp Dry   Skin Integrity   Skin Integrity (WDL) X   Skin Integrity Redness   Location BLE   Preventative Dressing No   Skin Fold Management No   Multiple Skin Integrity Sites Yes   Skin Integrity Site 2   Skin Integrity Location 2 Redness   Location 2 buttocks    Musculoskeletal   Musculoskeletal (WDL) X   RUE Full movement   LUE Full movement   RL Extremity Weakness   LL Extremity Weakness   Genitourinary   Genitourinary (WDL) X   Urethral Catheter Straight-tip 16 fr   Placement Date/Time: 09/05/18 0840   Urethral Catheter Timeout: Patient;Procedure;Site/Side;Appropriate Equipment;Sterile technique  Inserted by: Florecita Zayas RN  Catheter Type: Straight-tip  Tube Size (fr): 16 fr  Catheter Balloon Size: 10 mL  Collection C. .. Catheter Indications Acute urinary retention/obstruction   Urine Color Yellow   Urine Appearance Clear   Output (mL) 340 mL   Psychosocial   Psychosocial (WDL) X   Patient Behaviors Aggressive verbally   Patient admitted from ED to [facility]. Patient is a resident from [facility]. Upon arrival to medical unit patient was lethargic and woke to sternal rub. Once awake patient is alert to person only. Oriented person to place and time. Patient has episodes of cursing and repetitive speech. Patient has redness noted to bilateral lower extremities. Per report patient is bound to wheelchair at [address]. Admission complete per documentation sent from [facility]. Bed alarm placed on patient and call light within reach. Will continue to monitor.

## 2018-09-06 NOTE — PROGRESS NOTES
Report given to [facility] LPN, at [facility] [place], no further question, Medication administration(s) reviewed. Aware of Pt ETA of unkn due to EMS ETA .

## 2018-09-06 NOTE — H&P
Discharge Summary      Patient ID: Wojciech Karlie      Patient's PCP: Baljeet Gallo MD    Admit Date: 9/5/2018     Discharge Date:  9/6/18    Admitting Physician: Joy Valente MD    Discharge Physician: Joy Valente MD     Reason for this admission:   Mental status changes and trouble with his speech. Discharge Diagnoses: Active Hospital Problems    Diagnosis Date Noted    Mental status change [R41.82] 09/06/2018    Schizophrenia ([facility] [address] [F20.9] 09/06/2018    Aphasia [R47.01] 09/05/2018       Procedures:  Carotid ultrasound  MRI of the brain without contrast  CT scan of the brain without contrast  EEG    Consults:   IP CONSULT TO NEUROLOGY  Speech consult    Briefly:   Patient was admitted with mental status changes and aphasia. Patient's poor historian and most of the H&P was obtained from ER report/nursing home report/nurse's report. It seems like patient wasn't himself especially in regard to talking/current medication. Some mental status changes. Patient noted in the emergency room to be slightly aphasic and somnolent. She was admitted for further evaluation. Hospital Course:   Patient was admitted to the floor. Neuro checks were followed. He was treated supportively with IV fluid. Initial CT scan of the brain showed no acute findings. MRI of the brain was done and also showed no acute findings. Neurology consult was obtained. Basic blood work as well as ammonia, B12, TSH all came back unremarkable. EEG was done and results still pending. We did hold most of his psych medicine. Patient has improved. He is talking today and laughing occasionally. Speech back to normal and neurologic exam is nonfocal. Patient still somewhat disoriented but seems to be at baseline per nursing home report. Renal function remained at baseline for him. There is a questionable urinary retention on arrival but likely related to his questionable oversedation/medication side effect. Catheter was discontinued before discharge. Altered  Liquid Consistency Recommendation: Thin (NO STRAW)    Follow Up: Primary Care Physician in one week  Follow-up with psychiatry as soon as possible. Patient to proceed with the following lab (CBC, BMP) in 2 weeks    Labs: For convenience and continuity at follow-up the following most recent labs are provided:    CBC:   Lab Results   Component Value Date    WBC 7.5 09/06/2018    HGB 10.9 09/06/2018    HCT 35.2 09/06/2018     09/06/2018       RENAL:   Lab Results   Component Value Date     09/06/2018    K 4.8 09/06/2018     09/06/2018    CO2 30 09/06/2018    BUN 22 09/06/2018    CREATININE 2.1 09/06/2018         Discharge Medications:     Current Discharge Medication List           Details   divalproex (DEPAKOTE ER) 250 MG extended release tablet Take 500 mg by mouth daily      tamsulosin (FLOMAX) 0.4 MG capsule Take 0.4 mg by mouth daily      fludrocortisone (FLORINEF) 0.1 MG tablet Take 0.1 mg by mouth daily      !! furosemide (LASIX) 20 MG tablet Take 20 mg by mouth daily       magnesium oxide (MAG-OX) 400 MG tablet Take 400 mg by mouth 2 times daily      !! furosemide (LASIX) 20 MG tablet Take 20 mg by mouth See Admin Instructions Give one tablet 3 times weekly in addition to scheduled dose as needed.       albuterol sulfate  (90 Base) MCG/ACT inhaler Inhale 2 puffs into the lungs every 4 hours as needed       aspirin 81 MG tablet Take 81 mg by mouth daily      ergocalciferol (ERGOCALCIFEROL) 91663 units capsule Take 50,000 Units by mouth once a week      ferrous sulfate 325 (65 Fe) MG tablet Take 325 mg by mouth 2 times daily      hydrOXYzine (ATARAX) 25 MG tablet Take 25 mg by mouth 2 times daily      traZODone (DESYREL) 100 MG tablet Take 50 mg by mouth nightly       ipratropium-albuterol (DUONEB) 0.5-2.5 (3) MG/3ML SOLN nebulizer solution Inhale 1 vial into the lungs every 4 hours as needed for Shortness of Breath      bisacodyl (DULCOLAX) 10 MG suppository Place 10 mg

## 2018-09-06 NOTE — PLAN OF CARE
Problem: Falls - Risk of:  Goal: Will remain free from falls  Will remain free from falls   Outcome: Ongoing      Problem: Safety:  Goal: Free from accidental physical injury  Free from accidental physical injury   Outcome: Ongoing

## 2018-09-23 ENCOUNTER — LAB REQUISITION (OUTPATIENT)
Dept: LAB | Facility: HOSPITAL | Age: 68
End: 2018-09-23

## 2018-09-23 DIAGNOSIS — Z00.00 ROUTINE GENERAL MEDICAL EXAMINATION AT A HEALTH CARE FACILITY: ICD-10-CM

## 2018-09-23 LAB
BUN BLD-MCNC: 23 MG/DL (ref 9–23)
CREAT BLD-MCNC: 1.99 MG/DL (ref 0.6–1.3)
GFR SERPL CREATININE-BSD FRML MDRD: 34 ML/MIN/1.73
VANCOMYCIN TROUGH SERPL-MCNC: 23.1 MCG/ML (ref 10–20)

## 2018-09-23 PROCEDURE — 82565 ASSAY OF CREATININE: CPT

## 2018-09-23 PROCEDURE — 80202 ASSAY OF VANCOMYCIN: CPT

## 2018-09-23 PROCEDURE — 84520 ASSAY OF UREA NITROGEN: CPT

## 2018-11-26 ENCOUNTER — HOSPITAL ENCOUNTER (EMERGENCY)
Facility: HOSPITAL | Age: 68
Discharge: ANOTHER HOSPITAL | End: 2018-11-26
Attending: HOSPITALIST
Payer: MEDICARE

## 2018-11-26 ENCOUNTER — APPOINTMENT (OUTPATIENT)
Dept: GENERAL RADIOLOGY | Facility: HOSPITAL | Age: 68
End: 2018-11-26
Payer: MEDICARE

## 2018-11-26 ENCOUNTER — HOSPITAL ENCOUNTER (INPATIENT)
Facility: HOSPITAL | Age: 68
LOS: 6 days | Discharge: NURSING, REHAB OR LONG-TERM CARE | End: 2018-12-02
Attending: INTERNAL MEDICINE | Admitting: INTERNAL MEDICINE

## 2018-11-26 ENCOUNTER — APPOINTMENT (OUTPATIENT)
Dept: CT IMAGING | Facility: HOSPITAL | Age: 68
End: 2018-11-26
Payer: MEDICARE

## 2018-11-26 VITALS
WEIGHT: 220.38 LBS | BODY MASS INDEX: 33.4 KG/M2 | DIASTOLIC BLOOD PRESSURE: 89 MMHG | TEMPERATURE: 93.9 F | HEIGHT: 68 IN | SYSTOLIC BLOOD PRESSURE: 163 MMHG | HEART RATE: 75 BPM | OXYGEN SATURATION: 97 % | RESPIRATION RATE: 11 BRPM

## 2018-11-26 DIAGNOSIS — Z78.9 IMPAIRED MOBILITY AND ADLS: ICD-10-CM

## 2018-11-26 DIAGNOSIS — Z74.09 IMPAIRED FUNCTIONAL MOBILITY, BALANCE, GAIT, AND ENDURANCE: ICD-10-CM

## 2018-11-26 DIAGNOSIS — R41.82 ALTERED MENTAL STATUS, UNSPECIFIED ALTERED MENTAL STATUS TYPE: Primary | ICD-10-CM

## 2018-11-26 DIAGNOSIS — Z74.09 IMPAIRED MOBILITY AND ADLS: ICD-10-CM

## 2018-11-26 DIAGNOSIS — T68.XXXA HYPOTHERMIA, INITIAL ENCOUNTER: ICD-10-CM

## 2018-11-26 DIAGNOSIS — R13.12 OROPHARYNGEAL DYSPHAGIA: Primary | ICD-10-CM

## 2018-11-26 PROBLEM — F31.9 BIPOLAR 1 DISORDER (HCC): Status: ACTIVE | Noted: 2018-11-26

## 2018-11-26 LAB
A/G RATIO: 1.2 (ref 0.8–2)
ALBUMIN SERPL-MCNC: 3 G/DL (ref 3.4–4.8)
ALP BLD-CCNC: 135 U/L (ref 25–100)
ALT SERPL-CCNC: 22 U/L (ref 4–36)
AMPHETAMINE SCREEN, URINE: ABNORMAL
ANION GAP SERPL CALCULATED.3IONS-SCNC: 8 MMOL/L (ref 3–16)
AST SERPL-CCNC: 20 U/L (ref 8–33)
BARBITURATE SCREEN URINE: ABNORMAL
BASE EXCESS ARTERIAL: 2.7 MMOL/L (ref -3–3)
BASOPHILS ABSOLUTE: 0 K/UL (ref 0–0.1)
BASOPHILS RELATIVE PERCENT: 0.3 %
BENZODIAZEPINE SCREEN, URINE: ABNORMAL
BILIRUB SERPL-MCNC: <0.2 MG/DL (ref 0.3–1.2)
BILIRUBIN URINE: NEGATIVE
BLOOD, URINE: NEGATIVE
BUN BLDV-MCNC: 44 MG/DL (ref 6–20)
CALCIUM SERPL-MCNC: 8.9 MG/DL (ref 8.5–10.5)
CANNABINOID SCREEN URINE: ABNORMAL
CHLORIDE BLD-SCNC: 108 MMOL/L (ref 98–107)
CLARITY: CLEAR
CO2: 27 MMOL/L (ref 20–30)
COCAINE METABOLITE SCREEN URINE: ABNORMAL
COLOR: YELLOW
CREAT SERPL-MCNC: 2 MG/DL (ref 0.4–1.2)
D-LACTATE SERPL-SCNC: 0.9 MMOL/L (ref 0.5–2)
EOSINOPHILS ABSOLUTE: 0.1 K/UL (ref 0–0.4)
EOSINOPHILS RELATIVE PERCENT: 2.8 %
FIO2: 0.21 %
GFR AFRICAN AMERICAN: 41
GFR NON-AFRICAN AMERICAN: 34
GLOBULIN: 2.5 G/DL
GLUCOSE BLD-MCNC: 97 MG/DL (ref 74–106)
GLUCOSE URINE: NEGATIVE MG/DL
HCO3 ARTERIAL: 28.8 MMOL/L (ref 22–26)
HCT VFR BLD CALC: 37.1 % (ref 40–54)
HEMOGLOBIN: 11.6 G/DL (ref 13–18)
IMMATURE GRANULOCYTES #: 0.1 K/UL
IMMATURE GRANULOCYTES %: 3.6 % (ref 0–5)
KETONES, URINE: NEGATIVE MG/DL
LACTIC ACID: 1.8 MMOL/L (ref 0.4–2)
LEUKOCYTE ESTERASE, URINE: NEGATIVE
LYMPHOCYTES ABSOLUTE: 1.1 K/UL (ref 1.5–4)
LYMPHOCYTES RELATIVE PERCENT: 28 %
Lab: ABNORMAL
MCH RBC QN AUTO: 28.4 PG (ref 27–32)
MCHC RBC AUTO-ENTMCNC: 31.3 G/DL (ref 31–35)
MCV RBC AUTO: 90.9 FL (ref 80–100)
METHADONE SCREEN, URINE: ABNORMAL
METHAMPHETAMINE, URINE: ABNORMAL
MICROSCOPIC EXAMINATION: YES
MONOCYTES ABSOLUTE: 0.4 K/UL (ref 0.2–0.8)
MONOCYTES RELATIVE PERCENT: 9 %
MUCUS: ABNORMAL /LPF
NEUTROPHILS ABSOLUTE: 2.2 K/UL (ref 2–7.5)
NEUTROPHILS RELATIVE PERCENT: 56.3 %
NITRITE, URINE: NEGATIVE
O2 SAT, ARTERIAL: 94.4 %
O2 THERAPY: ABNORMAL
OPIATE SCREEN URINE: ABNORMAL
PCO2 ARTERIAL: 51.1 MMHG (ref 35–45)
PDW BLD-RTO: 15.2 % (ref 11–16)
PH ARTERIAL: 7.37 (ref 7.35–7.45)
PH UA: 6.5
PHENCYCLIDINE SCREEN URINE: ABNORMAL
PLATELET # BLD: 101 K/UL (ref 150–400)
PMV BLD AUTO: 10 FL (ref 6–10)
PO2 ARTERIAL: 77 MMHG (ref 80–100)
POTASSIUM REFLEX MAGNESIUM: 5.6 MMOL/L (ref 3.4–5.1)
PRO-BNP: 116 PG/ML (ref 0–1800)
PROPOXYPHENE SCREEN, URINE: ABNORMAL
PROTEIN UA: 30 MG/DL
RBC # BLD: 4.08 M/UL (ref 4.5–6)
RBC UA: ABNORMAL /HPF (ref 0–2)
SODIUM BLD-SCNC: 143 MMOL/L (ref 136–145)
SPECIFIC GRAVITY UA: 1.01
T4 FREE: 0.61 NG/DL (ref 0.89–1.76)
TCO2 ARTERIAL: 30.4 MMOL/L
TOTAL PROTEIN: 5.5 G/DL (ref 6.4–8.3)
TRICYCLIC, URINE: POSITIVE
TROPONIN: <0.3 NG/ML
TSH REFLEX: 5.52 UIU/ML (ref 0.35–5.5)
UR OXYCODONE RAPID SCREEN: ABNORMAL
URINE REFLEX TO CULTURE: ABNORMAL
URINE TYPE: ABNORMAL
UROBILINOGEN, URINE: 0.2 E.U./DL
WBC # BLD: 3.9 K/UL (ref 4–11)
WBC UA: ABNORMAL /HPF (ref 0–5)

## 2018-11-26 PROCEDURE — 83605 ASSAY OF LACTIC ACID: CPT

## 2018-11-26 PROCEDURE — 83605 ASSAY OF LACTIC ACID: CPT | Performed by: NURSE PRACTITIONER

## 2018-11-26 PROCEDURE — 83880 ASSAY OF NATRIURETIC PEPTIDE: CPT

## 2018-11-26 PROCEDURE — 96375 TX/PRO/DX INJ NEW DRUG ADDON: CPT

## 2018-11-26 PROCEDURE — 96367 TX/PROPH/DG ADDL SEQ IV INF: CPT

## 2018-11-26 PROCEDURE — 93005 ELECTROCARDIOGRAM TRACING: CPT

## 2018-11-26 PROCEDURE — 80053 COMPREHEN METABOLIC PANEL: CPT

## 2018-11-26 PROCEDURE — 82803 BLOOD GASES ANY COMBINATION: CPT

## 2018-11-26 PROCEDURE — 96365 THER/PROPH/DIAG IV INF INIT: CPT

## 2018-11-26 PROCEDURE — 85025 COMPLETE CBC W/AUTO DIFF WBC: CPT

## 2018-11-26 PROCEDURE — 6360000002 HC RX W HCPCS: Performed by: HOSPITALIST

## 2018-11-26 PROCEDURE — 99285 EMERGENCY DEPT VISIT HI MDM: CPT

## 2018-11-26 PROCEDURE — 36415 COLL VENOUS BLD VENIPUNCTURE: CPT

## 2018-11-26 PROCEDURE — 84443 ASSAY THYROID STIM HORMONE: CPT

## 2018-11-26 PROCEDURE — 80307 DRUG TEST PRSMV CHEM ANLYZR: CPT

## 2018-11-26 PROCEDURE — 99291 CRITICAL CARE FIRST HOUR: CPT | Performed by: INTERNAL MEDICINE

## 2018-11-26 PROCEDURE — 2580000003 HC RX 258: Performed by: HOSPITALIST

## 2018-11-26 PROCEDURE — 25010000002 VANCOMYCIN: Performed by: NURSE PRACTITIONER

## 2018-11-26 PROCEDURE — 87040 BLOOD CULTURE FOR BACTERIA: CPT | Performed by: NURSE PRACTITIONER

## 2018-11-26 PROCEDURE — 71045 X-RAY EXAM CHEST 1 VIEW: CPT

## 2018-11-26 PROCEDURE — 87040 BLOOD CULTURE FOR BACTERIA: CPT

## 2018-11-26 PROCEDURE — 81001 URINALYSIS AUTO W/SCOPE: CPT

## 2018-11-26 PROCEDURE — 96366 THER/PROPH/DIAG IV INF ADDON: CPT

## 2018-11-26 PROCEDURE — 84439 ASSAY OF FREE THYROXINE: CPT

## 2018-11-26 PROCEDURE — 70450 CT HEAD/BRAIN W/O DYE: CPT

## 2018-11-26 PROCEDURE — 84484 ASSAY OF TROPONIN QUANT: CPT

## 2018-11-26 PROCEDURE — 25010000002 PIPERACILLIN SOD-TAZOBACTAM PER 1 G: Performed by: NURSE PRACTITIONER

## 2018-11-26 RX ORDER — QUETIAPINE 150 MG/1
150 TABLET, FILM COATED, EXTENDED RELEASE ORAL NIGHTLY
COMMUNITY
End: 2019-06-14

## 2018-11-26 RX ORDER — NOREPINEPHRINE BIT/0.9 % NACL 8 MG/250ML
.02-.3 INFUSION BOTTLE (ML) INTRAVENOUS
Status: DISCONTINUED | OUTPATIENT
Start: 2018-11-26 | End: 2018-11-27

## 2018-11-26 RX ORDER — CITALOPRAM 20 MG/1
20 TABLET ORAL DAILY
COMMUNITY

## 2018-11-26 RX ORDER — QUETIAPINE FUMARATE 100 MG/1
100 TABLET, FILM COATED ORAL 2 TIMES DAILY
COMMUNITY

## 2018-11-26 RX ORDER — DIPHENHYDRAMINE HCL 25 MG
25 CAPSULE ORAL DAILY PRN
COMMUNITY
End: 2019-06-14

## 2018-11-26 RX ORDER — IPRATROPIUM BROMIDE AND ALBUTEROL SULFATE 2.5; .5 MG/3ML; MG/3ML
3 SOLUTION RESPIRATORY (INHALATION) EVERY 6 HOURS PRN
Status: DISCONTINUED | OUTPATIENT
Start: 2018-11-26 | End: 2018-12-02 | Stop reason: HOSPADM

## 2018-11-26 RX ORDER — FUROSEMIDE 10 MG/ML
40 INJECTION INTRAMUSCULAR; INTRAVENOUS ONCE
Status: COMPLETED | OUTPATIENT
Start: 2018-11-26 | End: 2018-11-26

## 2018-11-26 RX ORDER — SODIUM CHLORIDE, SODIUM LACTATE, POTASSIUM CHLORIDE, CALCIUM CHLORIDE 600; 310; 30; 20 MG/100ML; MG/100ML; MG/100ML; MG/100ML
100 INJECTION, SOLUTION INTRAVENOUS CONTINUOUS
Status: DISCONTINUED | OUTPATIENT
Start: 2018-11-26 | End: 2018-11-27

## 2018-11-26 RX ORDER — SODIUM CHLORIDE 0.9 % (FLUSH) 0.9 %
3-10 SYRINGE (ML) INJECTION AS NEEDED
Status: DISCONTINUED | OUTPATIENT
Start: 2018-11-26 | End: 2018-12-02 | Stop reason: HOSPADM

## 2018-11-26 RX ORDER — NALOXONE HYDROCHLORIDE 0.4 MG/ML
0.4 INJECTION, SOLUTION INTRAMUSCULAR; INTRAVENOUS; SUBCUTANEOUS ONCE
Status: COMPLETED | OUTPATIENT
Start: 2018-11-26 | End: 2018-11-26

## 2018-11-26 RX ORDER — OLANZAPINE 10 MG/1
5 TABLET ORAL NIGHTLY
COMMUNITY

## 2018-11-26 RX ORDER — RISPERIDONE 2 MG/1
2 TABLET, FILM COATED ORAL 2 TIMES DAILY
COMMUNITY

## 2018-11-26 RX ORDER — SODIUM CHLORIDE 0.9 % (FLUSH) 0.9 %
3 SYRINGE (ML) INJECTION EVERY 12 HOURS SCHEDULED
Status: DISCONTINUED | OUTPATIENT
Start: 2018-11-26 | End: 2018-12-02 | Stop reason: HOSPADM

## 2018-11-26 RX ADMIN — FUROSEMIDE 40 MG: 10 INJECTION, SOLUTION INTRAMUSCULAR; INTRAVENOUS at 09:42

## 2018-11-26 RX ADMIN — NALOXONE HYDROCHLORIDE 0.4 MG: 0.4 INJECTION, SOLUTION INTRAMUSCULAR; INTRAVENOUS; SUBCUTANEOUS at 09:57

## 2018-11-26 RX ADMIN — VANCOMYCIN HYDROCHLORIDE 500 MG: 500 INJECTION, POWDER, LYOPHILIZED, FOR SOLUTION INTRAVENOUS at 20:45

## 2018-11-26 RX ADMIN — PIPERACILLIN SODIUM,TAZOBACTAM SODIUM 3.38 G: 3; .375 INJECTION, POWDER, FOR SOLUTION INTRAVENOUS at 10:51

## 2018-11-26 RX ADMIN — VANCOMYCIN HYDROCHLORIDE 1500 MG: 500 INJECTION, POWDER, LYOPHILIZED, FOR SOLUTION INTRAVENOUS at 11:41

## 2018-11-26 RX ADMIN — TAZOBACTAM SODIUM AND PIPERACILLIN SODIUM 3.38 G: 375; 3 INJECTION, SOLUTION INTRAVENOUS at 19:50

## 2018-11-26 RX ADMIN — SODIUM CHLORIDE, POTASSIUM CHLORIDE, SODIUM LACTATE AND CALCIUM CHLORIDE 100 ML/HR: 600; 310; 30; 20 INJECTION, SOLUTION INTRAVENOUS at 18:24

## 2018-11-26 NOTE — ED NOTES
Placed call to [facility], notified of transfer to [facility].         Sharee Power RN  11/26/18 8049

## 2018-11-26 NOTE — ED PROVIDER NOTES
RBC, UA None seen 0 - 2 /HPF   TSH with Reflex   Result Value Ref Range    TSH 5.52 (H) 0.35 - 5.50 uIU/mL   Blood Gas, Arterial   Result Value Ref Range    pH, Arterial 7.369 7.350 - 7.450    pCO2, Arterial 51.1 (H) 35.0 - 45.0 mmHg    pO2, Arterial 77.0 (L) 80.0 - 100.0 mmHg    HCO3, Arterial 28.8 (H) 22.0 - 26.0 mmol/L    Base Excess, Arterial 2.7 -3.0 - 3.0 mmol/L    O2 Sat, Arterial 94.4 >92 %    TCO2, Arterial 30.4 (H) Not Established mmol/L    O2 Therapy Unknown     FIO2 0.21 Not Established %        All other labs were within normal range or not returned as of this dictation. EMERGENCY DEPARTMENT COURSE and DIFFERENTIAL DIAGNOSIS/MDM:   Vitals:    Vitals:    11/26/18 1345 11/26/18 1400 11/26/18 1415 11/26/18 1430   BP: (!) 155/92 (!) 162/94 (!) 150/80 (!) 150/84   Pulse: 71 70 70 72   Resp: 12 12 11 11   Temp:       TempSrc:       SpO2: 98% 98% 96% 96%   Weight:       Height:           MEDICATIONS ADMINISTERED IN ED:  Medications   naloxone (NARCAN) injection 0.4 mg (0.4 mg Intravenous Given 11/26/18 0957)   furosemide (LASIX) injection 40 mg (40 mg Intravenous Given 11/26/18 0942)   piperacillin-tazobactam (ZOSYN) 3.375 g in dextrose 5 % 50 mL IVPB (mini-bag) (0 g Intravenous Stopped 11/26/18 1135)   vancomycin (VANCOCIN) 1,500 mg in dextrose 5 % 250 mL IVPB (0 mg Intravenous Stopped 11/26/18 1419)       After initial evaluation and examination did have a conversation with the patient about the upcoming plan, treatment and possible disposition which they were agreeable to the time of this dictation. Patient advised that the nursing facility was concerned for altered mental status and hypothermia. Patient states he does have heat in his room and was wearing clothing several layers but didn't seem to be chilling on the stretcher. Patient denies it concern is sometimes sepsis can cause hypothermia along with just exposure or poor radiate heat to the area where he is been happening.  Patient be given Narcan 0.4 mg IV for this suspected altered mental status however he does answer questions appropriately and awake but he is only alert and oriented ×1 which I do suspect is his baseline. Patient also has the +2 pitting edema of the lower extremities up to just above the knees bilaterally and the portion above the knees he is really more +1. We give the patient Lasix 40 mg IV he is on 20 mg Lasix daily. Patient also has CT scan of the head performed and a portable chest radiograph. We'll also check blood cultures ×2, lactic acid, CBC, CMP, troponin, UA, urine drug screen, BNP and 12-lead EKG. Also had Mccracken catheter placed for fluid management with suspected need for diuresis. Rectal temperature here was 90.9. Patient will be provided with warm blankets and a bear hugger. I will hold off giving this patient a warm fluids at this time secondary to his peripheral edema. Patient's final disposition will be determined once his radiological diagnostic studies been performed. I do suspect however this patient may require hospitalization secondary to his severe hypothermia. Blood work showed white counts 3900, hemoglobin 11.6, HemoCue crit 37.1, platelet counts 541. Lactic acid normal 1.8. BNP is 116. Troponin was nondetectable less than 0.30. Comprehensive metabolic panel was benign except potassium being 5.6, chloride of 108 mildly elevated, albumin 44 creatinine of 2.0 this is the patient's baseline, alk phosphatase of 135. UA negative are indicated. Urine drug screen positive for tricyclics    CT scan ahead read by radiology as atrophy and white matter ischemic change. No acute hemorrhage or mass effect identified. Chest radiograph read by radiology as mild pulmonary edema pattern. Mild cardiomegaly with atherosclerotic plaque in the aorta.     One hour on the bear hugger patient's rectal temperature is now 91.5    We'll discuss the case with Donya Stanford is on-call hospitalist here for possible admission for quite or

## 2018-11-26 NOTE — ED NOTES
[facility] called per Kelly Beckham for code status. [facility]      Traci Dennis RN  11/26/18 0584

## 2018-11-26 NOTE — ED NOTES
Called medical unit for miguel [facility] for possible admission.  Spoke with Cory bhatti who stated he was out on to lunch and she will have him call ed when he returns     Axel Jeans Duty  11/26/18 1125

## 2018-11-27 ENCOUNTER — APPOINTMENT (OUTPATIENT)
Dept: GENERAL RADIOLOGY | Facility: HOSPITAL | Age: 68
End: 2018-11-27

## 2018-11-27 LAB
ANION GAP SERPL CALCULATED.3IONS-SCNC: 3 MMOL/L (ref 3–11)
BASOPHILS # BLD AUTO: 0.01 10*3/MM3 (ref 0–0.2)
BASOPHILS NFR BLD AUTO: 0.2 % (ref 0–1)
BILIRUB UR QL STRIP: NEGATIVE
BUN BLD-MCNC: 44 MG/DL (ref 9–23)
BUN/CREAT SERPL: 16.9 (ref 7–25)
CALCIUM SPEC-SCNC: 7.8 MG/DL (ref 8.7–10.4)
CHLORIDE SERPL-SCNC: 113 MMOL/L (ref 99–109)
CLARITY UR: CLEAR
CO2 SERPL-SCNC: 29 MMOL/L (ref 20–31)
COLOR UR: YELLOW
CORTIS SERPL-MCNC: 22.9 MCG/DL
CREAT BLD-MCNC: 2.61 MG/DL (ref 0.6–1.3)
CRP SERPL-MCNC: 3.78 MG/DL (ref 0–1)
DEPRECATED RDW RBC AUTO: 53.2 FL (ref 37–54)
EOSINOPHIL # BLD AUTO: 0.09 10*3/MM3 (ref 0–0.3)
EOSINOPHIL NFR BLD AUTO: 1.9 % (ref 0–3)
ERYTHROCYTE [DISTWIDTH] IN BLOOD BY AUTOMATED COUNT: 15.9 % (ref 11.3–14.5)
ERYTHROCYTE [SEDIMENTATION RATE] IN BLOOD: 11 MM/HR (ref 0–20)
GFR SERPL CREATININE-BSD FRML MDRD: 25 ML/MIN/1.73
GLUCOSE BLD-MCNC: 80 MG/DL (ref 70–100)
GLUCOSE BLDC GLUCOMTR-MCNC: 125 MG/DL (ref 70–130)
GLUCOSE BLDC GLUCOMTR-MCNC: 74 MG/DL (ref 70–130)
GLUCOSE UR STRIP-MCNC: NEGATIVE MG/DL
HCT VFR BLD AUTO: 37.3 % (ref 38.9–50.9)
HGB BLD-MCNC: 11.5 G/DL (ref 13.1–17.5)
HGB UR QL STRIP.AUTO: NEGATIVE
IMM GRANULOCYTES # BLD: 0.08 10*3/MM3 (ref 0–0.03)
IMM GRANULOCYTES NFR BLD: 1.7 % (ref 0–0.6)
KETONES UR QL STRIP: NEGATIVE
LEUKOCYTE ESTERASE UR QL STRIP.AUTO: NEGATIVE
LYMPHOCYTES # BLD AUTO: 1.98 10*3/MM3 (ref 0.6–4.8)
LYMPHOCYTES NFR BLD AUTO: 41.3 % (ref 24–44)
MAGNESIUM SERPL-MCNC: 1.9 MG/DL (ref 1.3–2.7)
MCH RBC QN AUTO: 28.6 PG (ref 27–31)
MCHC RBC AUTO-ENTMCNC: 30.8 G/DL (ref 32–36)
MCV RBC AUTO: 92.8 FL (ref 80–99)
MONOCYTES # BLD AUTO: 0.56 10*3/MM3 (ref 0–1)
MONOCYTES NFR BLD AUTO: 11.7 % (ref 0–12)
NEUTROPHILS # BLD AUTO: 2.16 10*3/MM3 (ref 1.5–8.3)
NEUTROPHILS NFR BLD AUTO: 44.9 % (ref 41–71)
NITRITE UR QL STRIP: NEGATIVE
PH UR STRIP.AUTO: 8.5 [PH] (ref 5–8)
PHOSPHATE SERPL-MCNC: 4.5 MG/DL (ref 2.4–5.1)
PLATELET # BLD AUTO: 110 10*3/MM3 (ref 150–450)
PMV BLD AUTO: 10.9 FL (ref 6–12)
POTASSIUM BLD-SCNC: 5.1 MMOL/L (ref 3.5–5.5)
PROCALCITONIN SERPL-MCNC: 0.11 NG/ML
PROT UR QL STRIP: NEGATIVE
RBC # BLD AUTO: 4.02 10*6/MM3 (ref 4.2–5.76)
SODIUM BLD-SCNC: 145 MMOL/L (ref 132–146)
SP GR UR STRIP: <=1.005 (ref 1–1.03)
TSH SERPL DL<=0.05 MIU/L-ACNC: 3.14 MIU/ML (ref 0.35–5.35)
UROBILINOGEN UR QL STRIP: ABNORMAL
VANCOMYCIN SERPL-MCNC: 22.8 MCG/ML (ref 5–40)
WBC NRBC COR # BLD: 4.8 10*3/MM3 (ref 3.5–10.8)

## 2018-11-27 PROCEDURE — 80048 BASIC METABOLIC PNL TOTAL CA: CPT

## 2018-11-27 PROCEDURE — 99233 SBSQ HOSP IP/OBS HIGH 50: CPT | Performed by: INTERNAL MEDICINE

## 2018-11-27 PROCEDURE — 80202 ASSAY OF VANCOMYCIN: CPT

## 2018-11-27 PROCEDURE — 82962 GLUCOSE BLOOD TEST: CPT

## 2018-11-27 PROCEDURE — 86140 C-REACTIVE PROTEIN: CPT | Performed by: INTERNAL MEDICINE

## 2018-11-27 PROCEDURE — 85652 RBC SED RATE AUTOMATED: CPT | Performed by: INTERNAL MEDICINE

## 2018-11-27 PROCEDURE — 94799 UNLISTED PULMONARY SVC/PX: CPT

## 2018-11-27 PROCEDURE — 84145 PROCALCITONIN (PCT): CPT | Performed by: INTERNAL MEDICINE

## 2018-11-27 PROCEDURE — 25010000002 PIPERACILLIN SOD-TAZOBACTAM PER 1 G: Performed by: NURSE PRACTITIONER

## 2018-11-27 PROCEDURE — 25010000002 HEPARIN (PORCINE) PER 1000 UNITS: Performed by: INTERNAL MEDICINE

## 2018-11-27 PROCEDURE — 81003 URINALYSIS AUTO W/O SCOPE: CPT | Performed by: NURSE PRACTITIONER

## 2018-11-27 PROCEDURE — 84100 ASSAY OF PHOSPHORUS: CPT | Performed by: NURSE PRACTITIONER

## 2018-11-27 PROCEDURE — 84443 ASSAY THYROID STIM HORMONE: CPT | Performed by: INTERNAL MEDICINE

## 2018-11-27 PROCEDURE — 71045 X-RAY EXAM CHEST 1 VIEW: CPT

## 2018-11-27 PROCEDURE — 82533 TOTAL CORTISOL: CPT | Performed by: INTERNAL MEDICINE

## 2018-11-27 PROCEDURE — 85025 COMPLETE CBC W/AUTO DIFF WBC: CPT | Performed by: INTERNAL MEDICINE

## 2018-11-27 PROCEDURE — 83735 ASSAY OF MAGNESIUM: CPT | Performed by: NURSE PRACTITIONER

## 2018-11-27 PROCEDURE — 92610 EVALUATE SWALLOWING FUNCTION: CPT

## 2018-11-27 RX ORDER — DIVALPROEX SODIUM 500 MG/1
500 TABLET, EXTENDED RELEASE ORAL DAILY
Status: DISCONTINUED | OUTPATIENT
Start: 2018-11-27 | End: 2018-12-01

## 2018-11-27 RX ORDER — FERROUS SULFATE 325(65) MG
325 TABLET ORAL 2 TIMES DAILY WITH MEALS
Status: DISCONTINUED | OUTPATIENT
Start: 2018-11-27 | End: 2018-12-02 | Stop reason: HOSPADM

## 2018-11-27 RX ORDER — OLANZAPINE 10 MG/1
20 TABLET ORAL NIGHTLY
COMMUNITY
End: 2018-12-02 | Stop reason: HOSPADM

## 2018-11-27 RX ORDER — VANCOMYCIN HYDROCHLORIDE 1 G/200ML
1000 INJECTION, SOLUTION INTRAVENOUS ONCE
Status: COMPLETED | OUTPATIENT
Start: 2018-11-28 | End: 2018-11-28

## 2018-11-27 RX ORDER — OLANZAPINE 5 MG/1
20 TABLET ORAL NIGHTLY
Status: DISCONTINUED | OUTPATIENT
Start: 2018-11-27 | End: 2018-12-01

## 2018-11-27 RX ORDER — RISPERIDONE 2 MG/1
2 TABLET ORAL 2 TIMES DAILY
COMMUNITY
End: 2018-12-02 | Stop reason: HOSPADM

## 2018-11-27 RX ORDER — TAMSULOSIN HYDROCHLORIDE 0.4 MG/1
0.4 CAPSULE ORAL DAILY
Status: DISCONTINUED | OUTPATIENT
Start: 2018-11-27 | End: 2018-12-02 | Stop reason: HOSPADM

## 2018-11-27 RX ORDER — CITALOPRAM 20 MG/1
20 TABLET ORAL DAILY
Status: ON HOLD | COMMUNITY
End: 2021-07-07

## 2018-11-27 RX ORDER — ASPIRIN 81 MG/1
81 TABLET, CHEWABLE ORAL DAILY
Status: DISCONTINUED | OUTPATIENT
Start: 2018-11-27 | End: 2018-12-02 | Stop reason: HOSPADM

## 2018-11-27 RX ORDER — QUETIAPINE FUMARATE 100 MG/1
100 TABLET, FILM COATED ORAL DAILY
Status: DISCONTINUED | OUTPATIENT
Start: 2018-11-27 | End: 2018-12-02 | Stop reason: HOSPADM

## 2018-11-27 RX ORDER — CITALOPRAM 20 MG/1
20 TABLET ORAL DAILY
Status: DISCONTINUED | OUTPATIENT
Start: 2018-11-27 | End: 2018-12-02 | Stop reason: HOSPADM

## 2018-11-27 RX ORDER — TRAZODONE HYDROCHLORIDE 50 MG/1
50 TABLET ORAL EVERY 12 HOURS SCHEDULED
Status: DISCONTINUED | OUTPATIENT
Start: 2018-11-27 | End: 2018-12-02 | Stop reason: HOSPADM

## 2018-11-27 RX ORDER — QUETIAPINE FUMARATE 50 MG/1
150 TABLET, EXTENDED RELEASE ORAL NIGHTLY
Status: DISCONTINUED | OUTPATIENT
Start: 2018-11-27 | End: 2018-12-02 | Stop reason: HOSPADM

## 2018-11-27 RX ORDER — DEXTROSE, SODIUM CHLORIDE, SODIUM LACTATE, POTASSIUM CHLORIDE, AND CALCIUM CHLORIDE 5; .6; .31; .03; .02 G/100ML; G/100ML; G/100ML; G/100ML; G/100ML
100 INJECTION, SOLUTION INTRAVENOUS CONTINUOUS
Status: DISCONTINUED | OUTPATIENT
Start: 2018-11-27 | End: 2018-11-27

## 2018-11-27 RX ORDER — QUETIAPINE 150 MG/1
150 TABLET, FILM COATED, EXTENDED RELEASE ORAL NIGHTLY
COMMUNITY
End: 2018-12-02 | Stop reason: HOSPADM

## 2018-11-27 RX ORDER — HEPARIN SODIUM 5000 [USP'U]/ML
5000 INJECTION, SOLUTION INTRAVENOUS; SUBCUTANEOUS EVERY 8 HOURS SCHEDULED
Status: DISCONTINUED | OUTPATIENT
Start: 2018-11-27 | End: 2018-12-02 | Stop reason: HOSPADM

## 2018-11-27 RX ORDER — AMLODIPINE BESYLATE 5 MG/1
2.5 TABLET ORAL DAILY
Status: ON HOLD | COMMUNITY
End: 2021-08-02

## 2018-11-27 RX ORDER — HYDROXYZINE HYDROCHLORIDE 25 MG/1
25 TABLET, FILM COATED ORAL 2 TIMES DAILY
Status: ON HOLD | COMMUNITY
End: 2019-06-15

## 2018-11-27 RX ORDER — HYDROXYZINE HYDROCHLORIDE 25 MG/1
25 TABLET, FILM COATED ORAL 2 TIMES DAILY
Status: DISCONTINUED | OUTPATIENT
Start: 2018-11-27 | End: 2018-12-02 | Stop reason: HOSPADM

## 2018-11-27 RX ADMIN — SODIUM CHLORIDE, PRESERVATIVE FREE 3 ML: 5 INJECTION INTRAVENOUS at 08:13

## 2018-11-27 RX ADMIN — OLANZAPINE 20 MG: 5 TABLET, FILM COATED ORAL at 21:58

## 2018-11-27 RX ADMIN — Medication 400 MG: at 21:58

## 2018-11-27 RX ADMIN — QUETIAPINE FUMARATE 150 MG: 50 TABLET, FILM COATED, EXTENDED RELEASE ORAL at 21:58

## 2018-11-27 RX ADMIN — HYDROXYZINE HYDROCHLORIDE 25 MG: 25 TABLET, FILM COATED ORAL at 14:00

## 2018-11-27 RX ADMIN — ASPIRIN 81 MG 81 MG: 81 TABLET ORAL at 13:59

## 2018-11-27 RX ADMIN — HYDROXYZINE HYDROCHLORIDE 25 MG: 25 TABLET, FILM COATED ORAL at 21:58

## 2018-11-27 RX ADMIN — TAMSULOSIN HYDROCHLORIDE 0.4 MG: 0.4 CAPSULE ORAL at 13:59

## 2018-11-27 RX ADMIN — TRAZODONE HYDROCHLORIDE 50 MG: 50 TABLET ORAL at 21:58

## 2018-11-27 RX ADMIN — SODIUM CHLORIDE, SODIUM LACTATE, POTASSIUM CHLORIDE, CALCIUM CHLORIDE AND DEXTROSE MONOHYDRATE 100 ML/HR: 5; 600; 310; 30; 20 INJECTION, SOLUTION INTRAVENOUS at 04:16

## 2018-11-27 RX ADMIN — QUETIAPINE FUMARATE 100 MG: 100 TABLET ORAL at 13:59

## 2018-11-27 RX ADMIN — TAZOBACTAM SODIUM AND PIPERACILLIN SODIUM 3.38 G: 375; 3 INJECTION, SOLUTION INTRAVENOUS at 01:17

## 2018-11-27 RX ADMIN — DIVALPROEX SODIUM 500 MG: 500 TABLET, FILM COATED, EXTENDED RELEASE ORAL at 13:59

## 2018-11-27 RX ADMIN — HEPARIN SODIUM 5000 UNITS: 5000 INJECTION, SOLUTION INTRAVENOUS; SUBCUTANEOUS at 21:58

## 2018-11-27 RX ADMIN — CITALOPRAM HYDROBROMIDE 20 MG: 20 TABLET ORAL at 13:59

## 2018-11-27 RX ADMIN — HEPARIN SODIUM 5000 UNITS: 5000 INJECTION, SOLUTION INTRAVENOUS; SUBCUTANEOUS at 13:59

## 2018-11-27 RX ADMIN — TAZOBACTAM SODIUM AND PIPERACILLIN SODIUM 3.38 G: 375; 3 INJECTION, SOLUTION INTRAVENOUS at 08:09

## 2018-11-27 RX ADMIN — TRAZODONE HYDROCHLORIDE 50 MG: 50 TABLET ORAL at 14:00

## 2018-11-27 RX ADMIN — Medication 325 MG: at 18:32

## 2018-11-27 RX ADMIN — SODIUM CHLORIDE, PRESERVATIVE FREE 3 ML: 5 INJECTION INTRAVENOUS at 21:59

## 2018-11-28 ENCOUNTER — APPOINTMENT (OUTPATIENT)
Dept: GENERAL RADIOLOGY | Facility: HOSPITAL | Age: 68
End: 2018-11-28

## 2018-11-28 LAB
ALBUMIN SERPL-MCNC: 3.09 G/DL (ref 3.2–4.8)
ALBUMIN/GLOB SERPL: 1.5 G/DL (ref 1.5–2.5)
ALP SERPL-CCNC: 150 U/L (ref 25–100)
ALT SERPL W P-5'-P-CCNC: 25 U/L (ref 7–40)
ANION GAP SERPL CALCULATED.3IONS-SCNC: 10 MMOL/L (ref 3–11)
AST SERPL-CCNC: 21 U/L (ref 0–33)
BASOPHILS # BLD AUTO: 0.01 10*3/MM3 (ref 0–0.2)
BASOPHILS NFR BLD AUTO: 0.2 % (ref 0–1)
BILIRUB SERPL-MCNC: 0.3 MG/DL (ref 0.3–1.2)
BUN BLD-MCNC: 43 MG/DL (ref 9–23)
BUN/CREAT SERPL: 14.2 (ref 7–25)
CALCIUM SPEC-SCNC: 8.6 MG/DL (ref 8.7–10.4)
CHLORIDE SERPL-SCNC: 116 MMOL/L (ref 99–109)
CO2 SERPL-SCNC: 23 MMOL/L (ref 20–31)
CREAT BLD-MCNC: 3.02 MG/DL (ref 0.6–1.3)
DEPRECATED RDW RBC AUTO: 54.6 FL (ref 37–54)
EOSINOPHIL # BLD AUTO: 0.14 10*3/MM3 (ref 0–0.3)
EOSINOPHIL NFR BLD AUTO: 2.7 % (ref 0–3)
ERYTHROCYTE [DISTWIDTH] IN BLOOD BY AUTOMATED COUNT: 15.9 % (ref 11.3–14.5)
GFR SERPL CREATININE-BSD FRML MDRD: 21 ML/MIN/1.73
GLOBULIN UR ELPH-MCNC: 2.1 GM/DL
GLUCOSE BLD-MCNC: 89 MG/DL (ref 70–100)
HCT VFR BLD AUTO: 37.7 % (ref 38.9–50.9)
HGB BLD-MCNC: 11.5 G/DL (ref 13.1–17.5)
IMM GRANULOCYTES # BLD: 0.13 10*3/MM3 (ref 0–0.03)
IMM GRANULOCYTES NFR BLD: 2.5 % (ref 0–0.6)
LYMPHOCYTES # BLD AUTO: 1.94 10*3/MM3 (ref 0.6–4.8)
LYMPHOCYTES NFR BLD AUTO: 37.1 % (ref 24–44)
MAGNESIUM SERPL-MCNC: 2.1 MG/DL (ref 1.3–2.7)
MCH RBC QN AUTO: 28.5 PG (ref 27–31)
MCHC RBC AUTO-ENTMCNC: 30.5 G/DL (ref 32–36)
MCV RBC AUTO: 93.3 FL (ref 80–99)
MONOCYTES # BLD AUTO: 0.72 10*3/MM3 (ref 0–1)
MONOCYTES NFR BLD AUTO: 13.8 % (ref 0–12)
NEUTROPHILS # BLD AUTO: 2.42 10*3/MM3 (ref 1.5–8.3)
NEUTROPHILS NFR BLD AUTO: 46.2 % (ref 41–71)
PHOSPHATE SERPL-MCNC: 4 MG/DL (ref 2.4–5.1)
PLATELET # BLD AUTO: 111 10*3/MM3 (ref 150–450)
PMV BLD AUTO: 10.6 FL (ref 6–12)
POTASSIUM BLD-SCNC: 5 MMOL/L (ref 3.5–5.5)
PROT SERPL-MCNC: 5.2 G/DL (ref 5.7–8.2)
RBC # BLD AUTO: 4.04 10*6/MM3 (ref 4.2–5.76)
SODIUM BLD-SCNC: 149 MMOL/L (ref 132–146)
WBC NRBC COR # BLD: 5.23 10*3/MM3 (ref 3.5–10.8)

## 2018-11-28 PROCEDURE — 97166 OT EVAL MOD COMPLEX 45 MIN: CPT | Performed by: OCCUPATIONAL THERAPIST

## 2018-11-28 PROCEDURE — 97161 PT EVAL LOW COMPLEX 20 MIN: CPT

## 2018-11-28 PROCEDURE — 25010000002 HEPARIN (PORCINE) PER 1000 UNITS: Performed by: INTERNAL MEDICINE

## 2018-11-28 PROCEDURE — 83735 ASSAY OF MAGNESIUM: CPT | Performed by: INTERNAL MEDICINE

## 2018-11-28 PROCEDURE — 74230 X-RAY XM SWLNG FUNCJ C+: CPT

## 2018-11-28 PROCEDURE — 80053 COMPREHEN METABOLIC PANEL: CPT

## 2018-11-28 PROCEDURE — 99233 SBSQ HOSP IP/OBS HIGH 50: CPT | Performed by: INTERNAL MEDICINE

## 2018-11-28 PROCEDURE — 25010000002 VANCOMYCIN PER 500 MG

## 2018-11-28 PROCEDURE — 84100 ASSAY OF PHOSPHORUS: CPT | Performed by: INTERNAL MEDICINE

## 2018-11-28 PROCEDURE — 85025 COMPLETE CBC W/AUTO DIFF WBC: CPT | Performed by: INTERNAL MEDICINE

## 2018-11-28 PROCEDURE — 92611 MOTION FLUOROSCOPY/SWALLOW: CPT

## 2018-11-28 RX ORDER — SODIUM CHLORIDE 450 MG/100ML
75 INJECTION, SOLUTION INTRAVENOUS CONTINUOUS
Status: DISCONTINUED | OUTPATIENT
Start: 2018-11-28 | End: 2018-12-01

## 2018-11-28 RX ADMIN — DIVALPROEX SODIUM 500 MG: 500 TABLET, FILM COATED, EXTENDED RELEASE ORAL at 08:05

## 2018-11-28 RX ADMIN — Medication 400 MG: at 08:05

## 2018-11-28 RX ADMIN — HYDROXYZINE HYDROCHLORIDE 25 MG: 25 TABLET, FILM COATED ORAL at 21:17

## 2018-11-28 RX ADMIN — Medication 400 MG: at 21:17

## 2018-11-28 RX ADMIN — BARIUM SULFATE 20 ML: 400 PASTE ORAL at 11:25

## 2018-11-28 RX ADMIN — QUETIAPINE FUMARATE 150 MG: 50 TABLET, FILM COATED, EXTENDED RELEASE ORAL at 21:17

## 2018-11-28 RX ADMIN — ASPIRIN 81 MG 81 MG: 81 TABLET ORAL at 08:06

## 2018-11-28 RX ADMIN — SODIUM CHLORIDE, PRESERVATIVE FREE 3 ML: 5 INJECTION INTRAVENOUS at 21:17

## 2018-11-28 RX ADMIN — VANCOMYCIN HYDROCHLORIDE 1000 MG: 1 INJECTION, SOLUTION INTRAVENOUS at 05:22

## 2018-11-28 RX ADMIN — SODIUM CHLORIDE, PRESERVATIVE FREE 3 ML: 5 INJECTION INTRAVENOUS at 08:06

## 2018-11-28 RX ADMIN — Medication 325 MG: at 08:06

## 2018-11-28 RX ADMIN — CITALOPRAM HYDROBROMIDE 20 MG: 20 TABLET ORAL at 08:06

## 2018-11-28 RX ADMIN — BARIUM SULFATE 100 ML: 0.81 POWDER, FOR SUSPENSION ORAL at 10:00

## 2018-11-28 RX ADMIN — HEPARIN SODIUM 5000 UNITS: 5000 INJECTION, SOLUTION INTRAVENOUS; SUBCUTANEOUS at 13:12

## 2018-11-28 RX ADMIN — HEPARIN SODIUM 5000 UNITS: 5000 INJECTION, SOLUTION INTRAVENOUS; SUBCUTANEOUS at 21:16

## 2018-11-28 RX ADMIN — TRAZODONE HYDROCHLORIDE 50 MG: 50 TABLET ORAL at 21:16

## 2018-11-28 RX ADMIN — BARIUM SULFATE 100 ML: 0.81 POWDER, FOR SUSPENSION ORAL at 10:20

## 2018-11-28 RX ADMIN — TRAZODONE HYDROCHLORIDE 50 MG: 50 TABLET ORAL at 08:06

## 2018-11-28 RX ADMIN — SODIUM CHLORIDE 75 ML/HR: 4.5 INJECTION, SOLUTION INTRAVENOUS at 13:12

## 2018-11-28 RX ADMIN — HEPARIN SODIUM 5000 UNITS: 5000 INJECTION, SOLUTION INTRAVENOUS; SUBCUTANEOUS at 05:22

## 2018-11-28 RX ADMIN — BARIUM SULFATE 20 ML: 400 PASTE ORAL at 10:19

## 2018-11-28 RX ADMIN — Medication 325 MG: at 17:14

## 2018-11-28 RX ADMIN — OLANZAPINE 20 MG: 5 TABLET, FILM COATED ORAL at 21:16

## 2018-11-28 RX ADMIN — QUETIAPINE FUMARATE 100 MG: 100 TABLET ORAL at 08:06

## 2018-11-28 RX ADMIN — TAMSULOSIN HYDROCHLORIDE 0.4 MG: 0.4 CAPSULE ORAL at 08:06

## 2018-11-28 RX ADMIN — HYDROXYZINE HYDROCHLORIDE 25 MG: 25 TABLET, FILM COATED ORAL at 08:06

## 2018-11-29 LAB
ANION GAP SERPL CALCULATED.3IONS-SCNC: 3 MMOL/L (ref 3–11)
BUN BLD-MCNC: 40 MG/DL (ref 9–23)
BUN/CREAT SERPL: 14.4 (ref 7–25)
CALCIUM SPEC-SCNC: 8.3 MG/DL (ref 8.7–10.4)
CHLORIDE SERPL-SCNC: 115 MMOL/L (ref 99–109)
CO2 SERPL-SCNC: 27 MMOL/L (ref 20–31)
CREAT BLD-MCNC: 2.78 MG/DL (ref 0.6–1.3)
DEPRECATED RDW RBC AUTO: 54.9 FL (ref 37–54)
ERYTHROCYTE [DISTWIDTH] IN BLOOD BY AUTOMATED COUNT: 15.8 % (ref 11.3–14.5)
GFR SERPL CREATININE-BSD FRML MDRD: 23 ML/MIN/1.73
GLUCOSE BLD-MCNC: 98 MG/DL (ref 70–100)
HCT VFR BLD AUTO: 34.2 % (ref 38.9–50.9)
HGB BLD-MCNC: 10.4 G/DL (ref 13.1–17.5)
MCH RBC QN AUTO: 28.7 PG (ref 27–31)
MCHC RBC AUTO-ENTMCNC: 30.4 G/DL (ref 32–36)
MCV RBC AUTO: 94.5 FL (ref 80–99)
PLATELET # BLD AUTO: 94 10*3/MM3 (ref 150–450)
PMV BLD AUTO: 10.2 FL (ref 6–12)
POTASSIUM BLD-SCNC: 4.6 MMOL/L (ref 3.5–5.5)
RBC # BLD AUTO: 3.62 10*6/MM3 (ref 4.2–5.76)
SODIUM BLD-SCNC: 145 MMOL/L (ref 132–146)
VANCOMYCIN SERPL-MCNC: 21.1 MCG/ML (ref 5–40)
WBC NRBC COR # BLD: 4.99 10*3/MM3 (ref 3.5–10.8)

## 2018-11-29 PROCEDURE — 80048 BASIC METABOLIC PNL TOTAL CA: CPT | Performed by: INTERNAL MEDICINE

## 2018-11-29 PROCEDURE — 85027 COMPLETE CBC AUTOMATED: CPT | Performed by: INTERNAL MEDICINE

## 2018-11-29 PROCEDURE — 25010000002 HEPARIN (PORCINE) PER 1000 UNITS: Performed by: INTERNAL MEDICINE

## 2018-11-29 PROCEDURE — 80202 ASSAY OF VANCOMYCIN: CPT

## 2018-11-29 PROCEDURE — 25010000002 HYDRALAZINE PER 20 MG: Performed by: NURSE PRACTITIONER

## 2018-11-29 PROCEDURE — 25010000002 VANCOMYCIN PER 500 MG

## 2018-11-29 PROCEDURE — 99232 SBSQ HOSP IP/OBS MODERATE 35: CPT | Performed by: NURSE PRACTITIONER

## 2018-11-29 RX ORDER — HYDRALAZINE HYDROCHLORIDE 20 MG/ML
5 INJECTION INTRAMUSCULAR; INTRAVENOUS ONCE
Status: COMPLETED | OUTPATIENT
Start: 2018-11-29 | End: 2018-11-29

## 2018-11-29 RX ADMIN — ASPIRIN 81 MG 81 MG: 81 TABLET ORAL at 08:39

## 2018-11-29 RX ADMIN — SODIUM CHLORIDE 75 ML/HR: 4.5 INJECTION, SOLUTION INTRAVENOUS at 17:48

## 2018-11-29 RX ADMIN — CITALOPRAM HYDROBROMIDE 20 MG: 20 TABLET ORAL at 08:39

## 2018-11-29 RX ADMIN — VANCOMYCIN HYDROCHLORIDE 750 MG: 750 INJECTION, SOLUTION INTRAVENOUS at 10:49

## 2018-11-29 RX ADMIN — Medication 400 MG: at 20:21

## 2018-11-29 RX ADMIN — HYDROXYZINE HYDROCHLORIDE 25 MG: 25 TABLET, FILM COATED ORAL at 20:20

## 2018-11-29 RX ADMIN — HEPARIN SODIUM 5000 UNITS: 5000 INJECTION, SOLUTION INTRAVENOUS; SUBCUTANEOUS at 13:40

## 2018-11-29 RX ADMIN — HEPARIN SODIUM 5000 UNITS: 5000 INJECTION, SOLUTION INTRAVENOUS; SUBCUTANEOUS at 20:20

## 2018-11-29 RX ADMIN — TAMSULOSIN HYDROCHLORIDE 0.4 MG: 0.4 CAPSULE ORAL at 08:39

## 2018-11-29 RX ADMIN — HYDRALAZINE HYDROCHLORIDE 5 MG: 20 INJECTION INTRAMUSCULAR; INTRAVENOUS at 21:45

## 2018-11-29 RX ADMIN — OLANZAPINE 20 MG: 5 TABLET, FILM COATED ORAL at 20:20

## 2018-11-29 RX ADMIN — TRAZODONE HYDROCHLORIDE 50 MG: 50 TABLET ORAL at 20:20

## 2018-11-29 RX ADMIN — HYDROXYZINE HYDROCHLORIDE 25 MG: 25 TABLET, FILM COATED ORAL at 08:39

## 2018-11-29 RX ADMIN — DIVALPROEX SODIUM 500 MG: 500 TABLET, FILM COATED, EXTENDED RELEASE ORAL at 08:39

## 2018-11-29 RX ADMIN — TRAZODONE HYDROCHLORIDE 50 MG: 50 TABLET ORAL at 08:39

## 2018-11-29 RX ADMIN — Medication 325 MG: at 08:39

## 2018-11-29 RX ADMIN — SODIUM CHLORIDE 75 ML/HR: 4.5 INJECTION, SOLUTION INTRAVENOUS at 05:10

## 2018-11-29 RX ADMIN — Medication 325 MG: at 17:48

## 2018-11-29 RX ADMIN — Medication 400 MG: at 08:39

## 2018-11-29 RX ADMIN — HEPARIN SODIUM 5000 UNITS: 5000 INJECTION, SOLUTION INTRAVENOUS; SUBCUTANEOUS at 05:09

## 2018-11-29 RX ADMIN — QUETIAPINE FUMARATE 150 MG: 50 TABLET, FILM COATED, EXTENDED RELEASE ORAL at 20:20

## 2018-11-29 RX ADMIN — QUETIAPINE FUMARATE 100 MG: 100 TABLET ORAL at 08:39

## 2018-11-29 RX ADMIN — SODIUM CHLORIDE, PRESERVATIVE FREE 3 ML: 5 INJECTION INTRAVENOUS at 08:39

## 2018-11-30 LAB — VANCOMYCIN SERPL-MCNC: 21 MCG/ML (ref 5–40)

## 2018-11-30 PROCEDURE — 25010000002 VANCOMYCIN

## 2018-11-30 PROCEDURE — 80202 ASSAY OF VANCOMYCIN: CPT

## 2018-11-30 PROCEDURE — 99232 SBSQ HOSP IP/OBS MODERATE 35: CPT | Performed by: INTERNAL MEDICINE

## 2018-11-30 PROCEDURE — 25010000002 HEPARIN (PORCINE) PER 1000 UNITS: Performed by: INTERNAL MEDICINE

## 2018-11-30 PROCEDURE — 97110 THERAPEUTIC EXERCISES: CPT

## 2018-11-30 PROCEDURE — 97530 THERAPEUTIC ACTIVITIES: CPT

## 2018-11-30 RX ADMIN — SODIUM CHLORIDE, PRESERVATIVE FREE 3 ML: 5 INJECTION INTRAVENOUS at 08:32

## 2018-11-30 RX ADMIN — TRAZODONE HYDROCHLORIDE 50 MG: 50 TABLET ORAL at 22:30

## 2018-11-30 RX ADMIN — Medication 325 MG: at 17:22

## 2018-11-30 RX ADMIN — ASPIRIN 81 MG 81 MG: 81 TABLET ORAL at 08:32

## 2018-11-30 RX ADMIN — DIVALPROEX SODIUM 500 MG: 500 TABLET, FILM COATED, EXTENDED RELEASE ORAL at 08:32

## 2018-11-30 RX ADMIN — QUETIAPINE FUMARATE 100 MG: 100 TABLET ORAL at 08:32

## 2018-11-30 RX ADMIN — HEPARIN SODIUM 5000 UNITS: 5000 INJECTION, SOLUTION INTRAVENOUS; SUBCUTANEOUS at 05:01

## 2018-11-30 RX ADMIN — Medication 325 MG: at 08:32

## 2018-11-30 RX ADMIN — VANCOMYCIN HYDROCHLORIDE 500 MG: 500 INJECTION, POWDER, LYOPHILIZED, FOR SOLUTION INTRAVENOUS at 12:12

## 2018-11-30 RX ADMIN — Medication 400 MG: at 22:33

## 2018-11-30 RX ADMIN — SODIUM CHLORIDE, PRESERVATIVE FREE 3 ML: 5 INJECTION INTRAVENOUS at 22:31

## 2018-11-30 RX ADMIN — TAMSULOSIN HYDROCHLORIDE 0.4 MG: 0.4 CAPSULE ORAL at 08:32

## 2018-11-30 RX ADMIN — HEPARIN SODIUM 5000 UNITS: 5000 INJECTION, SOLUTION INTRAVENOUS; SUBCUTANEOUS at 22:31

## 2018-11-30 RX ADMIN — OLANZAPINE 20 MG: 5 TABLET, FILM COATED ORAL at 22:30

## 2018-11-30 RX ADMIN — HEPARIN SODIUM 5000 UNITS: 5000 INJECTION, SOLUTION INTRAVENOUS; SUBCUTANEOUS at 13:27

## 2018-11-30 RX ADMIN — HYDROXYZINE HYDROCHLORIDE 25 MG: 25 TABLET, FILM COATED ORAL at 08:32

## 2018-11-30 RX ADMIN — Medication 400 MG: at 08:32

## 2018-11-30 RX ADMIN — QUETIAPINE FUMARATE 150 MG: 50 TABLET, FILM COATED, EXTENDED RELEASE ORAL at 22:30

## 2018-11-30 RX ADMIN — TRAZODONE HYDROCHLORIDE 50 MG: 50 TABLET ORAL at 08:32

## 2018-11-30 RX ADMIN — SODIUM CHLORIDE 75 ML/HR: 4.5 INJECTION, SOLUTION INTRAVENOUS at 05:01

## 2018-11-30 RX ADMIN — HYDROXYZINE HYDROCHLORIDE 25 MG: 25 TABLET, FILM COATED ORAL at 22:30

## 2018-11-30 RX ADMIN — CITALOPRAM HYDROBROMIDE 20 MG: 20 TABLET ORAL at 08:32

## 2018-12-01 PROBLEM — R13.11 ORAL PHASE DYSPHAGIA: Status: ACTIVE | Noted: 2018-12-01

## 2018-12-01 LAB
ANION GAP SERPL CALCULATED.3IONS-SCNC: 3 MMOL/L (ref 3–11)
BACTERIA SPEC AEROBE CULT: NORMAL
BLOOD CULTURE, ROUTINE: NORMAL
BUN BLD-MCNC: 34 MG/DL (ref 9–23)
BUN/CREAT SERPL: 14.5 (ref 7–25)
CALCIUM SPEC-SCNC: 8.2 MG/DL (ref 8.7–10.4)
CHLORIDE SERPL-SCNC: 111 MMOL/L (ref 99–109)
CO2 SERPL-SCNC: 28 MMOL/L (ref 20–31)
CREAT BLD-MCNC: 2.35 MG/DL (ref 0.6–1.3)
CULTURE, BLOOD 2: NORMAL
GFR SERPL CREATININE-BSD FRML MDRD: 28 ML/MIN/1.73
GLUCOSE BLD-MCNC: 77 MG/DL (ref 70–100)
POTASSIUM BLD-SCNC: 4.8 MMOL/L (ref 3.5–5.5)
SODIUM BLD-SCNC: 142 MMOL/L (ref 132–146)
VANCOMYCIN SERPL-MCNC: 19.9 MCG/ML (ref 5–40)

## 2018-12-01 PROCEDURE — 99232 SBSQ HOSP IP/OBS MODERATE 35: CPT | Performed by: INTERNAL MEDICINE

## 2018-12-01 PROCEDURE — 80202 ASSAY OF VANCOMYCIN: CPT

## 2018-12-01 PROCEDURE — 25010000002 VANCOMYCIN

## 2018-12-01 PROCEDURE — 80048 BASIC METABOLIC PNL TOTAL CA: CPT

## 2018-12-01 PROCEDURE — 25010000002 HEPARIN (PORCINE) PER 1000 UNITS: Performed by: INTERNAL MEDICINE

## 2018-12-01 RX ORDER — OLANZAPINE 5 MG/1
5 TABLET ORAL NIGHTLY
Status: DISCONTINUED | OUTPATIENT
Start: 2018-12-01 | End: 2018-12-02 | Stop reason: HOSPADM

## 2018-12-01 RX ORDER — DOXYCYCLINE 100 MG/1
100 CAPSULE ORAL EVERY 12 HOURS SCHEDULED
Status: DISCONTINUED | OUTPATIENT
Start: 2018-12-01 | End: 2018-12-02 | Stop reason: HOSPADM

## 2018-12-01 RX ORDER — VALPROIC ACID 250 MG/5ML
125 SOLUTION ORAL EVERY 6 HOURS SCHEDULED
Status: DISCONTINUED | OUTPATIENT
Start: 2018-12-01 | End: 2018-12-02 | Stop reason: HOSPADM

## 2018-12-01 RX ORDER — DIAPER,BRIEF,INFANT-TODD,DISP
EACH MISCELLANEOUS EVERY 12 HOURS SCHEDULED
Status: DISCONTINUED | OUTPATIENT
Start: 2018-12-01 | End: 2018-12-02 | Stop reason: HOSPADM

## 2018-12-01 RX ADMIN — HYDROXYZINE HYDROCHLORIDE 25 MG: 25 TABLET, FILM COATED ORAL at 22:11

## 2018-12-01 RX ADMIN — DOXYCYCLINE 100 MG: 100 CAPSULE ORAL at 22:10

## 2018-12-01 RX ADMIN — Medication 400 MG: at 10:25

## 2018-12-01 RX ADMIN — VALPROIC ACID 125 MG: 250 SOLUTION ORAL at 12:17

## 2018-12-01 RX ADMIN — SODIUM CHLORIDE, PRESERVATIVE FREE 3 ML: 5 INJECTION INTRAVENOUS at 10:25

## 2018-12-01 RX ADMIN — HEPARIN SODIUM 5000 UNITS: 5000 INJECTION, SOLUTION INTRAVENOUS; SUBCUTANEOUS at 04:57

## 2018-12-01 RX ADMIN — TRAZODONE HYDROCHLORIDE 50 MG: 50 TABLET ORAL at 10:25

## 2018-12-01 RX ADMIN — ASPIRIN 81 MG 81 MG: 81 TABLET ORAL at 10:24

## 2018-12-01 RX ADMIN — TAMSULOSIN HYDROCHLORIDE 0.4 MG: 0.4 CAPSULE ORAL at 10:24

## 2018-12-01 RX ADMIN — HEPARIN SODIUM 5000 UNITS: 5000 INJECTION, SOLUTION INTRAVENOUS; SUBCUTANEOUS at 22:11

## 2018-12-01 RX ADMIN — VANCOMYCIN HYDROCHLORIDE 500 MG: 500 INJECTION, POWDER, LYOPHILIZED, FOR SOLUTION INTRAVENOUS at 13:01

## 2018-12-01 RX ADMIN — BACITRACIN ZINC: 500 OINTMENT TOPICAL at 22:08

## 2018-12-01 RX ADMIN — OLANZAPINE 5 MG: 5 TABLET, FILM COATED ORAL at 22:08

## 2018-12-01 RX ADMIN — SODIUM CHLORIDE, PRESERVATIVE FREE 3 ML: 5 INJECTION INTRAVENOUS at 22:09

## 2018-12-01 RX ADMIN — VALPROIC ACID 125 MG: 250 SOLUTION ORAL at 17:34

## 2018-12-01 RX ADMIN — TRAZODONE HYDROCHLORIDE 50 MG: 50 TABLET ORAL at 22:11

## 2018-12-01 RX ADMIN — HEPARIN SODIUM 5000 UNITS: 5000 INJECTION, SOLUTION INTRAVENOUS; SUBCUTANEOUS at 13:40

## 2018-12-01 RX ADMIN — QUETIAPINE FUMARATE 100 MG: 100 TABLET ORAL at 10:25

## 2018-12-01 RX ADMIN — QUETIAPINE FUMARATE 150 MG: 50 TABLET, FILM COATED, EXTENDED RELEASE ORAL at 22:07

## 2018-12-01 RX ADMIN — Medication 325 MG: at 17:29

## 2018-12-01 RX ADMIN — Medication 325 MG: at 10:24

## 2018-12-01 RX ADMIN — CITALOPRAM HYDROBROMIDE 20 MG: 20 TABLET ORAL at 10:25

## 2018-12-01 RX ADMIN — HYDROXYZINE HYDROCHLORIDE 25 MG: 25 TABLET, FILM COATED ORAL at 10:25

## 2018-12-01 RX ADMIN — Medication 400 MG: at 22:11

## 2018-12-01 RX ADMIN — DOXYCYCLINE 100 MG: 100 CAPSULE ORAL at 10:30

## 2018-12-01 RX ADMIN — VALPROIC ACID 125 MG: 250 SOLUTION ORAL at 22:08

## 2018-12-01 NOTE — PROGRESS NOTES
[facility] Medicine Services  PROGRESS NOTE    Patient Name: Braulio Schwartz  : 1950  MRN: [id number]    Date of Admission: 2018  Length of Stay: 5  Primary Care Physician: Yandel Steward MD    Subjective   Subjective   CC:  Sent from facility with lethargy and hypothermia; sepsis    HPI: (poor historian)  Initially when patient was evaluated this morning he was quite lethargic.  He initially did not wake up to touch but after sternal rub he eventually woke up and started mumbling some words.  His speech became clear but then he quickly fell back asleep.  He was monitored and later in the afternoon he became more alert and return to eating and his normal self per nursing.  No complaints reported.    Review of Systems   Unable to obtain due to psychiatric disease.   Reportedly he walks with walker usually.   Per RN, he has not had any complaints.   Otherwise ROS is negative except as mentioned in the HPI.    Objective   Objective   Vital Signs:   Temp:  [97.2 °F (36.2 °C)-97.8 °F (36.6 °C)] 97.4 °F (36.3 °C)  Heart Rate:  [58-71] 71  Resp:  [14-18] 14  BP: (134-162)/() 147/90  Physical Exam:  General: Alert, well-developed obese male in no acute distress    Head: Normocephalic atraumatic    Eyes: PERRLA, EOMI, nonicteric, conjunctiva normal    ENT: Pink, moist mucous membranes with poor dentition    Neck: Supple, nontender, trachea midline without lymphadenopathy.      Cardiovascular: RRR  +radial pulses bilaterally    Respiratory: Nonlabored, symmetrical chest expansion, clear to auscultation bilaterally    Abdomen: Obese, soft, nontender, nondistended,  positive bowel sounds in all 4 quadrants     Extremities: FROM in upper and lower extremities bilaterally. +1 pitting edema with pink non-weeping LE erythema approximately 2 inches below knees;  Negative calf pain; Feet in waffle boots - erythema noted bilaterally, reportedly improved per staff.    Skin:  Pink/warm/dry.  No rash or lesions noted.  pink non-weeping LE erythema approximately 2 inches below knees with superficial scratches    Neuro: Lethargic initially but now oriented to person, dysarthric speech but can be understood, follows all commands     Psych: Patient is pleasant and cooperative.  Normal affect.      Results Reviewed:  I have personally reviewed current lab, radiology, and data and agree.  Results from last 7 days   Lab Units  11/29/18   0519  11/28/18   0423  11/27/18   1047   WBC 10*3/mm3  4.99  5.23  4.80   HEMOGLOBIN g/dL  10.4*  11.5*  11.5*   HEMATOCRIT %  34.2*  37.7*  37.3*   PLATELETS 10*3/mm3  94*  111*  110*     Results from last 7 days   Lab Units  12/01/18   0532  11/29/18   0519  11/28/18   0423   SODIUM mmol/L  142  145  149*   POTASSIUM mmol/L  4.8  4.6  5.0   CHLORIDE mmol/L  111*  115*  116*   CO2 mmol/L  28.0  27.0  23.0   BUN mg/dL  34*  40*  43*   CREATININE mg/dL  2.35*  2.78*  3.02*   GLUCOSE mg/dL  77  98  89   CALCIUM mg/dL  8.2*  8.3*  8.6*   ALT (SGPT) U/L   --    --   25   AST (SGOT) U/L   --    --   21     Estimated Creatinine Clearance: 36.5 mL/min (A) (by C-G formula based on SCr of 2.35 mg/dL (H)).  No results found for: BNP    Microbiology Results Abnormal     Procedure Component Value - Date/Time    Blood Culture - Blood, Hand, Left [[id number]] Collected:  11/26/18 1737    Lab Status:  Preliminary result Specimen:  Blood from Hand, Left Updated:  11/30/18 1815     Blood Culture No growth at 4 days      Aerobic bottle only    Blood Culture - Blood, Arm, Left [[id number]] Collected:  11/26/18 1735    Lab Status:  Preliminary result Specimen:  Blood from Arm, Left Updated:  11/30/18 1815     Blood Culture No growth at 4 days        Imaging Results (last 24 hours)     ** No results found for the last 24 hours. **        Results for orders placed during the hospital encounter of 04/19/18   Adult Transthoracic Echo Complete W/ Cont if Necessary Per Protocol     Narrative · Left ventricular systolic function is normal. Estimated EF = 60%.  · Calculated right ventricular systolic pressure from tricuspid   regurgitation is 48 mmHg.        I have reviewed the medications.    aspirin 81 mg Oral Daily   citalopram 20 mg Oral Daily   doxycycline 100 mg Oral Q12H   ferrous sulfate 325 mg Oral BID With Meals   heparin (porcine) 5,000 Units Subcutaneous Q8H   hydrOXYzine 25 mg Oral BID   magnesium oxide 400 mg Oral BID   OLANZapine 20 mg Oral Nightly   QUEtiapine 100 mg Oral Daily   QUEtiapine fumarate  mg Oral Nightly   sodium chloride 3 mL Intravenous Q12H   tamsulosin 0.4 mg Oral Daily   traZODone 50 mg Oral Q12H   Valproic Acid 125 mg Oral Q6H   vancomycin (dosing per levels)  Does not apply Daily   vancomycin 500 mg Intravenous Q24H     Assessment/Plan   Assessment / Plan     Active Hospital Problems    Diagnosis   • **Hypothermia   • Oral phase dysphagia   • Altered mental status   • Bipolar disorder (CMS/HCC)   • Acute metabolic encephalopathy   • Cellulitis of leg, right   • Chronic hypertension   • Other schizophrenia (CMS/HCC)   • Urinary retention     Brief Hospital Course to date:  Braulio Schwartz is a 67 y.o. male with bipolar disease and schizophrenia, vargas of the [facility] and longterm resident of a facility.  He was transferred from [facility] with lethargy and temp of 93, was admitted to [facility] ICU with presumed sepsis. Bilat LE cellulitis was diagnosed.    Assessment & Plan:  Hypothermia   - resolved with warming and treatment of ssepsis.     Hypernatremia  - better on fluids, hold for now    Cellulitis   - IV Vanco day 5, level 19 on 12/1  --Pharmacy dosing  --slightly improved cr  - can't take Zyvox due to SSRI    CKD    - cr improved slightly today  - fluids  - baseline 2.5    nutrition   - mod oral/pharyngial dysfxn  - puree/thin.     Decrease Zyprexa with Sedation and AM labs.      DVT Prophylaxis:  heparin    CODE STATUS:   Code Status and Medical  Interventions:   Ordered at: 11/26/18 1714     Code Status:    CPR     Medical Interventions (Level of Support Prior to Arrest):    Full     Disposition: I expect the patient to be discharged back to facility in 1 days.  On Doxy.    Electronically signed by Paramjit Mcarthur MD, 12/01/18, 2:18 PM.

## 2018-12-01 NOTE — PROGRESS NOTES
Pharmacy Consult-Vancomycin Dosing  Braulio Schwartz is a  67 y.o. male receiving vancomycin therapy.     Indication: Sepsis, cellulitis  Consulting Provider: Intensivist  ID Consult: No    Goal Trough: 15-20 mcg/mL    Current Antimicrobial Therapy  Vancomycin (pharmacy dosing) - day 6    Allergies  Allergies as of 11/26/2018 - Reviewed 11/26/2018   Allergen Reaction Noted   • Haldol [haloperidol] Unknown (See Comments) 04/19/2018   • Ibuprofen Unknown (See Comments) 04/19/2018     Labs  Results from last 7 days   Lab Units  12/01/18   0532  11/29/18   0519  11/28/18   0423   BUN mg/dL  34*  40*  43*   CREATININE mg/dL  2.35*  2.78*  3.02*     Serum creatinine: 2.35 mg/dL (H) 12/01/18 0532  Estimated creatinine clearance: 36.5 mL/min (A)    Results from last 7 days   Lab Units  11/29/18   0519  11/28/18   0423  11/27/18   1047   WBC 10*3/mm3  4.99  5.23  4.80     Evaluation of Dosing     Last Dose Received in the ED/Outside Facility: Patient received vancomycin 1500 mg (15 mg/kg) IV x 1 on 11/26 at [facility]    Ht - 180.3 cm (71 in)  Wt - 95.8 kg (211 lb 4.8 oz)    I/O last 3 completed shifts:  In: 1328 [P.O.:480; I.V.:848]  Out: 3200 [Urine:3200]    Microbiology and Radiology  Microbiology Results (last 10 days)       Procedure Component Value - Date/Time    Blood Culture - Blood, Hand, Left [[id number]] Collected:  11/26/18 1737    Lab Status:  Preliminary result Specimen:  Blood from Hand, Left Updated:  11/30/18 1815     Blood Culture No growth at 4 days      Aerobic bottle only    Blood Culture - Blood, Arm, Left [[id number]] Collected:  11/26/18 1735    Lab Status:  Preliminary result Specimen:  Blood from Arm, Left Updated:  11/30/18 1815     Blood Culture No growth at 4 days          Evaluation of Level  11/26: Vancomycin 1500 mg IV x 1 @ 1141, additional 500 mg IV x 1 @ 2045 (to eqal a 2000 mg loading dose)  11/27: Vancomycin random @ 0613 = 22.8 mcg/mL         No dose given  11/28: Vancomycin 1000 mg IV  x 1 given @ 0522  11/29: Vancomycin random = 21.1 mcg/mL          Vancomycin 750 mg IV x 1 given @ 1049  11/30: Vancomycin random = 21.0 mcg/mL          Vancomycin 500 mg IV x 1 ordered  12/1: Vancomycin random = 19.9mcg/mL          Vancomycin 500mg IV x 1    Assessment/Plan:  1. Pharmacy to dose vancomycin for cellulitis/sepsis. Dosing has been per levels (see above) due to renal function and CKD. Baseline Scr appears to be 2-2.6. The patient is NOT receiving dialysis.    2. Random level drawn this morning resulted in 19.9mcg/mL after receiving 500mg x1 yesterday. Goal trough is 15-20mcg/mL.   3. Will continue with 500mg and schedule q24H. No additional levels have been scheduled. Would recommend checking level over the next several days to ensure there is no accumulation, or sooner if clinically appropriate.   4. UOP adequate, afebrile, Scr improved to 2.35 today.   5. Pharmacy will continue to follow    Emilee,  Taj Ruiz, PharmD  Pharmacy Resident  12/1/2018  9:39 AM

## 2018-12-01 NOTE — PROGRESS NOTES
Continued Stay Note  [facility]     Patient Name: Braulio Schawrtz  MRN: [id number]  Today's Date: 12/1/2018    Admit Date: 11/26/2018    Discharge Plan     Row Name 12/01/18 0941       Plan    Plan Comments  [facility] rescheduled for tomorrow at 1300. CM following.         Discharge Codes    No documentation.       Expected Discharge Date and Time     Expected Discharge Date Expected Discharge Time    Dec 2, 2018             Justyna Gonzalez RN

## 2018-12-02 VITALS
OXYGEN SATURATION: 93 % | HEART RATE: 65 BPM | WEIGHT: 211.3 LBS | RESPIRATION RATE: 18 BRPM | SYSTOLIC BLOOD PRESSURE: 133 MMHG | BODY MASS INDEX: 29.58 KG/M2 | DIASTOLIC BLOOD PRESSURE: 86 MMHG | TEMPERATURE: 97.9 F | HEIGHT: 71 IN

## 2018-12-02 PROBLEM — G93.41 ACUTE METABOLIC ENCEPHALOPATHY: Status: RESOLVED | Noted: 2018-04-19 | Resolved: 2018-12-02

## 2018-12-02 PROBLEM — R41.82 ALTERED MENTAL STATUS: Status: RESOLVED | Noted: 2018-11-26 | Resolved: 2018-12-02

## 2018-12-02 PROBLEM — R33.9 URINARY RETENTION: Status: RESOLVED | Noted: 2018-04-19 | Resolved: 2018-12-02

## 2018-12-02 LAB
ANION GAP SERPL CALCULATED.3IONS-SCNC: 6 MMOL/L (ref 3–11)
BUN BLD-MCNC: 35 MG/DL (ref 9–23)
BUN/CREAT SERPL: 14.4 (ref 7–25)
CALCIUM SPEC-SCNC: 7.9 MG/DL (ref 8.7–10.4)
CHLORIDE SERPL-SCNC: 114 MMOL/L (ref 99–109)
CO2 SERPL-SCNC: 26 MMOL/L (ref 20–31)
CREAT BLD-MCNC: 2.43 MG/DL (ref 0.6–1.3)
GFR SERPL CREATININE-BSD FRML MDRD: 27 ML/MIN/1.73
GLUCOSE BLD-MCNC: 83 MG/DL (ref 70–100)
POTASSIUM BLD-SCNC: 5.4 MMOL/L (ref 3.5–5.5)
POTASSIUM BLD-SCNC: 5.7 MMOL/L (ref 3.5–5.5)
SODIUM BLD-SCNC: 146 MMOL/L (ref 132–146)

## 2018-12-02 PROCEDURE — 99239 HOSP IP/OBS DSCHRG MGMT >30: CPT | Performed by: INTERNAL MEDICINE

## 2018-12-02 PROCEDURE — 25010000002 HEPARIN (PORCINE) PER 1000 UNITS: Performed by: INTERNAL MEDICINE

## 2018-12-02 PROCEDURE — 80048 BASIC METABOLIC PNL TOTAL CA: CPT

## 2018-12-02 PROCEDURE — 84132 ASSAY OF SERUM POTASSIUM: CPT | Performed by: INTERNAL MEDICINE

## 2018-12-02 PROCEDURE — 25010000002 VANCOMYCIN

## 2018-12-02 RX ORDER — OLANZAPINE 5 MG/1
5 TABLET ORAL NIGHTLY
Status: ON HOLD
Start: 2018-12-02 | End: 2021-07-07

## 2018-12-02 RX ORDER — DIAPER,BRIEF,INFANT-TODD,DISP
EACH MISCELLANEOUS EVERY 12 HOURS SCHEDULED
Status: ON HOLD
Start: 2018-12-02 | End: 2019-06-15

## 2018-12-02 RX ORDER — QUETIAPINE FUMARATE 100 MG/1
100 TABLET, FILM COATED ORAL DAILY
Status: ON HOLD
Start: 2018-12-03 | End: 2021-07-07 | Stop reason: DRUGHIGH

## 2018-12-02 RX ORDER — DOXYCYCLINE 100 MG/1
100 CAPSULE ORAL EVERY 12 HOURS SCHEDULED
Qty: 11 CAPSULE | Refills: 0 | Status: SHIPPED | OUTPATIENT
Start: 2018-12-02 | End: 2018-12-08

## 2018-12-02 RX ORDER — QUETIAPINE 150 MG/1
150 TABLET, FILM COATED, EXTENDED RELEASE ORAL NIGHTLY
Qty: 90 EACH
Start: 2018-12-02 | End: 2019-06-18 | Stop reason: HOSPADM

## 2018-12-02 RX ADMIN — HYDROXYZINE HYDROCHLORIDE 25 MG: 25 TABLET, FILM COATED ORAL at 08:27

## 2018-12-02 RX ADMIN — CITALOPRAM HYDROBROMIDE 20 MG: 20 TABLET ORAL at 08:28

## 2018-12-02 RX ADMIN — TRAZODONE HYDROCHLORIDE 50 MG: 50 TABLET ORAL at 08:28

## 2018-12-02 RX ADMIN — Medication 325 MG: at 08:28

## 2018-12-02 RX ADMIN — Medication 400 MG: at 08:28

## 2018-12-02 RX ADMIN — VANCOMYCIN HYDROCHLORIDE 500 MG: 500 INJECTION, POWDER, LYOPHILIZED, FOR SOLUTION INTRAVENOUS at 11:33

## 2018-12-02 RX ADMIN — QUETIAPINE FUMARATE 100 MG: 100 TABLET ORAL at 08:27

## 2018-12-02 RX ADMIN — TAMSULOSIN HYDROCHLORIDE 0.4 MG: 0.4 CAPSULE ORAL at 08:28

## 2018-12-02 RX ADMIN — SODIUM CHLORIDE, PRESERVATIVE FREE 3 ML: 5 INJECTION INTRAVENOUS at 08:28

## 2018-12-02 RX ADMIN — HEPARIN SODIUM 5000 UNITS: 5000 INJECTION, SOLUTION INTRAVENOUS; SUBCUTANEOUS at 04:55

## 2018-12-02 RX ADMIN — BACITRACIN ZINC: 500 OINTMENT TOPICAL at 08:27

## 2018-12-02 RX ADMIN — VALPROIC ACID 125 MG: 250 SOLUTION ORAL at 11:33

## 2018-12-02 RX ADMIN — DOXYCYCLINE 100 MG: 100 CAPSULE ORAL at 08:27

## 2018-12-02 RX ADMIN — VALPROIC ACID 125 MG: 250 SOLUTION ORAL at 04:54

## 2018-12-02 RX ADMIN — ASPIRIN 81 MG 81 MG: 81 TABLET ORAL at 08:28

## 2018-12-02 NOTE — PROGRESS NOTES
Continued Stay Note  [facility]     Patient Name: Braulio Schwartz  MRN: [id number]  Today's Date: 12/2/2018    Admit Date: 11/26/2018    Discharge Plan     Row Name 12/02/18 1259       Plan    Plan Comments  DC summary faxed. CM following.         Discharge Codes    No documentation.       Expected Discharge Date and Time     Expected Discharge Date Expected Discharge Time    Dec 2, 2018             Justyna Gonzalez RN

## 2018-12-02 NOTE — DISCHARGE PLACEMENT REQUEST
"Richard Schwartz (67 y.o. Male)     Date of Birth Social Security Number Address Home Phone MRN    1950  [place] [address] [phone] [id number]    [facility] Mar[facility] Status          None        Admission Date Admission Type Admitting Provider Attending Provider Department, Room/Bed    11/26/18 Urgent Paramjit Mcarthur MD Furlow, Stephen Matthew, MD [facility] 3E, S335/1    Discharge Date Discharge Disposition Discharge Destination         Skilled Nursing Facility (DC - External)              Attending Provider:  Paramjit Mcarthur MD    Allergies:  Haldol [Haloperidol], Ibuprofen    Isolation:  None   Infection:  None   Code Status:  CPR    Ht:  180.3 cm (70.98\")   Wt:  95.8 kg (211 lb 4.8 oz)    Admission Cmt:  None   Principal Problem:  Hypothermia [T68.XXXA]                 Active Insurance as of 11/26/2018     Primary Coverage     Payor Plan Insurance Group Employer/Plan Group    MEDICARE MEDICARE A & B      Payor Plan Address Payor Plan Phone Number Payor Plan Fax Number Effective Dates    [address] [phone]  10/1/1985 - None Entered    [facility] [id number]       Subscriber Name Subscriber Birth Date Member ID       RICHARD SCHWARTZ 1950 [id number]           Secondary Coverage     Payor Plan Insurance Group Employer/Plan Group    MISC NURSING HOME MISC NURSING HOME      Coverage Address Coverage Phone Number Coverage Fax Number Effective Dates    [address]   5/1/2018 - None Entered    [facility] [id number]       Subscriber Name Subscriber Birth Date Member ID       RICHARD SCHWARTZ 1950 [id number]           Tertiary Coverage     Payor Plan Insurance Group Employer/Plan Group    [place]      Payor Plan Address Payor Plan Phone Number Payor Plan Fax Number Effective Dates    [address] [phone]  4/19/2018 - None Entered    [place] [address]       Subscriber Name Subscriber Birth Date Member ID       " RICHARD SCHWARTZ 1950 [id number]                 Emergency Contacts      (Rel.) Home Phone Work Phone Mobile Phone    Melissa Lee (Guardian) [phone] -- --                 Discharge Summary      Paramjit Mcarthur MD at 2018 12:07 PM              [facility] Medicine Services  DISCHARGE SUMMARY    Patient Name: Richard Schwartz  : 1950  MRN: [id number]    Date of Admission: 2018  Date of Discharge:  2018  Primary Care Physician: Yandel Steward MD    Consults     No orders found from 10/28/2018 to 2018.        Hospital Course     Presenting Problem:   Hypothermia [T68.XXXA]    Active Hospital Problems    Diagnosis Date Noted   • **Hypothermia [T68.XXXA] 2018   • Oral phase dysphagia [R13.11] 2018   • Bipolar disorder (CMS/HCC) [F31.9] 2018   • Cellulitis of leg, right [L03.115] 2018   • Chronic hypertension [I10] 2018   • Other schizophrenia (CMS/[facility]) [F20.89] 2018      Resolved Hospital Problems    Diagnosis Date Noted Date Resolved   • Altered mental status [R41.82] 2018   • Acute metabolic encephalopathy [G93.41] 2018   • Urinary retention [R33.9] 2018          Hospital Course:  Richard Schwartz is a 67 y.o. male  Richard Schwartz is a 67 y.o. male with bipolar disease and schizophrenia, vargas of the [facility] and longterm resident of a facility.  He was transferred from [facility] with lethargy and temp of 93, was admitted to [facility] ICU with presumed sepsis. Bilat LE cellulitis was diagnosed.  Patient was treated with broad-spectrum IV antibiotics and.  He clinically improved and stabilized.  He was lethargic and his mood stabilizers were decreased.  He had elevated sodium levels and encouraged fluid.  Please have staff help him with feeds and encouraged him to drink a large glass of water with every meal.  Patient was found to have dysphagia by speech  therapy and is now on a modified diet.  Please see discharge orders for full details on diet.  Patient is felt to be stabilized and received maximum benefit from inpatient therapy and is cleared for discharge today.  He will need monitoring of his electrolytes including potassium, creatinine, and sodium levels and recommend BMP in 2 days.  His Zyprexa dose was decreased at night as he was having severe lethargy in the morning.     Assessment & Plan:  Hypothermia   - resolved with warming and treatment of sepsis.      Hypernatremia  - better after fluids, now drinking.  Please have him have assistance with eating and drink at least one glass of water with each meals.     Cellulitis   - IV Vanco day 5, level 19 on 12/1, discharge on doxycycline.  --slightly improved cr     CKD   4  - cr improved, felt to be a long-term baseline.  - fluids  - baseline 2.5     Oral dysphagia and nutrition  - mod oral/pharyngial dysfxn  - puree/thin.    Followed by speech therapy.  Assist with feeding.  Low potassium diet for now however patient will restart Lasix at discharge and if potassium stabilizes he can likely go back to a non-potassium restricted diet.                Discharge Follow Up Recommendations for labs/diagnostics:  Recommend BMP in 2 days at facility to monitor K and Cr    Day of Discharge     HPI:   Poor historian.  No complaints.  Eating and drinking well.  Nursing reported he did well overnight.  He is agreeable to transfer today back to his facility when plan discussed.  Oriented to person and situation somewhat as he notes he is in the hospital.    Review of Systems  No current fevers or chills  No current shortness of breath or cough  No current nausea, vomiting, or diarrhea  No current chest pain or palpitations      Vital Signs:   Temp:  [97.2 °F (36.2 °C)-98.7 °F (37.1 °C)] 97.9 °F (36.6 °C)  Heart Rate:  [58-90] 65  Resp:  [14-18] 18  BP: (125-148)/(75-88) 133/86     Physical Exam:  General: Alert,  well-developed obese male in no acute distress     Head: Normocephalic atraumatic     Eyes: PERRLA, EOMI, nonicteric, conjunctiva normal     ENT: Pink, moist mucous membranes with poor dentition     Neck: Supple, nontender, trachea midline without lymphadenopathy.       Cardiovascular: RRR  +radial pulses bilaterally     Respiratory: Nonlabored, symmetrical chest expansion, clear to auscultation bilaterally     Abdomen: Obese, soft, nontender, nondistended,  positive bowel sounds in all 4 quadrants      Extremities: FROM in upper and lower extremities bilaterally. +1 pitting edema with pink non-weeping LE erythema approximately 2 inches below knees;  Negative calf pain; Feet in waffle boots - erythema noted bilaterally, improved     Skin: Pink/warm/dry.  No rash or lesions noted.  pink non-weeping LE erythema approximately 2 inches below knees with superficial scratches and old scabs     Neuro: oriented to person, dysarthric speech but can be understood, follows all simple commands, poor historian      Psych: Patient is pleasant and cooperative.  Normal affect          Pertinent  and/or Most Recent Results     Results from last 7 days   Lab Units  12/02/18   1121  12/02/18   0533  12/01/18   0532  11/29/18   0519  11/28/18   0423  11/27/18   1047  11/27/18   0613   WBC 10*3/mm3   --    --    --   4.99  5.23  4.80   --    HEMOGLOBIN g/dL   --    --    --   10.4*  11.5*  11.5*   --    HEMATOCRIT %   --    --    --   34.2*  37.7*  37.3*   --    PLATELETS 10*3/mm3   --    --    --   94*  111*  110*   --    SODIUM mmol/L   --   146  142  145  149*   --   145   POTASSIUM mmol/L  5.4  5.7*  4.8  4.6  5.0   --   5.1   CHLORIDE mmol/L   --   114*  111*  115*  116*   --   113*   CO2 mmol/L   --   26.0  28.0  27.0  23.0   --   29.0   BUN mg/dL   --   35*  34*  40*  43*   --   44*   CREATININE mg/dL   --   2.43*  2.35*  2.78*  3.02*   --   2.61*   GLUCOSE mg/dL   --   83  77  98  89   --   80   CALCIUM mg/dL   --   7.9*  8.2*   8.3*  8.6*   --   7.8*     Results from last 7 days   Lab Units  11/28/18   0423   BILIRUBIN mg/dL  0.3   ALK PHOS U/L  150*   ALT (SGPT) U/L  25   AST (SGOT) U/L  21           Invalid input(s): TG, LDLCALC, LDLREALC  Results from last 7 days   Lab Units  11/27/18   0613   TSH mIU/mL  3.144   CORTISOL mcg/dL  22.90     Brief Urine Lab Results  (Last result in the past 365 days)      Color   Clarity   Blood   Leuk Est   Nitrite   Protein   CREAT   Urine HCG        11/27/18 0242 Yellow Clear Negative Negative Negative Negative               Microbiology Results Abnormal     Procedure Component Value - Date/Time    Blood Culture - Blood, Hand, Left [[id number]] Collected:  11/26/18 1737    Lab Status:  Final result Specimen:  Blood from Hand, Left Updated:  12/01/18 1815     Blood Culture No growth at 5 days      Aerobic bottle only    Blood Culture - Blood, Arm, Left [[id number]] Collected:  11/26/18 1735    Lab Status:  Final result Specimen:  Blood from Arm, Left Updated:  12/01/18 1815     Blood Culture No growth at 5 days          Imaging Results (all)     Procedure Component Value Units Date/Time    FL Video Swallow With Speech [[id number]] Collected:  11/28/18 1538     Updated:  11/28/18 1610    Narrative:       EXAMINATION: FL VIDEO SWALLOW W SPEECH-     INDICATION: dysphagia     TECHNIQUE: 1 minute and 12 seconds of fluoroscopic time was used for  this exam. 1 associated image was saved. The patient was evaluated in  the seated lateral position while taking a variety of consistencies of  barium by mouth under the direction of speech pathology.     COMPARISON: NONE     FINDINGS: There was penetration that cleared without aspiration with  sips of thin barium. There was no penetration and no aspiration with  pudding, or solid consistency barium.          Impression:       Fluoroscopy provided for a modified barium swallow. Please  see speech therapy report for full details and recommendations.         This report  was finalized on 11/28/2018 4:07 PM by Santiago Cazares.       XR Chest 1 View [[id number]] Collected:  11/27/18 0909     Updated:  11/27/18 1044    Narrative:       EXAMINATION: XR CHEST 1 VW-11/27/2018:      INDICATION: Hypertension.     TECHNIQUE:  Single view frontal chest.     COMPARISONS: None.     FINDINGS:  Basal hypoventilatory change. Possible trace effusions. No  pneumothorax. Cardiomediastinal silhouette is grossly normal for  technique.       Impression:       Basal hypoventilatory change.     D:  11/27/2018  E:  11/27/2018     This report was finalized on 11/27/2018 10:42 AM by Santiago Cazares.                         Discharge Details        Discharge Medications      New Medications      Instructions Start Date   bacitracin 500 UNIT/GM ointment   Topical, Every 12 Hours Scheduled      doxycycline 100 MG capsule  Commonly known as:  MONODOX   100 mg, Oral, Every 12 Hours Scheduled         Changes to Medications      Instructions Start Date   furosemide 20 MG tablet  Commonly known as:  LASIX  What changed:  Another medication with the same name was removed. Continue taking this medication, and follow the directions you see here.   20 mg, Oral, Daily      OLANZapine 5 MG tablet  Commonly known as:  zyPREXA  What changed:    · medication strength  · how much to take   5 mg, Oral, Nightly      QUEtiapine  MG 24 hr tablet  Commonly known as:  SEROquel XR  What changed:  Another medication with the same name was changed. Make sure you understand how and when to take each.   150 mg, Oral, Nightly      QUEtiapine 100 MG tablet  Commonly known as:  SEROquel  What changed:    · medication strength  · how much to take  · when to take this   100 mg, Oral, Daily      traZODone 50 MG tablet  Commonly known as:  DESYREL  What changed:  when to take this   50 mg, Oral, Nightly         Continue These Medications      Instructions Start Date   acetaminophen 325 MG tablet  Commonly known as:  TYLENOL   650 mg, Oral, Every  6 Hours PRN      amLODIPine 5 MG tablet  Commonly known as:  NORVASC   5 mg, Oral, Daily      aspirin 81 MG chewable tablet   81 mg, Oral, Daily      azelastine 0.1 % nasal spray  Commonly known as:  ASTELIN   2 sprays, Nasal, 2 Times Daily PRN, Use in each nostril as directed       bisacodyl 10 MG suppository  Commonly known as:  DULCOLAX   10 mg, Rectal, Daily PRN      CHAPSTICK stick   1 each, Apply externally, Every 1 Hour PRN      citalopram 20 MG tablet  Commonly known as:  CeleXA   20 mg, Oral, Daily      dextromethorphan-quinidine 20-10 MG capsule capsule  Commonly known as:  NUEDEXTA   1 capsule, Oral, Daily      divalproex 500 MG 24 hr tablet  Commonly known as:  DEPAKOTE   500 mg, Oral, Daily      ferrous sulfate 325 (65 FE) MG tablet   325 mg, Oral, 2 Times Daily      guaiFENesin 600 MG 12 hr tablet  Commonly known as:  MUCINEX   600 mg, Oral, Every 12 Hours Scheduled      hydrOXYzine 25 MG tablet  Commonly known as:  ATARAX   25 mg, Oral, 2 Times Daily      ipratropium-albuterol 0.5-2.5 mg/3 ml nebulizer  Commonly known as:  DUO-NEB   3 mL, Nebulization, Every 4 Hours PRN      magnesium oxide 400 (241.3 Mg) MG tablet tablet  Commonly known as:  MAGOX   400 mg, Oral, 2 Times Daily      megestrol acetate 400 MG/10ML suspension oral suspension  Commonly known as:  MEGACE   400 mg, Oral, 2 Times Daily      PROAIR  (90 Base) MCG/ACT inhaler  Generic drug:  albuterol   2 puffs, Inhalation, Every 4 Hours PRN      tamsulosin 0.4 MG capsule 24 hr capsule  Commonly known as:  FLOMAX   0.4 mg, Oral, Daily      VITAMIN D2 PO   50,000 Units, Oral, Weekly         Stop These Medications    fludrocortisone 0.1 MG tablet     predniSONE 20 MG tablet  Commonly known as:  DELTASONE     risperiDONE 2 MG tablet  Commonly known as:  risperDAL     risperiDONE microspheres 25 MG injection  Commonly known as:  risperDAL CONSTA              Discharge Disposition:  Skilled Nursing Facility (DC - External)    Discharge  Diet:  Diet Instructions     Diet: Dysphagia, Cardiac; Thin Liquids, No Restrictions; Pureed      Discharge Diet:   Dysphagia  Cardiac       Fluid Consistency:  Thin Liquids, No Restrictions    Pureed Options:  Pureed    Low potassium, meds whole in applesauce.  No straws.          Discharge Activity:   Activity Instructions     Other Instructions (Specify)      Up with assist            Code Status/Level of Support:  Code Status and Medical Interventions:   Ordered at: 11/26/18 1716     Code Status:    CPR     Medical Interventions (Level of Support Prior to Arrest):    Full       No future appointments.    Additional Instructions for the Follow-ups that You Need to Schedule     Discharge Follow-up with PCP   As directed       Currently Documented PCP:    Yandel Steward MD    PCP Phone Number:    [phone]     Follow Up Details:  1 week               Time Spent on Discharge:  37 minutes    Electronically signed by Paramjit Mcarthur MD, 12/02/18, 12:07 PM.        Electronically signed by Paramjit Mcarthur MD at 12/2/2018 12:16 PM

## 2018-12-02 NOTE — DISCHARGE SUMMARY
[facility] Medicine Services  DISCHARGE SUMMARY    Patient Name: Braulio Schwartz  : 1950  MRN: [id number]    Date of Admission: 2018  Date of Discharge:  2018  Primary Care Physician: Yandel Steward MD    Consults     No orders found from 10/28/2018 to 2018.        Hospital Course     Presenting Problem:   Hypothermia [T68.XXXA]    Active Hospital Problems    Diagnosis Date Noted   • **Hypothermia [T68.XXXA] 2018   • Oral phase dysphagia [R13.11] 2018   • Bipolar disorder (CMS/HCC) [F31.9] 2018   • Cellulitis of leg, right [L03.115] 2018   • Chronic hypertension [I10] 2018   • Other schizophrenia (CMS/HCC) [F20.89] 2018      Resolved Hospital Problems    Diagnosis Date Noted Date Resolved   • Altered mental status [R41.82] 2018   • Acute metabolic encephalopathy [G93.41] 2018   • Urinary retention [R33.9] 2018          Hospital Course:  Braulio Schwartz is a 67 y.o. male  Braulio Schwartz is a 67 y.o. male with bipolar disease and schizophrenia, vargas of the [facility] and longterm resident of a facility.  He was transferred from [facility] with lethargy and temp of 93, was admitted to [facility] ICU with presumed sepsis. Bilat LE cellulitis was diagnosed.  Patient was treated with broad-spectrum IV antibiotics and.  He clinically improved and stabilized.  He was lethargic and his mood stabilizers were decreased.  He had elevated sodium levels and encouraged fluid.  Please have staff help him with feeds and encouraged him to drink a large glass of water with every meal.  Patient was found to have dysphagia by speech therapy and is now on a modified diet.  Please see discharge orders for full details on diet.  Patient is felt to be stabilized and received maximum benefit from inpatient therapy and is cleared for discharge today.  He will need monitoring of his electrolytes including  potassium, creatinine, and sodium levels and recommend BMP in 2 days.  His Zyprexa dose was decreased at night as he was having severe lethargy in the morning.     Assessment & Plan:  Hypothermia   - resolved with warming and treatment of sepsis.      Hypernatremia  - better after fluids, now drinking.  Please have him have assistance with eating and drink at least one glass of water with each meals.     Cellulitis   - IV Vanco day 5, level 19 on 12/1, discharge on doxycycline.  --slightly improved cr     CKD  4  - cr improved, felt to be a long-term baseline.  - fluids  - baseline 2.5     Oral dysphagia and nutrition  - mod oral/pharyngial dysfxn  - puree/thin.    Followed by speech therapy.  Assist with feeding.  Low potassium diet for now however patient will restart Lasix at discharge and if potassium stabilizes he can likely go back to a non-potassium restricted diet.                Discharge Follow Up Recommendations for labs/diagnostics:  Recommend BMP in 2 days at facility to monitor K and Cr    Day of Discharge     HPI:   Poor historian.  No complaints.  Eating and drinking well.  Nursing reported he did well overnight.  He is agreeable to transfer today back to his facility when plan discussed.  Oriented to person and situation somewhat as he notes he is in the hospital.    Review of Systems  No current fevers or chills  No current shortness of breath or cough  No current nausea, vomiting, or diarrhea  No current chest pain or palpitations      Vital Signs:   Temp:  [97.2 °F (36.2 °C)-98.7 °F (37.1 °C)] 97.9 °F (36.6 °C)  Heart Rate:  [58-90] 65  Resp:  [14-18] 18  BP: (125-148)/(75-88) 133/86     Physical Exam:  General: Alert, well-developed obese male in no acute distress     Head: Normocephalic atraumatic     Eyes: PERRLA, EOMI, nonicteric, conjunctiva normal     ENT: Pink, moist mucous membranes with poor dentition     Neck: Supple, nontender, trachea midline without lymphadenopathy.        Cardiovascular: RRR  +radial pulses bilaterally     Respiratory: Nonlabored, symmetrical chest expansion, clear to auscultation bilaterally     Abdomen: Obese, soft, nontender, nondistended,  positive bowel sounds in all 4 quadrants      Extremities: FROM in upper and lower extremities bilaterally. +1 pitting edema with pink non-weeping LE erythema approximately 2 inches below knees;  Negative calf pain; Feet in waffle boots - erythema noted bilaterally, improved     Skin: Pink/warm/dry.  No rash or lesions noted.  pink non-weeping LE erythema approximately 2 inches below knees with superficial scratches and old scabs     Neuro: oriented to person, dysarthric speech but can be understood, follows all simple commands, poor historian      Psych: Patient is pleasant and cooperative.  Normal affect          Pertinent  and/or Most Recent Results     Results from last 7 days   Lab Units  12/02/18   1121  12/02/18   0533  12/01/18   0532  11/29/18   0519  11/28/18   0423  11/27/18   1047  11/27/18   0613   WBC 10*3/mm3   --    --    --   4.99  5.23  4.80   --    HEMOGLOBIN g/dL   --    --    --   10.4*  11.5*  11.5*   --    HEMATOCRIT %   --    --    --   34.2*  37.7*  37.3*   --    PLATELETS 10*3/mm3   --    --    --   94*  111*  110*   --    SODIUM mmol/L   --   146  142  145  149*   --   145   POTASSIUM mmol/L  5.4  5.7*  4.8  4.6  5.0   --   5.1   CHLORIDE mmol/L   --   114*  111*  115*  116*   --   113*   CO2 mmol/L   --   26.0  28.0  27.0  23.0   --   29.0   BUN mg/dL   --   35*  34*  40*  43*   --   44*   CREATININE mg/dL   --   2.43*  2.35*  2.78*  3.02*   --   2.61*   GLUCOSE mg/dL   --   83  77  98  89   --   80   CALCIUM mg/dL   --   7.9*  8.2*  8.3*  8.6*   --   7.8*     Results from last 7 days   Lab Units  11/28/18   0423   BILIRUBIN mg/dL  0.3   ALK PHOS U/L  150*   ALT (SGPT) U/L  25   AST (SGOT) U/L  21           Invalid input(s): TG, LDLCALC, LDLREALC  Results from last 7 days   Lab Units  11/27/18    0613   TSH mIU/mL  3.144   CORTISOL mcg/dL  22.90     Brief Urine Lab Results  (Last result in the past 365 days)      Color   Clarity   Blood   Leuk Est   Nitrite   Protein   CREAT   Urine HCG        11/27/18 0242 Yellow Clear Negative Negative Negative Negative               Microbiology Results Abnormal     Procedure Component Value - Date/Time    Blood Culture - Blood, Hand, Left [[id number]] Collected:  11/26/18 1737    Lab Status:  Final result Specimen:  Blood from Hand, Left Updated:  12/01/18 1815     Blood Culture No growth at 5 days      Aerobic bottle only    Blood Culture - Blood, Arm, Left [[id number]] Collected:  11/26/18 1735    Lab Status:  Final result Specimen:  Blood from Arm, Left Updated:  12/01/18 1815     Blood Culture No growth at 5 days          Imaging Results (all)     Procedure Component Value Units Date/Time    FL Video Swallow With Speech [[id number]] Collected:  11/28/18 1538     Updated:  11/28/18 1610    Narrative:       EXAMINATION: FL VIDEO SWALLOW W SPEECH-     INDICATION: dysphagia     TECHNIQUE: 1 minute and 12 seconds of fluoroscopic time was used for  this exam. 1 associated image was saved. The patient was evaluated in  the seated lateral position while taking a variety of consistencies of  barium by mouth under the direction of speech pathology.     COMPARISON: NONE     FINDINGS: There was penetration that cleared without aspiration with  sips of thin barium. There was no penetration and no aspiration with  pudding, or solid consistency barium.          Impression:       Fluoroscopy provided for a modified barium swallow. Please  see speech therapy report for full details and recommendations.         This report was finalized on 11/28/2018 4:07 PM by Santiago Cazares.       XR Chest 1 View [[id number]] Collected:  11/27/18 0909     Updated:  11/27/18 1044    Narrative:       EXAMINATION: XR CHEST 1 VW-11/27/2018:      INDICATION: Hypertension.     TECHNIQUE:  Single view frontal  chest.     COMPARISONS: None.     FINDINGS:  Basal hypoventilatory change. Possible trace effusions. No  pneumothorax. Cardiomediastinal silhouette is grossly normal for  technique.       Impression:       Basal hypoventilatory change.     D:  11/27/2018  E:  11/27/2018     This report was finalized on 11/27/2018 10:42 AM by Santiago Cazares.                         Discharge Details        Discharge Medications      New Medications      Instructions Start Date   bacitracin 500 UNIT/GM ointment   Topical, Every 12 Hours Scheduled      doxycycline 100 MG capsule  Commonly known as:  MONODOX   100 mg, Oral, Every 12 Hours Scheduled         Changes to Medications      Instructions Start Date   furosemide 20 MG tablet  Commonly known as:  LASIX  What changed:  Another medication with the same name was removed. Continue taking this medication, and follow the directions you see here.   20 mg, Oral, Daily      OLANZapine 5 MG tablet  Commonly known as:  zyPREXA  What changed:    · medication strength  · how much to take   5 mg, Oral, Nightly      QUEtiapine  MG 24 hr tablet  Commonly known as:  SEROquel XR  What changed:  Another medication with the same name was changed. Make sure you understand how and when to take each.   150 mg, Oral, Nightly      QUEtiapine 100 MG tablet  Commonly known as:  SEROquel  What changed:    · medication strength  · how much to take  · when to take this   100 mg, Oral, Daily      traZODone 50 MG tablet  Commonly known as:  DESYREL  What changed:  when to take this   50 mg, Oral, Nightly         Continue These Medications      Instructions Start Date   acetaminophen 325 MG tablet  Commonly known as:  TYLENOL   650 mg, Oral, Every 6 Hours PRN      amLODIPine 5 MG tablet  Commonly known as:  NORVASC   5 mg, Oral, Daily      aspirin 81 MG chewable tablet   81 mg, Oral, Daily      azelastine 0.1 % nasal spray  Commonly known as:  ASTELIN   2 sprays, Nasal, 2 Times Daily PRN, Use in each nostril  as directed       bisacodyl 10 MG suppository  Commonly known as:  DULCOLAX   10 mg, Rectal, Daily PRN      CHAPSTICK stick   1 each, Apply externally, Every 1 Hour PRN      citalopram 20 MG tablet  Commonly known as:  CeleXA   20 mg, Oral, Daily      dextromethorphan-quinidine 20-10 MG capsule capsule  Commonly known as:  NUEDEXTA   1 capsule, Oral, Daily      divalproex 500 MG 24 hr tablet  Commonly known as:  DEPAKOTE   500 mg, Oral, Daily      ferrous sulfate 325 (65 FE) MG tablet   325 mg, Oral, 2 Times Daily      guaiFENesin 600 MG 12 hr tablet  Commonly known as:  MUCINEX   600 mg, Oral, Every 12 Hours Scheduled      hydrOXYzine 25 MG tablet  Commonly known as:  ATARAX   25 mg, Oral, 2 Times Daily      ipratropium-albuterol 0.5-2.5 mg/3 ml nebulizer  Commonly known as:  DUO-NEB   3 mL, Nebulization, Every 4 Hours PRN      magnesium oxide 400 (241.3 Mg) MG tablet tablet  Commonly known as:  MAGOX   400 mg, Oral, 2 Times Daily      megestrol acetate 400 MG/10ML suspension oral suspension  Commonly known as:  MEGACE   400 mg, Oral, 2 Times Daily      PROAIR  (90 Base) MCG/ACT inhaler  Generic drug:  albuterol   2 puffs, Inhalation, Every 4 Hours PRN      tamsulosin 0.4 MG capsule 24 hr capsule  Commonly known as:  FLOMAX   0.4 mg, Oral, Daily      VITAMIN D2 PO   50,000 Units, Oral, Weekly         Stop These Medications    fludrocortisone 0.1 MG tablet     predniSONE 20 MG tablet  Commonly known as:  DELTASONE     risperiDONE 2 MG tablet  Commonly known as:  risperDAL     risperiDONE microspheres 25 MG injection  Commonly known as:  risperDAL CONSTA              Discharge Disposition:  Skilled Nursing Facility (DC - External)    Discharge Diet:  Diet Instructions     Diet: Dysphagia, Cardiac; Thin Liquids, No Restrictions; Pureed      Discharge Diet:   Dysphagia  Cardiac       Fluid Consistency:  Thin Liquids, No Restrictions    Pureed Options:  Pureed    Low potassium, meds whole in applesauce.  No  dorothy.          Discharge Activity:   Activity Instructions     Other Instructions (Specify)      Up with assist            Code Status/Level of Support:  Code Status and Medical Interventions:   Ordered at: 11/26/18 3355     Code Status:    CPR     Medical Interventions (Level of Support Prior to Arrest):    Full       No future appointments.    Additional Instructions for the Follow-ups that You Need to Schedule     Discharge Follow-up with PCP   As directed       Currently Documented PCP:    Yandel Steward MD    PCP Phone Number:    [phone]     Follow Up Details:  1 week               Time Spent on Discharge:  37 minutes    Electronically signed by Paramjit Mcarthur MD, 12/02/18, 12:07 PM.

## 2018-12-14 PROCEDURE — 83880 ASSAY OF NATRIURETIC PEPTIDE: CPT

## 2018-12-14 PROCEDURE — 80048 BASIC METABOLIC PNL TOTAL CA: CPT

## 2018-12-14 PROCEDURE — 85025 COMPLETE CBC W/AUTO DIFF WBC: CPT

## 2018-12-15 ENCOUNTER — LAB REQUISITION (OUTPATIENT)
Dept: LAB | Facility: HOSPITAL | Age: 68
End: 2018-12-15

## 2018-12-15 DIAGNOSIS — Z00.00 ROUTINE GENERAL MEDICAL EXAMINATION AT A HEALTH CARE FACILITY: ICD-10-CM

## 2018-12-15 LAB
ANION GAP SERPL CALCULATED.3IONS-SCNC: 15 MMOL/L
BASOPHILS # BLD AUTO: 0.01 10*3/MM3 (ref 0–0.2)
BASOPHILS NFR BLD AUTO: 0.2 % (ref 0–1.5)
BUN BLD-MCNC: 34 MG/DL (ref 8–23)
BUN/CREAT SERPL: 12.2 (ref 7–25)
CALCIUM SPEC-SCNC: 8.5 MG/DL (ref 8.6–10.5)
CHLORIDE SERPL-SCNC: 105 MMOL/L (ref 98–107)
CO2 SERPL-SCNC: 21 MMOL/L (ref 22–29)
CREAT BLD-MCNC: 2.79 MG/DL (ref 0.76–1.27)
DEPRECATED RDW RBC AUTO: 53 FL (ref 37–54)
EOSINOPHIL # BLD AUTO: 0.1 10*3/MM3 (ref 0–0.7)
EOSINOPHIL NFR BLD AUTO: 2.1 % (ref 0.3–6.2)
ERYTHROCYTE [DISTWIDTH] IN BLOOD BY AUTOMATED COUNT: 16.3 % (ref 11.5–14.5)
GFR SERPL CREATININE-BSD FRML MDRD: 23 ML/MIN/1.73
GLUCOSE BLD-MCNC: 215 MG/DL (ref 65–99)
HCT VFR BLD AUTO: 34 % (ref 40.4–52.2)
HGB BLD-MCNC: 11.2 G/DL (ref 13.7–17.6)
IMM GRANULOCYTES # BLD: 0.08 10*3/MM3 (ref 0–0.03)
IMM GRANULOCYTES NFR BLD: 1.7 % (ref 0–0.5)
LYMPHOCYTES # BLD AUTO: 1.73 10*3/MM3 (ref 0.9–4.8)
LYMPHOCYTES NFR BLD AUTO: 36.8 % (ref 19.6–45.3)
MCH RBC QN AUTO: 29.2 PG (ref 27–32.7)
MCHC RBC AUTO-ENTMCNC: 32.9 G/DL (ref 32.6–36.4)
MCV RBC AUTO: 88.5 FL (ref 79.8–96.2)
MONOCYTES # BLD AUTO: 0.48 10*3/MM3 (ref 0.2–1.2)
MONOCYTES NFR BLD AUTO: 10.2 % (ref 5–12)
NEUTROPHILS # BLD AUTO: 2.38 10*3/MM3 (ref 1.9–8.1)
NEUTROPHILS NFR BLD AUTO: 50.7 % (ref 42.7–76)
NRBC BLD MANUAL-RTO: 0 /100 WBC (ref 0–0)
NT-PROBNP SERPL-MCNC: 154.8 PG/ML (ref 0–900)
PLATELET # BLD AUTO: 146 10*3/MM3 (ref 140–500)
PMV BLD AUTO: 11.3 FL (ref 6–12)
POTASSIUM BLD-SCNC: 5.2 MMOL/L (ref 3.5–5.2)
RBC # BLD AUTO: 3.84 10*6/MM3 (ref 4.6–6)
SODIUM BLD-SCNC: 141 MMOL/L (ref 136–145)
WBC NRBC COR # BLD: 4.7 10*3/MM3 (ref 4.5–10.7)

## 2019-03-07 ENCOUNTER — HOSPITAL ENCOUNTER (EMERGENCY)
Facility: HOSPITAL | Age: 69
Discharge: PSYCHIATRIC OR SUBSTANCE USE FACILITY | End: 2019-03-08
Attending: EMERGENCY MEDICINE
Payer: MEDICARE

## 2019-03-07 ENCOUNTER — APPOINTMENT (OUTPATIENT)
Dept: CT IMAGING | Facility: HOSPITAL | Age: 69
End: 2019-03-07
Payer: MEDICARE

## 2019-03-07 ENCOUNTER — APPOINTMENT (OUTPATIENT)
Dept: GENERAL RADIOLOGY | Facility: HOSPITAL | Age: 69
End: 2019-03-07
Payer: MEDICARE

## 2019-03-07 DIAGNOSIS — R41.82 ALTERED MENTAL STATUS, UNSPECIFIED ALTERED MENTAL STATUS TYPE: Primary | ICD-10-CM

## 2019-03-07 DIAGNOSIS — N17.9 ACUTE RENAL FAILURE, UNSPECIFIED ACUTE RENAL FAILURE TYPE (HCC): ICD-10-CM

## 2019-03-07 DIAGNOSIS — J10.1 INFLUENZA A: ICD-10-CM

## 2019-03-07 DIAGNOSIS — J10.1 INFLUENZA B: ICD-10-CM

## 2019-03-07 DIAGNOSIS — R00.1 BRADYCARDIA: ICD-10-CM

## 2019-03-07 DIAGNOSIS — E87.5 HYPERKALEMIA: ICD-10-CM

## 2019-03-07 LAB
A/G RATIO: 1 (ref 0.8–2)
ALBUMIN SERPL-MCNC: 2.9 G/DL (ref 3.4–4.8)
ALP BLD-CCNC: 132 U/L (ref 25–100)
ALT SERPL-CCNC: 9 U/L (ref 4–36)
ANION GAP SERPL CALCULATED.3IONS-SCNC: 10 MMOL/L (ref 3–16)
ANION GAP SERPL CALCULATED.3IONS-SCNC: 8 MMOL/L (ref 3–16)
AST SERPL-CCNC: 13 U/L (ref 8–33)
BASE EXCESS ARTERIAL: 3.2 MMOL/L (ref -3–3)
BASE EXCESS ARTERIAL: 3.6 MMOL/L (ref -3–3)
BASE EXCESS VENOUS: 4.2 MMOL/L (ref -3–3)
BASOPHILS ABSOLUTE: 0 K/UL (ref 0–0.1)
BASOPHILS RELATIVE PERCENT: 0.4 %
BILIRUB SERPL-MCNC: <0.2 MG/DL (ref 0.3–1.2)
BILIRUBIN URINE: NEGATIVE
BLOOD, URINE: NEGATIVE
BUN BLDV-MCNC: 40 MG/DL (ref 6–20)
BUN BLDV-MCNC: 41 MG/DL (ref 6–20)
CALCIUM SERPL-MCNC: 8.3 MG/DL (ref 8.5–10.5)
CALCIUM SERPL-MCNC: 8.3 MG/DL (ref 8.5–10.5)
CHLORIDE BLD-SCNC: 101 MMOL/L (ref 98–107)
CHLORIDE BLD-SCNC: 98 MMOL/L (ref 98–107)
CHP ED QC CHECK: 66
CHP ED QC CHECK: NORMAL
CLARITY: CLEAR
CO2: 29 MMOL/L (ref 20–30)
CO2: 29 MMOL/L (ref 20–30)
COLOR: YELLOW
CREAT SERPL-MCNC: 3.5 MG/DL (ref 0.4–1.2)
CREAT SERPL-MCNC: 3.5 MG/DL (ref 0.4–1.2)
EOSINOPHILS ABSOLUTE: 0 K/UL (ref 0–0.4)
EOSINOPHILS RELATIVE PERCENT: 0.4 %
FIO2: 0.5 %
FIO2: 0.5 %
GFR AFRICAN AMERICAN: 21
GFR AFRICAN AMERICAN: 21
GFR NON-AFRICAN AMERICAN: 18
GFR NON-AFRICAN AMERICAN: 18
GLOBULIN: 2.8 G/DL
GLUCOSE BLD-MCNC: 101 MG/DL (ref 74–106)
GLUCOSE BLD-MCNC: 50 MG/DL (ref 74–106)
GLUCOSE BLD-MCNC: 59 MG/DL (ref 74–106)
GLUCOSE BLD-MCNC: 80 MG/DL (ref 74–106)
GLUCOSE BLD-MCNC: 81 MG/DL (ref 74–106)
GLUCOSE BLD-MCNC: 86 MG/DL
GLUCOSE BLD-MCNC: 86 MG/DL (ref 74–106)
GLUCOSE BLD-MCNC: 89 MG/DL (ref 74–106)
GLUCOSE URINE: NEGATIVE MG/DL
HCO3 ARTERIAL: 30.8 MMOL/L (ref 22–26)
HCO3 ARTERIAL: 31.8 MMOL/L (ref 22–26)
HCO3 VENOUS: 32.6 MMOL/L (ref 23–29)
HCT VFR BLD CALC: 36.5 % (ref 40–54)
HEMOGLOBIN: 11.1 G/DL (ref 13–18)
IMMATURE GRANULOCYTES #: 0.1 K/UL
IMMATURE GRANULOCYTES %: 1.3 % (ref 0–5)
KETONES, URINE: NEGATIVE MG/DL
LACTIC ACID: 1.8 MMOL/L (ref 0.4–2)
LEUKOCYTE ESTERASE, URINE: NEGATIVE
LYMPHOCYTES ABSOLUTE: 1.3 K/UL (ref 1.5–4)
LYMPHOCYTES RELATIVE PERCENT: 18.1 %
MCH RBC QN AUTO: 29.6 PG (ref 27–32)
MCHC RBC AUTO-ENTMCNC: 30.4 G/DL (ref 31–35)
MCV RBC AUTO: 97.3 FL (ref 80–100)
MICROSCOPIC EXAMINATION: NORMAL
MONOCYTES ABSOLUTE: 0.7 K/UL (ref 0.2–0.8)
MONOCYTES RELATIVE PERCENT: 10 %
NEUTROPHILS ABSOLUTE: 4.9 K/UL (ref 2–7.5)
NEUTROPHILS RELATIVE PERCENT: 69.8 %
NITRITE, URINE: NEGATIVE
O2 SAT, ARTERIAL: 96.7 %
O2 SAT, ARTERIAL: 99.5 %
O2 THERAPY: ABNORMAL
PCO2 ARTERIAL: 64.9 MMHG (ref 35–45)
PCO2 ARTERIAL: 72.5 MMHG (ref 35–45)
PCO2, VEN: 71.2 MMHG (ref 40–50)
PDW BLD-RTO: 14.7 % (ref 11–16)
PERFORMED ON: ABNORMAL
PERFORMED ON: NORMAL
PH ARTERIAL: 7.26 (ref 7.35–7.45)
PH ARTERIAL: 7.29 (ref 7.35–7.45)
PH UA: 7 (ref 5–8)
PH VENOUS: 7.28 (ref 7.35–7.45)
PLATELET # BLD: 52 K/UL (ref 150–400)
PMV BLD AUTO: 10.3 FL (ref 6–10)
PO2 ARTERIAL: 217.1 MMHG (ref 80–100)
PO2 ARTERIAL: 98.1 MMHG (ref 80–100)
PO2, VEN: 35.1 MMHG (ref 25–40)
POTASSIUM REFLEX MAGNESIUM: 6.3 MMOL/L (ref 3.4–5.1)
POTASSIUM SERPL-SCNC: 6.8 MMOL/L (ref 3.4–5.1)
PRO-BNP: 248 PG/ML (ref 0–1800)
PROTEIN UA: NEGATIVE MG/DL
RAPID INFLUENZA  B AGN: POSITIVE
RAPID INFLUENZA A AGN: POSITIVE
RBC # BLD: 3.75 M/UL (ref 4.5–6)
SODIUM BLD-SCNC: 137 MMOL/L (ref 136–145)
SODIUM BLD-SCNC: 138 MMOL/L (ref 136–145)
SPECIFIC GRAVITY UA: 1.01 (ref 1–1.03)
TCO2 ARTERIAL: 32.8 MMOL/L (ref 24–30)
TCO2 ARTERIAL: 34 MMOL/L (ref 24–30)
TCO2 CALC VENOUS: 35 MMOL/L
TOTAL PROTEIN: 5.7 G/DL (ref 6.4–8.3)
TROPONIN: <0.3 NG/ML
URINE REFLEX TO CULTURE: NORMAL
URINE TYPE: NORMAL
UROBILINOGEN, URINE: 0.2 E.U./DL
WBC # BLD: 7 K/UL (ref 4–11)

## 2019-03-07 PROCEDURE — 99285 EMERGENCY DEPT VISIT HI MDM: CPT

## 2019-03-07 PROCEDURE — 83880 ASSAY OF NATRIURETIC PEPTIDE: CPT

## 2019-03-07 PROCEDURE — 87804 INFLUENZA ASSAY W/OPTIC: CPT

## 2019-03-07 PROCEDURE — 94660 CPAP INITIATION&MGMT: CPT

## 2019-03-07 PROCEDURE — 85025 COMPLETE CBC W/AUTO DIFF WBC: CPT

## 2019-03-07 PROCEDURE — 36415 COLL VENOUS BLD VENIPUNCTURE: CPT

## 2019-03-07 PROCEDURE — 94640 AIRWAY INHALATION TREATMENT: CPT

## 2019-03-07 PROCEDURE — 82803 BLOOD GASES ANY COMBINATION: CPT

## 2019-03-07 PROCEDURE — 93005 ELECTROCARDIOGRAM TRACING: CPT

## 2019-03-07 PROCEDURE — 96366 THER/PROPH/DIAG IV INF ADDON: CPT

## 2019-03-07 PROCEDURE — 6370000000 HC RX 637 (ALT 250 FOR IP): Performed by: EMERGENCY MEDICINE

## 2019-03-07 PROCEDURE — 96376 TX/PRO/DX INJ SAME DRUG ADON: CPT

## 2019-03-07 PROCEDURE — 6360000002 HC RX W HCPCS: Performed by: EMERGENCY MEDICINE

## 2019-03-07 PROCEDURE — 2500000003 HC RX 250 WO HCPCS: Performed by: EMERGENCY MEDICINE

## 2019-03-07 PROCEDURE — 83605 ASSAY OF LACTIC ACID: CPT

## 2019-03-07 PROCEDURE — 96375 TX/PRO/DX INJ NEW DRUG ADDON: CPT

## 2019-03-07 PROCEDURE — 36600 WITHDRAWAL OF ARTERIAL BLOOD: CPT

## 2019-03-07 PROCEDURE — 96365 THER/PROPH/DIAG IV INF INIT: CPT

## 2019-03-07 PROCEDURE — 2580000003 HC RX 258

## 2019-03-07 PROCEDURE — 87040 BLOOD CULTURE FOR BACTERIA: CPT

## 2019-03-07 PROCEDURE — 2580000003 HC RX 258: Performed by: EMERGENCY MEDICINE

## 2019-03-07 PROCEDURE — 84484 ASSAY OF TROPONIN QUANT: CPT

## 2019-03-07 PROCEDURE — 2580000003 HC RX 258: Performed by: FAMILY MEDICINE

## 2019-03-07 PROCEDURE — 70450 CT HEAD/BRAIN W/O DYE: CPT

## 2019-03-07 PROCEDURE — 81003 URINALYSIS AUTO W/O SCOPE: CPT

## 2019-03-07 PROCEDURE — 96367 TX/PROPH/DG ADDL SEQ IV INF: CPT

## 2019-03-07 PROCEDURE — 96361 HYDRATE IV INFUSION ADD-ON: CPT

## 2019-03-07 PROCEDURE — 6360000002 HC RX W HCPCS

## 2019-03-07 PROCEDURE — 80053 COMPREHEN METABOLIC PANEL: CPT

## 2019-03-07 PROCEDURE — 71045 X-RAY EXAM CHEST 1 VIEW: CPT

## 2019-03-07 RX ORDER — DEXTROSE AND SODIUM CHLORIDE 5; .9 G/100ML; G/100ML
INJECTION, SOLUTION INTRAVENOUS CONTINUOUS
Status: DISCONTINUED | OUTPATIENT
Start: 2019-03-07 | End: 2019-03-07

## 2019-03-07 RX ORDER — GINSENG 100 MG
CAPSULE ORAL 2 TIMES DAILY
COMMUNITY
End: 2019-06-14

## 2019-03-07 RX ORDER — DEXTROSE MONOHYDRATE 25 G/50ML
INJECTION, SOLUTION INTRAVENOUS
Status: COMPLETED
Start: 2019-03-07 | End: 2019-03-07

## 2019-03-07 RX ORDER — ATROPINE SULFATE 0.1 MG/ML
1 INJECTION INTRAVENOUS ONCE
Status: COMPLETED | OUTPATIENT
Start: 2019-03-07 | End: 2019-03-07

## 2019-03-07 RX ORDER — DEXTROSE AND SODIUM CHLORIDE 5; .9 G/100ML; G/100ML
INJECTION, SOLUTION INTRAVENOUS CONTINUOUS
Status: DISCONTINUED | OUTPATIENT
Start: 2019-03-07 | End: 2019-03-08 | Stop reason: HOSPADM

## 2019-03-07 RX ORDER — ALBUTEROL SULFATE 2.5 MG/3ML
2.5 SOLUTION RESPIRATORY (INHALATION) ONCE
Status: COMPLETED | OUTPATIENT
Start: 2019-03-07 | End: 2019-03-07

## 2019-03-07 RX ORDER — DEXTROSE, SODIUM CHLORIDE, SODIUM LACTATE, POTASSIUM CHLORIDE, AND CALCIUM CHLORIDE 5; .6; .31; .03; .02 G/100ML; G/100ML; G/100ML; G/100ML; G/100ML
INJECTION, SOLUTION INTRAVENOUS CONTINUOUS
Status: DISCONTINUED | OUTPATIENT
Start: 2019-03-07 | End: 2019-03-08 | Stop reason: HOSPADM

## 2019-03-07 RX ORDER — DEXTROSE MONOHYDRATE 25 G/50ML
25 INJECTION, SOLUTION INTRAVENOUS ONCE
Status: COMPLETED | OUTPATIENT
Start: 2019-03-07 | End: 2019-03-07

## 2019-03-07 RX ORDER — PYRIDOXINE HCL (VITAMIN B6) 100 MG
500 TABLET ORAL DAILY
COMMUNITY

## 2019-03-07 RX ORDER — BUMETANIDE 2 MG/1
2 TABLET ORAL DAILY
COMMUNITY
End: 2019-06-14

## 2019-03-07 RX ORDER — ERGOCALCIFEROL 1.25 MG/1
50000 CAPSULE ORAL WEEKLY
COMMUNITY

## 2019-03-07 RX ORDER — DEXTROSE MONOHYDRATE 25 G/50ML
12.5 INJECTION, SOLUTION INTRAVENOUS ONCE
Status: COMPLETED | OUTPATIENT
Start: 2019-03-07 | End: 2019-03-07

## 2019-03-07 RX ORDER — LORAZEPAM 0.5 MG/1
0.5 TABLET ORAL 2 TIMES DAILY
COMMUNITY
End: 2019-06-14

## 2019-03-07 RX ORDER — ONDANSETRON 2 MG/ML
4 INJECTION INTRAMUSCULAR; INTRAVENOUS EVERY 30 MIN PRN
Status: DISCONTINUED | OUTPATIENT
Start: 2019-03-07 | End: 2019-03-08 | Stop reason: HOSPADM

## 2019-03-07 RX ORDER — DEXTROSE MONOHYDRATE 25 G/50ML
50 INJECTION, SOLUTION INTRAVENOUS ONCE
Status: COMPLETED | OUTPATIENT
Start: 2019-03-07 | End: 2019-03-07

## 2019-03-07 RX ORDER — SODIUM CHLORIDE 9 MG/ML
INJECTION, SOLUTION INTRAVENOUS CONTINUOUS
Status: DISCONTINUED | OUTPATIENT
Start: 2019-03-07 | End: 2019-03-07

## 2019-03-07 RX ORDER — DOPAMINE HYDROCHLORIDE 160 MG/100ML
5 INJECTION, SOLUTION INTRAVENOUS CONTINUOUS
Status: DISCONTINUED | OUTPATIENT
Start: 2019-03-07 | End: 2019-03-08 | Stop reason: HOSPADM

## 2019-03-07 RX ORDER — ATROPINE SULFATE 0.1 MG/ML
INJECTION INTRAVENOUS
Status: COMPLETED
Start: 2019-03-07 | End: 2019-03-07

## 2019-03-07 RX ORDER — OSELTAMIVIR PHOSPHATE 75 MG/1
75 CAPSULE ORAL 2 TIMES DAILY
Status: DISCONTINUED | OUTPATIENT
Start: 2019-03-07 | End: 2019-03-08 | Stop reason: HOSPADM

## 2019-03-07 RX ADMIN — ATROPINE SULFATE 1 MG: 0.1 INJECTION INTRAVENOUS at 14:28

## 2019-03-07 RX ADMIN — DEXTROSE AND SODIUM CHLORIDE: 5; 900 INJECTION, SOLUTION INTRAVENOUS at 23:38

## 2019-03-07 RX ADMIN — DEXTROSE MONOHYDRATE 25 G: 25 INJECTION, SOLUTION INTRAVENOUS at 18:16

## 2019-03-07 RX ADMIN — DEXTROSE MONOHYDRATE 12.5 G: 500 INJECTION PARENTERAL at 13:58

## 2019-03-07 RX ADMIN — ATROPINE SULFATE 1 MG: 0.1 INJECTION PARENTERAL at 14:28

## 2019-03-07 RX ADMIN — INSULIN HUMAN 10 UNITS: 100 INJECTION, SOLUTION PARENTERAL at 15:25

## 2019-03-07 RX ADMIN — ALBUTEROL SULFATE 2.5 MG: 2.5 SOLUTION RESPIRATORY (INHALATION) at 15:20

## 2019-03-07 RX ADMIN — DEXTROSE AND SODIUM CHLORIDE: 5; 900 INJECTION, SOLUTION INTRAVENOUS at 18:20

## 2019-03-07 RX ADMIN — DEXTROSE MONOHYDRATE 50 ML: 25 INJECTION, SOLUTION INTRAVENOUS at 15:25

## 2019-03-07 RX ADMIN — SODIUM CHLORIDE: 9 INJECTION, SOLUTION INTRAVENOUS at 13:58

## 2019-03-07 RX ADMIN — CALCIUM CHLORIDE 1 G: 100 INJECTION, SOLUTION INTRAVENOUS; INTRAVENTRICULAR at 15:31

## 2019-03-07 RX ADMIN — DEXTROSE AND SODIUM CHLORIDE: 5; 900 INJECTION, SOLUTION INTRAVENOUS at 15:57

## 2019-03-07 RX ADMIN — DEXTROSE MONOHYDRATE 12.5 G: 25 INJECTION, SOLUTION INTRAVENOUS at 13:58

## 2019-03-08 VITALS
DIASTOLIC BLOOD PRESSURE: 64 MMHG | RESPIRATION RATE: 17 BRPM | BODY MASS INDEX: 31.55 KG/M2 | HEART RATE: 83 BPM | HEIGHT: 69 IN | TEMPERATURE: 98.1 F | WEIGHT: 213 LBS | OXYGEN SATURATION: 96 % | SYSTOLIC BLOOD PRESSURE: 124 MMHG

## 2019-03-08 LAB
ANION GAP SERPL CALCULATED.3IONS-SCNC: 7 MMOL/L (ref 3–16)
ANION GAP SERPL CALCULATED.3IONS-SCNC: 7 MMOL/L (ref 3–16)
ANION GAP SERPL CALCULATED.3IONS-SCNC: 8 MMOL/L (ref 3–16)
BASE EXCESS ARTERIAL: 3.6 MMOL/L (ref -3–3)
BUN BLDV-MCNC: 41 MG/DL (ref 6–20)
BUN BLDV-MCNC: 42 MG/DL (ref 6–20)
BUN BLDV-MCNC: 44 MG/DL (ref 6–20)
CALCIUM SERPL-MCNC: 7.9 MG/DL (ref 8.5–10.5)
CALCIUM SERPL-MCNC: 8.1 MG/DL (ref 8.5–10.5)
CALCIUM SERPL-MCNC: 8.1 MG/DL (ref 8.5–10.5)
CHLORIDE BLD-SCNC: 103 MMOL/L (ref 98–107)
CHLORIDE BLD-SCNC: 104 MMOL/L (ref 98–107)
CHLORIDE BLD-SCNC: 105 MMOL/L (ref 98–107)
CO2: 27 MMOL/L (ref 20–30)
CO2: 27 MMOL/L (ref 20–30)
CO2: 28 MMOL/L (ref 20–30)
CREAT SERPL-MCNC: 3.7 MG/DL (ref 0.4–1.2)
CREAT SERPL-MCNC: 3.7 MG/DL (ref 0.4–1.2)
CREAT SERPL-MCNC: 4 MG/DL (ref 0.4–1.2)
FIO2: 0.5 %
GFR AFRICAN AMERICAN: 18
GFR AFRICAN AMERICAN: 20
GFR AFRICAN AMERICAN: 20
GFR NON-AFRICAN AMERICAN: 15
GFR NON-AFRICAN AMERICAN: 16
GFR NON-AFRICAN AMERICAN: 16
GLUCOSE BLD-MCNC: 115 MG/DL (ref 74–106)
GLUCOSE BLD-MCNC: 147 MG/DL (ref 74–106)
GLUCOSE BLD-MCNC: 61 MG/DL (ref 74–106)
GLUCOSE BLD-MCNC: 76 MG/DL (ref 74–106)
GLUCOSE BLD-MCNC: 82 MG/DL (ref 74–106)
GLUCOSE BLD-MCNC: 89 MG/DL (ref 74–106)
HCO3 ARTERIAL: 30.3 MMOL/L (ref 22–26)
O2 SAT, ARTERIAL: 98.4 %
O2 THERAPY: ABNORMAL
PCO2 ARTERIAL: 58.8 MMHG (ref 35–45)
PERFORMED ON: ABNORMAL
PH ARTERIAL: 7.33 (ref 7.35–7.45)
PO2 ARTERIAL: 125.1 MMHG (ref 80–100)
POTASSIUM SERPL-SCNC: 6.3 MMOL/L (ref 3.4–5.1)
POTASSIUM SERPL-SCNC: 6.9 MMOL/L (ref 3.4–5.1)
POTASSIUM SERPL-SCNC: 7.2 MMOL/L (ref 3.4–5.1)
SODIUM BLD-SCNC: 137 MMOL/L (ref 136–145)
SODIUM BLD-SCNC: 139 MMOL/L (ref 136–145)
SODIUM BLD-SCNC: 140 MMOL/L (ref 136–145)
TCO2 ARTERIAL: 32.1 MMOL/L (ref 24–30)

## 2019-03-08 PROCEDURE — 36415 COLL VENOUS BLD VENIPUNCTURE: CPT

## 2019-03-08 PROCEDURE — 96366 THER/PROPH/DIAG IV INF ADDON: CPT

## 2019-03-08 PROCEDURE — 82803 BLOOD GASES ANY COMBINATION: CPT

## 2019-03-08 PROCEDURE — 94644 CONT INHLJ TX 1ST HOUR: CPT

## 2019-03-08 PROCEDURE — 80048 BASIC METABOLIC PNL TOTAL CA: CPT

## 2019-03-08 PROCEDURE — 6370000000 HC RX 637 (ALT 250 FOR IP): Performed by: EMERGENCY MEDICINE

## 2019-03-08 PROCEDURE — 2580000003 HC RX 258: Performed by: FAMILY MEDICINE

## 2019-03-08 PROCEDURE — 2580000003 HC RX 258

## 2019-03-08 PROCEDURE — 6360000002 HC RX W HCPCS: Performed by: FAMILY MEDICINE

## 2019-03-08 PROCEDURE — 6360000002 HC RX W HCPCS: Performed by: EMERGENCY MEDICINE

## 2019-03-08 PROCEDURE — 96368 THER/DIAG CONCURRENT INF: CPT

## 2019-03-08 PROCEDURE — 6370000000 HC RX 637 (ALT 250 FOR IP): Performed by: FAMILY MEDICINE

## 2019-03-08 PROCEDURE — 2500000003 HC RX 250 WO HCPCS: Performed by: FAMILY MEDICINE

## 2019-03-08 PROCEDURE — 96376 TX/PRO/DX INJ SAME DRUG ADON: CPT

## 2019-03-08 PROCEDURE — 36600 WITHDRAWAL OF ARTERIAL BLOOD: CPT

## 2019-03-08 PROCEDURE — 93005 ELECTROCARDIOGRAM TRACING: CPT

## 2019-03-08 PROCEDURE — 96375 TX/PRO/DX INJ NEW DRUG ADDON: CPT

## 2019-03-08 PROCEDURE — 6360000002 HC RX W HCPCS

## 2019-03-08 PROCEDURE — 2580000003 HC RX 258: Performed by: EMERGENCY MEDICINE

## 2019-03-08 PROCEDURE — 94660 CPAP INITIATION&MGMT: CPT

## 2019-03-08 RX ORDER — DEXTROSE MONOHYDRATE 25 G/50ML
25 INJECTION, SOLUTION INTRAVENOUS ONCE
Status: COMPLETED | OUTPATIENT
Start: 2019-03-08 | End: 2019-03-08

## 2019-03-08 RX ORDER — LEVOFLOXACIN 5 MG/ML
250 INJECTION, SOLUTION INTRAVENOUS ONCE
Status: COMPLETED | OUTPATIENT
Start: 2019-03-08 | End: 2019-03-08

## 2019-03-08 RX ORDER — DEXTROSE MONOHYDRATE 25 G/50ML
50 INJECTION, SOLUTION INTRAVENOUS ONCE
Status: COMPLETED | OUTPATIENT
Start: 2019-03-08 | End: 2019-03-08

## 2019-03-08 RX ORDER — DEXTROSE MONOHYDRATE 50 MG/ML
INJECTION, SOLUTION INTRAVENOUS
Status: COMPLETED
Start: 2019-03-08 | End: 2019-03-08

## 2019-03-08 RX ORDER — FUROSEMIDE 10 MG/ML
40 INJECTION INTRAMUSCULAR; INTRAVENOUS ONCE
Status: COMPLETED | OUTPATIENT
Start: 2019-03-08 | End: 2019-03-08

## 2019-03-08 RX ORDER — SODIUM POLYSTYRENE SULFONATE 15 G/60ML
30 SUSPENSION ORAL; RECTAL ONCE
Status: COMPLETED | OUTPATIENT
Start: 2019-03-08 | End: 2019-03-08

## 2019-03-08 RX ORDER — ALBUTEROL SULFATE 2.5 MG/3ML
2.5 SOLUTION RESPIRATORY (INHALATION) ONCE
Status: DISCONTINUED | OUTPATIENT
Start: 2019-03-08 | End: 2019-03-08

## 2019-03-08 RX ORDER — VANCOMYCIN HYDROCHLORIDE 1 G/20ML
INJECTION, POWDER, LYOPHILIZED, FOR SOLUTION INTRAVENOUS
Status: COMPLETED
Start: 2019-03-08 | End: 2019-03-08

## 2019-03-08 RX ADMIN — DEXTROSE MONOHYDRATE 25 G: 25 INJECTION, SOLUTION INTRAVENOUS at 02:00

## 2019-03-08 RX ADMIN — FUROSEMIDE 40 MG: 10 INJECTION, SOLUTION INTRAMUSCULAR; INTRAVENOUS at 08:25

## 2019-03-08 RX ADMIN — VANCOMYCIN HYDROCHLORIDE 1000 MG: 1 INJECTION, POWDER, LYOPHILIZED, FOR SOLUTION INTRAVENOUS at 07:10

## 2019-03-08 RX ADMIN — INSULIN HUMAN 6 UNITS: 100 INJECTION, SOLUTION PARENTERAL at 00:30

## 2019-03-08 RX ADMIN — LEVOFLOXACIN 250 MG: 5 INJECTION, SOLUTION INTRAVENOUS at 06:00

## 2019-03-08 RX ADMIN — SODIUM POLYSTYRENE SULFONATE 15 G: 15 SUSPENSION ORAL; RECTAL at 05:15

## 2019-03-08 RX ADMIN — ALBUTEROL SULFATE 10 MG/HR: 5 SOLUTION RESPIRATORY (INHALATION) at 08:23

## 2019-03-08 RX ADMIN — DEXTROSE MONOHYDRATE 250 ML: 50 INJECTION, SOLUTION INTRAVENOUS at 07:10

## 2019-03-08 RX ADMIN — INSULIN HUMAN 10 UNITS: 100 INJECTION, SOLUTION PARENTERAL at 09:29

## 2019-03-08 RX ADMIN — DEXTROSE MONOHYDRATE 50 ML: 25 INJECTION, SOLUTION INTRAVENOUS at 09:29

## 2019-03-08 RX ADMIN — GLUCAGON HYDROCHLORIDE 1 MG: KIT at 02:00

## 2019-03-08 RX ADMIN — PIPERACILLIN SODIUM,TAZOBACTAM SODIUM 3.38 G: 3; .375 INJECTION, POWDER, FOR SOLUTION INTRAVENOUS at 05:13

## 2019-03-08 RX ADMIN — DEXTROSE AND SODIUM CHLORIDE: 5; 900 INJECTION, SOLUTION INTRAVENOUS at 08:25

## 2019-03-08 RX ADMIN — DEXTROSE MONOHYDRATE 25 G: 25 INJECTION, SOLUTION INTRAVENOUS at 00:30

## 2019-03-12 LAB
BLOOD CULTURE, ROUTINE: NORMAL
CULTURE, BLOOD 2: NORMAL

## 2019-06-14 ENCOUNTER — APPOINTMENT (OUTPATIENT)
Dept: ULTRASOUND IMAGING | Facility: HOSPITAL | Age: 69
DRG: 603 | End: 2019-06-14
Payer: MEDICARE

## 2019-06-14 ENCOUNTER — HOSPITAL ENCOUNTER (INPATIENT)
Facility: HOSPITAL | Age: 69
LOS: 3 days | Discharge: NURSING, REHAB OR LONG-TERM CARE | End: 2019-06-18
Attending: INTERNAL MEDICINE | Admitting: INTERNAL MEDICINE

## 2019-06-14 ENCOUNTER — APPOINTMENT (OUTPATIENT)
Dept: GENERAL RADIOLOGY | Facility: HOSPITAL | Age: 69
DRG: 603 | End: 2019-06-14
Payer: MEDICARE

## 2019-06-14 ENCOUNTER — HOSPITAL ENCOUNTER (INPATIENT)
Facility: HOSPITAL | Age: 69
LOS: 1 days | Discharge: ANOTHER HOSPITAL | DRG: 603 | End: 2019-06-14
Attending: EMERGENCY MEDICINE | Admitting: INTERNAL MEDICINE
Payer: MEDICARE

## 2019-06-14 VITALS
TEMPERATURE: 97 F | DIASTOLIC BLOOD PRESSURE: 75 MMHG | OXYGEN SATURATION: 95 % | WEIGHT: 181 LBS | HEIGHT: 69 IN | RESPIRATION RATE: 16 BRPM | SYSTOLIC BLOOD PRESSURE: 126 MMHG | BODY MASS INDEX: 26.81 KG/M2 | HEART RATE: 81 BPM

## 2019-06-14 DIAGNOSIS — R79.9 ELEVATED BUN: ICD-10-CM

## 2019-06-14 DIAGNOSIS — Z74.09 IMPAIRED FUNCTIONAL MOBILITY, BALANCE, GAIT, AND ENDURANCE: Primary | ICD-10-CM

## 2019-06-14 DIAGNOSIS — L03.115 CELLULITIS OF RIGHT LOWER EXTREMITY: Primary | ICD-10-CM

## 2019-06-14 PROBLEM — N17.9 ACUTE ON CHRONIC RENAL FAILURE (HCC): Status: ACTIVE | Noted: 2019-06-14

## 2019-06-14 PROBLEM — E11.9 TYPE 2 DIABETES MELLITUS (HCC): Status: ACTIVE | Noted: 2019-06-14

## 2019-06-14 PROBLEM — N18.9 ACUTE ON CHRONIC RENAL FAILURE (HCC): Status: ACTIVE | Noted: 2019-06-14

## 2019-06-14 PROBLEM — R33.9 URINE RETENTION: Status: ACTIVE | Noted: 2019-06-14

## 2019-06-14 PROBLEM — I95.9 HYPOTENSION: Status: ACTIVE | Noted: 2019-06-14

## 2019-06-14 PROBLEM — L03.90 CELLULITIS: Status: ACTIVE | Noted: 2019-06-14

## 2019-06-14 PROBLEM — R41.82 CHANGE IN MENTAL STATUS: Status: ACTIVE | Noted: 2019-06-14

## 2019-06-14 LAB
A/G RATIO: 0.8 (ref 0.8–2)
ALBUMIN SERPL-MCNC: 3.2 G/DL (ref 3.4–4.8)
ALP BLD-CCNC: 177 U/L (ref 25–100)
ALT SERPL-CCNC: 41 U/L (ref 4–36)
ANION GAP SERPL CALCULATED.3IONS-SCNC: 15 MMOL/L (ref 3–16)
ANION GAP SERPL CALCULATED.3IONS-SCNC: 16 MMOL/L (ref 3–16)
AST SERPL-CCNC: 31 U/L (ref 8–33)
BASE EXCESS ARTERIAL: 2.4 MMOL/L (ref -3–3)
BASOPHILS ABSOLUTE: 0 K/UL (ref 0–0.1)
BASOPHILS RELATIVE PERCENT: 0.2 %
BILIRUB SERPL-MCNC: 0.5 MG/DL (ref 0.3–1.2)
BILIRUBIN URINE: NEGATIVE
BLOOD, URINE: ABNORMAL
BUN BLDV-MCNC: 91 MG/DL (ref 6–20)
BUN BLDV-MCNC: 92 MG/DL (ref 6–20)
CALCIUM SERPL-MCNC: 8.5 MG/DL (ref 8.5–10.5)
CALCIUM SERPL-MCNC: 9.3 MG/DL (ref 8.5–10.5)
CHLORIDE BLD-SCNC: 94 MMOL/L (ref 98–107)
CHLORIDE BLD-SCNC: 98 MMOL/L (ref 98–107)
CLARITY: CLEAR
CO2: 21 MMOL/L (ref 20–30)
CO2: 27 MMOL/L (ref 20–30)
COLOR: YELLOW
CREAT SERPL-MCNC: 2.8 MG/DL (ref 0.4–1.2)
CREAT SERPL-MCNC: 3 MG/DL (ref 0.4–1.2)
EOSINOPHILS ABSOLUTE: 0 K/UL (ref 0–0.4)
EOSINOPHILS RELATIVE PERCENT: 0.4 %
FIO2: 0.32 %
GFR AFRICAN AMERICAN: 25
GFR AFRICAN AMERICAN: 27
GFR NON-AFRICAN AMERICAN: 21
GFR NON-AFRICAN AMERICAN: 23
GLOBULIN: 4.1 G/DL
GLUCOSE BLD-MCNC: 136 MG/DL (ref 74–106)
GLUCOSE BLD-MCNC: 141 MG/DL (ref 74–106)
GLUCOSE BLD-MCNC: 164 MG/DL (ref 74–106)
GLUCOSE URINE: NEGATIVE MG/DL
HCO3 ARTERIAL: 27.9 MMOL/L (ref 22–26)
HCT VFR BLD CALC: 30.6 % (ref 40–54)
HEMOGLOBIN: 9.7 G/DL (ref 13–18)
IMMATURE GRANULOCYTES #: 0.4 K/UL
IMMATURE GRANULOCYTES %: 3.6 % (ref 0–5)
KETONES, URINE: NEGATIVE MG/DL
LACTIC ACID: 1.8 MMOL/L (ref 0.4–2)
LEUKOCYTE ESTERASE, URINE: NEGATIVE
LYMPHOCYTES ABSOLUTE: 2.1 K/UL (ref 1.5–4)
LYMPHOCYTES RELATIVE PERCENT: 18.6 %
MCH RBC QN AUTO: 29.3 PG (ref 27–32)
MCHC RBC AUTO-ENTMCNC: 31.7 G/DL (ref 31–35)
MCV RBC AUTO: 92.4 FL (ref 80–100)
MICROSCOPIC EXAMINATION: YES
MONOCYTES ABSOLUTE: 0.9 K/UL (ref 0.2–0.8)
MONOCYTES RELATIVE PERCENT: 7.7 %
NEUTROPHILS ABSOLUTE: 7.8 K/UL (ref 2–7.5)
NEUTROPHILS RELATIVE PERCENT: 69.5 %
NITRITE, URINE: NEGATIVE
O2 SAT, ARTERIAL: 97.2 %
O2 THERAPY: ABNORMAL
PCO2 ARTERIAL: 47.6 MMHG (ref 35–45)
PDW BLD-RTO: 14.4 % (ref 11–16)
PERFORMED ON: ABNORMAL
PH ARTERIAL: 7.39 (ref 7.35–7.45)
PH UA: 7.5 (ref 5–8)
PLATELET # BLD: 228 K/UL (ref 150–400)
PMV BLD AUTO: 11 FL (ref 6–10)
PO2 ARTERIAL: 103.9 MMHG (ref 80–100)
POTASSIUM REFLEX MAGNESIUM: 4.4 MMOL/L (ref 3.4–5.1)
POTASSIUM SERPL-SCNC: 4.8 MMOL/L (ref 3.4–5.1)
PROTEIN UA: 30 MG/DL
RBC # BLD: 3.31 M/UL (ref 4.5–6)
RBC UA: ABNORMAL /HPF (ref 0–2)
SODIUM BLD-SCNC: 135 MMOL/L (ref 136–145)
SODIUM BLD-SCNC: 136 MMOL/L (ref 136–145)
SPECIFIC GRAVITY UA: 1.01 (ref 1–1.03)
TCO2 ARTERIAL: 29.4 MMOL/L (ref 24–30)
TOTAL PROTEIN: 7.3 G/DL (ref 6.4–8.3)
TROPONIN: <0.3 NG/ML
URINE REFLEX TO CULTURE: ABNORMAL
URINE TYPE: ABNORMAL
UROBILINOGEN, URINE: 0.2 E.U./DL
WBC # BLD: 11.2 K/UL (ref 4–11)
WBC UA: ABNORMAL /HPF (ref 0–5)

## 2019-06-14 PROCEDURE — 36600 WITHDRAWAL OF ARTERIAL BLOOD: CPT

## 2019-06-14 PROCEDURE — 99236 HOSP IP/OBS SAME DATE HI 85: CPT | Performed by: INTERNAL MEDICINE

## 2019-06-14 PROCEDURE — 87081 CULTURE SCREEN ONLY: CPT | Performed by: INTERNAL MEDICINE

## 2019-06-14 PROCEDURE — 36415 COLL VENOUS BLD VENIPUNCTURE: CPT

## 2019-06-14 PROCEDURE — 2580000003 HC RX 258: Performed by: INTERNAL MEDICINE

## 2019-06-14 PROCEDURE — 96365 THER/PROPH/DIAG IV INF INIT: CPT

## 2019-06-14 PROCEDURE — 85025 COMPLETE CBC W/AUTO DIFF WBC: CPT

## 2019-06-14 PROCEDURE — 71045 X-RAY EXAM CHEST 1 VIEW: CPT

## 2019-06-14 PROCEDURE — G0378 HOSPITAL OBSERVATION PER HR: HCPCS

## 2019-06-14 PROCEDURE — 99285 EMERGENCY DEPT VISIT HI MDM: CPT

## 2019-06-14 PROCEDURE — 51702 INSERT TEMP BLADDER CATH: CPT

## 2019-06-14 PROCEDURE — 99223 1ST HOSP IP/OBS HIGH 75: CPT | Performed by: INTERNAL MEDICINE

## 2019-06-14 PROCEDURE — 2580000003 HC RX 258: Performed by: EMERGENCY MEDICINE

## 2019-06-14 PROCEDURE — 1200000000 HC SEMI PRIVATE

## 2019-06-14 PROCEDURE — 87040 BLOOD CULTURE FOR BACTERIA: CPT

## 2019-06-14 PROCEDURE — 6360000002 HC RX W HCPCS: Performed by: NURSE PRACTITIONER

## 2019-06-14 PROCEDURE — 83605 ASSAY OF LACTIC ACID: CPT

## 2019-06-14 PROCEDURE — 82803 BLOOD GASES ANY COMBINATION: CPT

## 2019-06-14 PROCEDURE — 80053 COMPREHEN METABOLIC PANEL: CPT

## 2019-06-14 PROCEDURE — 2580000003 HC RX 258: Performed by: NURSE PRACTITIONER

## 2019-06-14 PROCEDURE — 6360000002 HC RX W HCPCS: Performed by: EMERGENCY MEDICINE

## 2019-06-14 PROCEDURE — 93005 ELECTROCARDIOGRAM TRACING: CPT

## 2019-06-14 PROCEDURE — 81001 URINALYSIS AUTO W/SCOPE: CPT

## 2019-06-14 PROCEDURE — 93005 ELECTROCARDIOGRAM TRACING: CPT | Performed by: INTERNAL MEDICINE

## 2019-06-14 PROCEDURE — 93971 EXTREMITY STUDY: CPT

## 2019-06-14 PROCEDURE — 84484 ASSAY OF TROPONIN QUANT: CPT

## 2019-06-14 RX ORDER — BUSPIRONE HYDROCHLORIDE 5 MG/1
5 TABLET ORAL 2 TIMES DAILY
Status: DISCONTINUED | OUTPATIENT
Start: 2019-06-14 | End: 2019-06-14

## 2019-06-14 RX ORDER — SODIUM CHLORIDE 0.9 % (FLUSH) 0.9 %
10 SYRINGE (ML) INJECTION PRN
Status: DISCONTINUED | OUTPATIENT
Start: 2019-06-14 | End: 2019-06-14 | Stop reason: HOSPADM

## 2019-06-14 RX ORDER — NICOTINE POLACRILEX 4 MG
15 LOZENGE BUCCAL PRN
Status: DISCONTINUED | OUTPATIENT
Start: 2019-06-14 | End: 2019-06-14 | Stop reason: HOSPADM

## 2019-06-14 RX ORDER — POLYETHYLENE GLYCOL 3350 17 G/17G
17 POWDER, FOR SOLUTION ORAL 2 TIMES DAILY
COMMUNITY
End: 2019-06-18 | Stop reason: HOSPADM

## 2019-06-14 RX ORDER — HEPARIN SODIUM 5000 [USP'U]/ML
5000 INJECTION, SOLUTION INTRAVENOUS; SUBCUTANEOUS EVERY 8 HOURS SCHEDULED
Status: DISCONTINUED | OUTPATIENT
Start: 2019-06-14 | End: 2019-06-14 | Stop reason: HOSPADM

## 2019-06-14 RX ORDER — INSULIN GLARGINE 100 [IU]/ML
6 INJECTION, SOLUTION SUBCUTANEOUS NIGHTLY
Status: ON HOLD | COMMUNITY
End: 2021-07-07

## 2019-06-14 RX ORDER — MEROPENEM 1 G/1
INJECTION, POWDER, FOR SOLUTION INTRAVENOUS
Status: DISCONTINUED
Start: 2019-06-14 | End: 2019-06-14 | Stop reason: HOSPADM

## 2019-06-14 RX ORDER — AMILORIDE HYDROCHLORIDE 5 MG/1
5 TABLET ORAL DAILY
COMMUNITY

## 2019-06-14 RX ORDER — BUSPIRONE HYDROCHLORIDE 5 MG/1
5 TABLET ORAL 2 TIMES DAILY
Status: ON HOLD | COMMUNITY
End: 2021-07-07

## 2019-06-14 RX ORDER — DEXTROSE MONOHYDRATE 50 MG/ML
100 INJECTION, SOLUTION INTRAVENOUS PRN
Status: DISCONTINUED | OUTPATIENT
Start: 2019-06-14 | End: 2019-06-14 | Stop reason: HOSPADM

## 2019-06-14 RX ORDER — TRIAMCINOLONE ACETONIDE 1 MG/ML
1 LOTION TOPICAL DAILY
Status: ON HOLD | COMMUNITY
End: 2021-07-07

## 2019-06-14 RX ORDER — INSULIN GLARGINE 100 [IU]/ML
6 INJECTION, SOLUTION SUBCUTANEOUS NIGHTLY
COMMUNITY

## 2019-06-14 RX ORDER — CEPHALEXIN 500 MG/1
500 CAPSULE ORAL 3 TIMES DAILY
COMMUNITY

## 2019-06-14 RX ORDER — INSULIN GLARGINE 100 [IU]/ML
6 INJECTION, SOLUTION SUBCUTANEOUS NIGHTLY
Status: DISCONTINUED | OUTPATIENT
Start: 2019-06-14 | End: 2019-06-14

## 2019-06-14 RX ORDER — ONDANSETRON 2 MG/ML
4 INJECTION INTRAMUSCULAR; INTRAVENOUS EVERY 30 MIN PRN
Status: DISCONTINUED | OUTPATIENT
Start: 2019-06-14 | End: 2019-06-14 | Stop reason: HOSPADM

## 2019-06-14 RX ORDER — ONDANSETRON 2 MG/ML
4 INJECTION INTRAMUSCULAR; INTRAVENOUS EVERY 6 HOURS PRN
Status: DISCONTINUED | OUTPATIENT
Start: 2019-06-14 | End: 2019-06-14 | Stop reason: HOSPADM

## 2019-06-14 RX ORDER — PYRIDOXINE HCL (VITAMIN B6) 100 MG
500 TABLET ORAL DAILY
Status: DISCONTINUED | OUTPATIENT
Start: 2019-06-14 | End: 2019-06-14 | Stop reason: RX

## 2019-06-14 RX ORDER — HYDROCHLOROTHIAZIDE 25 MG/1
25 TABLET ORAL DAILY
Status: ON HOLD | COMMUNITY
End: 2021-07-07

## 2019-06-14 RX ORDER — FERROUS SULFATE 300 MG/5ML
300 LIQUID (ML) ORAL EVERY OTHER DAY
COMMUNITY

## 2019-06-14 RX ORDER — POLYETHYLENE GLYCOL 3350 17 G/17G
17 POWDER, FOR SOLUTION ORAL 2 TIMES DAILY
COMMUNITY

## 2019-06-14 RX ORDER — BUSPIRONE HYDROCHLORIDE 5 MG/1
5 TABLET ORAL 2 TIMES DAILY
COMMUNITY

## 2019-06-14 RX ORDER — DEXTROSE MONOHYDRATE 25 G/50ML
12.5 INJECTION, SOLUTION INTRAVENOUS PRN
Status: DISCONTINUED | OUTPATIENT
Start: 2019-06-14 | End: 2019-06-14 | Stop reason: HOSPADM

## 2019-06-14 RX ORDER — TAMSULOSIN HYDROCHLORIDE 0.4 MG/1
0.4 CAPSULE ORAL DAILY
Status: DISCONTINUED | OUTPATIENT
Start: 2019-06-14 | End: 2019-06-14

## 2019-06-14 RX ORDER — INFANT FORM.IRON LAC-F/DHA/ARA 3.1 G/1
237 POWDER (GRAM) ORAL
COMMUNITY

## 2019-06-14 RX ORDER — POLYETHYLENE GLYCOL 3350 17 G/17G
17 POWDER, FOR SOLUTION ORAL 2 TIMES DAILY
Status: DISCONTINUED | OUTPATIENT
Start: 2019-06-14 | End: 2019-06-14 | Stop reason: HOSPADM

## 2019-06-14 RX ORDER — RISPERIDONE 2 MG/1
2 TABLET ORAL 2 TIMES DAILY
Status: ON HOLD | COMMUNITY
End: 2021-07-07

## 2019-06-14 RX ORDER — SODIUM CHLORIDE 9 MG/ML
INJECTION, SOLUTION INTRAVENOUS CONTINUOUS
Status: DISCONTINUED | OUTPATIENT
Start: 2019-06-14 | End: 2019-06-14 | Stop reason: HOSPADM

## 2019-06-14 RX ORDER — CEPHALEXIN 500 MG/1
500 CAPSULE ORAL 3 TIMES DAILY
Status: ON HOLD | COMMUNITY
End: 2021-07-07

## 2019-06-14 RX ORDER — AMLODIPINE BESYLATE 5 MG/1
2.5 TABLET ORAL DAILY
Status: DISCONTINUED | OUTPATIENT
Start: 2019-06-14 | End: 2019-06-14

## 2019-06-14 RX ORDER — CITALOPRAM 20 MG/1
20 TABLET ORAL DAILY
Status: DISCONTINUED | OUTPATIENT
Start: 2019-06-14 | End: 2019-06-14

## 2019-06-14 RX ORDER — QUETIAPINE FUMARATE 100 MG/1
100 TABLET, FILM COATED ORAL 2 TIMES DAILY
Status: DISCONTINUED | OUTPATIENT
Start: 2019-06-14 | End: 2019-06-14

## 2019-06-14 RX ORDER — IPRATROPIUM BROMIDE AND ALBUTEROL SULFATE 2.5; .5 MG/3ML; MG/3ML
1 SOLUTION RESPIRATORY (INHALATION) EVERY 6 HOURS PRN
Status: DISCONTINUED | OUTPATIENT
Start: 2019-06-14 | End: 2019-06-14 | Stop reason: HOSPADM

## 2019-06-14 RX ORDER — FERROUS SULFATE 300 MG/5ML
300 LIQUID (ML) ORAL EVERY OTHER DAY
Status: DISCONTINUED | OUTPATIENT
Start: 2019-06-14 | End: 2019-06-14 | Stop reason: HOSPADM

## 2019-06-14 RX ORDER — RISPERIDONE 1 MG/1
2 TABLET, FILM COATED ORAL 2 TIMES DAILY
Status: DISCONTINUED | OUTPATIENT
Start: 2019-06-14 | End: 2019-06-14

## 2019-06-14 RX ORDER — ASPIRIN 81 MG/1
81 TABLET, CHEWABLE ORAL DAILY
Status: DISCONTINUED | OUTPATIENT
Start: 2019-06-14 | End: 2019-06-14 | Stop reason: HOSPADM

## 2019-06-14 RX ORDER — ASPIRIN 81 MG/1
81 TABLET, CHEWABLE ORAL DAILY
COMMUNITY

## 2019-06-14 RX ORDER — GUAIFENESIN 600 MG/1
600 TABLET, EXTENDED RELEASE ORAL 2 TIMES DAILY
Status: DISCONTINUED | OUTPATIENT
Start: 2019-06-14 | End: 2019-06-14 | Stop reason: HOSPADM

## 2019-06-14 RX ORDER — HYDROCHLOROTHIAZIDE 25 MG/1
25 TABLET ORAL DAILY
COMMUNITY

## 2019-06-14 RX ORDER — 0.9 % SODIUM CHLORIDE 0.9 %
1000 INTRAVENOUS SOLUTION INTRAVENOUS ONCE
Status: COMPLETED | OUTPATIENT
Start: 2019-06-14 | End: 2019-06-14

## 2019-06-14 RX ORDER — AMILORIDE HYDROCHLORIDE 5 MG/1
5 TABLET ORAL DAILY
Status: ON HOLD | COMMUNITY
End: 2021-08-02

## 2019-06-14 RX ORDER — ACETAMINOPHEN 325 MG/1
650 TABLET ORAL EVERY 6 HOURS PRN
Status: DISCONTINUED | OUTPATIENT
Start: 2019-06-14 | End: 2019-06-14 | Stop reason: HOSPADM

## 2019-06-14 RX ORDER — SODIUM CHLORIDE 0.9 % (FLUSH) 0.9 %
10 SYRINGE (ML) INJECTION EVERY 12 HOURS SCHEDULED
Status: DISCONTINUED | OUTPATIENT
Start: 2019-06-14 | End: 2019-06-14 | Stop reason: HOSPADM

## 2019-06-14 RX ORDER — GUAIFENESIN 600 MG/1
600 TABLET, EXTENDED RELEASE ORAL 2 TIMES DAILY
COMMUNITY

## 2019-06-14 RX ADMIN — SODIUM CHLORIDE: 9 INJECTION, SOLUTION INTRAVENOUS at 17:15

## 2019-06-14 RX ADMIN — MEROPENEM 500 MG: 500 INJECTION, POWDER, FOR SOLUTION INTRAVENOUS at 17:11

## 2019-06-14 RX ADMIN — DEXTROSE MONOHYDRATE 1250 MG: 50 INJECTION, SOLUTION INTRAVENOUS at 13:22

## 2019-06-14 RX ADMIN — SODIUM CHLORIDE 1000 ML: 9 INJECTION, SOLUTION INTRAVENOUS at 13:23

## 2019-06-14 NOTE — ED PROVIDER NOTES
[address] ENCOUNTER      Pt Name: Jyoti Lucas  MRN: [id number]  YOB: 1950  Date of evaluation: 6/14/2019  Provider: Robyn Ballesteros Dr       Chief Complaint   Patient presents with    Fever    Altered Mental Status         HISTORY OF PRESENT ILLNESS  (Location/Symptom, Timing/Onset, Context/Setting, Quality, Duration, Modifying Factors, Severity.)   Jyoti Lucas is a 80 y.o. male who presents to the emergency department via EMS from [address] [place] [address] for evaluation of possible fever, rash possible infection to the right hip and right lower extremity. They note the patient has been to facility for quite some time, has underlying dementia and does not provide much additional history which limits my HPI. When questioning about any pain or discomfort the patient says no. Nursing notes were reviewed.     REVIEW OFSYSTEMS    (2-9 systems for level 4, 10 or more for level 5)   ROS:  Unable to perform review of systems due to patient's underlying dementia      PAST MEDICAL HISTORY     Past Medical History:   Diagnosis Date    Anxiety     Auditory hallucinations     Chronic kidney disease     COPD (chronic obstructive pulmonary disease) (HCC)     Dysphagia     Hypertension     Paranoid schizophrenia ([address]     Pneumonia     Psychosis [facility]     Schizophrenia [facility]     Sepsis ([facility]          SURGICAL HISTORY       Past Surgical History:   Procedure Laterality Date    KNEE SURGERY Right          CURRENT MEDICATIONS       Previous Medications    ACETAMINOPHEN (TYLENOL) 325 MG TABLET    Take 650 mg by mouth every 6 hours as needed for Pain or Fever    ALBUTEROL SULFATE  (90 BASE) MCG/ACT INHALER    Inhale 2 puffs into the lungs every 4 hours as needed     AMLODIPINE (NORVASC) 5 MG TABLET    Take 5 mg by mouth daily    ASPIRIN 81 MG TABLET    Take 81 mg by mouth daily    AZELASTINE HCL (ASTELIN NA)    137 mcg by Nasal route 2 times daily as needed 2 puffs in each nostril    BACITRACIN 500 UNIT/GM OINTMENT    Apply topically 2 times daily Apply topically 2 times daily. BISACODYL (DULCOLAX) 10 MG SUPPOSITORY    Place 10 mg rectally See Admin Instructions Insert one suppository rectally as needed if no bowel movement in 4 days. BUMETANIDE (BUMEX) 2 MG TABLET    Take 2 mg by mouth daily    CITALOPRAM (CELEXA) 20 MG TABLET    Take 20 mg by mouth daily    CRANBERRY (CRANBERRY CONCENTRATE) 500 MG CAPS    Take 500 mg by mouth daily    DEXTROMETHORPHAN-QUINIDINE (NUEDEXTA) 20-10 MG CAPS PER CAPSULE    Take 1 capsule by mouth daily    DIPHENHYDRAMINE (BENADRYL) 25 MG CAPSULE    Take 25 mg by mouth daily as needed for Itching    DIVALPROEX (DEPAKOTE ER) 250 MG EXTENDED RELEASE TABLET    Take 500 mg by mouth 2 times daily     FERROUS SULFATE 325 (65 FE) MG TABLET    Take 325 mg by mouth 2 times daily    GUAIFENESIN PO    Take 600 mg by mouth 2 times daily    HYDROXYZINE (ATARAX) 25 MG TABLET    Take 25 mg by mouth 2 times daily    IPRATROPIUM-ALBUTEROL (DUONEB) 0.5-2.5 (3) MG/3ML SOLN NEBULIZER SOLUTION    Inhale 1 vial into the lungs every 6 hours as needed for Shortness of Breath     LORAZEPAM (ATIVAN) 0.5 MG TABLET    Take 0.5 mg by mouth 2 times daily. MAGNESIUM HYDROXIDE (MILK OF MAGNESIA PO)    Take 30 mLs by mouth daily as needed Give 30 ml po prn if no bowel movement in 3 days. MAGNESIUM OXIDE (MAG-OX) 400 MG TABLET    Take 400 mg by mouth 2 times daily    OLANZAPINE (ZYPREXA) 10 MG TABLET    Take 5 mg by mouth nightly May hold for sedation.      QUETIAPINE (SEROQUEL XR) 150 MG TB24 EXTENDED RELEASE TABLET    Take 150 mg by mouth nightly    QUETIAPINE (SEROQUEL) 100 MG TABLET    Take 100 mg by mouth daily    RISPERIDONE (RISPERDAL) 2 MG TABLET    Take 2 mg by mouth 2 times daily    RISPERIDONE MICROSPHERES (RISPERDAL CONSTA) 50 MG INJECTION    Inject 50 mg into the muscle every 14 days    TAMSULOSIN (FLOMAX) 0.4 MG CAPSULE    Take 0.4 mg by mouth daily    TRAZODONE (DESYREL) 100 MG TABLET    Take 50 mg by mouth 2 times daily     VITAMIN D (ERGOCALCIFEROL) 67745 UNITS CAPS CAPSULE    Take 50,000 Units by mouth once a week       ALLERGIES     Haldol [haloperidol lactate] and Ibuprofen    FAMILY HISTORY     No family history on file.        SOCIAL HISTORY       Social History     Socioeconomic History    Marital status: Legally      Spouse name: Not on file    Number of children: Not on file    Years of education: Not on file    Highest education level: Not on file   Occupational History    Not on file   Social Needs    Financial resource strain: Not on file    Food insecurity:     Worry: Not on file     Inability: Not on file    Transportation needs:     Medical: Not on file     Non-medical: Not on file   Tobacco Use    Smoking status: Unknown If Ever Smoked   Substance and Sexual Activity    Alcohol use: No    Drug use: Not on file    Sexual activity: Not on file   Lifestyle    Physical activity:     Days per week: Not on file     Minutes per session: Not on file    Stress: Not on file   Relationships    Social connections:     Talks on phone: Not on file     Gets together: Not on file     Attends [employer] service: Not on file     Active member of club or organization: Not on file     Attends meetings of clubs or organizations: Not on file     Relationship status: Not on file    Intimate partner violence:     Fear of current or ex partner: Not on file     Emotionally abused: Not on file     Physically abused: Not on file     Forced sexual activity: Not on file   Other Topics Concern    Not on file   Social History Narrative    Not on file         PHYSICAL EXAM    (up to 7 for level 4, 8 or more for level 5)     ED Triage Vitals   BP Temp Temp src Pulse Resp SpO2 Height Weight   06/14/19 1215 -- -- 06/14/19 1215 06/14/19 1215 06/14/19 1215 06/14/19 1235 06/14/19 1238   113/69   81 22 93 % 5' 9\" (1.758 (ifapplicable):  Results for orders placed or performed during the hospital encounter of 06/14/19   CBC Auto Differential   Result Value Ref Range    WBC 11.2 (H) 4.0 - 11.0 K/uL    RBC 3.31 (L) 4.50 - 6.00 M/uL    Hemoglobin 9.7 (L) 13.0 - 18.0 g/dL    Hematocrit 30.6 (L) 40.0 - 54.0 %    MCV 92.4 80.0 - 100.0 fL    MCH 29.3 27.0 - 32.0 pg    MCHC 31.7 31.0 - 35.0 g/dL    RDW 14.4 11.0 - 16.0 %    Platelets 737 707 - 456 K/uL    MPV 11.0 (H) 6.0 - 10.0 fL    Neutrophils % 69.5 %    Immature Granulocytes % 3.6 0.0 - 5.0 %    Lymphocytes % 18.6 %    Monocytes % 7.7 %    Eosinophils % 0.4 %    Basophils % 0.2 %    Neutrophils # 7.8 (H) 2.0 - 7.5 K/uL    Immature Granulocytes # 0.4 K/uL    Lymphocytes # 2.1 1.5 - 4.0 K/uL    Monocytes # 0.9 (H) 0.2 - 0.8 K/uL    Eosinophils # 0.0 0.0 - 0.4 K/uL    Basophils # 0.0 0.0 - 0.1 K/uL   Comprehensive Metabolic Panel w/ Reflex to MG   Result Value Ref Range    Sodium 136 136 - 145 mmol/L    Potassium reflex Magnesium 4.4 3.4 - 5.1 mmol/L    Chloride 94 (L) 98 - 107 mmol/L    CO2 27 20 - 30 mmol/L    Anion Gap 15 3 - 16    Glucose 136 (H) 74 - 106 mg/dL    BUN 92 (HH) 6 - 20 mg/dL    CREATININE 3.0 (H) 0.4 - 1.2 mg/dL    GFR Non-African American 21 (L) >59    GFR  25 (L) >59    Calcium 9.3 8.5 - 10.5 mg/dL    Total Protein 7.3 6.4 - 8.3 g/dL    Alb 3.2 (L) 3.4 - 4.8 g/dL    Albumin/Globulin Ratio 0.8 0.8 - 2.0    Total Bilirubin 0.5 0.3 - 1.2 mg/dL    Alkaline Phosphatase 177 (H) 25 - 100 U/L    ALT 41 (H) 4 - 36 U/L    AST 31 8 - 33 U/L    Globulin 4.1 g/dL   Urinalysis Reflex to Culture   Result Value Ref Range    Color, UA Yellow Straw/Yellow    Clarity, UA Clear Clear    Glucose, Ur Negative Negative mg/dL    Bilirubin Urine Negative Negative    Ketones, Urine Negative Negative mg/dL    Specific Gravity, UA 1.010 1.005 - 1.030    Blood, Urine TRACE-LYSED (A) Negative    pH, UA 7.5 5.0 - 8.0    Protein, UA 30 (A) Negative mg/dL    Urobilinogen, Urine 0.2 <2.0 required my urgent intervention. FINAL IMPRESSION      1. Cellulitis of right lower extremity    2. Elevated BUN          DISPOSITION/PLAN   DISPOSITION Decision To Admit 06/14/2019 01:28:23 PM      PATIENT REFERRED TO:  No follow-up provider specified. DISCHARGE MEDICATIONS:  New Prescriptions    No medications on file       Comment: Please note this report has been produced using speech recognition software and may contain errorsrelated to that system including errors in grammar, punctuation, and spelling, as well as words and phrases that may be inappropriate. If there are any questions or concerns please feel free to contact the dictating providerfor clarification.     Blanca Che DO  Attending Emergency Physician              Blanca Che DO  06/14/19 0660

## 2019-06-14 NOTE — PROGRESS NOTES
Placed call to [facility] to speak with hospitalitis on call.  on call she will give us a call back.

## 2019-06-14 NOTE — PROGRESS NOTES
1630 Went to bedside to check on patient, patient is noted to have gasping respirations. Called APRN to bedside. Pulse ox attached to patient, HR is 33-38 O2 saturation is 88%. Patient placed on vitals machine, see flowsheet. APRN at bedside, verbal orders for EKG, ABG, 1000 ml ns bolus, place patient on telemetry, anchor adrian catheter, check BGL. Respiratory came to bedside, EKG showed NS with elevated T waves. 1640 Dr Quin Maldnoado at bedside as well. Updated MD on patients condition. Spoke with nursing home in regards to patients baseline, patient is normally up to wheelchair with josué and recognizes staff at nursing home. Pt has a history of schizophrenia and dementia. He has history of auditory hallucinations and babbles/confused at times. Patients guardian is Marlis Duverney but she is currently unavailable Norva Juan [phone] and [employer] [phone], are covering for her.  is calling in regards to transferring patient to another facility. [place]  81. Dr. Quin Maldonado and Dr. Charlotte Escalera at bedside with patient, verbal orders for BMP and troponin. Notified lab.     1651 16 fr adrian catheter placed at this time by Sharee Power RN, 1000 mL clear pale yellow urine returned. Notified APRN and MD. Yasmeen Segura at bedside, placed additional IV in left hand. 1709 nasopharyngeal airway placed at this time by Sharee Power RN.

## 2019-06-14 NOTE — ED NOTES
Pt report to [employer], RN. Pt will be transported to Medical Unit via [facility].       Melissa Barrett RN  06/14/19 4987

## 2019-06-14 NOTE — PROGRESS NOTES
Received call from [facility] [address] reports that they are unsure if the pt needs an ICU or Tele bed. Per BRIDGETT Pizano, Pt will need to go to ICU, will call back when bed is available, approximately 1 hour. Dr. Josh Ragsdale and Rachna Martinez APRN updated.

## 2019-06-14 NOTE — DISCHARGE SUMMARY
Short stay summary      Patient ID: Sherice Stahl      Patient's PCP: Ori Ralph MD    Admit Date: 6/14/2019     Discharge Date: 6/14/2019     Admitting Physician: Rena Mckeon MD    Discharge Physician: JOSEPH Estevez     Reason for this admission: Fever, altered mental status    Discharge Diagnoses: Active Hospital Problems    Diagnosis Date Noted    Cellulitis [L03.90] 06/14/2019    Acute on chronic renal failure (HCC) [N17.9, N18.9] 06/14/2019    Type 2 diabetes mellitus ([address] [E11.9] 06/14/2019    Hypotension [I95.9] 06/14/2019    Change in mental status [R41.82] 06/14/2019    Urine retention [R33.9] 06/14/2019    Schizophrenia ([address] [F20.9] 09/06/2018       Procedures:   US DUP LOWER EXTREMITY RIGHT DELIA   Final Result      No evidence of deep vein thrombosis in the right lower extremity. XR CHEST PORTABLE   Final Result   Impression: No acute cardiopulmonary abnormality. Consults:   PHARMACY TO DOSE VANCOMYCIN  IP CONSULT TO DIETITIAN  PHARMACY TO DOSE VANCOMYCIN  PT/OT    HISTORY OF PRESENT ILLNESS:   The patient is a 80 y.o. male who presents with altered mental status, fever and redness of right lower extremity. No mention of trying anything for these issues. Patient is a nursing facility resident at [address] [place] [address]. He apparently has chronic debility and receives feed per PEG. Patient is a poor historian at this time in setting of acute illness and is unable to provide additional history. HPI obtained primarily from medical report and medical documentation.      Review of systems  ROS unobtainable: mental status change    Past Medical History:      Diagnosis Date    Anxiety     Auditory hallucinations     Chronic kidney disease     COPD (chronic obstructive pulmonary disease) (HCC)     Dysphagia     Hypertension     Paranoid schizophrenia ([address]     Pneumonia     Psychosis ([address]     Schizophrenia ([address]     Sepsis ([address]        Past Surgical History:      Procedure Laterality Date    KNEE SURGERY Right        Social History:   TOBACCO:   has no tobacco history on file. ETOH:   reports that he does not drink alcohol. Family History:   No family history on file. Allergies:  Haldol [haloperidol lactate] and Ibuprofen    Medications Prior to Admission:    Prior to Admission medications    Medication Sig Start Date End Date Taking? Authorizing Provider   aMILoride (MIDAMOR) 5 MG tablet 5 mg by Per G Tube route daily   Yes Historical Provider, MD   aspirin 81 MG chewable tablet 81 mg by Per G Tube route daily   Yes Historical Provider, MD   ferrous sulfate 300 (60 Fe) MG/5ML syrup 300 mg by Per G Tube route every other day   Yes Historical Provider, MD   cephALEXin (KEFLEX) 500 MG capsule 500 mg by Per G Tube route 3 times daily   Yes Historical Provider, MD   busPIRone (BUSPAR) 5 MG tablet 5 mg by Per G Tube route 2 times daily   Yes Historical Provider, MD   polyethylene glycol (GLYCOLAX) powder Take 17 g by mouth 2 times daily Mix 1 cap in 4-8 oz liquid and give via tube twice daily.    Yes Historical Provider, MD   guaiFENesin (MUCINEX) 600 MG extended release tablet Take 600 mg by mouth 2 times daily Per g-tube   Yes Historical Provider, MD   hydrochlorothiazide (HYDRODIURIL) 25 MG tablet Take 25 mg by mouth daily   Yes Historical Provider, MD   insulin aspart (NOVOLOG) 100 UNIT/ML injection vial Inject 4 Units into the skin 3 times daily (before meals) Plus sliding scale   Yes Historical Provider, MD   insulin glargine (LANTUS) 100 UNIT/ML injection vial Inject 6 Units into the skin nightly   Yes Historical Provider, MD   Nutritional Supplements (GLUCERNA 1.5 CHRIS) LIQD Take 237 oz by mouth 8 times daily 0001, 0200, 0600, 1000, 1200, 1400, 1800, 2200   Yes Historical Provider, MD   NONFORMULARY H2O Flush  200cc via gtube 8x daily   Yes Historical Provider, MD   vitamin D (ERGOCALCIFEROL) 79228 units CAPS capsule Take 50,000 Units by mouth once a week On Wednesday   Yes Historical Provider, MD   Cranberry (CRANBERRY CONCENTRATE) 500 MG CAPS 500 mg by Per G Tube route daily    Yes Historical Provider, MD   QUEtiapine (SEROQUEL) 100 MG tablet 100 mg by Per G Tube route 2 times daily    Yes Historical Provider, MD   citalopram (CELEXA) 20 MG tablet 20 mg by Per G Tube route daily    Yes Historical Provider, MD   risperiDONE (RISPERDAL) 2 MG tablet 2 mg by Per G Tube route 2 times daily    Yes Historical Provider, MD   OLANZapine (ZYPREXA) 10 MG tablet Take 5 mg by mouth nightly May hold for sedation. Yes Historical Provider, MD   tamsulosin (FLOMAX) 0.4 MG capsule Take 0.4 mg by mouth daily Per G tube   Yes Historical Provider, MD   amLODIPine (NORVASC) 2.5 MG tablet 2.5 mg by Per G Tube route daily    Yes Historical Provider, MD   risperiDONE microspheres (RISPERDAL CONSTA) 50 MG injection Inject 50 mg into the muscle every 14 days    Historical Provider, MD   albuterol sulfate  (90 Base) MCG/ACT inhaler Inhale 2 puffs into the lungs every 4 hours as needed     Historical Provider, MD   ipratropium-albuterol (DUONEB) 0.5-2.5 (3) MG/3ML SOLN nebulizer solution Inhale 1 vial into the lungs every 6 hours as needed for Shortness of Breath     Historical Provider, MD   bisacodyl (DULCOLAX) 10 MG suppository Place 10 mg rectally See Admin Instructions Insert one suppository rectally as needed if no bowel movement in 4 days. Historical Provider, MD   Magnesium Hydroxide (MILK OF MAGNESIA PO) Take 30 mLs by mouth daily as needed Give 30 ml po prn if no bowel movement in 3 days.     Historical Provider, MD   acetaminophen (TYLENOL) 325 MG tablet Take 650 mg by mouth every 6 hours as needed for Pain or Fever    Historical Provider, MD   Azelastine HCl (ASTELIN NA) 137 mcg by Nasal route 2 times daily as needed 2 puffs in each nostril    Historical Provider, MD       Vitals:   Vitals:    06/14/19 1734   BP: 138/87   Pulse: 85   Resp: 16   Temp:  97.5 °F SpO2: 97%     Physical exam  Constitutional:  Well developed, chronically ill appearing, no acute distress  Eyes:  PERRL, no scleral icterus, conjunctiva normal   HENT:  Atraumatic, external ears normal, nose normal, oropharynx dry. Neck- supple, no JVD, no lymphadenopathy  Respiratory:  No respiratory distress, no wheezing, rales or rhonchi detected  Cardiovascular:  Normal rate, normal rhythm, no murmurs, no gallops, no rubs, no edema   GI:  Soft, nondistended, normal bowel sounds, nontender, no voluntary guarding  Musculoskeletal:  No cyanosis or obvious acute deformity. Moving all extremities   Integument:  Warm and dry. Patchy areas of erythema to right lower extremity extends to right hip/nearly right flank. Slight induration. No abscess or fluctuance palpated. Neurologic:  Lethargic, awakens to uncomfortable stimuli, follows simple commands  Psychiatric:  Difficult to obtain comprehensive musculoskeletal, neurologic or psychiatric exam in setting of altered mental status      CBC:   Lab Results   Component Value Date    WBC 11.2 06/14/2019    RBC 3.31 06/14/2019    HGB 9.7 06/14/2019    HCT 30.6 06/14/2019    MCV 92.4 06/14/2019    RDW 14.4 06/14/2019     06/14/2019     CMP:    Lab Results   Component Value Date     06/14/2019    K 4.8 06/14/2019    K 4.4 06/14/2019    CL 98 06/14/2019    CO2 21 06/14/2019    BUN 91 06/14/2019    CREATININE 2.8 06/14/2019    GFRAA 27 06/14/2019    AGRATIO 0.8 06/14/2019    LABGLOM 23 06/14/2019    GLUCOSE 141 06/14/2019    PROT 7.3 06/14/2019    CALCIUM 8.5 06/14/2019    BILITOT 0.5 06/14/2019    ALKPHOS 177 06/14/2019    AST 31 06/14/2019    ALT 41 06/14/2019     Troponin:   Recent Labs     06/14/19  1655   TROPONINI <0.30     US DUP LOWER EXTREMITY RIGHT DELIA   Final Result      No evidence of deep vein thrombosis in the right lower extremity. XR CHEST PORTABLE   Final Result   Impression: No acute cardiopulmonary abnormality.             Assessment and Plan/Hospital course: Active Hospital Problems    Diagnosis Date Noted    Cellulitis [L03.90] 06/14/2019    Acute on chronic renal failure (HCC) [N17.9, N18.9] 06/14/2019    Type 2 diabetes mellitus ([facility] [E11.9] 06/14/2019    Hypotension [I95.9] 06/14/2019    Change in mental status [R41.82] 06/14/2019    Urine retention [R33.9] 06/14/2019    Schizophrenia ([facility] [F20.9] 09/06/2018     Patient was admitted with altered mental status, fever and redness RLE. Venous duplex showed no acute finding. Patient with underlying CKD. Noted to have more elevation in BUN than baseline. Diagnosed with cellulitis. Treated with IV Vanco and Merrem. Patient noted to be hypotensive and having increased difficulty arousing shortly after arriving to medical unit. 1 liter IV bolus and Solumedrol given. Patient's psychiatric/sedating meds held. Mccracken catheter was placed and did have almost immediate 1 liter output possibly related to retention. Patient will be transferred to higher level of care for further evaluation and management for possible early sepsis secondary to cellulitis. Case discussed with hospitalist who accepts for admission. Follow up blood pressure has improved with treatment. Disposition: TCC    Discharged Condition: Fairly stable    Code Status: Full Code      Discharge Medications:     Current Discharge Medication List           Details   aMILoride (MIDAMOR) 5 MG tablet 5 mg by Per G Tube route daily      aspirin 81 MG chewable tablet 81 mg by Per G Tube route daily      ferrous sulfate 300 (60 Fe) MG/5ML syrup 300 mg by Per G Tube route every other day      cephALEXin (KEFLEX) 500 MG capsule 500 mg by Per G Tube route 3 times daily      busPIRone (BUSPAR) 5 MG tablet 5 mg by Per G Tube route 2 times daily      polyethylene glycol (GLYCOLAX) powder Take 17 g by mouth 2 times daily Mix 1 cap in 4-8 oz liquid and give via tube twice daily.       guaiFENesin (MUCINEX) 600 MG extended release tablet Take 600 mg by mouth 2 times daily Per g-tube      hydrochlorothiazide (HYDRODIURIL) 25 MG tablet Take 25 mg by mouth daily      insulin aspart (NOVOLOG) 100 UNIT/ML injection vial Inject 4 Units into the skin 3 times daily (before meals) Plus sliding scale      insulin glargine (LANTUS) 100 UNIT/ML injection vial Inject 6 Units into the skin nightly      Nutritional Supplements (GLUCERNA 1.5 CHRIS) LIQD Take 237 oz by mouth 8 times daily 0001, 0200, 0600, 1000, 1200, 1400, 1800, 2200      NONFORMULARY H2O Flush  200cc via gtube 8x daily      vitamin D (ERGOCALCIFEROL) 57784 units CAPS capsule Take 50,000 Units by mouth once a week On Wednesday      Cranberry (CRANBERRY CONCENTRATE) 500 MG CAPS 500 mg by Per G Tube route daily       QUEtiapine (SEROQUEL) 100 MG tablet 100 mg by Per G Tube route 2 times daily       citalopram (CELEXA) 20 MG tablet 20 mg by Per G Tube route daily       risperiDONE (RISPERDAL) 2 MG tablet 2 mg by Per G Tube route 2 times daily       OLANZapine (ZYPREXA) 10 MG tablet Take 5 mg by mouth nightly May hold for sedation. tamsulosin (FLOMAX) 0.4 MG capsule Take 0.4 mg by mouth daily Per G tube      amLODIPine (NORVASC) 2.5 MG tablet 2.5 mg by Per G Tube route daily       risperiDONE microspheres (RISPERDAL CONSTA) 50 MG injection Inject 50 mg into the muscle every 14 days      albuterol sulfate  (90 Base) MCG/ACT inhaler Inhale 2 puffs into the lungs every 4 hours as needed       ipratropium-albuterol (DUONEB) 0.5-2.5 (3) MG/3ML SOLN nebulizer solution Inhale 1 vial into the lungs every 6 hours as needed for Shortness of Breath       bisacodyl (DULCOLAX) 10 MG suppository Place 10 mg rectally See Admin Instructions Insert one suppository rectally as needed if no bowel movement in 4 days. Magnesium Hydroxide (MILK OF MAGNESIA PO) Take 30 mLs by mouth daily as needed Give 30 ml po prn if no bowel movement in 3 days.       acetaminophen (TYLENOL) 325 MG tablet Take 650 mg by mouth every 6 hours as needed for Pain or Fever      Azelastine HCl (ASTELIN NA) 137 mcg by Nasal route 2 times daily as needed 2 puffs in each nostril            Patient was seen and examined by Dr. Romana Lucks and plan of care reviewed. Signed:  Electronically signed by JOSEPH Iraheta on 6/14/2019 at 5:41 PM       Thank you Janes Parker MD for the opportunity to be involved in this patient's care. If you have any questions or concerns please feel free to contact me at [phone].

## 2019-06-14 NOTE — CONSULTS
Patient admitted to floor. Per JOSEPH Hernandez, pharmacy to dose vancomycin. Current estimated CrCl=24mL/min. Vancomycin intermittent placeholder ordered as well as random vancomycin level with am labs. Pharmacy will continue to follow and adjust as necessary.

## 2019-06-14 NOTE — FLOWSHEET NOTE
06/14/19 1626   Vital Signs   Temp 97.5 °F (36.4 °C)   Temp Source Axillary   Pulse 68   Heart Rate Source Monitor   Resp 18   /64   BP Location Right Arm   BP Upper/Lower Upper   MAP (mmHg) 79   Oxygen Therapy   SpO2 92 %   O2 Device Nasal cannula

## 2019-06-14 NOTE — FLOWSHEET NOTE
Patient admitted from ER. Pt noted to have redness that extends from his hip to his ankle on the right. Pt has nonblanchable redness to his buttocks. Lungs clear, patient looks dry, no edema noted. JOSEPH and MD called to bedside, see other note for details.  KRISHAN patients LOC, strength, and KRISHAN admission at this time, patient is not oriented, arouses to pain only at this time and does not interact with staff.      06/14/19 1630   Assessment   Charting Type Admission   Neurological   Neuro (WDL) X   Level of Consciousness 3   Orientation Level KRISHAN   Cognition KRISHAN   Language Incomprehensible   R Hand  KRISHAN   L Hand  KRISHAN   R Foot Dorsiflexion KRISHAN   L Foot Dorsiflexion KRISHAN   R Foot Plantar Flexion KRISHAN   L Foot Plantar Flexion KRISHAN   Xander Coma Scale   Eye Opening 2   Best Verbal Response 2   Best Motor Response 4   Xander Coma Scale Score 8   HEENT   HEENT (WDL) X   Teeth Edentulous   Mucous Membrane Dry   Respiratory   Respiratory (WDL) X   Respiratory Pattern Bradypneic   Respiratory Depth Shallow   Respiratory Quality/Effort Orthopneic   Chest Assessment Chest expansion symmetrical   L Breath Sounds Clear;Diminished   R Breath Sounds Clear;Diminished   Cardiac   Cardiac (WDL) X   Cardiac Rhythm Other (Comment)  (bradycardic)   Rhythm Interpretation   Pulse (!) 33   Cardiac Monitor   Telemetry Monitor On Yes   Telemetry Audible Yes   Telemetry Alarms Set Yes   Telemetry Box Number [id number]   Gastrointestinal   Abdominal (WDL) X   Abdomen Inspection Distended   RUQ Bowel Sounds Active   LUQ Bowel Sounds Active   RLQ Bowel Sounds Active   LLQ Bowel Sounds Active   Peripheral Vascular   Peripheral Vascular (WDL) WDL   Edema None   Skin Color/Condition   Skin Color/Condition (WDL) X   Skin Color Pale   Skin Condition/Temp Dry   Skin Integrity   Skin Integrity (WDL) X   Skin Integrity Redness  (nonblanchable)   Location buttocks   Preventative Dressing Yes   Dressing Site   (buttocks)   Musculoskeletal   Musculoskeletal (WDL) X   RUE KRISHAN   LUE KRISHAN   RL Extremity KRISHAN   LL Extremity KRISHAN   Genitourinary   Genitourinary (WDL) X   Flank Tenderness KRISHAN   Suprapubic Tenderness KRISHAN   Dysuria KRISHAN   Urine Assessment   Incontinence Yes   Psychosocial   Psychosocial (WDL) X

## 2019-06-14 NOTE — ED NOTES
Bed assignment received from [facility]. Pt will be going to room 9. Will call back to give report after home medication list is reviewed per RN.      Nathalie Madden RN  06/14/19 [id number]

## 2019-06-14 NOTE — ED NOTES
Straight cath performed at this time. Approximately 400cc of clear yellow urine obtained. Pt tolerated well. Depends changed-small amount of stool (brown, loose). Krysta care performed. No skin breakdown noted at this time. Redness noted to the right lower extremity.       Violeta Escoto RN  06/14/19 1312

## 2019-06-14 NOTE — H&P
Short stay summary      Patient ID: Negin Fermin      Patient's PCP: Tala Marrero MD    Admit Date: 6/14/2019     Discharge Date: 6/14/2019     Admitting Physician: Erasmo Griffith MD    Discharge Physician: JOSEPH Ramos     Reason for this admission: Fever, altered mental status    Discharge Diagnoses: Active Hospital Problems    Diagnosis Date Noted    Cellulitis [L03.90] 06/14/2019    Acute on chronic renal failure (HCC) [N17.9, N18.9] 06/14/2019    Type 2 diabetes mellitus ([address] [E11.9] 06/14/2019    Hypotension [I95.9] 06/14/2019    Change in mental status [R41.82] 06/14/2019    Urine retention [R33.9] 06/14/2019    Schizophrenia ([address] [F20.9] 09/06/2018       Procedures:   US DUP LOWER EXTREMITY RIGHT DELIA   Final Result      No evidence of deep vein thrombosis in the right lower extremity. XR CHEST PORTABLE   Final Result   Impression: No acute cardiopulmonary abnormality. Consults:   PHARMACY TO DOSE VANCOMYCIN  IP CONSULT TO DIETITIAN  PHARMACY TO DOSE VANCOMYCIN  PT/OT    HISTORY OF PRESENT ILLNESS:   The patient is a 80 y.o. male who presents with altered mental status, fever and redness of right lower extremity. No mention of trying anything for these issues. Patient is a nursing facility resident at [address] [place] [address]. He apparently has chronic debility and receives feed per PEG. Patient is a poor historian at this time in setting of acute illness and is unable to provide additional history. HPI obtained primarily from medical report and medical documentation.      Review of systems  ROS unobtainable: mental status change    Past Medical History:      Diagnosis Date    Anxiety     Auditory hallucinations     Chronic kidney disease     COPD (chronic obstructive pulmonary disease) (HCC)     Dysphagia     Hypertension     Paranoid schizophrenia ([address]     Pneumonia     Psychosis ([address]     Schizophrenia ([address]     Sepsis ([address]        Past Surgical History:      Procedure Laterality Date    KNEE SURGERY Right        Social History:   TOBACCO:   has no tobacco history on file. ETOH:   reports that he does not drink alcohol. Family History:   No family history on file. Allergies:  Haldol [haloperidol lactate] and Ibuprofen    Medications Prior to Admission:    Prior to Admission medications    Medication Sig Start Date End Date Taking? Authorizing Provider   aMILoride (MIDAMOR) 5 MG tablet 5 mg by Per G Tube route daily   Yes Historical Provider, MD   aspirin 81 MG chewable tablet 81 mg by Per G Tube route daily   Yes Historical Provider, MD   ferrous sulfate 300 (60 Fe) MG/5ML syrup 300 mg by Per G Tube route every other day   Yes Historical Provider, MD   cephALEXin (KEFLEX) 500 MG capsule 500 mg by Per G Tube route 3 times daily   Yes Historical Provider, MD   busPIRone (BUSPAR) 5 MG tablet 5 mg by Per G Tube route 2 times daily   Yes Historical Provider, MD   polyethylene glycol (GLYCOLAX) powder Take 17 g by mouth 2 times daily Mix 1 cap in 4-8 oz liquid and give via tube twice daily.    Yes Historical Provider, MD   guaiFENesin (MUCINEX) 600 MG extended release tablet Take 600 mg by mouth 2 times daily Per g-tube   Yes Historical Provider, MD   hydrochlorothiazide (HYDRODIURIL) 25 MG tablet Take 25 mg by mouth daily   Yes Historical Provider, MD   insulin aspart (NOVOLOG) 100 UNIT/ML injection vial Inject 4 Units into the skin 3 times daily (before meals) Plus sliding scale   Yes Historical Provider, MD   insulin glargine (LANTUS) 100 UNIT/ML injection vial Inject 6 Units into the skin nightly   Yes Historical Provider, MD   Nutritional Supplements (GLUCERNA 1.5 CRHIS) LIQD Take 237 oz by mouth 8 times daily 0001, 0200, 0600, 1000, 1200, 1400, 1800, 2200   Yes Historical Provider, MD   NONFORMULARY H2O Flush  200cc via gtube 8x daily   Yes Historical Provider, MD   vitamin D (ERGOCALCIFEROL) 74270 units CAPS capsule Take 50,000 Units by mouth once a week On Wednesday   Yes Historical Provider, MD   Cranberry (CRANBERRY CONCENTRATE) 500 MG CAPS 500 mg by Per G Tube route daily    Yes Historical Provider, MD   QUEtiapine (SEROQUEL) 100 MG tablet 100 mg by Per G Tube route 2 times daily    Yes Historical Provider, MD   citalopram (CELEXA) 20 MG tablet 20 mg by Per G Tube route daily    Yes Historical Provider, MD   risperiDONE (RISPERDAL) 2 MG tablet 2 mg by Per G Tube route 2 times daily    Yes Historical Provider, MD   OLANZapine (ZYPREXA) 10 MG tablet Take 5 mg by mouth nightly May hold for sedation. Yes Historical Provider, MD   tamsulosin (FLOMAX) 0.4 MG capsule Take 0.4 mg by mouth daily Per G tube   Yes Historical Provider, MD   amLODIPine (NORVASC) 2.5 MG tablet 2.5 mg by Per G Tube route daily    Yes Historical Provider, MD   risperiDONE microspheres (RISPERDAL CONSTA) 50 MG injection Inject 50 mg into the muscle every 14 days    Historical Provider, MD   albuterol sulfate  (90 Base) MCG/ACT inhaler Inhale 2 puffs into the lungs every 4 hours as needed     Historical Provider, MD   ipratropium-albuterol (DUONEB) 0.5-2.5 (3) MG/3ML SOLN nebulizer solution Inhale 1 vial into the lungs every 6 hours as needed for Shortness of Breath     Historical Provider, MD   bisacodyl (DULCOLAX) 10 MG suppository Place 10 mg rectally See Admin Instructions Insert one suppository rectally as needed if no bowel movement in 4 days. Historical Provider, MD   Magnesium Hydroxide (MILK OF MAGNESIA PO) Take 30 mLs by mouth daily as needed Give 30 ml po prn if no bowel movement in 3 days.     Historical Provider, MD   acetaminophen (TYLENOL) 325 MG tablet Take 650 mg by mouth every 6 hours as needed for Pain or Fever    Historical Provider, MD   Azelastine HCl (ASTELIN NA) 137 mcg by Nasal route 2 times daily as needed 2 puffs in each nostril    Historical Provider, MD       Vitals:   Vitals:    06/14/19 1734   BP: 138/87   Pulse: 85   Resp: 16   Temp:  97.5 °F SpO2: 97%     Physical exam  Constitutional:  Well developed, chronically ill appearing, no acute distress  Eyes:  PERRL, no scleral icterus, conjunctiva normal   HENT:  Atraumatic, external ears normal, nose normal, oropharynx dry. Neck- supple, no JVD, no lymphadenopathy  Respiratory:  No respiratory distress, no wheezing, rales or rhonchi detected  Cardiovascular:  Normal rate, normal rhythm, no murmurs, no gallops, no rubs, no edema   GI:  Soft, nondistended, normal bowel sounds, nontender, no voluntary guarding  Musculoskeletal:  No cyanosis or obvious acute deformity. Moving all extremities   Integument:  Warm and dry. Patchy areas of erythema to right lower extremity extends to right hip/nearly right flank. Slight induration. No abscess or fluctuance palpated. Neurologic:  Lethargic, awakens to uncomfortable stimuli, follows simple commands  Psychiatric:  Difficult to obtain comprehensive musculoskeletal, neurologic or psychiatric exam in setting of altered mental status      CBC:   Lab Results   Component Value Date    WBC 11.2 06/14/2019    RBC 3.31 06/14/2019    HGB 9.7 06/14/2019    HCT 30.6 06/14/2019    MCV 92.4 06/14/2019    RDW 14.4 06/14/2019     06/14/2019     CMP:    Lab Results   Component Value Date     06/14/2019    K 4.8 06/14/2019    K 4.4 06/14/2019    CL 98 06/14/2019    CO2 21 06/14/2019    BUN 91 06/14/2019    CREATININE 2.8 06/14/2019    GFRAA 27 06/14/2019    AGRATIO 0.8 06/14/2019    LABGLOM 23 06/14/2019    GLUCOSE 141 06/14/2019    PROT 7.3 06/14/2019    CALCIUM 8.5 06/14/2019    BILITOT 0.5 06/14/2019    ALKPHOS 177 06/14/2019    AST 31 06/14/2019    ALT 41 06/14/2019     Troponin:   Recent Labs     06/14/19  1655   TROPONINI <0.30     US DUP LOWER EXTREMITY RIGHT DELIA   Final Result      No evidence of deep vein thrombosis in the right lower extremity. XR CHEST PORTABLE   Final Result   Impression: No acute cardiopulmonary abnormality.             Assessment and Plan/Hospital course: Active Hospital Problems    Diagnosis Date Noted    Cellulitis [L03.90] 06/14/2019    Acute on chronic renal failure (HCC) [N17.9, N18.9] 06/14/2019    Type 2 diabetes mellitus ([facility] [E11.9] 06/14/2019    Hypotension [I95.9] 06/14/2019    Change in mental status [R41.82] 06/14/2019    Urine retention [R33.9] 06/14/2019    Schizophrenia ([facility] [F20.9] 09/06/2018     Patient was admitted with altered mental status, fever and redness RLE. Venous duplex showed no acute finding. Patient with underlying CKD. Noted to have more elevation in BUN than baseline. Diagnosed with cellulitis. Treated with IV Vanco and Merrem. Patient noted to be hypotensive and having increased difficulty arousing shortly after arriving to medical unit. 1 liter IV bolus and Solumedrol given. Patient's psychiatric/sedating meds held. Mccracken catheter was placed and did have almost immediate 1 liter output possibly related to retention. Patient will be transferred to higher level of care for further evaluation and management for possible early sepsis secondary to cellulitis. Case discussed with hospitalist who accepts for admission. Follow up blood pressure has improved with treatment. Disposition: TCC    Discharged Condition: Fairly stable    Code Status: Full Code      Discharge Medications:     Current Discharge Medication List           Details   aMILoride (MIDAMOR) 5 MG tablet 5 mg by Per G Tube route daily      aspirin 81 MG chewable tablet 81 mg by Per G Tube route daily      ferrous sulfate 300 (60 Fe) MG/5ML syrup 300 mg by Per G Tube route every other day      cephALEXin (KEFLEX) 500 MG capsule 500 mg by Per G Tube route 3 times daily      busPIRone (BUSPAR) 5 MG tablet 5 mg by Per G Tube route 2 times daily      polyethylene glycol (GLYCOLAX) powder Take 17 g by mouth 2 times daily Mix 1 cap in 4-8 oz liquid and give via tube twice daily.       guaiFENesin (MUCINEX) 600 MG extended release tablet Take 600 mg by mouth 2 times daily Per g-tube      hydrochlorothiazide (HYDRODIURIL) 25 MG tablet Take 25 mg by mouth daily      insulin aspart (NOVOLOG) 100 UNIT/ML injection vial Inject 4 Units into the skin 3 times daily (before meals) Plus sliding scale      insulin glargine (LANTUS) 100 UNIT/ML injection vial Inject 6 Units into the skin nightly      Nutritional Supplements (GLUCERNA 1.5 CHRIS) LIQD Take 237 oz by mouth 8 times daily 0001, 0200, 0600, 1000, 1200, 1400, 1800, 2200      NONFORMULARY H2O Flush  200cc via gtube 8x daily      vitamin D (ERGOCALCIFEROL) 54357 units CAPS capsule Take 50,000 Units by mouth once a week On Wednesday      Cranberry (CRANBERRY CONCENTRATE) 500 MG CAPS 500 mg by Per G Tube route daily       QUEtiapine (SEROQUEL) 100 MG tablet 100 mg by Per G Tube route 2 times daily       citalopram (CELEXA) 20 MG tablet 20 mg by Per G Tube route daily       risperiDONE (RISPERDAL) 2 MG tablet 2 mg by Per G Tube route 2 times daily       OLANZapine (ZYPREXA) 10 MG tablet Take 5 mg by mouth nightly May hold for sedation. tamsulosin (FLOMAX) 0.4 MG capsule Take 0.4 mg by mouth daily Per G tube      amLODIPine (NORVASC) 2.5 MG tablet 2.5 mg by Per G Tube route daily       risperiDONE microspheres (RISPERDAL CONSTA) 50 MG injection Inject 50 mg into the muscle every 14 days      albuterol sulfate  (90 Base) MCG/ACT inhaler Inhale 2 puffs into the lungs every 4 hours as needed       ipratropium-albuterol (DUONEB) 0.5-2.5 (3) MG/3ML SOLN nebulizer solution Inhale 1 vial into the lungs every 6 hours as needed for Shortness of Breath       bisacodyl (DULCOLAX) 10 MG suppository Place 10 mg rectally See Admin Instructions Insert one suppository rectally as needed if no bowel movement in 4 days. Magnesium Hydroxide (MILK OF MAGNESIA PO) Take 30 mLs by mouth daily as needed Give 30 ml po prn if no bowel movement in 3 days.       acetaminophen (TYLENOL) 325 MG tablet Take 650 mg by mouth every 6 hours as needed for Pain or Fever      Azelastine HCl (ASTELIN NA) 137 mcg by Nasal route 2 times daily as needed 2 puffs in each nostril            Patient was seen and examined by Dr. Romana Lucks and plan of care reviewed. Signed:  Electronically signed by JOSEPH Iraheta on 6/14/2019 at 5:41 PM       Thank you Janes Parker MD for the opportunity to be involved in this patient's care. If you have any questions or concerns please feel free to contact me at [phone].

## 2019-06-14 NOTE — ED NOTES
Venous Doppler of the right lower extremity requested per [facility]. Will wait for these results prior to admission.       Joce Au RN  06/14/19 [id number]

## 2019-06-14 NOTE — ED NOTES
Pt arrival to ER from [facility]. Pt has had redness to the right leg and increasing altered mental status over the past day. On arrival of BLS crew pts blood pressure was 70/30 and o2 sat 89% on room air. ALS crew then went to patient. Pt was lethargic but on arrival to ER is alert but confused. Pt does have noted redness down the right leg. Pt has an #18 Right hand per [facility]. Pt fsbg 188 in route.       [address] Sebastien. Iglesia Starkey RN  06/14/19 1214

## 2019-06-15 ENCOUNTER — APPOINTMENT (OUTPATIENT)
Dept: GENERAL RADIOLOGY | Facility: HOSPITAL | Age: 69
End: 2019-06-15

## 2019-06-15 PROBLEM — A41.9 SEPSIS (HCC): Status: ACTIVE | Noted: 2019-06-15

## 2019-06-15 LAB
ANION GAP SERPL CALCULATED.3IONS-SCNC: 13.2 MMOL/L (ref 10–20)
BUN BLD-MCNC: 83 MG/DL (ref 7–20)
BUN/CREAT SERPL: 30.7 (ref 6.3–21.9)
CALCIUM SPEC-SCNC: 8.5 MG/DL (ref 8.4–10.2)
CHLORIDE SERPL-SCNC: 106 MMOL/L (ref 98–107)
CO2 SERPL-SCNC: 28 MMOL/L (ref 26–30)
CREAT BLD-MCNC: 2.7 MG/DL (ref 0.6–1.3)
DEPRECATED RDW RBC AUTO: 48.2 FL (ref 37–54)
ERYTHROCYTE [DISTWIDTH] IN BLOOD BY AUTOMATED COUNT: 14.2 % (ref 12.3–15.4)
GFR SERPL CREATININE-BSD FRML MDRD: 24 ML/MIN/1.73
GLUCOSE BLD-MCNC: 102 MG/DL (ref 74–98)
GLUCOSE BLDC GLUCOMTR-MCNC: 121 MG/DL (ref 70–130)
GLUCOSE BLDC GLUCOMTR-MCNC: 123 MG/DL (ref 70–130)
GLUCOSE BLDC GLUCOMTR-MCNC: 151 MG/DL (ref 70–130)
GLUCOSE BLDC GLUCOMTR-MCNC: 223 MG/DL (ref 70–130)
GLUCOSE BLDC GLUCOMTR-MCNC: 240 MG/DL (ref 70–130)
HCT VFR BLD AUTO: 27.7 % (ref 37.5–51)
HGB BLD-MCNC: 8.7 G/DL (ref 13–17.7)
MCH RBC QN AUTO: 28.7 PG (ref 26.6–33)
MCHC RBC AUTO-ENTMCNC: 31.4 G/DL (ref 31.5–35.7)
MCV RBC AUTO: 91.4 FL (ref 79–97)
PLATELET # BLD AUTO: 214 10*3/MM3 (ref 140–450)
PMV BLD AUTO: 11 FL (ref 6–12)
POTASSIUM BLD-SCNC: 4.2 MMOL/L (ref 3.5–5.1)
RBC # BLD AUTO: 3.03 10*6/MM3 (ref 4.14–5.8)
SODIUM BLD-SCNC: 143 MMOL/L (ref 137–145)
TROPONIN I SERPL-MCNC: <0.012 NG/ML (ref 0–0.03)
TROPONIN I SERPL-MCNC: <0.012 NG/ML (ref 0–0.03)
VANCOMYCIN SERPL-MCNC: 11.31 MCG/ML (ref 5–40)
WBC NRBC COR # BLD: 6.64 10*3/MM3 (ref 3.4–10.8)

## 2019-06-15 PROCEDURE — 87040 BLOOD CULTURE FOR BACTERIA: CPT | Performed by: INTERNAL MEDICINE

## 2019-06-15 PROCEDURE — 99232 SBSQ HOSP IP/OBS MODERATE 35: CPT | Performed by: INTERNAL MEDICINE

## 2019-06-15 PROCEDURE — 85027 COMPLETE CBC AUTOMATED: CPT | Performed by: INTERNAL MEDICINE

## 2019-06-15 PROCEDURE — 80048 BASIC METABOLIC PNL TOTAL CA: CPT | Performed by: INTERNAL MEDICINE

## 2019-06-15 PROCEDURE — 25010000002 VANCOMYCIN 5 G RECONSTITUTED SOLUTION 5,000 MG VIAL: Performed by: INTERNAL MEDICINE

## 2019-06-15 PROCEDURE — 84484 ASSAY OF TROPONIN QUANT: CPT | Performed by: INTERNAL MEDICINE

## 2019-06-15 PROCEDURE — 82962 GLUCOSE BLOOD TEST: CPT

## 2019-06-15 PROCEDURE — 71045 X-RAY EXAM CHEST 1 VIEW: CPT

## 2019-06-15 PROCEDURE — 25010000002 HEPARIN (PORCINE) PER 1000 UNITS: Performed by: INTERNAL MEDICINE

## 2019-06-15 PROCEDURE — 80202 ASSAY OF VANCOMYCIN: CPT | Performed by: INTERNAL MEDICINE

## 2019-06-15 PROCEDURE — 63710000001 INSULIN DETEMIR PER 5 UNITS: Performed by: INTERNAL MEDICINE

## 2019-06-15 PROCEDURE — 25010000002 PIPERACILLIN SOD-TAZOBACTAM PER 1 G: Performed by: INTERNAL MEDICINE

## 2019-06-15 RX ORDER — TRIAMCINOLONE ACETONIDE 1 MG/G
CREAM TOPICAL EVERY 12 HOURS SCHEDULED
Status: DISCONTINUED | OUTPATIENT
Start: 2019-06-15 | End: 2019-06-18 | Stop reason: HOSPADM

## 2019-06-15 RX ORDER — SODIUM CHLORIDE 9 MG/ML
100 INJECTION, SOLUTION INTRAVENOUS CONTINUOUS
Status: DISCONTINUED | OUTPATIENT
Start: 2019-06-15 | End: 2019-06-17

## 2019-06-15 RX ORDER — AZELASTINE 1 MG/ML
2 SPRAY, METERED NASAL 2 TIMES DAILY PRN
Status: DISCONTINUED | OUTPATIENT
Start: 2019-06-15 | End: 2019-06-18 | Stop reason: HOSPADM

## 2019-06-15 RX ORDER — ONDANSETRON 4 MG/1
4 TABLET, FILM COATED ORAL EVERY 6 HOURS PRN
Status: DISCONTINUED | OUTPATIENT
Start: 2019-06-15 | End: 2019-06-18 | Stop reason: HOSPADM

## 2019-06-15 RX ORDER — IPRATROPIUM BROMIDE AND ALBUTEROL SULFATE 2.5; .5 MG/3ML; MG/3ML
3 SOLUTION RESPIRATORY (INHALATION) EVERY 4 HOURS PRN
Status: DISCONTINUED | OUTPATIENT
Start: 2019-06-15 | End: 2019-06-18 | Stop reason: HOSPADM

## 2019-06-15 RX ORDER — BUSPIRONE HYDROCHLORIDE 5 MG/1
5 TABLET ORAL 2 TIMES DAILY
Status: DISCONTINUED | OUTPATIENT
Start: 2019-06-15 | End: 2019-06-18 | Stop reason: HOSPADM

## 2019-06-15 RX ORDER — ACETAMINOPHEN 160 MG/5ML
650 SOLUTION ORAL EVERY 4 HOURS PRN
Status: DISCONTINUED | OUTPATIENT
Start: 2019-06-15 | End: 2019-06-18 | Stop reason: HOSPADM

## 2019-06-15 RX ORDER — MEGESTROL ACETATE 40 MG/ML
400 SUSPENSION ORAL 2 TIMES DAILY
Status: DISCONTINUED | OUTPATIENT
Start: 2019-06-15 | End: 2019-06-18 | Stop reason: HOSPADM

## 2019-06-15 RX ORDER — QUETIAPINE FUMARATE 100 MG/1
100 TABLET, FILM COATED ORAL EVERY 12 HOURS SCHEDULED
Status: DISCONTINUED | OUTPATIENT
Start: 2019-06-15 | End: 2019-06-18 | Stop reason: HOSPADM

## 2019-06-15 RX ORDER — SPIRONOLACTONE 25 MG/1
25 TABLET ORAL DAILY
Status: COMPLETED | OUTPATIENT
Start: 2019-06-15 | End: 2019-06-16

## 2019-06-15 RX ORDER — RISPERIDONE 1 MG/1
2 TABLET ORAL 2 TIMES DAILY
Status: DISCONTINUED | OUTPATIENT
Start: 2019-06-15 | End: 2019-06-18 | Stop reason: HOSPADM

## 2019-06-15 RX ORDER — L.ACID,PARA/B.BIFIDUM/S.THERM 8B CELL
1 CAPSULE ORAL 2 TIMES DAILY
Status: DISCONTINUED | OUTPATIENT
Start: 2019-06-15 | End: 2019-06-18 | Stop reason: HOSPADM

## 2019-06-15 RX ORDER — ASPIRIN 81 MG/1
81 TABLET, CHEWABLE ORAL DAILY
Status: DISCONTINUED | OUTPATIENT
Start: 2019-06-15 | End: 2019-06-18 | Stop reason: HOSPADM

## 2019-06-15 RX ORDER — AMILORIDE HYDROCHLORIDE 5 MG/1
5 TABLET ORAL DAILY
Status: DISCONTINUED | OUTPATIENT
Start: 2019-06-17 | End: 2019-06-18 | Stop reason: HOSPADM

## 2019-06-15 RX ORDER — CITALOPRAM 20 MG/1
20 TABLET ORAL DAILY
Status: DISCONTINUED | OUTPATIENT
Start: 2019-06-15 | End: 2019-06-18 | Stop reason: HOSPADM

## 2019-06-15 RX ORDER — OLANZAPINE 5 MG/1
5 TABLET ORAL NIGHTLY
Status: DISCONTINUED | OUTPATIENT
Start: 2019-06-15 | End: 2019-06-18 | Stop reason: HOSPADM

## 2019-06-15 RX ORDER — POLYETHYLENE GLYCOL 3350 17 G/17G
17 POWDER, FOR SOLUTION ORAL 2 TIMES DAILY
Status: DISCONTINUED | OUTPATIENT
Start: 2019-06-15 | End: 2019-06-17

## 2019-06-15 RX ORDER — SODIUM CHLORIDE 0.9 % (FLUSH) 0.9 %
3-10 SYRINGE (ML) INJECTION AS NEEDED
Status: DISCONTINUED | OUTPATIENT
Start: 2019-06-15 | End: 2019-06-18 | Stop reason: HOSPADM

## 2019-06-15 RX ORDER — BISACODYL 10 MG
10 SUPPOSITORY, RECTAL RECTAL DAILY PRN
Status: DISCONTINUED | OUTPATIENT
Start: 2019-06-15 | End: 2019-06-18 | Stop reason: HOSPADM

## 2019-06-15 RX ORDER — AMILORIDE HYDROCHLORIDE 5 MG/1
5 TABLET ORAL DAILY
Status: DISCONTINUED | OUTPATIENT
Start: 2019-06-15 | End: 2019-06-15

## 2019-06-15 RX ORDER — CHOLECALCIFEROL (VITAMIN D3) 125 MCG
5 CAPSULE ORAL NIGHTLY PRN
Status: DISCONTINUED | OUTPATIENT
Start: 2019-06-15 | End: 2019-06-18 | Stop reason: HOSPADM

## 2019-06-15 RX ORDER — SODIUM CHLORIDE 0.9 % (FLUSH) 0.9 %
3 SYRINGE (ML) INJECTION EVERY 12 HOURS SCHEDULED
Status: DISCONTINUED | OUTPATIENT
Start: 2019-06-15 | End: 2019-06-18 | Stop reason: HOSPADM

## 2019-06-15 RX ORDER — GUAIFENESIN 600 MG/1
600 TABLET, EXTENDED RELEASE ORAL EVERY 12 HOURS SCHEDULED
Status: DISCONTINUED | OUTPATIENT
Start: 2019-06-15 | End: 2019-06-15 | Stop reason: ALTCHOICE

## 2019-06-15 RX ORDER — HEPARIN SODIUM 5000 [USP'U]/ML
5000 INJECTION, SOLUTION INTRAVENOUS; SUBCUTANEOUS EVERY 8 HOURS SCHEDULED
Status: DISCONTINUED | OUTPATIENT
Start: 2019-06-15 | End: 2019-06-18 | Stop reason: HOSPADM

## 2019-06-15 RX ORDER — TAMSULOSIN HYDROCHLORIDE 0.4 MG/1
0.4 CAPSULE ORAL DAILY
Status: DISCONTINUED | OUTPATIENT
Start: 2019-06-15 | End: 2019-06-18 | Stop reason: HOSPADM

## 2019-06-15 RX ORDER — ONDANSETRON 2 MG/ML
4 INJECTION INTRAMUSCULAR; INTRAVENOUS EVERY 6 HOURS PRN
Status: DISCONTINUED | OUTPATIENT
Start: 2019-06-15 | End: 2019-06-18 | Stop reason: HOSPADM

## 2019-06-15 RX ADMIN — TAZOBACTAM SODIUM AND PIPERACILLIN SODIUM 4.5 G: 500; 4 INJECTION, SOLUTION INTRAVENOUS at 03:10

## 2019-06-15 RX ADMIN — OLANZAPINE 5 MG: 5 TABLET, FILM COATED ORAL at 21:13

## 2019-06-15 RX ADMIN — BUSPIRONE HYDROCHLORIDE 5 MG: 5 TABLET ORAL at 21:13

## 2019-06-15 RX ADMIN — SODIUM CHLORIDE, PRESERVATIVE FREE 3 ML: 5 INJECTION INTRAVENOUS at 21:14

## 2019-06-15 RX ADMIN — QUETIAPINE FUMARATE 100 MG: 100 TABLET ORAL at 08:12

## 2019-06-15 RX ADMIN — GUAIFENESIN 200 MG: 200 SOLUTION ORAL at 09:54

## 2019-06-15 RX ADMIN — ASPIRIN 81 MG 81 MG: 81 TABLET ORAL at 08:12

## 2019-06-15 RX ADMIN — CITALOPRAM HYDROBROMIDE 20 MG: 20 TABLET ORAL at 08:12

## 2019-06-15 RX ADMIN — SODIUM CHLORIDE, PRESERVATIVE FREE 3 ML: 5 INJECTION INTRAVENOUS at 03:10

## 2019-06-15 RX ADMIN — ACETAMINOPHEN 650 MG: 650 SOLUTION ORAL at 17:20

## 2019-06-15 RX ADMIN — MEGESTROL ACETATE 400 MG: 40 SUSPENSION ORAL at 21:13

## 2019-06-15 RX ADMIN — SPIRONOLACTONE 25 MG: 25 TABLET, FILM COATED ORAL at 13:52

## 2019-06-15 RX ADMIN — QUETIAPINE FUMARATE 100 MG: 100 TABLET ORAL at 21:13

## 2019-06-15 RX ADMIN — SODIUM CHLORIDE 100 ML/HR: 9 INJECTION, SOLUTION INTRAVENOUS at 15:39

## 2019-06-15 RX ADMIN — Medication 1 CAPSULE: at 09:54

## 2019-06-15 RX ADMIN — GUAIFENESIN 200 MG: 200 SOLUTION ORAL at 17:35

## 2019-06-15 RX ADMIN — HEPARIN SODIUM 5000 UNITS: 5000 INJECTION, SOLUTION INTRAVENOUS; SUBCUTANEOUS at 13:52

## 2019-06-15 RX ADMIN — RISPERIDONE 2 MG: 1 TABLET ORAL at 21:13

## 2019-06-15 RX ADMIN — BUSPIRONE HYDROCHLORIDE 5 MG: 5 TABLET ORAL at 08:12

## 2019-06-15 RX ADMIN — GUAIFENESIN 200 MG: 200 SOLUTION ORAL at 13:52

## 2019-06-15 RX ADMIN — RISPERIDONE 2 MG: 1 TABLET ORAL at 08:12

## 2019-06-15 RX ADMIN — SODIUM CHLORIDE, PRESERVATIVE FREE 3 ML: 5 INJECTION INTRAVENOUS at 08:12

## 2019-06-15 RX ADMIN — TRIAMCINOLONE ACETONIDE: 1 CREAM TOPICAL at 03:10

## 2019-06-15 RX ADMIN — GUAIFENESIN 200 MG: 200 SOLUTION ORAL at 21:12

## 2019-06-15 RX ADMIN — TAZOBACTAM SODIUM AND PIPERACILLIN SODIUM 4.5 G: 500; 4 INJECTION, SOLUTION INTRAVENOUS at 15:39

## 2019-06-15 RX ADMIN — TRIAMCINOLONE ACETONIDE: 1 CREAM TOPICAL at 21:00

## 2019-06-15 RX ADMIN — TAMSULOSIN HYDROCHLORIDE 0.4 MG: 0.4 CAPSULE ORAL at 08:12

## 2019-06-15 RX ADMIN — INSULIN DETEMIR 6 UNITS: 100 INJECTION, SOLUTION SUBCUTANEOUS at 21:14

## 2019-06-15 RX ADMIN — OLANZAPINE 5 MG: 5 TABLET, FILM COATED ORAL at 03:11

## 2019-06-15 RX ADMIN — MEGESTROL ACETATE 400 MG: 40 SUSPENSION ORAL at 08:12

## 2019-06-15 RX ADMIN — TRIAMCINOLONE ACETONIDE: 1 CREAM TOPICAL at 08:12

## 2019-06-15 RX ADMIN — TAZOBACTAM SODIUM AND PIPERACILLIN SODIUM 4.5 G: 500; 4 INJECTION, SOLUTION INTRAVENOUS at 07:52

## 2019-06-15 RX ADMIN — Medication 1 CAPSULE: at 21:13

## 2019-06-15 RX ADMIN — GUAIFENESIN 600 MG: 600 TABLET, EXTENDED RELEASE ORAL at 03:11

## 2019-06-15 RX ADMIN — VANCOMYCIN HYDROCHLORIDE 1500 MG: 500 INJECTION, POWDER, LYOPHILIZED, FOR SOLUTION INTRAVENOUS at 08:13

## 2019-06-15 RX ADMIN — HEPARIN SODIUM 5000 UNITS: 5000 INJECTION, SOLUTION INTRAVENOUS; SUBCUTANEOUS at 05:54

## 2019-06-15 RX ADMIN — POLYETHYLENE GLYCOL 3350 17 G: 17 POWDER, FOR SOLUTION ORAL at 21:12

## 2019-06-15 RX ADMIN — HEPARIN SODIUM 5000 UNITS: 5000 INJECTION, SOLUTION INTRAVENOUS; SUBCUTANEOUS at 21:13

## 2019-06-15 NOTE — FLOWSHEET NOTE
Pt left medical unit with [facility] at this time. Pt alert and speaking to staff. Belongings and transfer packet with EMS. Notified [facility] that pt transferred to [facility] ICU.

## 2019-06-16 LAB
ALBUMIN SERPL-MCNC: 3.1 G/DL (ref 3.5–5)
ALBUMIN/GLOB SERPL: 0.9 G/DL (ref 1–2)
ALP SERPL-CCNC: 188 U/L (ref 38–126)
ALT SERPL W P-5'-P-CCNC: 51 U/L (ref 13–69)
ANION GAP SERPL CALCULATED.3IONS-SCNC: 14.1 MMOL/L (ref 10–20)
AST SERPL-CCNC: 36 U/L (ref 15–46)
BILIRUB SERPL-MCNC: 0.3 MG/DL (ref 0.2–1.3)
BUN BLD-MCNC: 69 MG/DL (ref 7–20)
BUN/CREAT SERPL: 31.4 (ref 6.3–21.9)
CALCIUM SPEC-SCNC: 8.2 MG/DL (ref 8.4–10.2)
CHLORIDE SERPL-SCNC: 105 MMOL/L (ref 98–107)
CO2 SERPL-SCNC: 25 MMOL/L (ref 26–30)
CREAT BLD-MCNC: 2.2 MG/DL (ref 0.6–1.3)
DEPRECATED RDW RBC AUTO: 48.1 FL (ref 37–54)
ERYTHROCYTE [DISTWIDTH] IN BLOOD BY AUTOMATED COUNT: 14 % (ref 12.3–15.4)
GFR SERPL CREATININE-BSD FRML MDRD: 30 ML/MIN/1.73
GLOBULIN UR ELPH-MCNC: 3.4 GM/DL
GLUCOSE BLD-MCNC: 141 MG/DL (ref 74–98)
GLUCOSE BLDC GLUCOMTR-MCNC: 155 MG/DL (ref 70–130)
GLUCOSE BLDC GLUCOMTR-MCNC: 161 MG/DL (ref 70–130)
GLUCOSE BLDC GLUCOMTR-MCNC: 171 MG/DL (ref 70–130)
HCT VFR BLD AUTO: 26.2 % (ref 37.5–51)
HGB BLD-MCNC: 8.1 G/DL (ref 13–17.7)
MAGNESIUM SERPL-MCNC: 2.6 MG/DL (ref 1.6–2.3)
MCH RBC QN AUTO: 28.7 PG (ref 26.6–33)
MCHC RBC AUTO-ENTMCNC: 30.9 G/DL (ref 31.5–35.7)
MCV RBC AUTO: 92.9 FL (ref 79–97)
PLATELET # BLD AUTO: 240 10*3/MM3 (ref 140–450)
PMV BLD AUTO: 10.6 FL (ref 6–12)
POTASSIUM BLD-SCNC: 4.1 MMOL/L (ref 3.5–5.1)
PROT SERPL-MCNC: 6.5 G/DL (ref 6.3–8.2)
RBC # BLD AUTO: 2.82 10*6/MM3 (ref 4.14–5.8)
SODIUM BLD-SCNC: 140 MMOL/L (ref 137–145)
WBC NRBC COR # BLD: 5.77 10*3/MM3 (ref 3.4–10.8)

## 2019-06-16 PROCEDURE — 97162 PT EVAL MOD COMPLEX 30 MIN: CPT

## 2019-06-16 PROCEDURE — 63710000001 INSULIN DETEMIR PER 5 UNITS: Performed by: INTERNAL MEDICINE

## 2019-06-16 PROCEDURE — 99232 SBSQ HOSP IP/OBS MODERATE 35: CPT | Performed by: INTERNAL MEDICINE

## 2019-06-16 PROCEDURE — 80053 COMPREHEN METABOLIC PANEL: CPT | Performed by: INTERNAL MEDICINE

## 2019-06-16 PROCEDURE — 25010000002 HEPARIN (PORCINE) PER 1000 UNITS: Performed by: INTERNAL MEDICINE

## 2019-06-16 PROCEDURE — 83735 ASSAY OF MAGNESIUM: CPT | Performed by: INTERNAL MEDICINE

## 2019-06-16 PROCEDURE — 82962 GLUCOSE BLOOD TEST: CPT

## 2019-06-16 PROCEDURE — 25010000002 VANCOMYCIN 5 G RECONSTITUTED SOLUTION 5,000 MG VIAL: Performed by: INTERNAL MEDICINE

## 2019-06-16 PROCEDURE — 25010000002 PIPERACILLIN SOD-TAZOBACTAM PER 1 G: Performed by: INTERNAL MEDICINE

## 2019-06-16 PROCEDURE — 85027 COMPLETE CBC AUTOMATED: CPT | Performed by: INTERNAL MEDICINE

## 2019-06-16 RX ADMIN — BUSPIRONE HYDROCHLORIDE 5 MG: 5 TABLET ORAL at 21:39

## 2019-06-16 RX ADMIN — CITALOPRAM HYDROBROMIDE 20 MG: 20 TABLET ORAL at 08:44

## 2019-06-16 RX ADMIN — OLANZAPINE 5 MG: 5 TABLET, FILM COATED ORAL at 21:39

## 2019-06-16 RX ADMIN — RISPERIDONE 2 MG: 1 TABLET ORAL at 08:43

## 2019-06-16 RX ADMIN — ASPIRIN 81 MG 81 MG: 81 TABLET ORAL at 08:44

## 2019-06-16 RX ADMIN — GUAIFENESIN 200 MG: 200 SOLUTION ORAL at 18:19

## 2019-06-16 RX ADMIN — Medication 1 CAPSULE: at 21:39

## 2019-06-16 RX ADMIN — TAZOBACTAM SODIUM AND PIPERACILLIN SODIUM 4.5 G: 500; 4 INJECTION, SOLUTION INTRAVENOUS at 00:30

## 2019-06-16 RX ADMIN — VANCOMYCIN HYDROCHLORIDE 1500 MG: 500 INJECTION, POWDER, LYOPHILIZED, FOR SOLUTION INTRAVENOUS at 21:35

## 2019-06-16 RX ADMIN — BUSPIRONE HYDROCHLORIDE 5 MG: 5 TABLET ORAL at 08:44

## 2019-06-16 RX ADMIN — TAZOBACTAM SODIUM AND PIPERACILLIN SODIUM 4.5 G: 500; 4 INJECTION, SOLUTION INTRAVENOUS at 08:43

## 2019-06-16 RX ADMIN — MEGESTROL ACETATE 400 MG: 40 SUSPENSION ORAL at 08:43

## 2019-06-16 RX ADMIN — Medication 1 CAPSULE: at 08:43

## 2019-06-16 RX ADMIN — GUAIFENESIN 200 MG: 200 SOLUTION ORAL at 06:01

## 2019-06-16 RX ADMIN — QUETIAPINE FUMARATE 100 MG: 100 TABLET ORAL at 21:39

## 2019-06-16 RX ADMIN — TAZOBACTAM SODIUM AND PIPERACILLIN SODIUM 4.5 G: 500; 4 INJECTION, SOLUTION INTRAVENOUS at 17:02

## 2019-06-16 RX ADMIN — TAMSULOSIN HYDROCHLORIDE 0.4 MG: 0.4 CAPSULE ORAL at 08:43

## 2019-06-16 RX ADMIN — QUETIAPINE FUMARATE 100 MG: 100 TABLET ORAL at 08:43

## 2019-06-16 RX ADMIN — HEPARIN SODIUM 5000 UNITS: 5000 INJECTION, SOLUTION INTRAVENOUS; SUBCUTANEOUS at 21:38

## 2019-06-16 RX ADMIN — HEPARIN SODIUM 5000 UNITS: 5000 INJECTION, SOLUTION INTRAVENOUS; SUBCUTANEOUS at 14:11

## 2019-06-16 RX ADMIN — RISPERIDONE 2 MG: 1 TABLET ORAL at 21:39

## 2019-06-16 RX ADMIN — GUAIFENESIN 200 MG: 200 SOLUTION ORAL at 08:43

## 2019-06-16 RX ADMIN — TRIAMCINOLONE ACETONIDE: 1 CREAM TOPICAL at 21:40

## 2019-06-16 RX ADMIN — MELATONIN TAB 5 MG 5 MG: 5 TAB at 21:39

## 2019-06-16 RX ADMIN — SODIUM CHLORIDE 100 ML/HR: 9 INJECTION, SOLUTION INTRAVENOUS at 11:42

## 2019-06-16 RX ADMIN — TRIAMCINOLONE ACETONIDE: 1 CREAM TOPICAL at 08:45

## 2019-06-16 RX ADMIN — GUAIFENESIN 200 MG: 200 SOLUTION ORAL at 14:11

## 2019-06-16 RX ADMIN — HEPARIN SODIUM 5000 UNITS: 5000 INJECTION, SOLUTION INTRAVENOUS; SUBCUTANEOUS at 06:01

## 2019-06-16 RX ADMIN — MEGESTROL ACETATE 400 MG: 40 SUSPENSION ORAL at 21:39

## 2019-06-16 RX ADMIN — SPIRONOLACTONE 25 MG: 25 TABLET, FILM COATED ORAL at 08:43

## 2019-06-16 RX ADMIN — INSULIN DETEMIR 6 UNITS: 100 INJECTION, SOLUTION SUBCUTANEOUS at 21:41

## 2019-06-16 RX ADMIN — GUAIFENESIN 200 MG: 200 SOLUTION ORAL at 21:39

## 2019-06-16 RX ADMIN — GUAIFENESIN 200 MG: 200 SOLUTION ORAL at 01:44

## 2019-06-16 RX ADMIN — SODIUM CHLORIDE 100 ML/HR: 9 INJECTION, SOLUTION INTRAVENOUS at 01:41

## 2019-06-17 ENCOUNTER — APPOINTMENT (OUTPATIENT)
Dept: CARDIOLOGY | Facility: HOSPITAL | Age: 69
End: 2019-06-17

## 2019-06-17 LAB
ACINETOBACTER SCREEN CX: NORMAL
ANION GAP SERPL CALCULATED.3IONS-SCNC: 10.6 MMOL/L (ref 10–20)
BH CV ECHO MEAS - IVSD: 1.2 CM
BH CV ECHO MEAS - LVPWD: 1.1 CM
BH CV ECHO MEAS - RAP SYSTOLE: 5 MMHG
BH CV ECHO MEAS - RVSP: 33 MMHG
BUN BLD-MCNC: 57 MG/DL (ref 7–20)
BUN/CREAT SERPL: 27.1 (ref 6.3–21.9)
CALCIUM SPEC-SCNC: 8.6 MG/DL (ref 8.4–10.2)
CHLORIDE SERPL-SCNC: 112 MMOL/L (ref 98–107)
CO2 SERPL-SCNC: 25 MMOL/L (ref 26–30)
CREAT BLD-MCNC: 2.1 MG/DL (ref 0.6–1.3)
DEPRECATED RDW RBC AUTO: 48.6 FL (ref 37–54)
ERYTHROCYTE [DISTWIDTH] IN BLOOD BY AUTOMATED COUNT: 14.3 % (ref 12.3–15.4)
GFR SERPL CREATININE-BSD FRML MDRD: 32 ML/MIN/1.73
GLUCOSE BLD-MCNC: 150 MG/DL (ref 74–98)
GLUCOSE BLDC GLUCOMTR-MCNC: 164 MG/DL (ref 70–130)
GLUCOSE BLDC GLUCOMTR-MCNC: 165 MG/DL (ref 70–130)
GLUCOSE BLDC GLUCOMTR-MCNC: 175 MG/DL (ref 70–130)
GLUCOSE BLDC GLUCOMTR-MCNC: 184 MG/DL (ref 70–130)
GLUCOSE BLDC GLUCOMTR-MCNC: 256 MG/DL (ref 70–130)
HCT VFR BLD AUTO: 29.1 % (ref 37.5–51)
HGB BLD-MCNC: 8.9 G/DL (ref 13–17.7)
LEFT ATRIUM VOLUME INDEX: 33 ML/M2
LV EF 2D ECHO EST: 55 %
MAXIMAL PREDICTED HEART RATE: 153 BPM
MCH RBC QN AUTO: 28.6 PG (ref 26.6–33)
MCHC RBC AUTO-ENTMCNC: 30.6 G/DL (ref 31.5–35.7)
MCV RBC AUTO: 93.6 FL (ref 79–97)
MRSA SPEC QL CULT: NORMAL
PLATELET # BLD AUTO: 259 10*3/MM3 (ref 140–450)
PMV BLD AUTO: 10.9 FL (ref 6–12)
POTASSIUM BLD-SCNC: 4.6 MMOL/L (ref 3.5–5.1)
RBC # BLD AUTO: 3.11 10*6/MM3 (ref 4.14–5.8)
SODIUM BLD-SCNC: 143 MMOL/L (ref 137–145)
STRESS TARGET HR: 130 BPM
VRE SPEC QL CULT: NORMAL
WBC NRBC COR # BLD: 6.01 10*3/MM3 (ref 3.4–10.8)

## 2019-06-17 PROCEDURE — 82962 GLUCOSE BLOOD TEST: CPT

## 2019-06-17 PROCEDURE — 25010000002 PIPERACILLIN SOD-TAZOBACTAM PER 1 G: Performed by: INTERNAL MEDICINE

## 2019-06-17 PROCEDURE — 85027 COMPLETE CBC AUTOMATED: CPT | Performed by: INTERNAL MEDICINE

## 2019-06-17 PROCEDURE — 63710000001 INSULIN DETEMIR PER 5 UNITS: Performed by: INTERNAL MEDICINE

## 2019-06-17 PROCEDURE — 25010000002 HEPARIN (PORCINE) PER 1000 UNITS: Performed by: INTERNAL MEDICINE

## 2019-06-17 PROCEDURE — 80048 BASIC METABOLIC PNL TOTAL CA: CPT | Performed by: INTERNAL MEDICINE

## 2019-06-17 PROCEDURE — 99232 SBSQ HOSP IP/OBS MODERATE 35: CPT | Performed by: INTERNAL MEDICINE

## 2019-06-17 PROCEDURE — 93306 TTE W/DOPPLER COMPLETE: CPT | Performed by: INTERNAL MEDICINE

## 2019-06-17 PROCEDURE — 93306 TTE W/DOPPLER COMPLETE: CPT

## 2019-06-17 RX ORDER — LEVOFLOXACIN 5 MG/ML
500 INJECTION, SOLUTION INTRAVENOUS EVERY 24 HOURS
Status: DISCONTINUED | OUTPATIENT
Start: 2019-06-17 | End: 2019-06-17

## 2019-06-17 RX ORDER — AMOXICILLIN AND CLAVULANATE POTASSIUM 875; 125 MG/1; MG/1
1 TABLET, FILM COATED ORAL EVERY 12 HOURS SCHEDULED
Status: DISCONTINUED | OUTPATIENT
Start: 2019-06-17 | End: 2019-06-18 | Stop reason: HOSPADM

## 2019-06-17 RX ADMIN — GUAIFENESIN 200 MG: 200 SOLUTION ORAL at 14:26

## 2019-06-17 RX ADMIN — MEGESTROL ACETATE 400 MG: 40 SUSPENSION ORAL at 09:30

## 2019-06-17 RX ADMIN — AMOXICILLIN AND CLAVULANATE POTASSIUM 1 TABLET: 875; 125 TABLET, FILM COATED ORAL at 21:40

## 2019-06-17 RX ADMIN — RISPERIDONE 2 MG: 1 TABLET ORAL at 21:40

## 2019-06-17 RX ADMIN — MEGESTROL ACETATE 400 MG: 40 SUSPENSION ORAL at 21:40

## 2019-06-17 RX ADMIN — QUETIAPINE FUMARATE 100 MG: 100 TABLET ORAL at 09:30

## 2019-06-17 RX ADMIN — QUETIAPINE FUMARATE 100 MG: 100 TABLET ORAL at 21:40

## 2019-06-17 RX ADMIN — BUSPIRONE HYDROCHLORIDE 5 MG: 5 TABLET ORAL at 21:40

## 2019-06-17 RX ADMIN — HEPARIN SODIUM 5000 UNITS: 5000 INJECTION, SOLUTION INTRAVENOUS; SUBCUTANEOUS at 06:24

## 2019-06-17 RX ADMIN — Medication 1 CAPSULE: at 09:31

## 2019-06-17 RX ADMIN — GUAIFENESIN 200 MG: 200 SOLUTION ORAL at 21:40

## 2019-06-17 RX ADMIN — MELATONIN TAB 5 MG 5 MG: 5 TAB at 21:40

## 2019-06-17 RX ADMIN — OLANZAPINE 5 MG: 5 TABLET, FILM COATED ORAL at 21:40

## 2019-06-17 RX ADMIN — GUAIFENESIN 200 MG: 200 SOLUTION ORAL at 18:36

## 2019-06-17 RX ADMIN — AMILORIDE HYDROCHLORIDE 5 MG: 5 TABLET ORAL at 09:43

## 2019-06-17 RX ADMIN — TRIAMCINOLONE ACETONIDE 1 APPLICATION: 1 CREAM TOPICAL at 21:40

## 2019-06-17 RX ADMIN — TAMSULOSIN HYDROCHLORIDE 0.4 MG: 0.4 CAPSULE ORAL at 09:30

## 2019-06-17 RX ADMIN — GUAIFENESIN 200 MG: 200 SOLUTION ORAL at 09:32

## 2019-06-17 RX ADMIN — HEPARIN SODIUM 5000 UNITS: 5000 INJECTION, SOLUTION INTRAVENOUS; SUBCUTANEOUS at 21:40

## 2019-06-17 RX ADMIN — RISPERIDONE 2 MG: 1 TABLET ORAL at 09:30

## 2019-06-17 RX ADMIN — TAZOBACTAM SODIUM AND PIPERACILLIN SODIUM 4.5 G: 500; 4 INJECTION, SOLUTION INTRAVENOUS at 01:10

## 2019-06-17 RX ADMIN — Medication 1 CAPSULE: at 21:40

## 2019-06-17 RX ADMIN — GUAIFENESIN 200 MG: 200 SOLUTION ORAL at 06:24

## 2019-06-17 RX ADMIN — AMOXICILLIN AND CLAVULANATE POTASSIUM 1 TABLET: 875; 125 TABLET, FILM COATED ORAL at 14:26

## 2019-06-17 RX ADMIN — CITALOPRAM HYDROBROMIDE 20 MG: 20 TABLET ORAL at 09:31

## 2019-06-17 RX ADMIN — ASPIRIN 81 MG 81 MG: 81 TABLET ORAL at 09:30

## 2019-06-17 RX ADMIN — HEPARIN SODIUM 5000 UNITS: 5000 INJECTION, SOLUTION INTRAVENOUS; SUBCUTANEOUS at 14:26

## 2019-06-17 RX ADMIN — TAZOBACTAM SODIUM AND PIPERACILLIN SODIUM 4.5 G: 500; 4 INJECTION, SOLUTION INTRAVENOUS at 09:30

## 2019-06-17 RX ADMIN — INSULIN DETEMIR 6 UNITS: 100 INJECTION, SOLUTION SUBCUTANEOUS at 21:40

## 2019-06-17 RX ADMIN — TRIAMCINOLONE ACETONIDE: 1 CREAM TOPICAL at 09:30

## 2019-06-17 RX ADMIN — BUSPIRONE HYDROCHLORIDE 5 MG: 5 TABLET ORAL at 09:30

## 2019-06-18 VITALS
DIASTOLIC BLOOD PRESSURE: 81 MMHG | SYSTOLIC BLOOD PRESSURE: 145 MMHG | HEART RATE: 76 BPM | OXYGEN SATURATION: 99 % | BODY MASS INDEX: 27.84 KG/M2 | TEMPERATURE: 97.5 F | RESPIRATION RATE: 18 BRPM | HEIGHT: 70 IN | WEIGHT: 194.5 LBS

## 2019-06-18 LAB
ANION GAP SERPL CALCULATED.3IONS-SCNC: 11.8 MMOL/L (ref 10–20)
BUN BLD-MCNC: 48 MG/DL (ref 7–20)
BUN/CREAT SERPL: 22.9 (ref 6.3–21.9)
CALCIUM SPEC-SCNC: 8.8 MG/DL (ref 8.4–10.2)
CHLORIDE SERPL-SCNC: 114 MMOL/L (ref 98–107)
CO2 SERPL-SCNC: 24 MMOL/L (ref 26–30)
CREAT BLD-MCNC: 2.1 MG/DL (ref 0.6–1.3)
DEPRECATED RDW RBC AUTO: 48.8 FL (ref 37–54)
ERYTHROCYTE [DISTWIDTH] IN BLOOD BY AUTOMATED COUNT: 14.3 % (ref 12.3–15.4)
GFR SERPL CREATININE-BSD FRML MDRD: 32 ML/MIN/1.73
GLUCOSE BLD-MCNC: 132 MG/DL (ref 74–98)
GLUCOSE BLDC GLUCOMTR-MCNC: 193 MG/DL (ref 70–130)
GLUCOSE BLDC GLUCOMTR-MCNC: 218 MG/DL (ref 70–130)
HCT VFR BLD AUTO: 29.6 % (ref 37.5–51)
HGB BLD-MCNC: 9.1 G/DL (ref 13–17.7)
MCH RBC QN AUTO: 28.8 PG (ref 26.6–33)
MCHC RBC AUTO-ENTMCNC: 30.7 G/DL (ref 31.5–35.7)
MCV RBC AUTO: 93.7 FL (ref 79–97)
PLATELET # BLD AUTO: 271 10*3/MM3 (ref 140–450)
PMV BLD AUTO: 9.7 FL (ref 6–12)
POTASSIUM BLD-SCNC: 4.8 MMOL/L (ref 3.5–5.1)
RBC # BLD AUTO: 3.16 10*6/MM3 (ref 4.14–5.8)
SODIUM BLD-SCNC: 145 MMOL/L (ref 137–145)
WBC NRBC COR # BLD: 6.25 10*3/MM3 (ref 3.4–10.8)

## 2019-06-18 PROCEDURE — 82962 GLUCOSE BLOOD TEST: CPT

## 2019-06-18 PROCEDURE — 80048 BASIC METABOLIC PNL TOTAL CA: CPT | Performed by: INTERNAL MEDICINE

## 2019-06-18 PROCEDURE — 85027 COMPLETE CBC AUTOMATED: CPT | Performed by: INTERNAL MEDICINE

## 2019-06-18 PROCEDURE — 92610 EVALUATE SWALLOWING FUNCTION: CPT

## 2019-06-18 PROCEDURE — 99239 HOSP IP/OBS DSCHRG MGMT >30: CPT | Performed by: INTERNAL MEDICINE

## 2019-06-18 RX ORDER — L.ACID,PARA/B.BIFIDUM/S.THERM 8B CELL
1 CAPSULE ORAL 2 TIMES DAILY
Status: ON HOLD
Start: 2019-06-18 | End: 2021-08-02

## 2019-06-18 RX ADMIN — MEGESTROL ACETATE 400 MG: 40 SUSPENSION ORAL at 09:11

## 2019-06-18 RX ADMIN — Medication 1 CAPSULE: at 09:11

## 2019-06-18 RX ADMIN — AMOXICILLIN AND CLAVULANATE POTASSIUM 1 TABLET: 875; 125 TABLET, FILM COATED ORAL at 09:11

## 2019-06-18 RX ADMIN — TAMSULOSIN HYDROCHLORIDE 0.4 MG: 0.4 CAPSULE ORAL at 09:11

## 2019-06-18 RX ADMIN — CITALOPRAM HYDROBROMIDE 20 MG: 20 TABLET ORAL at 09:11

## 2019-06-18 RX ADMIN — ASPIRIN 81 MG 81 MG: 81 TABLET ORAL at 09:11

## 2019-06-18 RX ADMIN — GUAIFENESIN 200 MG: 200 SOLUTION ORAL at 06:39

## 2019-06-18 RX ADMIN — QUETIAPINE FUMARATE 100 MG: 100 TABLET ORAL at 09:11

## 2019-06-18 RX ADMIN — BUSPIRONE HYDROCHLORIDE 5 MG: 5 TABLET ORAL at 09:11

## 2019-06-18 RX ADMIN — TRIAMCINOLONE ACETONIDE: 1 CREAM TOPICAL at 09:13

## 2019-06-18 RX ADMIN — AMILORIDE HYDROCHLORIDE 5 MG: 5 TABLET ORAL at 09:11

## 2019-06-18 RX ADMIN — RISPERIDONE 2 MG: 1 TABLET ORAL at 09:11

## 2019-06-18 RX ADMIN — GUAIFENESIN 200 MG: 200 SOLUTION ORAL at 09:19

## 2019-06-19 LAB
BLOOD CULTURE, ROUTINE: NORMAL
CULTURE, BLOOD 2: NORMAL

## 2019-06-20 LAB
BACTERIA SPEC AEROBE CULT: NORMAL
BACTERIA SPEC AEROBE CULT: NORMAL

## 2021-07-02 ENCOUNTER — APPOINTMENT (OUTPATIENT)
Dept: GENERAL RADIOLOGY | Facility: HOSPITAL | Age: 71
End: 2021-07-02

## 2021-07-02 ENCOUNTER — HOSPITAL ENCOUNTER (OUTPATIENT)
Facility: HOSPITAL | Age: 71
Discharge: NURSING, REHAB OR LONG-TERM CARE | End: 2021-08-05
Attending: INTERNAL MEDICINE | Admitting: INTERNAL MEDICINE

## 2021-07-02 LAB
A-A DO2: 196 MMHG (ref 0–300)
ALBUMIN SERPL-MCNC: 3.12 G/DL (ref 3.5–5.2)
ALBUMIN/GLOB SERPL: 0.8 G/DL
ALP SERPL-CCNC: 302 U/L (ref 39–117)
ALT SERPL W P-5'-P-CCNC: 68 U/L (ref 1–41)
ANION GAP SERPL CALCULATED.3IONS-SCNC: 19 MMOL/L (ref 5–15)
ARTERIAL PATENCY WRIST A: ABNORMAL
AST SERPL-CCNC: 37 U/L (ref 1–40)
ATMOSPHERIC PRESS: 725 MMHG
BASE EXCESS BLDA CALC-SCNC: -3.8 MMOL/L (ref 0–2)
BASOPHILS # BLD AUTO: 0.02 10*3/MM3 (ref 0–0.2)
BASOPHILS NFR BLD AUTO: 0.3 % (ref 0–1.5)
BDY SITE: ABNORMAL
BILIRUB SERPL-MCNC: 0.7 MG/DL (ref 0–1.2)
BODY TEMPERATURE: 0 C
BUN SERPL-MCNC: 60 MG/DL (ref 8–23)
BUN/CREAT SERPL: 12 (ref 7–25)
CALCIUM SPEC-SCNC: 9.2 MG/DL (ref 8.6–10.5)
CHLORIDE SERPL-SCNC: 101 MMOL/L (ref 98–107)
CO2 BLDA-SCNC: 22.2 MMOL/L (ref 22–33)
CO2 SERPL-SCNC: 20 MMOL/L (ref 22–29)
COHGB MFR BLD: 0.9 % (ref 0–5)
CREAT SERPL-MCNC: 5.02 MG/DL (ref 0.76–1.27)
DEPRECATED RDW RBC AUTO: 52.9 FL (ref 37–54)
EOSINOPHIL # BLD AUTO: 0.08 10*3/MM3 (ref 0–0.4)
EOSINOPHIL NFR BLD AUTO: 1.1 % (ref 0.3–6.2)
ERYTHROCYTE [DISTWIDTH] IN BLOOD BY AUTOMATED COUNT: 16.1 % (ref 12.3–15.4)
GFR SERPL CREATININE-BSD FRML MDRD: 12 ML/MIN/1.73
GFR SERPL CREATININE-BSD FRML MDRD: ABNORMAL ML/MIN/{1.73_M2}
GLOBULIN UR ELPH-MCNC: 4.1 GM/DL
GLUCOSE BLDC GLUCOMTR-MCNC: 131 MG/DL (ref 70–130)
GLUCOSE SERPL-MCNC: 139 MG/DL (ref 65–99)
HCO3 BLDA-SCNC: 21.1 MMOL/L (ref 20–26)
HCT VFR BLD AUTO: 31.8 % (ref 37.5–51)
HCT VFR BLD CALC: 31.6 % (ref 38–51)
HGB BLD-MCNC: 10.1 G/DL (ref 13–17.7)
HGB BLDA-MCNC: 10.3 G/DL (ref 14–18)
IMM GRANULOCYTES # BLD AUTO: 0.05 10*3/MM3 (ref 0–0.05)
IMM GRANULOCYTES NFR BLD AUTO: 0.7 % (ref 0–0.5)
INHALED O2 CONCENTRATION: 45 %
LYMPHOCYTES # BLD AUTO: 1.01 10*3/MM3 (ref 0.7–3.1)
LYMPHOCYTES NFR BLD AUTO: 14.3 % (ref 19.6–45.3)
Lab: ABNORMAL
MCH RBC QN AUTO: 28.6 PG (ref 26.6–33)
MCHC RBC AUTO-ENTMCNC: 31.8 G/DL (ref 31.5–35.7)
MCV RBC AUTO: 90.1 FL (ref 79–97)
METHGB BLD QL: 0.4 % (ref 0–3)
MODALITY: ABNORMAL
MONOCYTES # BLD AUTO: 0.35 10*3/MM3 (ref 0.1–0.9)
MONOCYTES NFR BLD AUTO: 5 % (ref 5–12)
NEUTROPHILS NFR BLD AUTO: 5.55 10*3/MM3 (ref 1.7–7)
NEUTROPHILS NFR BLD AUTO: 78.6 % (ref 42.7–76)
NOTE: ABNORMAL
NRBC BLD AUTO-RTO: 0 /100 WBC (ref 0–0.2)
OXYHGB MFR BLDV: 91.6 % (ref 94–99)
PCO2 BLDA: 36.5 MM HG (ref 35–45)
PCO2 TEMP ADJ BLD: ABNORMAL MM[HG]
PEEP RESPIRATORY: 5 CM[H2O]
PH BLDA: 7.37 PH UNITS (ref 7.35–7.45)
PH, TEMP CORRECTED: ABNORMAL
PLATELET # BLD AUTO: 323 10*3/MM3 (ref 140–450)
PMV BLD AUTO: 10.9 FL (ref 6–12)
PO2 BLDA: 69.5 MM HG (ref 83–108)
PO2 TEMP ADJ BLD: ABNORMAL MM[HG]
POTASSIUM SERPL-SCNC: 4.1 MMOL/L (ref 3.5–5.2)
PROT SERPL-MCNC: 7.2 G/DL (ref 6–8.5)
RBC # BLD AUTO: 3.53 10*6/MM3 (ref 4.14–5.8)
SAO2 % BLDCOA: 92.8 % (ref 94–99)
SET MECH RESP RATE: 16
SODIUM SERPL-SCNC: 140 MMOL/L (ref 136–145)
VENTILATOR MODE: ABNORMAL
VT ON VENT VENT: 450 ML
WBC # BLD AUTO: 7.06 10*3/MM3 (ref 3.4–10.8)

## 2021-07-02 PROCEDURE — 82805 BLOOD GASES W/O2 SATURATION: CPT

## 2021-07-02 PROCEDURE — 71045 X-RAY EXAM CHEST 1 VIEW: CPT | Performed by: RADIOLOGY

## 2021-07-02 PROCEDURE — 87070 CULTURE OTHR SPECIMN AEROBIC: CPT | Performed by: INTERNAL MEDICINE

## 2021-07-02 PROCEDURE — 71045 X-RAY EXAM CHEST 1 VIEW: CPT

## 2021-07-02 PROCEDURE — 93005 ELECTROCARDIOGRAM TRACING: CPT | Performed by: INTERNAL MEDICINE

## 2021-07-02 PROCEDURE — 80053 COMPREHEN METABOLIC PANEL: CPT | Performed by: INTERNAL MEDICINE

## 2021-07-02 PROCEDURE — 87205 SMEAR GRAM STAIN: CPT | Performed by: INTERNAL MEDICINE

## 2021-07-02 PROCEDURE — 83050 HGB METHEMOGLOBIN QUAN: CPT

## 2021-07-02 PROCEDURE — 82375 ASSAY CARBOXYHB QUANT: CPT

## 2021-07-02 PROCEDURE — 82962 GLUCOSE BLOOD TEST: CPT

## 2021-07-02 PROCEDURE — 36600 WITHDRAWAL OF ARTERIAL BLOOD: CPT

## 2021-07-02 PROCEDURE — 84134 ASSAY OF PREALBUMIN: CPT | Performed by: INTERNAL MEDICINE

## 2021-07-02 PROCEDURE — 85025 COMPLETE CBC W/AUTO DIFF WBC: CPT | Performed by: INTERNAL MEDICINE

## 2021-07-03 ENCOUNTER — OUTSIDE FACILITY SERVICE (OUTPATIENT)
Dept: PULMONOLOGY | Facility: CLINIC | Age: 71
End: 2021-07-03

## 2021-07-03 ENCOUNTER — APPOINTMENT (OUTPATIENT)
Dept: ULTRASOUND IMAGING | Facility: HOSPITAL | Age: 71
End: 2021-07-03

## 2021-07-03 LAB
ALBUMIN SERPL-MCNC: 2.46 G/DL (ref 3.5–5.2)
ALBUMIN/GLOB SERPL: 0.6 G/DL
ALP SERPL-CCNC: 262 U/L (ref 39–117)
ALT SERPL W P-5'-P-CCNC: 55 U/L (ref 1–41)
ANION GAP SERPL CALCULATED.3IONS-SCNC: 17.1 MMOL/L (ref 5–15)
AST SERPL-CCNC: 30 U/L (ref 1–40)
BASOPHILS # BLD AUTO: 0.03 10*3/MM3 (ref 0–0.2)
BASOPHILS NFR BLD AUTO: 0.5 % (ref 0–1.5)
BILIRUB SERPL-MCNC: 0.6 MG/DL (ref 0–1.2)
BUN SERPL-MCNC: 61 MG/DL (ref 8–23)
BUN/CREAT SERPL: 10.7 (ref 7–25)
CALCIUM SPEC-SCNC: 8.6 MG/DL (ref 8.6–10.5)
CHLORIDE SERPL-SCNC: 103 MMOL/L (ref 98–107)
CO2 SERPL-SCNC: 18.9 MMOL/L (ref 22–29)
CREAT SERPL-MCNC: 5.69 MG/DL (ref 0.76–1.27)
CRP SERPL-MCNC: 10.07 MG/DL (ref 0–0.5)
DEPRECATED RDW RBC AUTO: 53.3 FL (ref 37–54)
EOSINOPHIL # BLD AUTO: 0.12 10*3/MM3 (ref 0–0.4)
EOSINOPHIL NFR BLD AUTO: 2.1 % (ref 0.3–6.2)
ERYTHROCYTE [DISTWIDTH] IN BLOOD BY AUTOMATED COUNT: 15.9 % (ref 12.3–15.4)
GFR SERPL CREATININE-BSD FRML MDRD: 10 ML/MIN/1.73
GFR SERPL CREATININE-BSD FRML MDRD: ABNORMAL ML/MIN/{1.73_M2}
GLOBULIN UR ELPH-MCNC: 3.8 GM/DL
GLUCOSE BLDC GLUCOMTR-MCNC: 139 MG/DL (ref 70–130)
GLUCOSE BLDC GLUCOMTR-MCNC: 144 MG/DL (ref 70–130)
GLUCOSE BLDC GLUCOMTR-MCNC: 183 MG/DL (ref 70–130)
GLUCOSE BLDC GLUCOMTR-MCNC: 202 MG/DL (ref 70–130)
GLUCOSE BLDC GLUCOMTR-MCNC: 222 MG/DL (ref 70–130)
GLUCOSE SERPL-MCNC: 188 MG/DL (ref 65–99)
HAV IGM SERPL QL IA: NORMAL
HBV CORE IGM SERPL QL IA: NORMAL
HBV SURFACE AG SERPL QL IA: NORMAL
HCT VFR BLD AUTO: 29.2 % (ref 37.5–51)
HCV AB SER DONR QL: NORMAL
HGB BLD-MCNC: 9.1 G/DL (ref 13–17.7)
IMM GRANULOCYTES # BLD AUTO: 0.03 10*3/MM3 (ref 0–0.05)
IMM GRANULOCYTES NFR BLD AUTO: 0.5 % (ref 0–0.5)
LYMPHOCYTES # BLD AUTO: 1.08 10*3/MM3 (ref 0.7–3.1)
LYMPHOCYTES NFR BLD AUTO: 19.1 % (ref 19.6–45.3)
MCH RBC QN AUTO: 28.3 PG (ref 26.6–33)
MCHC RBC AUTO-ENTMCNC: 31.2 G/DL (ref 31.5–35.7)
MCV RBC AUTO: 91 FL (ref 79–97)
MONOCYTES # BLD AUTO: 0.37 10*3/MM3 (ref 0.1–0.9)
MONOCYTES NFR BLD AUTO: 6.5 % (ref 5–12)
NEUTROPHILS NFR BLD AUTO: 4.03 10*3/MM3 (ref 1.7–7)
NEUTROPHILS NFR BLD AUTO: 71.3 % (ref 42.7–76)
NRBC BLD AUTO-RTO: 0 /100 WBC (ref 0–0.2)
PLATELET # BLD AUTO: 280 10*3/MM3 (ref 140–450)
PMV BLD AUTO: 10.7 FL (ref 6–12)
POTASSIUM SERPL-SCNC: 4.1 MMOL/L (ref 3.5–5.2)
PREALB SERPL-MCNC: 21.7 MG/DL (ref 20–40)
PROT SERPL-MCNC: 6.3 G/DL (ref 6–8.5)
RBC # BLD AUTO: 3.21 10*6/MM3 (ref 4.14–5.8)
SODIUM SERPL-SCNC: 139 MMOL/L (ref 136–145)
WBC # BLD AUTO: 5.66 10*3/MM3 (ref 3.4–10.8)

## 2021-07-03 PROCEDURE — 82962 GLUCOSE BLOOD TEST: CPT

## 2021-07-03 PROCEDURE — 93010 ELECTROCARDIOGRAM REPORT: CPT | Performed by: INTERNAL MEDICINE

## 2021-07-03 PROCEDURE — G0509 CRIT CARE TELEHEA CONSULT 50: HCPCS | Performed by: INTERNAL MEDICINE

## 2021-07-03 PROCEDURE — 76775 US EXAM ABDO BACK WALL LIM: CPT

## 2021-07-03 PROCEDURE — 80074 ACUTE HEPATITIS PANEL: CPT | Performed by: INTERNAL MEDICINE

## 2021-07-03 PROCEDURE — 80053 COMPREHEN METABOLIC PANEL: CPT | Performed by: INTERNAL MEDICINE

## 2021-07-03 PROCEDURE — 86140 C-REACTIVE PROTEIN: CPT | Performed by: INTERNAL MEDICINE

## 2021-07-03 PROCEDURE — 76775 US EXAM ABDO BACK WALL LIM: CPT | Performed by: RADIOLOGY

## 2021-07-03 PROCEDURE — 85025 COMPLETE CBC W/AUTO DIFF WBC: CPT | Performed by: INTERNAL MEDICINE

## 2021-07-04 ENCOUNTER — OUTSIDE FACILITY SERVICE (OUTPATIENT)
Dept: PULMONOLOGY | Facility: CLINIC | Age: 71
End: 2021-07-04

## 2021-07-04 LAB
ALBUMIN SERPL-MCNC: 2.84 G/DL (ref 3.5–5.2)
ALBUMIN/GLOB SERPL: 0.7 G/DL
ALP SERPL-CCNC: 292 U/L (ref 39–117)
ALT SERPL W P-5'-P-CCNC: 50 U/L (ref 1–41)
ANION GAP SERPL CALCULATED.3IONS-SCNC: 12.7 MMOL/L (ref 5–15)
AST SERPL-CCNC: 29 U/L (ref 1–40)
BACTERIA SPEC RESP CULT: ABNORMAL
BACTERIA SPEC RESP CULT: ABNORMAL
BACTERIA UR QL AUTO: ABNORMAL /HPF
BASOPHILS # BLD AUTO: 0.02 10*3/MM3 (ref 0–0.2)
BASOPHILS NFR BLD AUTO: 0.4 % (ref 0–1.5)
BILIRUB SERPL-MCNC: 0.5 MG/DL (ref 0–1.2)
BILIRUB UR QL STRIP: NEGATIVE
BUN SERPL-MCNC: 33 MG/DL (ref 8–23)
BUN/CREAT SERPL: 8.3 (ref 7–25)
CALCIUM SPEC-SCNC: 8.7 MG/DL (ref 8.6–10.5)
CHLORIDE SERPL-SCNC: 101 MMOL/L (ref 98–107)
CLARITY UR: CLEAR
CO2 SERPL-SCNC: 25.3 MMOL/L (ref 22–29)
COLOR UR: YELLOW
CREAT SERPL-MCNC: 3.96 MG/DL (ref 0.76–1.27)
CRP SERPL-MCNC: 9.78 MG/DL (ref 0–0.5)
DEPRECATED RDW RBC AUTO: 52.4 FL (ref 37–54)
EOSINOPHIL # BLD AUTO: 0.16 10*3/MM3 (ref 0–0.4)
EOSINOPHIL NFR BLD AUTO: 3 % (ref 0.3–6.2)
ERYTHROCYTE [DISTWIDTH] IN BLOOD BY AUTOMATED COUNT: 16 % (ref 12.3–15.4)
GFR SERPL CREATININE-BSD FRML MDRD: 15 ML/MIN/1.73
GLOBULIN UR ELPH-MCNC: 3.9 GM/DL
GLUCOSE BLDC GLUCOMTR-MCNC: 129 MG/DL (ref 70–130)
GLUCOSE BLDC GLUCOMTR-MCNC: 147 MG/DL (ref 70–130)
GLUCOSE BLDC GLUCOMTR-MCNC: 164 MG/DL (ref 70–130)
GLUCOSE BLDC GLUCOMTR-MCNC: 201 MG/DL (ref 70–130)
GLUCOSE SERPL-MCNC: 109 MG/DL (ref 65–99)
GLUCOSE UR STRIP-MCNC: NEGATIVE MG/DL
GRAM STN SPEC: ABNORMAL
GRAM STN SPEC: ABNORMAL
HCT VFR BLD AUTO: 28.2 % (ref 37.5–51)
HGB BLD-MCNC: 8.9 G/DL (ref 13–17.7)
HGB UR QL STRIP.AUTO: ABNORMAL
HYALINE CASTS UR QL AUTO: ABNORMAL /LPF
IMM GRANULOCYTES # BLD AUTO: 0.03 10*3/MM3 (ref 0–0.05)
IMM GRANULOCYTES NFR BLD AUTO: 0.6 % (ref 0–0.5)
KETONES UR QL STRIP: NEGATIVE
LEUKOCYTE ESTERASE UR QL STRIP.AUTO: ABNORMAL
LYMPHOCYTES # BLD AUTO: 1.09 10*3/MM3 (ref 0.7–3.1)
LYMPHOCYTES NFR BLD AUTO: 20.2 % (ref 19.6–45.3)
MCH RBC QN AUTO: 28.3 PG (ref 26.6–33)
MCHC RBC AUTO-ENTMCNC: 31.6 G/DL (ref 31.5–35.7)
MCV RBC AUTO: 89.8 FL (ref 79–97)
MONOCYTES # BLD AUTO: 0.48 10*3/MM3 (ref 0.1–0.9)
MONOCYTES NFR BLD AUTO: 8.9 % (ref 5–12)
NEUTROPHILS NFR BLD AUTO: 3.61 10*3/MM3 (ref 1.7–7)
NEUTROPHILS NFR BLD AUTO: 66.9 % (ref 42.7–76)
NITRITE UR QL STRIP: NEGATIVE
NRBC BLD AUTO-RTO: 0 /100 WBC (ref 0–0.2)
PH UR STRIP.AUTO: 7 [PH] (ref 5–8)
PLATELET # BLD AUTO: 295 10*3/MM3 (ref 140–450)
PMV BLD AUTO: 10.9 FL (ref 6–12)
POTASSIUM SERPL-SCNC: 3.7 MMOL/L (ref 3.5–5.2)
PROT SERPL-MCNC: 6.7 G/DL (ref 6–8.5)
PROT UR QL STRIP: ABNORMAL
RBC # BLD AUTO: 3.14 10*6/MM3 (ref 4.14–5.8)
RBC # UR: ABNORMAL /HPF
REF LAB TEST METHOD: ABNORMAL
SODIUM SERPL-SCNC: 139 MMOL/L (ref 136–145)
SP GR UR STRIP: 1.01 (ref 1–1.03)
SQUAMOUS #/AREA URNS HPF: ABNORMAL /HPF
UROBILINOGEN UR QL STRIP: ABNORMAL
WBC # BLD AUTO: 5.39 10*3/MM3 (ref 3.4–10.8)
WBC UR QL AUTO: ABNORMAL /HPF
YEAST URNS QL MICRO: ABNORMAL /HPF

## 2021-07-04 PROCEDURE — 82962 GLUCOSE BLOOD TEST: CPT

## 2021-07-04 PROCEDURE — 86140 C-REACTIVE PROTEIN: CPT | Performed by: INTERNAL MEDICINE

## 2021-07-04 PROCEDURE — 85025 COMPLETE CBC W/AUTO DIFF WBC: CPT | Performed by: INTERNAL MEDICINE

## 2021-07-04 PROCEDURE — G0509 CRIT CARE TELEHEA CONSULT 50: HCPCS | Performed by: INTERNAL MEDICINE

## 2021-07-04 PROCEDURE — 87086 URINE CULTURE/COLONY COUNT: CPT | Performed by: INTERNAL MEDICINE

## 2021-07-04 PROCEDURE — 81001 URINALYSIS AUTO W/SCOPE: CPT | Performed by: INTERNAL MEDICINE

## 2021-07-04 PROCEDURE — 80053 COMPREHEN METABOLIC PANEL: CPT | Performed by: INTERNAL MEDICINE

## 2021-07-05 ENCOUNTER — OUTSIDE FACILITY SERVICE (OUTPATIENT)
Dept: PULMONOLOGY | Facility: CLINIC | Age: 71
End: 2021-07-05

## 2021-07-05 LAB
ALBUMIN SERPL-MCNC: 2.7 G/DL (ref 3.5–5.2)
ALBUMIN/GLOB SERPL: 0.7 G/DL
ALP SERPL-CCNC: 278 U/L (ref 39–117)
ALT SERPL W P-5'-P-CCNC: 45 U/L (ref 1–41)
ANION GAP SERPL CALCULATED.3IONS-SCNC: 13.6 MMOL/L (ref 5–15)
AST SERPL-CCNC: 24 U/L (ref 1–40)
BACTERIA SPEC AEROBE CULT: NO GROWTH
BASOPHILS # BLD AUTO: 0.03 10*3/MM3 (ref 0–0.2)
BASOPHILS NFR BLD AUTO: 0.6 % (ref 0–1.5)
BILIRUB SERPL-MCNC: 0.4 MG/DL (ref 0–1.2)
BUN SERPL-MCNC: 43 MG/DL (ref 8–23)
BUN/CREAT SERPL: 9.1 (ref 7–25)
CALCIUM SPEC-SCNC: 8.5 MG/DL (ref 8.6–10.5)
CHLORIDE SERPL-SCNC: 102 MMOL/L (ref 98–107)
CO2 SERPL-SCNC: 23.4 MMOL/L (ref 22–29)
CREAT SERPL-MCNC: 4.72 MG/DL (ref 0.76–1.27)
CRP SERPL-MCNC: 9.43 MG/DL (ref 0–0.5)
DEPRECATED RDW RBC AUTO: 53.1 FL (ref 37–54)
EOSINOPHIL # BLD AUTO: 0.15 10*3/MM3 (ref 0–0.4)
EOSINOPHIL NFR BLD AUTO: 2.8 % (ref 0.3–6.2)
ERYTHROCYTE [DISTWIDTH] IN BLOOD BY AUTOMATED COUNT: 16 % (ref 12.3–15.4)
GFR SERPL CREATININE-BSD FRML MDRD: 12 ML/MIN/1.73
GFR SERPL CREATININE-BSD FRML MDRD: ABNORMAL ML/MIN/{1.73_M2}
GLOBULIN UR ELPH-MCNC: 3.7 GM/DL
GLUCOSE BLDC GLUCOMTR-MCNC: 145 MG/DL (ref 70–130)
GLUCOSE BLDC GLUCOMTR-MCNC: 160 MG/DL (ref 70–130)
GLUCOSE BLDC GLUCOMTR-MCNC: 188 MG/DL (ref 70–130)
GLUCOSE BLDC GLUCOMTR-MCNC: 217 MG/DL (ref 70–130)
GLUCOSE SERPL-MCNC: 133 MG/DL (ref 65–99)
HCT VFR BLD AUTO: 28.2 % (ref 37.5–51)
HGB BLD-MCNC: 8.7 G/DL (ref 13–17.7)
IMM GRANULOCYTES # BLD AUTO: 0.05 10*3/MM3 (ref 0–0.05)
IMM GRANULOCYTES NFR BLD AUTO: 0.9 % (ref 0–0.5)
LYMPHOCYTES # BLD AUTO: 1.36 10*3/MM3 (ref 0.7–3.1)
LYMPHOCYTES NFR BLD AUTO: 25.5 % (ref 19.6–45.3)
MCH RBC QN AUTO: 28 PG (ref 26.6–33)
MCHC RBC AUTO-ENTMCNC: 30.9 G/DL (ref 31.5–35.7)
MCV RBC AUTO: 90.7 FL (ref 79–97)
MONOCYTES # BLD AUTO: 0.48 10*3/MM3 (ref 0.1–0.9)
MONOCYTES NFR BLD AUTO: 9 % (ref 5–12)
NEUTROPHILS NFR BLD AUTO: 3.26 10*3/MM3 (ref 1.7–7)
NEUTROPHILS NFR BLD AUTO: 61.2 % (ref 42.7–76)
NRBC BLD AUTO-RTO: 0 /100 WBC (ref 0–0.2)
PLATELET # BLD AUTO: 303 10*3/MM3 (ref 140–450)
PMV BLD AUTO: 11 FL (ref 6–12)
POTASSIUM SERPL-SCNC: 3.7 MMOL/L (ref 3.5–5.2)
PROT SERPL-MCNC: 6.4 G/DL (ref 6–8.5)
QT INTERVAL: 416 MS
QTC INTERVAL: 445 MS
RBC # BLD AUTO: 3.11 10*6/MM3 (ref 4.14–5.8)
SODIUM SERPL-SCNC: 139 MMOL/L (ref 136–145)
WBC # BLD AUTO: 5.33 10*3/MM3 (ref 3.4–10.8)

## 2021-07-05 PROCEDURE — G0509 CRIT CARE TELEHEA CONSULT 50: HCPCS | Performed by: INTERNAL MEDICINE

## 2021-07-05 PROCEDURE — 97167 OT EVAL HIGH COMPLEX 60 MIN: CPT

## 2021-07-05 PROCEDURE — 86140 C-REACTIVE PROTEIN: CPT | Performed by: INTERNAL MEDICINE

## 2021-07-05 PROCEDURE — 82962 GLUCOSE BLOOD TEST: CPT

## 2021-07-05 PROCEDURE — 85025 COMPLETE CBC W/AUTO DIFF WBC: CPT | Performed by: INTERNAL MEDICINE

## 2021-07-05 PROCEDURE — 80053 COMPREHEN METABOLIC PANEL: CPT | Performed by: INTERNAL MEDICINE

## 2021-07-06 ENCOUNTER — OUTSIDE FACILITY SERVICE (OUTPATIENT)
Dept: PULMONOLOGY | Facility: CLINIC | Age: 71
End: 2021-07-06

## 2021-07-06 LAB
ALBUMIN SERPL-MCNC: 2.73 G/DL (ref 3.5–5.2)
ALBUMIN/GLOB SERPL: 0.7 G/DL
ALP SERPL-CCNC: 287 U/L (ref 39–117)
ALT SERPL W P-5'-P-CCNC: 42 U/L (ref 1–41)
ANION GAP SERPL CALCULATED.3IONS-SCNC: 15 MMOL/L (ref 5–15)
AST SERPL-CCNC: 23 U/L (ref 1–40)
BASOPHILS # BLD AUTO: 0.03 10*3/MM3 (ref 0–0.2)
BASOPHILS NFR BLD AUTO: 0.6 % (ref 0–1.5)
BILIRUB SERPL-MCNC: 0.4 MG/DL (ref 0–1.2)
BUN SERPL-MCNC: 50 MG/DL (ref 8–23)
BUN/CREAT SERPL: 10 (ref 7–25)
CALCIUM SPEC-SCNC: 8.6 MG/DL (ref 8.6–10.5)
CHLORIDE SERPL-SCNC: 105 MMOL/L (ref 98–107)
CO2 SERPL-SCNC: 22 MMOL/L (ref 22–29)
CREAT SERPL-MCNC: 4.98 MG/DL (ref 0.76–1.27)
CRP SERPL-MCNC: 8.04 MG/DL (ref 0–0.5)
DEPRECATED RDW RBC AUTO: 54.1 FL (ref 37–54)
EOSINOPHIL # BLD AUTO: 0.13 10*3/MM3 (ref 0–0.4)
EOSINOPHIL NFR BLD AUTO: 2.5 % (ref 0.3–6.2)
ERYTHROCYTE [DISTWIDTH] IN BLOOD BY AUTOMATED COUNT: 15.9 % (ref 12.3–15.4)
GFR SERPL CREATININE-BSD FRML MDRD: 12 ML/MIN/1.73
GFR SERPL CREATININE-BSD FRML MDRD: ABNORMAL ML/MIN/{1.73_M2}
GLOBULIN UR ELPH-MCNC: 3.8 GM/DL
GLUCOSE BLDC GLUCOMTR-MCNC: 157 MG/DL (ref 70–130)
GLUCOSE BLDC GLUCOMTR-MCNC: 169 MG/DL (ref 70–130)
GLUCOSE BLDC GLUCOMTR-MCNC: 172 MG/DL (ref 70–130)
GLUCOSE BLDC GLUCOMTR-MCNC: 178 MG/DL (ref 70–130)
GLUCOSE SERPL-MCNC: 170 MG/DL (ref 65–99)
HCT VFR BLD AUTO: 27.7 % (ref 37.5–51)
HGB BLD-MCNC: 8.7 G/DL (ref 13–17.7)
IMM GRANULOCYTES # BLD AUTO: 0.06 10*3/MM3 (ref 0–0.05)
IMM GRANULOCYTES NFR BLD AUTO: 1.1 % (ref 0–0.5)
LYMPHOCYTES # BLD AUTO: 1.3 10*3/MM3 (ref 0.7–3.1)
LYMPHOCYTES NFR BLD AUTO: 24.6 % (ref 19.6–45.3)
MCH RBC QN AUTO: 29.1 PG (ref 26.6–33)
MCHC RBC AUTO-ENTMCNC: 31.4 G/DL (ref 31.5–35.7)
MCV RBC AUTO: 92.6 FL (ref 79–97)
MONOCYTES # BLD AUTO: 0.46 10*3/MM3 (ref 0.1–0.9)
MONOCYTES NFR BLD AUTO: 8.7 % (ref 5–12)
NEUTROPHILS NFR BLD AUTO: 3.3 10*3/MM3 (ref 1.7–7)
NEUTROPHILS NFR BLD AUTO: 62.5 % (ref 42.7–76)
NRBC BLD AUTO-RTO: 0 /100 WBC (ref 0–0.2)
PLATELET # BLD AUTO: 259 10*3/MM3 (ref 140–450)
PMV BLD AUTO: 10.5 FL (ref 6–12)
POTASSIUM SERPL-SCNC: 4.4 MMOL/L (ref 3.5–5.2)
PROT SERPL-MCNC: 6.5 G/DL (ref 6–8.5)
RBC # BLD AUTO: 2.99 10*6/MM3 (ref 4.14–5.8)
SODIUM SERPL-SCNC: 142 MMOL/L (ref 136–145)
WBC # BLD AUTO: 5.28 10*3/MM3 (ref 3.4–10.8)

## 2021-07-06 PROCEDURE — 82962 GLUCOSE BLOOD TEST: CPT

## 2021-07-06 PROCEDURE — 80053 COMPREHEN METABOLIC PANEL: CPT | Performed by: INTERNAL MEDICINE

## 2021-07-06 PROCEDURE — 93005 ELECTROCARDIOGRAM TRACING: CPT | Performed by: PHYSICIAN ASSISTANT

## 2021-07-06 PROCEDURE — 85025 COMPLETE CBC W/AUTO DIFF WBC: CPT | Performed by: INTERNAL MEDICINE

## 2021-07-06 PROCEDURE — G0509 CRIT CARE TELEHEA CONSULT 50: HCPCS | Performed by: INTERNAL MEDICINE

## 2021-07-06 PROCEDURE — 93010 ELECTROCARDIOGRAM REPORT: CPT | Performed by: INTERNAL MEDICINE

## 2021-07-06 PROCEDURE — U0003 INFECTIOUS AGENT DETECTION BY NUCLEIC ACID (DNA OR RNA); SEVERE ACUTE RESPIRATORY SYNDROME CORONAVIRUS 2 (SARS-COV-2) (CORONAVIRUS DISEASE [COVID-19]), AMPLIFIED PROBE TECHNIQUE, MAKING USE OF HIGH THROUGHPUT TECHNOLOGIES AS DESCRIBED BY CMS-2020-01-R: HCPCS | Performed by: PHYSICIAN ASSISTANT

## 2021-07-06 PROCEDURE — 97162 PT EVAL MOD COMPLEX 30 MIN: CPT

## 2021-07-06 PROCEDURE — 86140 C-REACTIVE PROTEIN: CPT | Performed by: INTERNAL MEDICINE

## 2021-07-07 ENCOUNTER — ANESTHESIA (OUTPATIENT)
Dept: PERIOP | Facility: HOSPITAL | Age: 71
End: 2021-07-07

## 2021-07-07 ENCOUNTER — OUTSIDE FACILITY SERVICE (OUTPATIENT)
Dept: CARDIOLOGY | Facility: CLINIC | Age: 71
End: 2021-07-07

## 2021-07-07 ENCOUNTER — OUTSIDE FACILITY SERVICE (OUTPATIENT)
Dept: PULMONOLOGY | Facility: CLINIC | Age: 71
End: 2021-07-07

## 2021-07-07 ENCOUNTER — ANESTHESIA EVENT (OUTPATIENT)
Dept: PERIOP | Facility: HOSPITAL | Age: 71
End: 2021-07-07

## 2021-07-07 LAB
ALBUMIN SERPL-MCNC: 2.9 G/DL (ref 3.5–5.2)
ALBUMIN/GLOB SERPL: 0.8 G/DL
ALP SERPL-CCNC: 297 U/L (ref 39–117)
ALT SERPL W P-5'-P-CCNC: 39 U/L (ref 1–41)
ANION GAP SERPL CALCULATED.3IONS-SCNC: 11 MMOL/L (ref 5–15)
AST SERPL-CCNC: 22 U/L (ref 1–40)
BASOPHILS # BLD AUTO: 0.03 10*3/MM3 (ref 0–0.2)
BASOPHILS NFR BLD AUTO: 0.6 % (ref 0–1.5)
BILIRUB SERPL-MCNC: 0.4 MG/DL (ref 0–1.2)
BUN SERPL-MCNC: 33 MG/DL (ref 8–23)
BUN/CREAT SERPL: 9.7 (ref 7–25)
CALCIUM SPEC-SCNC: 8.7 MG/DL (ref 8.6–10.5)
CHLORIDE SERPL-SCNC: 98 MMOL/L (ref 98–107)
CO2 SERPL-SCNC: 29 MMOL/L (ref 22–29)
CREAT SERPL-MCNC: 3.4 MG/DL (ref 0.76–1.27)
CRP SERPL-MCNC: 7.66 MG/DL (ref 0–0.5)
DEPRECATED RDW RBC AUTO: 53.5 FL (ref 37–54)
EOSINOPHIL # BLD AUTO: 0.22 10*3/MM3 (ref 0–0.4)
EOSINOPHIL NFR BLD AUTO: 4.1 % (ref 0.3–6.2)
ERYTHROCYTE [DISTWIDTH] IN BLOOD BY AUTOMATED COUNT: 15.9 % (ref 12.3–15.4)
FERRITIN SERPL-MCNC: 350.6 NG/ML (ref 30–400)
GFR SERPL CREATININE-BSD FRML MDRD: 18 ML/MIN/1.73
GLOBULIN UR ELPH-MCNC: 3.8 GM/DL
GLUCOSE BLDC GLUCOMTR-MCNC: 138 MG/DL (ref 70–130)
GLUCOSE BLDC GLUCOMTR-MCNC: 192 MG/DL (ref 70–130)
GLUCOSE SERPL-MCNC: 169 MG/DL (ref 65–99)
HCT VFR BLD AUTO: 28.7 % (ref 37.5–51)
HGB BLD-MCNC: 9 G/DL (ref 13–17.7)
IMM GRANULOCYTES # BLD AUTO: 0.09 10*3/MM3 (ref 0–0.05)
IMM GRANULOCYTES NFR BLD AUTO: 1.7 % (ref 0–0.5)
INR PPP: 0.99 (ref 0.9–1.1)
IRON 24H UR-MRATE: 45 MCG/DL (ref 59–158)
IRON SATN MFR SERPL: 16 % (ref 20–50)
LYMPHOCYTES # BLD AUTO: 1.4 10*3/MM3 (ref 0.7–3.1)
LYMPHOCYTES NFR BLD AUTO: 26.1 % (ref 19.6–45.3)
MAGNESIUM SERPL-MCNC: 2 MG/DL (ref 1.6–2.4)
MCH RBC QN AUTO: 28.9 PG (ref 26.6–33)
MCHC RBC AUTO-ENTMCNC: 31.4 G/DL (ref 31.5–35.7)
MCV RBC AUTO: 92.3 FL (ref 79–97)
MONOCYTES # BLD AUTO: 0.47 10*3/MM3 (ref 0.1–0.9)
MONOCYTES NFR BLD AUTO: 8.8 % (ref 5–12)
NEUTROPHILS NFR BLD AUTO: 3.15 10*3/MM3 (ref 1.7–7)
NEUTROPHILS NFR BLD AUTO: 58.7 % (ref 42.7–76)
NRBC BLD AUTO-RTO: 0 /100 WBC (ref 0–0.2)
PLATELET # BLD AUTO: 259 10*3/MM3 (ref 140–450)
PMV BLD AUTO: 10.9 FL (ref 6–12)
POTASSIUM SERPL-SCNC: 4.3 MMOL/L (ref 3.5–5.2)
PROT SERPL-MCNC: 6.7 G/DL (ref 6–8.5)
PROTHROMBIN TIME: 13.5 SECONDS (ref 12.8–14.5)
RBC # BLD AUTO: 3.11 10*6/MM3 (ref 4.14–5.8)
SARS-COV-2 RNA RESP QL NAA+PROBE: NOT DETECTED
SODIUM SERPL-SCNC: 138 MMOL/L (ref 136–145)
TIBC SERPL-MCNC: 276 MCG/DL (ref 298–536)
TRANSFERRIN SERPL-MCNC: 185 MG/DL (ref 200–360)
WBC # BLD AUTO: 5.36 10*3/MM3 (ref 3.4–10.8)

## 2021-07-07 PROCEDURE — 86140 C-REACTIVE PROTEIN: CPT | Performed by: INTERNAL MEDICINE

## 2021-07-07 PROCEDURE — 83735 ASSAY OF MAGNESIUM: CPT | Performed by: PHYSICIAN ASSISTANT

## 2021-07-07 PROCEDURE — 85025 COMPLETE CBC W/AUTO DIFF WBC: CPT | Performed by: INTERNAL MEDICINE

## 2021-07-07 PROCEDURE — 99222 1ST HOSP IP/OBS MODERATE 55: CPT | Performed by: NURSE PRACTITIONER

## 2021-07-07 PROCEDURE — 82962 GLUCOSE BLOOD TEST: CPT

## 2021-07-07 PROCEDURE — 83540 ASSAY OF IRON: CPT | Performed by: INTERNAL MEDICINE

## 2021-07-07 PROCEDURE — 84466 ASSAY OF TRANSFERRIN: CPT | Performed by: INTERNAL MEDICINE

## 2021-07-07 PROCEDURE — 82728 ASSAY OF FERRITIN: CPT | Performed by: INTERNAL MEDICINE

## 2021-07-07 PROCEDURE — G0509 CRIT CARE TELEHEA CONSULT 50: HCPCS | Performed by: INTERNAL MEDICINE

## 2021-07-07 PROCEDURE — 80053 COMPREHEN METABOLIC PANEL: CPT | Performed by: INTERNAL MEDICINE

## 2021-07-07 PROCEDURE — 85610 PROTHROMBIN TIME: CPT | Performed by: PHYSICIAN ASSISTANT

## 2021-07-07 RX ORDER — ACETAMINOPHEN 325 MG/1
650 TABLET ORAL EVERY 4 HOURS PRN
Status: ON HOLD | COMMUNITY
End: 2021-07-08

## 2021-07-07 RX ORDER — ONDANSETRON 2 MG/ML
4 INJECTION INTRAMUSCULAR; INTRAVENOUS EVERY 6 HOURS PRN
COMMUNITY

## 2021-07-07 RX ORDER — MAGNESIUM SULFATE HEPTAHYDRATE 40 MG/ML
3 INJECTION, SOLUTION INTRAVENOUS DAILY PRN
Status: CANCELLED | OUTPATIENT
Start: 2021-07-07

## 2021-07-07 RX ORDER — DOCUSATE SODIUM 50 MG/5 ML
100 LIQUID (ML) ORAL 2 TIMES DAILY PRN
COMMUNITY

## 2021-07-07 RX ORDER — NALOXONE HYDROCHLORIDE 0.4 MG/ML
0.4 INJECTION, SOLUTION INTRAMUSCULAR; INTRAVENOUS; SUBCUTANEOUS
Status: ON HOLD | COMMUNITY
End: 2021-07-08

## 2021-07-07 RX ORDER — POTASSIUM CHLORIDE 7.45 MG/ML
40 INJECTION INTRAVENOUS DAILY PRN
Status: ON HOLD | COMMUNITY
End: 2021-07-08

## 2021-07-07 RX ORDER — DEXTROSE MONOHYDRATE 25 G/50ML
50 INJECTION, SOLUTION INTRAVENOUS AS NEEDED
COMMUNITY

## 2021-07-07 RX ORDER — MEROPENEM 1 G/1
2 INJECTION, POWDER, FOR SOLUTION INTRAVENOUS EVERY 12 HOURS SCHEDULED
Status: ON HOLD | COMMUNITY
Start: 2021-07-03 | End: 2021-07-08

## 2021-07-07 RX ORDER — MAGNESIUM SULFATE HEPTAHYDRATE 40 MG/ML
2 INJECTION, SOLUTION INTRAVENOUS DAILY PRN
Status: ON HOLD | COMMUNITY
End: 2021-07-08

## 2021-07-07 RX ORDER — PREGABALIN 100 MG/1
100 CAPSULE ORAL 2 TIMES DAILY
Status: ON HOLD | COMMUNITY
End: 2021-07-08

## 2021-07-07 RX ORDER — CASTOR OIL AND BALSAM, PERU 788; 87 MG/G; MG/G
1 OINTMENT TOPICAL 3 TIMES DAILY
Status: ON HOLD | COMMUNITY
End: 2021-07-08

## 2021-07-07 RX ORDER — DIPHENHYDRAMINE HCL 25 MG
50 CAPSULE ORAL EVERY 6 HOURS PRN
Status: ON HOLD | COMMUNITY
End: 2021-07-08

## 2021-07-07 RX ORDER — HYDROCODONE BITARTRATE AND ACETAMINOPHEN 5; 325 MG/1; MG/1
1 TABLET ORAL EVERY 6 HOURS PRN
Status: ON HOLD | COMMUNITY
End: 2021-07-08

## 2021-07-07 RX ORDER — MAGNESIUM SULFATE HEPTAHYDRATE 40 MG/ML
3 INJECTION, SOLUTION INTRAVENOUS DAILY PRN
Status: ON HOLD | COMMUNITY
End: 2021-07-08

## 2021-07-07 RX ORDER — SIMETHICONE 80 MG
80 TABLET,CHEWABLE ORAL EVERY 4 HOURS PRN
Status: ON HOLD | COMMUNITY
End: 2021-07-08

## 2021-07-07 RX ORDER — DEXMEDETOMIDINE HYDROCHLORIDE 4 UG/ML
0.2 INJECTION, SOLUTION INTRAVENOUS
Status: ON HOLD | COMMUNITY
End: 2021-08-02

## 2021-07-07 RX ORDER — POTASSIUM CHLORIDE 7.45 MG/ML
30 INJECTION INTRAVENOUS DAILY PRN
Status: ON HOLD | COMMUNITY
End: 2021-07-08

## 2021-07-07 RX ORDER — PROPOFOL 10 MG/ML
1000 VIAL (ML) INTRAVENOUS EVERY 4 HOURS
Status: ON HOLD | COMMUNITY
End: 2021-07-08

## 2021-07-07 RX ORDER — ACETYLCYSTEINE 200 MG/ML
4 SOLUTION ORAL; RESPIRATORY (INHALATION) 2 TIMES DAILY
Status: ON HOLD | COMMUNITY
End: 2021-07-08

## 2021-07-07 RX ORDER — POTASSIUM CHLORIDE 20 MEQ/1
20 TABLET, EXTENDED RELEASE ORAL
Status: ON HOLD | COMMUNITY
End: 2021-07-08

## 2021-07-07 RX ORDER — ONDANSETRON 4 MG/1
4 TABLET, FILM COATED ORAL EVERY 4 HOURS PRN
Status: ON HOLD | COMMUNITY
End: 2021-07-08

## 2021-07-07 RX ORDER — AMOXICILLIN 250 MG
2 CAPSULE ORAL 2 TIMES DAILY PRN
COMMUNITY

## 2021-07-07 RX ORDER — NYSTATIN 100000 [USP'U]/G
1 POWDER TOPICAL 4 TIMES DAILY
Status: ON HOLD | COMMUNITY
End: 2021-07-08

## 2021-07-07 RX ORDER — QUETIAPINE FUMARATE 25 MG/1
25 TABLET, FILM COATED ORAL NIGHTLY
Status: ON HOLD | COMMUNITY
End: 2021-07-08

## 2021-07-07 RX ORDER — CHOLESTYRAMINE LIGHT 4 G/5.7G
4 POWDER, FOR SUSPENSION ORAL 3 TIMES DAILY
Status: ON HOLD | COMMUNITY
End: 2021-08-02

## 2021-07-07 RX ORDER — OXYBUTYNIN CHLORIDE 5 MG/1
10 TABLET ORAL EVERY 6 HOURS PRN
Status: ON HOLD | COMMUNITY
End: 2021-07-08

## 2021-07-07 RX ORDER — LEVETIRACETAM 100 MG/ML
500 SOLUTION ORAL 2 TIMES DAILY
COMMUNITY

## 2021-07-07 RX ORDER — HEPARIN SODIUM 5000 [USP'U]/ML
5000 INJECTION, SOLUTION INTRAVENOUS; SUBCUTANEOUS EVERY 8 HOURS SCHEDULED
Status: ON HOLD | COMMUNITY
End: 2021-08-02

## 2021-07-07 RX ORDER — PANTOPRAZOLE SODIUM 40 MG/1
40 TABLET, DELAYED RELEASE ORAL DAILY
Status: ON HOLD | COMMUNITY
End: 2021-07-08

## 2021-07-07 RX ORDER — CALCIUM CARBONATE 200(500)MG
1 TABLET,CHEWABLE ORAL EVERY 4 HOURS PRN
Status: ON HOLD | COMMUNITY
End: 2021-07-08

## 2021-07-07 RX ORDER — MULTIPLE VITAMINS W/ MINERALS TAB 9MG-400MCG
1 TAB ORAL DAILY
Status: ON HOLD | COMMUNITY
End: 2021-08-02

## 2021-07-07 RX ORDER — BUDESONIDE 0.5 MG/2ML
0.5 INHALANT ORAL
Status: ON HOLD | COMMUNITY
End: 2021-07-08

## 2021-07-07 RX ORDER — LEVOTHYROXINE SODIUM 88 UG/1
88 TABLET ORAL DAILY
Status: ON HOLD | COMMUNITY
End: 2021-07-08

## 2021-07-07 RX ORDER — ATORVASTATIN CALCIUM 40 MG/1
40 TABLET, FILM COATED ORAL DAILY
Status: ON HOLD | COMMUNITY
End: 2021-07-08

## 2021-07-07 NOTE — ANESTHESIA PREPROCEDURE EVALUATION
Anesthesia Evaluation     Patient summary reviewed and Nursing notes reviewed   no history of anesthetic complications:  NPO Solid Status: > 8 hours  NPO Liquid Status: > 8 hours           Airway   Mallampati: II  TM distance: >3 FB  Neck ROM: full  No difficulty expected  Dental - normal exam     Pulmonary    (+) a smoker Former, decreased breath sounds,   Cardiovascular - normal exam    ECG reviewed  Rhythm: regular  Rate: normal    (+) hypertension, hyperlipidemia,       Neuro/Psych  (+) psychiatric history Bipolar, poor historian (Ac. metabolic encephalopathy).,     GI/Hepatic/Renal/Endo    (+)   renal disease CRI and ARF, diabetes mellitus poorly controlled using insulin, thyroid problem hypothyroidism    Musculoskeletal (-) negative ROS    Abdominal    Substance History - negative use     OB/GYN negative ob/gyn ROS         Other   blood dyscrasia anemia,     ROS/Med Hx Other: CXR 7/2/2021:  XR Chest 1 View   1.  Small left pleural effusion.   2.  Cardiomegaly.   3.  Bibasilar atelectasis or alveolar edema.     Echo 6/17/2019:  ·The left ventricular cavity is small.  ·Left ventricular wall thickness is consistent with mild concentric hypertrophy.  ·Estimated EF = 55%.  ·Left ventricular systolic function is normal.  ·Calculated right ventricular systolic pressure from tricuspid regurgitation is 33 mmHg.         Phys Exam Other: Intubated                Anesthesia Plan    ASA 4     general   (Mr. Schwartz has metabolic encephalopathy and is currently intubated.  They have been unable to extubate Mr. Schwartz.  He is currently on CPAP.  Vt 350's, RR 20, and O2 Sat 97%.   He is awake but is poorly responsive.  It has been noted that under sedation he becomes profoundly bradycardic with a HR in the 30's.  He has no underlying cardiac issues that are noted.  Off sedation, his HR is in the 80's.  )  intravenous induction   Postoperative Plan: Expected vent after surgery  Anesthetic plan, all risks, benefits, and  alternatives have been provided, discussed and informed consent has been obtained with: patient and other.    Plan discussed with CRNA.

## 2021-07-08 ENCOUNTER — OUTSIDE FACILITY SERVICE (OUTPATIENT)
Dept: PULMONOLOGY | Facility: CLINIC | Age: 71
End: 2021-07-08

## 2021-07-08 LAB
ALBUMIN SERPL-MCNC: 2.81 G/DL (ref 3.5–5.2)
ALBUMIN/GLOB SERPL: 0.7 G/DL
ALP SERPL-CCNC: 329 U/L (ref 39–117)
ALT SERPL W P-5'-P-CCNC: 38 U/L (ref 1–41)
ANION GAP SERPL CALCULATED.3IONS-SCNC: 13.3 MMOL/L (ref 5–15)
AST SERPL-CCNC: 25 U/L (ref 1–40)
BASOPHILS # BLD AUTO: 0.05 10*3/MM3 (ref 0–0.2)
BASOPHILS NFR BLD AUTO: 0.9 % (ref 0–1.5)
BILIRUB SERPL-MCNC: 0.3 MG/DL (ref 0–1.2)
BUN SERPL-MCNC: 43 MG/DL (ref 8–23)
BUN/CREAT SERPL: 11.1 (ref 7–25)
CALCIUM SPEC-SCNC: 9.1 MG/DL (ref 8.6–10.5)
CHLORIDE SERPL-SCNC: 100 MMOL/L (ref 98–107)
CO2 SERPL-SCNC: 25.7 MMOL/L (ref 22–29)
CREAT SERPL-MCNC: 3.89 MG/DL (ref 0.76–1.27)
CRP SERPL-MCNC: 7.84 MG/DL (ref 0–0.5)
DEPRECATED RDW RBC AUTO: 52.3 FL (ref 37–54)
EOSINOPHIL # BLD AUTO: 0.25 10*3/MM3 (ref 0–0.4)
EOSINOPHIL NFR BLD AUTO: 4.6 % (ref 0.3–6.2)
ERYTHROCYTE [DISTWIDTH] IN BLOOD BY AUTOMATED COUNT: 15.7 % (ref 12.3–15.4)
GFR SERPL CREATININE-BSD FRML MDRD: 15 ML/MIN/1.73
GLOBULIN UR ELPH-MCNC: 4.1 GM/DL
GLUCOSE BLDC GLUCOMTR-MCNC: 117 MG/DL (ref 70–130)
GLUCOSE BLDC GLUCOMTR-MCNC: 140 MG/DL (ref 70–130)
GLUCOSE BLDC GLUCOMTR-MCNC: 178 MG/DL (ref 70–130)
GLUCOSE BLDC GLUCOMTR-MCNC: 179 MG/DL (ref 70–130)
GLUCOSE SERPL-MCNC: 157 MG/DL (ref 65–99)
HCT VFR BLD AUTO: 30.3 % (ref 37.5–51)
HGB BLD-MCNC: 9.7 G/DL (ref 13–17.7)
IMM GRANULOCYTES # BLD AUTO: 0.11 10*3/MM3 (ref 0–0.05)
IMM GRANULOCYTES NFR BLD AUTO: 2 % (ref 0–0.5)
LYMPHOCYTES # BLD AUTO: 1.66 10*3/MM3 (ref 0.7–3.1)
LYMPHOCYTES NFR BLD AUTO: 30.2 % (ref 19.6–45.3)
MCH RBC QN AUTO: 29 PG (ref 26.6–33)
MCHC RBC AUTO-ENTMCNC: 32 G/DL (ref 31.5–35.7)
MCV RBC AUTO: 90.7 FL (ref 79–97)
MONOCYTES # BLD AUTO: 0.55 10*3/MM3 (ref 0.1–0.9)
MONOCYTES NFR BLD AUTO: 10 % (ref 5–12)
NEUTROPHILS NFR BLD AUTO: 2.87 10*3/MM3 (ref 1.7–7)
NEUTROPHILS NFR BLD AUTO: 52.3 % (ref 42.7–76)
NRBC BLD AUTO-RTO: 0 /100 WBC (ref 0–0.2)
PLATELET # BLD AUTO: 273 10*3/MM3 (ref 140–450)
PMV BLD AUTO: 10.5 FL (ref 6–12)
POTASSIUM SERPL-SCNC: 3.9 MMOL/L (ref 3.5–5.2)
PROT SERPL-MCNC: 6.9 G/DL (ref 6–8.5)
QT INTERVAL: 448 MS
QTC INTERVAL: 412 MS
RBC # BLD AUTO: 3.34 10*6/MM3 (ref 4.14–5.8)
SODIUM SERPL-SCNC: 139 MMOL/L (ref 136–145)
WBC # BLD AUTO: 5.49 10*3/MM3 (ref 3.4–10.8)

## 2021-07-08 PROCEDURE — G0509 CRIT CARE TELEHEA CONSULT 50: HCPCS | Performed by: INTERNAL MEDICINE

## 2021-07-08 PROCEDURE — 43762 RPLC GTUBE NO REVJ TRC: CPT | Performed by: SURGERY

## 2021-07-08 PROCEDURE — 25010000002 MIDAZOLAM PER 1 MG: Performed by: NURSE ANESTHETIST, CERTIFIED REGISTERED

## 2021-07-08 PROCEDURE — 86140 C-REACTIVE PROTEIN: CPT | Performed by: INTERNAL MEDICINE

## 2021-07-08 PROCEDURE — 82962 GLUCOSE BLOOD TEST: CPT

## 2021-07-08 PROCEDURE — 31600 PLANNED TRACHEOSTOMY: CPT | Performed by: SURGERY

## 2021-07-08 PROCEDURE — 85025 COMPLETE CBC W/AUTO DIFF WBC: CPT | Performed by: INTERNAL MEDICINE

## 2021-07-08 PROCEDURE — 80053 COMPREHEN METABOLIC PANEL: CPT | Performed by: INTERNAL MEDICINE

## 2021-07-08 RX ORDER — MAGNESIUM HYDROXIDE 1200 MG/15ML
LIQUID ORAL AS NEEDED
Status: DISCONTINUED | OUTPATIENT
Start: 2021-07-08 | End: 2021-07-08 | Stop reason: HOSPADM

## 2021-07-08 RX ORDER — MIDAZOLAM HYDROCHLORIDE 1 MG/ML
INJECTION INTRAMUSCULAR; INTRAVENOUS AS NEEDED
Status: DISCONTINUED | OUTPATIENT
Start: 2021-07-08 | End: 2021-07-08 | Stop reason: SURG

## 2021-07-08 RX ORDER — CLONAZEPAM 0.5 MG/1
0.5 TABLET ORAL 2 TIMES DAILY
COMMUNITY

## 2021-07-08 RX ORDER — SODIUM CHLORIDE, SODIUM LACTATE, POTASSIUM CHLORIDE, CALCIUM CHLORIDE 600; 310; 30; 20 MG/100ML; MG/100ML; MG/100ML; MG/100ML
INJECTION, SOLUTION INTRAVENOUS CONTINUOUS PRN
Status: DISCONTINUED | OUTPATIENT
Start: 2021-07-08 | End: 2021-07-08 | Stop reason: SURG

## 2021-07-08 RX ORDER — SODIUM CHLORIDE 0.9 % (FLUSH) 0.9 %
10 SYRINGE (ML) INJECTION EVERY 12 HOURS
COMMUNITY

## 2021-07-08 RX ORDER — SODIUM CHLORIDE 0.9 % (FLUSH) 0.9 %
5 SYRINGE (ML) INJECTION EVERY 8 HOURS
COMMUNITY

## 2021-07-08 RX ORDER — SODIUM CHLORIDE 0.9 % (FLUSH) 0.9 %
10 SYRINGE (ML) INJECTION AS NEEDED
COMMUNITY

## 2021-07-08 RX ORDER — BUPIVACAINE HYDROCHLORIDE AND EPINEPHRINE 5; 5 MG/ML; UG/ML
INJECTION, SOLUTION EPIDURAL; INTRACAUDAL; PERINEURAL AS NEEDED
Status: DISCONTINUED | OUTPATIENT
Start: 2021-07-08 | End: 2021-07-08 | Stop reason: HOSPADM

## 2021-07-08 RX ORDER — OXYCODONE HYDROCHLORIDE 5 MG/1
5 TABLET ORAL 2 TIMES DAILY PRN
COMMUNITY

## 2021-07-08 RX ADMIN — MIDAZOLAM 2 MG: 1 INJECTION INTRAMUSCULAR; INTRAVENOUS at 16:35

## 2021-07-08 RX ADMIN — SODIUM CHLORIDE, POTASSIUM CHLORIDE, SODIUM LACTATE AND CALCIUM CHLORIDE: 600; 310; 30; 20 INJECTION, SOLUTION INTRAVENOUS at 16:01

## 2021-07-08 NOTE — ANESTHESIA POSTPROCEDURE EVALUATION
Patient: Braulio Schwartz    Procedure Summary     Date: 07/08/21 Room / Location: [facility] OR 03 /  [facility] OR    Anesthesia Start: 1601 Anesthesia Stop:     Procedure: TRACHEOSTOMY AND PERCUTANEOUS ENDOSCOPIC GASTROSTOMY TUBE INSERTION (N/A Neck) Diagnosis:     Surgeons: Bob Montaño MD Provider:     Anesthesia Type: general ASA Status: 4          Anesthesia Type: general    Vitals  No vitals data found for the desired time range.          Post Anesthesia Care and Evaluation    Patient location during evaluation: PHASE II  Patient participation: complete - patient participated  Level of consciousness: awake and alert  Pain score: 1  Pain management: adequate  Airway patency: patent  Anesthetic complications: No anesthetic complications  PONV Status: controlled  Cardiovascular status: acceptable  Respiratory status: acceptable  Hydration status: acceptable

## 2021-07-09 ENCOUNTER — APPOINTMENT (OUTPATIENT)
Dept: GENERAL RADIOLOGY | Facility: HOSPITAL | Age: 71
End: 2021-07-09

## 2021-07-09 ENCOUNTER — OUTSIDE FACILITY SERVICE (OUTPATIENT)
Dept: PULMONOLOGY | Facility: CLINIC | Age: 71
End: 2021-07-09

## 2021-07-09 LAB
ALBUMIN SERPL-MCNC: 2.71 G/DL (ref 3.5–5.2)
ALBUMIN/GLOB SERPL: 0.7 G/DL
ALP SERPL-CCNC: 325 U/L (ref 39–117)
ALT SERPL W P-5'-P-CCNC: 34 U/L (ref 1–41)
ANION GAP SERPL CALCULATED.3IONS-SCNC: 14 MMOL/L (ref 5–15)
AST SERPL-CCNC: 31 U/L (ref 1–40)
BACTERIA UR QL AUTO: ABNORMAL /HPF
BASOPHILS # BLD AUTO: 0.03 10*3/MM3 (ref 0–0.2)
BASOPHILS NFR BLD AUTO: 0.5 % (ref 0–1.5)
BILIRUB SERPL-MCNC: 0.4 MG/DL (ref 0–1.2)
BILIRUB UR QL STRIP: NEGATIVE
BUN SERPL-MCNC: 51 MG/DL (ref 8–23)
BUN/CREAT SERPL: 11.5 (ref 7–25)
CALCIUM SPEC-SCNC: 8.8 MG/DL (ref 8.6–10.5)
CHLORIDE SERPL-SCNC: 106 MMOL/L (ref 98–107)
CLARITY UR: ABNORMAL
CO2 SERPL-SCNC: 21 MMOL/L (ref 22–29)
COLOR UR: YELLOW
CREAT SERPL-MCNC: 4.45 MG/DL (ref 0.76–1.27)
CRP SERPL-MCNC: 6.47 MG/DL (ref 0–0.5)
DEPRECATED RDW RBC AUTO: 52 FL (ref 37–54)
EOSINOPHIL # BLD AUTO: 0.16 10*3/MM3 (ref 0–0.4)
EOSINOPHIL NFR BLD AUTO: 2.9 % (ref 0.3–6.2)
ERYTHROCYTE [DISTWIDTH] IN BLOOD BY AUTOMATED COUNT: 15.6 % (ref 12.3–15.4)
GFR SERPL CREATININE-BSD FRML MDRD: 13 ML/MIN/1.73
GFR SERPL CREATININE-BSD FRML MDRD: ABNORMAL ML/MIN/{1.73_M2}
GLOBULIN UR ELPH-MCNC: 4 GM/DL
GLUCOSE BLDC GLUCOMTR-MCNC: 170 MG/DL (ref 70–130)
GLUCOSE BLDC GLUCOMTR-MCNC: 175 MG/DL (ref 70–130)
GLUCOSE BLDC GLUCOMTR-MCNC: 202 MG/DL (ref 70–130)
GLUCOSE BLDC GLUCOMTR-MCNC: 209 MG/DL (ref 70–130)
GLUCOSE SERPL-MCNC: 166 MG/DL (ref 65–99)
GLUCOSE UR STRIP-MCNC: NEGATIVE MG/DL
HCT VFR BLD AUTO: 28.3 % (ref 37.5–51)
HCT VFR BLD AUTO: 30.2 % (ref 37.5–51)
HGB BLD-MCNC: 8.6 G/DL (ref 13–17.7)
HGB BLD-MCNC: 9.4 G/DL (ref 13–17.7)
HGB UR QL STRIP.AUTO: ABNORMAL
HYALINE CASTS UR QL AUTO: ABNORMAL /LPF
IMM GRANULOCYTES # BLD AUTO: 0.14 10*3/MM3 (ref 0–0.05)
IMM GRANULOCYTES NFR BLD AUTO: 2.5 % (ref 0–0.5)
KETONES UR QL STRIP: NEGATIVE
LEUKOCYTE ESTERASE UR QL STRIP.AUTO: ABNORMAL
LYMPHOCYTES # BLD AUTO: 1.23 10*3/MM3 (ref 0.7–3.1)
LYMPHOCYTES NFR BLD AUTO: 22 % (ref 19.6–45.3)
MCH RBC QN AUTO: 28.4 PG (ref 26.6–33)
MCHC RBC AUTO-ENTMCNC: 31.1 G/DL (ref 31.5–35.7)
MCV RBC AUTO: 91.2 FL (ref 79–97)
MONOCYTES # BLD AUTO: 0.58 10*3/MM3 (ref 0.1–0.9)
MONOCYTES NFR BLD AUTO: 10.4 % (ref 5–12)
NEUTROPHILS NFR BLD AUTO: 3.46 10*3/MM3 (ref 1.7–7)
NEUTROPHILS NFR BLD AUTO: 61.7 % (ref 42.7–76)
NITRITE UR QL STRIP: NEGATIVE
NRBC BLD AUTO-RTO: 0 /100 WBC (ref 0–0.2)
PH UR STRIP.AUTO: 7.5 [PH] (ref 5–8)
PLATELET # BLD AUTO: 197 10*3/MM3 (ref 140–450)
PMV BLD AUTO: 11.3 FL (ref 6–12)
POTASSIUM SERPL-SCNC: 5 MMOL/L (ref 3.5–5.2)
PROT SERPL-MCNC: 6.7 G/DL (ref 6–8.5)
PROT UR QL STRIP: ABNORMAL
RBC # BLD AUTO: 3.31 10*6/MM3 (ref 4.14–5.8)
RBC # UR: ABNORMAL /HPF
REF LAB TEST METHOD: ABNORMAL
SODIUM SERPL-SCNC: 141 MMOL/L (ref 136–145)
SP GR UR STRIP: 1.01 (ref 1–1.03)
SQUAMOUS #/AREA URNS HPF: ABNORMAL /HPF
UROBILINOGEN UR QL STRIP: ABNORMAL
WBC # BLD AUTO: 5.6 10*3/MM3 (ref 3.4–10.8)
WBC UR QL AUTO: ABNORMAL /HPF

## 2021-07-09 PROCEDURE — 82962 GLUCOSE BLOOD TEST: CPT

## 2021-07-09 PROCEDURE — 81001 URINALYSIS AUTO W/SCOPE: CPT | Performed by: INTERNAL MEDICINE

## 2021-07-09 PROCEDURE — 87040 BLOOD CULTURE FOR BACTERIA: CPT | Performed by: INTERNAL MEDICINE

## 2021-07-09 PROCEDURE — 87077 CULTURE AEROBIC IDENTIFY: CPT | Performed by: INTERNAL MEDICINE

## 2021-07-09 PROCEDURE — 87186 SC STD MICRODIL/AGAR DIL: CPT | Performed by: INTERNAL MEDICINE

## 2021-07-09 PROCEDURE — 87086 URINE CULTURE/COLONY COUNT: CPT | Performed by: INTERNAL MEDICINE

## 2021-07-09 PROCEDURE — 80053 COMPREHEN METABOLIC PANEL: CPT | Performed by: INTERNAL MEDICINE

## 2021-07-09 PROCEDURE — 87205 SMEAR GRAM STAIN: CPT | Performed by: INTERNAL MEDICINE

## 2021-07-09 PROCEDURE — 85014 HEMATOCRIT: CPT | Performed by: INTERNAL MEDICINE

## 2021-07-09 PROCEDURE — 71045 X-RAY EXAM CHEST 1 VIEW: CPT | Performed by: RADIOLOGY

## 2021-07-09 PROCEDURE — 71045 X-RAY EXAM CHEST 1 VIEW: CPT

## 2021-07-09 PROCEDURE — 85025 COMPLETE CBC W/AUTO DIFF WBC: CPT | Performed by: INTERNAL MEDICINE

## 2021-07-09 PROCEDURE — 85018 HEMOGLOBIN: CPT | Performed by: INTERNAL MEDICINE

## 2021-07-09 PROCEDURE — 87070 CULTURE OTHR SPECIMN AEROBIC: CPT | Performed by: INTERNAL MEDICINE

## 2021-07-09 PROCEDURE — 86140 C-REACTIVE PROTEIN: CPT | Performed by: INTERNAL MEDICINE

## 2021-07-09 PROCEDURE — 87181 SC STD AGAR DILUTION PER AGT: CPT | Performed by: INTERNAL MEDICINE

## 2021-07-09 PROCEDURE — G0509 CRIT CARE TELEHEA CONSULT 50: HCPCS | Performed by: INTERNAL MEDICINE

## 2021-07-10 ENCOUNTER — APPOINTMENT (OUTPATIENT)
Dept: GENERAL RADIOLOGY | Facility: HOSPITAL | Age: 71
End: 2021-07-10

## 2021-07-10 ENCOUNTER — OUTSIDE FACILITY SERVICE (OUTPATIENT)
Dept: PULMONOLOGY | Facility: CLINIC | Age: 71
End: 2021-07-10

## 2021-07-10 LAB
ALBUMIN SERPL-MCNC: 2.67 G/DL (ref 3.5–5.2)
ALBUMIN/GLOB SERPL: 0.6 G/DL
ALP SERPL-CCNC: 281 U/L (ref 39–117)
ALT SERPL W P-5'-P-CCNC: 23 U/L (ref 1–41)
ANION GAP SERPL CALCULATED.3IONS-SCNC: 14.2 MMOL/L (ref 5–15)
AST SERPL-CCNC: 14 U/L (ref 1–40)
BASOPHILS # BLD AUTO: 0.04 10*3/MM3 (ref 0–0.2)
BASOPHILS NFR BLD AUTO: 0.4 % (ref 0–1.5)
BILIRUB SERPL-MCNC: 0.3 MG/DL (ref 0–1.2)
BUN SERPL-MCNC: 57 MG/DL (ref 8–23)
BUN/CREAT SERPL: 11.1 (ref 7–25)
CALCIUM SPEC-SCNC: 8.9 MG/DL (ref 8.6–10.5)
CHLORIDE SERPL-SCNC: 104 MMOL/L (ref 98–107)
CO2 SERPL-SCNC: 20.8 MMOL/L (ref 22–29)
CREAT SERPL-MCNC: 5.13 MG/DL (ref 0.76–1.27)
CRP SERPL-MCNC: 21.41 MG/DL (ref 0–0.5)
DEPRECATED RDW RBC AUTO: 52.9 FL (ref 37–54)
EOSINOPHIL # BLD AUTO: 0.04 10*3/MM3 (ref 0–0.4)
EOSINOPHIL NFR BLD AUTO: 0.4 % (ref 0.3–6.2)
ERYTHROCYTE [DISTWIDTH] IN BLOOD BY AUTOMATED COUNT: 16 % (ref 12.3–15.4)
GFR SERPL CREATININE-BSD FRML MDRD: 11 ML/MIN/1.73
GFR SERPL CREATININE-BSD FRML MDRD: ABNORMAL ML/MIN/{1.73_M2}
GLOBULIN UR ELPH-MCNC: 4.3 GM/DL
GLUCOSE BLDC GLUCOMTR-MCNC: 177 MG/DL (ref 70–130)
GLUCOSE BLDC GLUCOMTR-MCNC: 190 MG/DL (ref 70–130)
GLUCOSE BLDC GLUCOMTR-MCNC: 311 MG/DL (ref 70–130)
GLUCOSE SERPL-MCNC: 174 MG/DL (ref 65–99)
HCT VFR BLD AUTO: 27.8 % (ref 37.5–51)
HGB BLD-MCNC: 8.9 G/DL (ref 13–17.7)
IMM GRANULOCYTES # BLD AUTO: 0.2 10*3/MM3 (ref 0–0.05)
IMM GRANULOCYTES NFR BLD AUTO: 1.9 % (ref 0–0.5)
LYMPHOCYTES # BLD AUTO: 1.43 10*3/MM3 (ref 0.7–3.1)
LYMPHOCYTES NFR BLD AUTO: 13.6 % (ref 19.6–45.3)
MCH RBC QN AUTO: 28.7 PG (ref 26.6–33)
MCHC RBC AUTO-ENTMCNC: 32 G/DL (ref 31.5–35.7)
MCV RBC AUTO: 89.7 FL (ref 79–97)
MONOCYTES # BLD AUTO: 1.04 10*3/MM3 (ref 0.1–0.9)
MONOCYTES NFR BLD AUTO: 9.9 % (ref 5–12)
NEUTROPHILS NFR BLD AUTO: 7.8 10*3/MM3 (ref 1.7–7)
NEUTROPHILS NFR BLD AUTO: 73.8 % (ref 42.7–76)
NRBC BLD AUTO-RTO: 0 /100 WBC (ref 0–0.2)
PLATELET # BLD AUTO: 181 10*3/MM3 (ref 140–450)
PMV BLD AUTO: 10 FL (ref 6–12)
POTASSIUM SERPL-SCNC: 4.8 MMOL/L (ref 3.5–5.2)
PROT SERPL-MCNC: 7 G/DL (ref 6–8.5)
RBC # BLD AUTO: 3.1 10*6/MM3 (ref 4.14–5.8)
SODIUM SERPL-SCNC: 139 MMOL/L (ref 136–145)
WBC # BLD AUTO: 10.55 10*3/MM3 (ref 3.4–10.8)

## 2021-07-10 PROCEDURE — 71045 X-RAY EXAM CHEST 1 VIEW: CPT

## 2021-07-10 PROCEDURE — 86140 C-REACTIVE PROTEIN: CPT | Performed by: INTERNAL MEDICINE

## 2021-07-10 PROCEDURE — G0509 CRIT CARE TELEHEA CONSULT 50: HCPCS | Performed by: INTERNAL MEDICINE

## 2021-07-10 PROCEDURE — 80053 COMPREHEN METABOLIC PANEL: CPT | Performed by: INTERNAL MEDICINE

## 2021-07-10 PROCEDURE — 85025 COMPLETE CBC W/AUTO DIFF WBC: CPT | Performed by: INTERNAL MEDICINE

## 2021-07-10 PROCEDURE — 82962 GLUCOSE BLOOD TEST: CPT

## 2021-07-11 ENCOUNTER — OUTSIDE FACILITY SERVICE (OUTPATIENT)
Dept: PULMONOLOGY | Facility: CLINIC | Age: 71
End: 2021-07-11

## 2021-07-11 LAB
ALBUMIN SERPL-MCNC: 3.15 G/DL (ref 3.5–5.2)
ALBUMIN/GLOB SERPL: 0.8 G/DL
ALP SERPL-CCNC: 298 U/L (ref 39–117)
ALT SERPL W P-5'-P-CCNC: 21 U/L (ref 1–41)
ANION GAP SERPL CALCULATED.3IONS-SCNC: 13.5 MMOL/L (ref 5–15)
AST SERPL-CCNC: 18 U/L (ref 1–40)
BASOPHILS # BLD AUTO: 0.02 10*3/MM3 (ref 0–0.2)
BASOPHILS NFR BLD AUTO: 0.3 % (ref 0–1.5)
BILIRUB SERPL-MCNC: 0.4 MG/DL (ref 0–1.2)
BUN SERPL-MCNC: 45 MG/DL (ref 8–23)
BUN/CREAT SERPL: 12.2 (ref 7–25)
CALCIUM SPEC-SCNC: 8.8 MG/DL (ref 8.6–10.5)
CHLORIDE SERPL-SCNC: 95 MMOL/L (ref 98–107)
CO2 SERPL-SCNC: 25.5 MMOL/L (ref 22–29)
CREAT SERPL-MCNC: 3.69 MG/DL (ref 0.76–1.27)
CRP SERPL-MCNC: 26.41 MG/DL (ref 0–0.5)
DEPRECATED RDW RBC AUTO: 53.5 FL (ref 37–54)
EOSINOPHIL # BLD AUTO: 0.2 10*3/MM3 (ref 0–0.4)
EOSINOPHIL NFR BLD AUTO: 2.7 % (ref 0.3–6.2)
ERYTHROCYTE [DISTWIDTH] IN BLOOD BY AUTOMATED COUNT: 15.9 % (ref 12.3–15.4)
GFR SERPL CREATININE-BSD FRML MDRD: 16 ML/MIN/1.73
GLOBULIN UR ELPH-MCNC: 3.9 GM/DL
GLUCOSE BLDC GLUCOMTR-MCNC: 198 MG/DL (ref 70–130)
GLUCOSE BLDC GLUCOMTR-MCNC: 201 MG/DL (ref 70–130)
GLUCOSE BLDC GLUCOMTR-MCNC: 224 MG/DL (ref 70–130)
GLUCOSE BLDC GLUCOMTR-MCNC: 241 MG/DL (ref 70–130)
GLUCOSE SERPL-MCNC: 226 MG/DL (ref 65–99)
HCT VFR BLD AUTO: 26 % (ref 37.5–51)
HGB BLD-MCNC: 8 G/DL (ref 13–17.7)
IMM GRANULOCYTES # BLD AUTO: 0.18 10*3/MM3 (ref 0–0.05)
IMM GRANULOCYTES NFR BLD AUTO: 2.4 % (ref 0–0.5)
LYMPHOCYTES # BLD AUTO: 1.8 10*3/MM3 (ref 0.7–3.1)
LYMPHOCYTES NFR BLD AUTO: 24 % (ref 19.6–45.3)
MCH RBC QN AUTO: 28.4 PG (ref 26.6–33)
MCHC RBC AUTO-ENTMCNC: 30.8 G/DL (ref 31.5–35.7)
MCV RBC AUTO: 92.2 FL (ref 79–97)
MONOCYTES # BLD AUTO: 0.68 10*3/MM3 (ref 0.1–0.9)
MONOCYTES NFR BLD AUTO: 9.1 % (ref 5–12)
NEUTROPHILS NFR BLD AUTO: 4.61 10*3/MM3 (ref 1.7–7)
NEUTROPHILS NFR BLD AUTO: 61.5 % (ref 42.7–76)
NRBC BLD AUTO-RTO: 0 /100 WBC (ref 0–0.2)
PLATELET # BLD AUTO: 139 10*3/MM3 (ref 140–450)
PMV BLD AUTO: 10.6 FL (ref 6–12)
POTASSIUM SERPL-SCNC: 4.3 MMOL/L (ref 3.5–5.2)
PROT SERPL-MCNC: 7 G/DL (ref 6–8.5)
RBC # BLD AUTO: 2.82 10*6/MM3 (ref 4.14–5.8)
SODIUM SERPL-SCNC: 134 MMOL/L (ref 136–145)
WBC # BLD AUTO: 7.49 10*3/MM3 (ref 3.4–10.8)

## 2021-07-11 PROCEDURE — G0509 CRIT CARE TELEHEA CONSULT 50: HCPCS | Performed by: INTERNAL MEDICINE

## 2021-07-11 PROCEDURE — 85025 COMPLETE CBC W/AUTO DIFF WBC: CPT | Performed by: INTERNAL MEDICINE

## 2021-07-11 PROCEDURE — 82962 GLUCOSE BLOOD TEST: CPT

## 2021-07-11 PROCEDURE — 80053 COMPREHEN METABOLIC PANEL: CPT | Performed by: INTERNAL MEDICINE

## 2021-07-11 PROCEDURE — 86140 C-REACTIVE PROTEIN: CPT | Performed by: INTERNAL MEDICINE

## 2021-07-12 ENCOUNTER — OUTSIDE FACILITY SERVICE (OUTPATIENT)
Dept: PULMONOLOGY | Facility: CLINIC | Age: 71
End: 2021-07-12

## 2021-07-12 LAB
A-A DO2: 126.8 MMHG (ref 0–300)
ALBUMIN SERPL-MCNC: 3.14 G/DL (ref 3.5–5.2)
ALBUMIN/GLOB SERPL: 0.8 G/DL
ALP SERPL-CCNC: 368 U/L (ref 39–117)
ALT SERPL W P-5'-P-CCNC: 33 U/L (ref 1–41)
ANION GAP SERPL CALCULATED.3IONS-SCNC: 15.5 MMOL/L (ref 5–15)
ANISOCYTOSIS BLD QL: ABNORMAL
ARTERIAL PATENCY WRIST A: ABNORMAL
AST SERPL-CCNC: 27 U/L (ref 1–40)
ATMOSPHERIC PRESS: 729 MMHG
BACTERIA SPEC AEROBE CULT: ABNORMAL
BASE EXCESS BLDA CALC-SCNC: 0.1 MMOL/L (ref 0–2)
BDY SITE: ABNORMAL
BILIRUB SERPL-MCNC: 0.3 MG/DL (ref 0–1.2)
BODY TEMPERATURE: 0 C
BUN SERPL-MCNC: 61 MG/DL (ref 8–23)
BUN/CREAT SERPL: 14.2 (ref 7–25)
CALCIUM SPEC-SCNC: 9.1 MG/DL (ref 8.6–10.5)
CHLORIDE SERPL-SCNC: 96 MMOL/L (ref 98–107)
CO2 BLDA-SCNC: 26.2 MMOL/L (ref 22–33)
CO2 SERPL-SCNC: 23.5 MMOL/L (ref 22–29)
COHGB MFR BLD: 0.9 % (ref 0–5)
CREAT SERPL-MCNC: 4.3 MG/DL (ref 0.76–1.27)
CRP SERPL-MCNC: 18.35 MG/DL (ref 0–0.5)
DEPRECATED RDW RBC AUTO: 55.2 FL (ref 37–54)
EOSINOPHIL # BLD MANUAL: 0.49 10*3/MM3 (ref 0–0.4)
EOSINOPHIL NFR BLD MANUAL: 7 % (ref 0.3–6.2)
ERYTHROCYTE [DISTWIDTH] IN BLOOD BY AUTOMATED COUNT: 15.9 % (ref 12.3–15.4)
GFR SERPL CREATININE-BSD FRML MDRD: 14 ML/MIN/1.73
GFR SERPL CREATININE-BSD FRML MDRD: ABNORMAL ML/MIN/{1.73_M2}
GLOBULIN UR ELPH-MCNC: 4 GM/DL
GLUCOSE BLDC GLUCOMTR-MCNC: 187 MG/DL (ref 70–130)
GLUCOSE BLDC GLUCOMTR-MCNC: 200 MG/DL (ref 70–130)
GLUCOSE BLDC GLUCOMTR-MCNC: 203 MG/DL (ref 70–130)
GLUCOSE BLDC GLUCOMTR-MCNC: 209 MG/DL (ref 70–130)
GLUCOSE BLDC GLUCOMTR-MCNC: 224 MG/DL (ref 70–130)
GLUCOSE SERPL-MCNC: 173 MG/DL (ref 65–99)
HCO3 BLDA-SCNC: 24.9 MMOL/L (ref 20–26)
HCT VFR BLD AUTO: 26.3 % (ref 37.5–51)
HCT VFR BLD CALC: 25.6 % (ref 38–51)
HGB BLD-MCNC: 8.1 G/DL (ref 13–17.7)
HGB BLDA-MCNC: 8.3 G/DL (ref 14–18)
HYPOCHROMIA BLD QL: ABNORMAL
INHALED O2 CONCENTRATION: 40 %
LYMPHOCYTES # BLD MANUAL: 1.55 10*3/MM3 (ref 0.7–3.1)
LYMPHOCYTES NFR BLD MANUAL: 10 % (ref 5–12)
LYMPHOCYTES NFR BLD MANUAL: 22 % (ref 19.6–45.3)
Lab: ABNORMAL
MCH RBC QN AUTO: 29 PG (ref 26.6–33)
MCHC RBC AUTO-ENTMCNC: 30.8 G/DL (ref 31.5–35.7)
MCV RBC AUTO: 94.3 FL (ref 79–97)
METAMYELOCYTES NFR BLD MANUAL: 1 % (ref 0–0)
METHGB BLD QL: 0.4 % (ref 0–3)
MODALITY: ABNORMAL
MONOCYTES # BLD AUTO: 0.71 10*3/MM3 (ref 0.1–0.9)
MYELOCYTES NFR BLD MANUAL: 1 % (ref 0–0)
NEUTROPHILS # BLD AUTO: 4.16 10*3/MM3 (ref 1.7–7)
NEUTROPHILS NFR BLD MANUAL: 51 % (ref 42.7–76)
NEUTS BAND NFR BLD MANUAL: 8 % (ref 0–5)
NOTE: ABNORMAL
OXYHGB MFR BLDV: 97.1 % (ref 94–99)
PCO2 BLDA: 40.2 MM HG (ref 35–45)
PCO2 TEMP ADJ BLD: ABNORMAL MM[HG]
PEEP RESPIRATORY: 5 CM[H2O]
PH BLDA: 7.4 PH UNITS (ref 7.35–7.45)
PH, TEMP CORRECTED: ABNORMAL
PLAT MORPH BLD: NORMAL
PLATELET # BLD AUTO: 141 10*3/MM3 (ref 140–450)
PMV BLD AUTO: 10.5 FL (ref 6–12)
PO2 BLDA: 102 MM HG (ref 83–108)
PO2 TEMP ADJ BLD: ABNORMAL MM[HG]
POTASSIUM SERPL-SCNC: 4.6 MMOL/L (ref 3.5–5.2)
PROT SERPL-MCNC: 7.1 G/DL (ref 6–8.5)
RBC # BLD AUTO: 2.79 10*6/MM3 (ref 4.14–5.8)
SAO2 % BLDCOA: 98.4 % (ref 94–99)
SET MECH RESP RATE: 16
SODIUM SERPL-SCNC: 135 MMOL/L (ref 136–145)
VENTILATOR MODE: ABNORMAL
VT ON VENT VENT: 450 ML
WBC # BLD AUTO: 7.05 10*3/MM3 (ref 3.4–10.8)

## 2021-07-12 PROCEDURE — 83050 HGB METHEMOGLOBIN QUAN: CPT

## 2021-07-12 PROCEDURE — 82805 BLOOD GASES W/O2 SATURATION: CPT

## 2021-07-12 PROCEDURE — 82375 ASSAY CARBOXYHB QUANT: CPT

## 2021-07-12 PROCEDURE — 36600 WITHDRAWAL OF ARTERIAL BLOOD: CPT

## 2021-07-12 PROCEDURE — 86140 C-REACTIVE PROTEIN: CPT | Performed by: INTERNAL MEDICINE

## 2021-07-12 PROCEDURE — 82962 GLUCOSE BLOOD TEST: CPT

## 2021-07-12 PROCEDURE — 80053 COMPREHEN METABOLIC PANEL: CPT | Performed by: INTERNAL MEDICINE

## 2021-07-12 PROCEDURE — G0509 CRIT CARE TELEHEA CONSULT 50: HCPCS | Performed by: INTERNAL MEDICINE

## 2021-07-12 PROCEDURE — 85007 BL SMEAR W/DIFF WBC COUNT: CPT | Performed by: INTERNAL MEDICINE

## 2021-07-12 PROCEDURE — 85025 COMPLETE CBC W/AUTO DIFF WBC: CPT | Performed by: INTERNAL MEDICINE

## 2021-07-13 ENCOUNTER — OUTSIDE FACILITY SERVICE (OUTPATIENT)
Dept: PULMONOLOGY | Facility: CLINIC | Age: 71
End: 2021-07-13

## 2021-07-13 ENCOUNTER — APPOINTMENT (OUTPATIENT)
Dept: GENERAL RADIOLOGY | Facility: HOSPITAL | Age: 71
End: 2021-07-13

## 2021-07-13 LAB
ALBUMIN SERPL-MCNC: 2.9 G/DL (ref 3.5–5.2)
ALBUMIN SERPL-MCNC: 3.24 G/DL (ref 3.5–5.2)
ALBUMIN/GLOB SERPL: 0.7 G/DL
ALP SERPL-CCNC: 355 U/L (ref 39–117)
ALP SERPL-CCNC: 415 U/L (ref 39–117)
ALT SERPL W P-5'-P-CCNC: 27 U/L (ref 1–41)
ALT SERPL W P-5'-P-CCNC: 28 U/L (ref 1–41)
ANION GAP SERPL CALCULATED.3IONS-SCNC: 16.9 MMOL/L (ref 5–15)
ANISOCYTOSIS BLD QL: ABNORMAL
APTT PPP: 37.6 SECONDS (ref 25.5–35.4)
AST SERPL-CCNC: 16 U/L (ref 1–40)
AST SERPL-CCNC: 17 U/L (ref 1–40)
BACTERIA SPEC RESP CULT: ABNORMAL
BILIRUB CONJ SERPL-MCNC: 0.2 MG/DL (ref 0–0.3)
BILIRUB INDIRECT SERPL-MCNC: 0.1 MG/DL
BILIRUB SERPL-MCNC: 0.3 MG/DL (ref 0–1.2)
BILIRUB SERPL-MCNC: 0.3 MG/DL (ref 0–1.2)
BUN SERPL-MCNC: 72 MG/DL (ref 8–23)
BUN/CREAT SERPL: 14.8 (ref 7–25)
CALCIUM SPEC-SCNC: 9.2 MG/DL (ref 8.6–10.5)
CHLORIDE SERPL-SCNC: 100 MMOL/L (ref 98–107)
CO2 SERPL-SCNC: 21.1 MMOL/L (ref 22–29)
CREAT SERPL-MCNC: 4.87 MG/DL (ref 0.76–1.27)
CRP SERPL-MCNC: 14.3 MG/DL (ref 0–0.5)
DEPRECATED RDW RBC AUTO: 54.2 FL (ref 37–54)
ERYTHROCYTE [DISTWIDTH] IN BLOOD BY AUTOMATED COUNT: 15.8 % (ref 12.3–15.4)
GFR SERPL CREATININE-BSD FRML MDRD: 12 ML/MIN/1.73
GFR SERPL CREATININE-BSD FRML MDRD: ABNORMAL ML/MIN/{1.73_M2}
GLOBULIN UR ELPH-MCNC: 4.1 GM/DL
GLUCOSE BLDC GLUCOMTR-MCNC: 203 MG/DL (ref 70–130)
GLUCOSE BLDC GLUCOMTR-MCNC: 238 MG/DL (ref 70–130)
GLUCOSE BLDC GLUCOMTR-MCNC: 239 MG/DL (ref 70–130)
GLUCOSE SERPL-MCNC: 188 MG/DL (ref 65–99)
GRAM STN SPEC: ABNORMAL
HCT VFR BLD AUTO: 26 % (ref 37.5–51)
HGB BLD-MCNC: 7.8 G/DL (ref 13–17.7)
HYPOCHROMIA BLD QL: ABNORMAL
LYMPHOCYTES # BLD MANUAL: 0.81 10*3/MM3 (ref 0.7–3.1)
LYMPHOCYTES NFR BLD MANUAL: 10 % (ref 19.6–45.3)
LYMPHOCYTES NFR BLD MANUAL: 7 % (ref 5–12)
MCH RBC QN AUTO: 28.1 PG (ref 26.6–33)
MCHC RBC AUTO-ENTMCNC: 30 G/DL (ref 31.5–35.7)
MCV RBC AUTO: 93.5 FL (ref 79–97)
METAMYELOCYTES NFR BLD MANUAL: 2 % (ref 0–0)
MONOCYTES # BLD AUTO: 0.57 10*3/MM3 (ref 0.1–0.9)
NEUTROPHILS # BLD AUTO: 6.58 10*3/MM3 (ref 1.7–7)
NEUTROPHILS NFR BLD MANUAL: 74 % (ref 42.7–76)
NEUTS BAND NFR BLD MANUAL: 7 % (ref 0–5)
PLAT MORPH BLD: NORMAL
PLATELET # BLD AUTO: 134 10*3/MM3 (ref 140–450)
PMV BLD AUTO: 10.7 FL (ref 6–12)
POTASSIUM SERPL-SCNC: 5.4 MMOL/L (ref 3.5–5.2)
PROT SERPL-MCNC: 7 G/DL (ref 6–8.5)
PROT SERPL-MCNC: 7.1 G/DL (ref 6–8.5)
RBC # BLD AUTO: 2.78 10*6/MM3 (ref 4.14–5.8)
SCAN SLIDE: NORMAL
SODIUM SERPL-SCNC: 138 MMOL/L (ref 136–145)
WBC # BLD AUTO: 8.12 10*3/MM3 (ref 3.4–10.8)

## 2021-07-13 PROCEDURE — 74018 RADEX ABDOMEN 1 VIEW: CPT | Performed by: RADIOLOGY

## 2021-07-13 PROCEDURE — 86140 C-REACTIVE PROTEIN: CPT | Performed by: INTERNAL MEDICINE

## 2021-07-13 PROCEDURE — 82542 COL CHROMOTOGRAPHY QUAL/QUAN: CPT | Performed by: INTERNAL MEDICINE

## 2021-07-13 PROCEDURE — 86022 PLATELET ANTIBODIES: CPT | Performed by: INTERNAL MEDICINE

## 2021-07-13 PROCEDURE — 82962 GLUCOSE BLOOD TEST: CPT

## 2021-07-13 PROCEDURE — 80076 HEPATIC FUNCTION PANEL: CPT | Performed by: INTERNAL MEDICINE

## 2021-07-13 PROCEDURE — 74018 RADEX ABDOMEN 1 VIEW: CPT

## 2021-07-13 PROCEDURE — 85007 BL SMEAR W/DIFF WBC COUNT: CPT | Performed by: INTERNAL MEDICINE

## 2021-07-13 PROCEDURE — 85730 THROMBOPLASTIN TIME PARTIAL: CPT | Performed by: INTERNAL MEDICINE

## 2021-07-13 PROCEDURE — G0509 CRIT CARE TELEHEA CONSULT 50: HCPCS | Performed by: INTERNAL MEDICINE

## 2021-07-13 PROCEDURE — 85025 COMPLETE CBC W/AUTO DIFF WBC: CPT | Performed by: INTERNAL MEDICINE

## 2021-07-13 PROCEDURE — 80053 COMPREHEN METABOLIC PANEL: CPT | Performed by: INTERNAL MEDICINE

## 2021-07-14 ENCOUNTER — APPOINTMENT (OUTPATIENT)
Dept: GENERAL RADIOLOGY | Facility: HOSPITAL | Age: 71
End: 2021-07-14

## 2021-07-14 ENCOUNTER — OUTSIDE FACILITY SERVICE (OUTPATIENT)
Dept: PULMONOLOGY | Facility: CLINIC | Age: 71
End: 2021-07-14

## 2021-07-14 LAB
A-A DO2: 134.8 MMHG (ref 0–300)
ALBUMIN SERPL-MCNC: 3.31 G/DL (ref 3.5–5.2)
ALBUMIN/GLOB SERPL: 0.8 G/DL
ALP SERPL-CCNC: 464 U/L (ref 39–117)
ALT SERPL W P-5'-P-CCNC: 32 U/L (ref 1–41)
ANION GAP SERPL CALCULATED.3IONS-SCNC: 17.3 MMOL/L (ref 5–15)
ANISOCYTOSIS BLD QL: ABNORMAL
APTT PPP: 36.1 SECONDS (ref 25.5–35.4)
ARTERIAL PATENCY WRIST A: ABNORMAL
AST SERPL-CCNC: 26 U/L (ref 1–40)
ATMOSPHERIC PRESS: 733 MMHG
BACTERIA SPEC AEROBE CULT: NORMAL
BACTERIA SPEC AEROBE CULT: NORMAL
BASE EXCESS BLDA CALC-SCNC: -3.8 MMOL/L (ref 0–2)
BASOPHILS # BLD MANUAL: 0.07 10*3/MM3 (ref 0–0.2)
BASOPHILS NFR BLD AUTO: 1 % (ref 0–1.5)
BDY SITE: ABNORMAL
BILIRUB SERPL-MCNC: 0.3 MG/DL (ref 0–1.2)
BODY TEMPERATURE: 0 C
BUN SERPL-MCNC: 79 MG/DL (ref 8–23)
BUN/CREAT SERPL: 14.9 (ref 7–25)
CALCIUM SPEC-SCNC: 9.5 MG/DL (ref 8.6–10.5)
CHLORIDE SERPL-SCNC: 100 MMOL/L (ref 98–107)
CO2 BLDA-SCNC: 24.1 MMOL/L (ref 22–33)
CO2 SERPL-SCNC: 21.7 MMOL/L (ref 22–29)
COHGB MFR BLD: 1 % (ref 0–5)
CREAT SERPL-MCNC: 5.3 MG/DL (ref 0.76–1.27)
CRP SERPL-MCNC: 12.98 MG/DL (ref 0–0.5)
DEPRECATED RDW RBC AUTO: 54.7 FL (ref 37–54)
EOSINOPHIL # BLD MANUAL: 0.43 10*3/MM3 (ref 0–0.4)
EOSINOPHIL NFR BLD MANUAL: 6 % (ref 0.3–6.2)
ERYTHROCYTE [DISTWIDTH] IN BLOOD BY AUTOMATED COUNT: 15.9 % (ref 12.3–15.4)
GFR SERPL CREATININE-BSD FRML MDRD: 11 ML/MIN/1.73
GFR SERPL CREATININE-BSD FRML MDRD: ABNORMAL ML/MIN/{1.73_M2}
GLOBULIN UR ELPH-MCNC: 4.1 GM/DL
GLUCOSE BLDC GLUCOMTR-MCNC: 169 MG/DL (ref 70–130)
GLUCOSE BLDC GLUCOMTR-MCNC: 170 MG/DL (ref 70–130)
GLUCOSE BLDC GLUCOMTR-MCNC: 181 MG/DL (ref 70–130)
GLUCOSE BLDC GLUCOMTR-MCNC: 196 MG/DL (ref 70–130)
GLUCOSE SERPL-MCNC: 149 MG/DL (ref 65–99)
HCO3 BLDA-SCNC: 22.7 MMOL/L (ref 20–26)
HCT VFR BLD AUTO: 26.7 % (ref 37.5–51)
HCT VFR BLD CALC: 25 % (ref 38–51)
HGB BLD-MCNC: 8.1 G/DL (ref 13–17.7)
HGB BLDA-MCNC: 8.1 G/DL (ref 14–18)
HYPOCHROMIA BLD QL: ABNORMAL
INHALED O2 CONCENTRATION: 40 %
LYMPHOCYTES # BLD MANUAL: 2.13 10*3/MM3 (ref 0.7–3.1)
LYMPHOCYTES NFR BLD MANUAL: 30 % (ref 19.6–45.3)
LYMPHOCYTES NFR BLD MANUAL: 4 % (ref 5–12)
Lab: ABNORMAL
MCH RBC QN AUTO: 28.2 PG (ref 26.6–33)
MCHC RBC AUTO-ENTMCNC: 30.3 G/DL (ref 31.5–35.7)
MCV RBC AUTO: 93 FL (ref 79–97)
METAMYELOCYTES NFR BLD MANUAL: 3 % (ref 0–0)
METHGB BLD QL: 0.5 % (ref 0–3)
MODALITY: ABNORMAL
MONOCYTES # BLD AUTO: 0.28 10*3/MM3 (ref 0.1–0.9)
MYELOCYTES NFR BLD MANUAL: 2 % (ref 0–0)
NEUTROPHILS # BLD AUTO: 3.83 10*3/MM3 (ref 1.7–7)
NEUTROPHILS NFR BLD MANUAL: 54 % (ref 42.7–76)
NOTE: ABNORMAL
NOTIFIED WHO: ABNORMAL
OXYHGB MFR BLDV: 94.5 % (ref 94–99)
PCO2 BLDA: 47.5 MM HG (ref 35–45)
PCO2 TEMP ADJ BLD: ABNORMAL MM[HG]
PEEP RESPIRATORY: 5 CM[H2O]
PH BLDA: 7.29 PH UNITS (ref 7.35–7.45)
PH, TEMP CORRECTED: ABNORMAL
PLAT MORPH BLD: NORMAL
PLATELET # BLD AUTO: 134 10*3/MM3 (ref 140–450)
PMV BLD AUTO: 11 FL (ref 6–12)
PO2 BLDA: 87.6 MM HG (ref 83–108)
PO2 TEMP ADJ BLD: ABNORMAL MM[HG]
POTASSIUM SERPL-SCNC: 5.1 MMOL/L (ref 3.5–5.2)
PROT SERPL-MCNC: 7.4 G/DL (ref 6–8.5)
PSV: 8 CMH2O
QT INTERVAL: 458 MS
QTC INTERVAL: 438 MS
RBC # BLD AUTO: 2.87 10*6/MM3 (ref 4.14–5.8)
SAO2 % BLDCOA: 95.9 % (ref 94–99)
SCAN SLIDE: NORMAL
SODIUM SERPL-SCNC: 139 MMOL/L (ref 136–145)
VENTILATOR MODE: ABNORMAL
WBC # BLD AUTO: 7.09 10*3/MM3 (ref 3.4–10.8)

## 2021-07-14 PROCEDURE — 80053 COMPREHEN METABOLIC PANEL: CPT | Performed by: INTERNAL MEDICINE

## 2021-07-14 PROCEDURE — G0509 CRIT CARE TELEHEA CONSULT 50: HCPCS | Performed by: INTERNAL MEDICINE

## 2021-07-14 PROCEDURE — 85025 COMPLETE CBC W/AUTO DIFF WBC: CPT | Performed by: INTERNAL MEDICINE

## 2021-07-14 PROCEDURE — 93010 ELECTROCARDIOGRAM REPORT: CPT | Performed by: INTERNAL MEDICINE

## 2021-07-14 PROCEDURE — 71045 X-RAY EXAM CHEST 1 VIEW: CPT | Performed by: RADIOLOGY

## 2021-07-14 PROCEDURE — 82805 BLOOD GASES W/O2 SATURATION: CPT

## 2021-07-14 PROCEDURE — 82375 ASSAY CARBOXYHB QUANT: CPT

## 2021-07-14 PROCEDURE — 86140 C-REACTIVE PROTEIN: CPT | Performed by: INTERNAL MEDICINE

## 2021-07-14 PROCEDURE — 85730 THROMBOPLASTIN TIME PARTIAL: CPT | Performed by: INTERNAL MEDICINE

## 2021-07-14 PROCEDURE — 71045 X-RAY EXAM CHEST 1 VIEW: CPT

## 2021-07-14 PROCEDURE — 82962 GLUCOSE BLOOD TEST: CPT

## 2021-07-14 PROCEDURE — 74018 RADEX ABDOMEN 1 VIEW: CPT

## 2021-07-14 PROCEDURE — 93005 ELECTROCARDIOGRAM TRACING: CPT | Performed by: INTERNAL MEDICINE

## 2021-07-14 PROCEDURE — 36600 WITHDRAWAL OF ARTERIAL BLOOD: CPT

## 2021-07-14 PROCEDURE — 83050 HGB METHEMOGLOBIN QUAN: CPT

## 2021-07-14 PROCEDURE — 85007 BL SMEAR W/DIFF WBC COUNT: CPT | Performed by: INTERNAL MEDICINE

## 2021-07-15 ENCOUNTER — OUTSIDE FACILITY SERVICE (OUTPATIENT)
Dept: PULMONOLOGY | Facility: CLINIC | Age: 71
End: 2021-07-15

## 2021-07-15 ENCOUNTER — APPOINTMENT (OUTPATIENT)
Dept: ULTRASOUND IMAGING | Facility: HOSPITAL | Age: 71
End: 2021-07-15

## 2021-07-15 LAB
ALBUMIN SERPL-MCNC: 3.33 G/DL (ref 3.5–5.2)
ALBUMIN/GLOB SERPL: 0.8 G/DL
ALP SERPL-CCNC: 416 U/L (ref 39–117)
ALT SERPL W P-5'-P-CCNC: 42 U/L (ref 1–41)
ANION GAP SERPL CALCULATED.3IONS-SCNC: 16.2 MMOL/L (ref 5–15)
APTT PPP: 39.7 SECONDS (ref 25.5–35.4)
AST SERPL-CCNC: 35 U/L (ref 1–40)
BILIRUB SERPL-MCNC: 0.3 MG/DL (ref 0–1.2)
BUN SERPL-MCNC: 82 MG/DL (ref 8–23)
BUN/CREAT SERPL: 15.7 (ref 7–25)
CALCIUM SPEC-SCNC: 9.5 MG/DL (ref 8.6–10.5)
CHLORIDE SERPL-SCNC: 101 MMOL/L (ref 98–107)
CO2 SERPL-SCNC: 21.8 MMOL/L (ref 22–29)
CREAT SERPL-MCNC: 5.21 MG/DL (ref 0.76–1.27)
CRP SERPL-MCNC: 9.13 MG/DL (ref 0–0.5)
DEPRECATED RDW RBC AUTO: 53.3 FL (ref 37–54)
EOSINOPHIL # BLD MANUAL: 0.21 10*3/MM3 (ref 0–0.4)
EOSINOPHIL NFR BLD MANUAL: 3 % (ref 0.3–6.2)
ERYTHROCYTE [DISTWIDTH] IN BLOOD BY AUTOMATED COUNT: 15.7 % (ref 12.3–15.4)
GENTAMICIN SERPL-MCNC: 2.5 MCG/ML (ref 0.5–10)
GFR SERPL CREATININE-BSD FRML MDRD: 11 ML/MIN/1.73
GFR SERPL CREATININE-BSD FRML MDRD: ABNORMAL ML/MIN/{1.73_M2}
GLOBULIN UR ELPH-MCNC: 4 GM/DL
GLUCOSE BLDC GLUCOMTR-MCNC: 163 MG/DL (ref 70–130)
GLUCOSE BLDC GLUCOMTR-MCNC: 171 MG/DL (ref 70–130)
GLUCOSE BLDC GLUCOMTR-MCNC: 186 MG/DL (ref 70–130)
GLUCOSE BLDC GLUCOMTR-MCNC: 200 MG/DL (ref 70–130)
GLUCOSE BLDC GLUCOMTR-MCNC: 214 MG/DL (ref 70–130)
GLUCOSE SERPL-MCNC: 172 MG/DL (ref 65–99)
HCT VFR BLD AUTO: 27.4 % (ref 37.5–51)
HGB BLD-MCNC: 8.3 G/DL (ref 13–17.7)
HYPOCHROMIA BLD QL: ABNORMAL
LYMPHOCYTES # BLD MANUAL: 1.18 10*3/MM3 (ref 0.7–3.1)
LYMPHOCYTES NFR BLD MANUAL: 10 % (ref 5–12)
LYMPHOCYTES NFR BLD MANUAL: 17 % (ref 19.6–45.3)
MCH RBC QN AUTO: 28.2 PG (ref 26.6–33)
MCHC RBC AUTO-ENTMCNC: 30.3 G/DL (ref 31.5–35.7)
MCV RBC AUTO: 93.2 FL (ref 79–97)
MONOCYTES # BLD AUTO: 0.7 10*3/MM3 (ref 0.1–0.9)
NEUTROPHILS # BLD AUTO: 4.87 10*3/MM3 (ref 1.7–7)
NEUTROPHILS NFR BLD MANUAL: 70 % (ref 42.7–76)
PF4 HEPARIN CMPLX IGG SERPL IA: 0.18 OD (ref 0–0.4)
PLAT MORPH BLD: NORMAL
PLATELET # BLD AUTO: 140 10*3/MM3 (ref 140–450)
PMV BLD AUTO: 10.9 FL (ref 6–12)
POTASSIUM SERPL-SCNC: 5.3 MMOL/L (ref 3.5–5.2)
PROT SERPL-MCNC: 7.3 G/DL (ref 6–8.5)
RBC # BLD AUTO: 2.94 10*6/MM3 (ref 4.14–5.8)
SODIUM SERPL-SCNC: 139 MMOL/L (ref 136–145)
WBC # BLD AUTO: 6.95 10*3/MM3 (ref 3.4–10.8)

## 2021-07-15 PROCEDURE — 85007 BL SMEAR W/DIFF WBC COUNT: CPT | Performed by: INTERNAL MEDICINE

## 2021-07-15 PROCEDURE — 85730 THROMBOPLASTIN TIME PARTIAL: CPT | Performed by: INTERNAL MEDICINE

## 2021-07-15 PROCEDURE — 80053 COMPREHEN METABOLIC PANEL: CPT | Performed by: INTERNAL MEDICINE

## 2021-07-15 PROCEDURE — 76705 ECHO EXAM OF ABDOMEN: CPT

## 2021-07-15 PROCEDURE — 86140 C-REACTIVE PROTEIN: CPT | Performed by: INTERNAL MEDICINE

## 2021-07-15 PROCEDURE — 82962 GLUCOSE BLOOD TEST: CPT

## 2021-07-15 PROCEDURE — 80170 ASSAY OF GENTAMICIN: CPT | Performed by: INTERNAL MEDICINE

## 2021-07-15 PROCEDURE — 85025 COMPLETE CBC W/AUTO DIFF WBC: CPT | Performed by: INTERNAL MEDICINE

## 2021-07-15 PROCEDURE — 76705 ECHO EXAM OF ABDOMEN: CPT | Performed by: RADIOLOGY

## 2021-07-15 PROCEDURE — G0509 CRIT CARE TELEHEA CONSULT 50: HCPCS | Performed by: INTERNAL MEDICINE

## 2021-07-16 ENCOUNTER — OUTSIDE FACILITY SERVICE (OUTPATIENT)
Dept: PULMONOLOGY | Facility: CLINIC | Age: 71
End: 2021-07-16

## 2021-07-16 LAB
A-A DO2: 128.7 MMHG (ref 0–300)
ALBUMIN SERPL-MCNC: 3.25 G/DL (ref 3.5–5.2)
ALBUMIN/GLOB SERPL: 0.8 G/DL
ALP SERPL-CCNC: 430 U/L (ref 39–117)
ALT SERPL W P-5'-P-CCNC: 41 U/L (ref 1–41)
ANION GAP SERPL CALCULATED.3IONS-SCNC: 16.4 MMOL/L (ref 5–15)
APTT PPP: 37.9 SECONDS (ref 25.5–35.4)
ARTERIAL PATENCY WRIST A: POSITIVE
AST SERPL-CCNC: 32 U/L (ref 1–40)
ATMOSPHERIC PRESS: 731 MMHG
BASE EXCESS BLDA CALC-SCNC: -3.3 MMOL/L (ref 0–2)
BDY SITE: ABNORMAL
BILIRUB CONJ SERPL-MCNC: <0.2 MG/DL (ref 0–0.3)
BILIRUB SERPL-MCNC: 0.2 MG/DL (ref 0–1.2)
BODY TEMPERATURE: 0 C
BUN SERPL-MCNC: 86 MG/DL (ref 8–23)
BUN/CREAT SERPL: 16.5 (ref 7–25)
CALCIUM SPEC-SCNC: 9.6 MG/DL (ref 8.6–10.5)
CHLORIDE SERPL-SCNC: 104 MMOL/L (ref 98–107)
CO2 BLDA-SCNC: 24.2 MMOL/L (ref 22–33)
CO2 SERPL-SCNC: 20.6 MMOL/L (ref 22–29)
COHGB MFR BLD: 0.9 % (ref 0–5)
CREAT SERPL-MCNC: 5.21 MG/DL (ref 0.76–1.27)
CRP SERPL-MCNC: 7.14 MG/DL (ref 0–0.5)
DEPRECATED RDW RBC AUTO: 53.4 FL (ref 37–54)
EOSINOPHIL # BLD MANUAL: 0.33 10*3/MM3 (ref 0–0.4)
EOSINOPHIL NFR BLD MANUAL: 5 % (ref 0.3–6.2)
ERYTHROCYTE [DISTWIDTH] IN BLOOD BY AUTOMATED COUNT: 15.8 % (ref 12.3–15.4)
GFR SERPL CREATININE-BSD FRML MDRD: 11 ML/MIN/1.73
GFR SERPL CREATININE-BSD FRML MDRD: ABNORMAL ML/MIN/{1.73_M2}
GLOBULIN UR ELPH-MCNC: 4.2 GM/DL
GLUCOSE BLDC GLUCOMTR-MCNC: 126 MG/DL (ref 70–130)
GLUCOSE BLDC GLUCOMTR-MCNC: 209 MG/DL (ref 70–130)
GLUCOSE BLDC GLUCOMTR-MCNC: 220 MG/DL (ref 70–130)
GLUCOSE BLDC GLUCOMTR-MCNC: 228 MG/DL (ref 70–130)
GLUCOSE SERPL-MCNC: 185 MG/DL (ref 65–99)
HCO3 BLDA-SCNC: 22.9 MMOL/L (ref 20–26)
HCT VFR BLD AUTO: 26.7 % (ref 37.5–51)
HCT VFR BLD CALC: 26.1 % (ref 38–51)
HGB BLD-MCNC: 8.3 G/DL (ref 13–17.7)
HGB BLDA-MCNC: 8.5 G/DL (ref 14–18)
INHALED O2 CONCENTRATION: 40 %
LYMPHOCYTES # BLD MANUAL: 1.43 10*3/MM3 (ref 0.7–3.1)
LYMPHOCYTES NFR BLD MANUAL: 22 % (ref 19.6–45.3)
LYMPHOCYTES NFR BLD MANUAL: 7 % (ref 5–12)
Lab: ABNORMAL
MCH RBC QN AUTO: 28.8 PG (ref 26.6–33)
MCHC RBC AUTO-ENTMCNC: 31.1 G/DL (ref 31.5–35.7)
MCV RBC AUTO: 92.7 FL (ref 79–97)
METHGB BLD QL: 0.2 % (ref 0–3)
MODALITY: ABNORMAL
MONOCYTES # BLD AUTO: 0.46 10*3/MM3 (ref 0.1–0.9)
NEUTROPHILS # BLD AUTO: 4.29 10*3/MM3 (ref 1.7–7)
NEUTROPHILS NFR BLD MANUAL: 65 % (ref 42.7–76)
NEUTS BAND NFR BLD MANUAL: 1 % (ref 0–5)
NOTE: ABNORMAL
OXYHGB MFR BLDV: 96 % (ref 94–99)
PCO2 BLDA: 45.4 MM HG (ref 35–45)
PCO2 TEMP ADJ BLD: ABNORMAL MM[HG]
PEEP RESPIRATORY: 5 CM[H2O]
PH BLDA: 7.31 PH UNITS (ref 7.35–7.45)
PH, TEMP CORRECTED: ABNORMAL
PLAT MORPH BLD: NORMAL
PLATELET # BLD AUTO: 163 10*3/MM3 (ref 140–450)
PMV BLD AUTO: 10.9 FL (ref 6–12)
PO2 BLDA: 94.9 MM HG (ref 83–108)
PO2 TEMP ADJ BLD: ABNORMAL MM[HG]
POTASSIUM SERPL-SCNC: 5.7 MMOL/L (ref 3.5–5.2)
PROT SERPL-MCNC: 7.4 G/DL (ref 6–8.5)
PSV: 8 CMH2O
RBC # BLD AUTO: 2.88 10*6/MM3 (ref 4.14–5.8)
RBC MORPH BLD: NORMAL
SAO2 % BLDCOA: 97.1 % (ref 94–99)
SODIUM SERPL-SCNC: 141 MMOL/L (ref 136–145)
SRA .2 IU/ML UFH SER-ACNC: <1 % (ref 0–20)
SRA 100IU/ML UFH SER-ACNC: <1 % (ref 0–20)
SRA UFH SER-IMP: NORMAL
VENTILATOR MODE: ABNORMAL
WBC # BLD AUTO: 6.5 10*3/MM3 (ref 3.4–10.8)

## 2021-07-16 PROCEDURE — G0509 CRIT CARE TELEHEA CONSULT 50: HCPCS | Performed by: INTERNAL MEDICINE

## 2021-07-16 PROCEDURE — 85730 THROMBOPLASTIN TIME PARTIAL: CPT | Performed by: INTERNAL MEDICINE

## 2021-07-16 PROCEDURE — 85007 BL SMEAR W/DIFF WBC COUNT: CPT | Performed by: INTERNAL MEDICINE

## 2021-07-16 PROCEDURE — 82248 BILIRUBIN DIRECT: CPT | Performed by: INTERNAL MEDICINE

## 2021-07-16 PROCEDURE — 82962 GLUCOSE BLOOD TEST: CPT

## 2021-07-16 PROCEDURE — 86140 C-REACTIVE PROTEIN: CPT | Performed by: INTERNAL MEDICINE

## 2021-07-16 PROCEDURE — 82375 ASSAY CARBOXYHB QUANT: CPT

## 2021-07-16 PROCEDURE — 80053 COMPREHEN METABOLIC PANEL: CPT | Performed by: INTERNAL MEDICINE

## 2021-07-16 PROCEDURE — 85025 COMPLETE CBC W/AUTO DIFF WBC: CPT | Performed by: INTERNAL MEDICINE

## 2021-07-16 PROCEDURE — 83050 HGB METHEMOGLOBIN QUAN: CPT

## 2021-07-16 PROCEDURE — 82805 BLOOD GASES W/O2 SATURATION: CPT

## 2021-07-16 PROCEDURE — 36600 WITHDRAWAL OF ARTERIAL BLOOD: CPT

## 2021-07-17 ENCOUNTER — OUTSIDE FACILITY SERVICE (OUTPATIENT)
Dept: PULMONOLOGY | Facility: CLINIC | Age: 71
End: 2021-07-17

## 2021-07-17 LAB
ALBUMIN SERPL-MCNC: 3.56 G/DL (ref 3.5–5.2)
ALBUMIN/GLOB SERPL: 1 G/DL
ALP SERPL-CCNC: 438 U/L (ref 39–117)
ALT SERPL W P-5'-P-CCNC: 36 U/L (ref 1–41)
ANION GAP SERPL CALCULATED.3IONS-SCNC: 13.8 MMOL/L (ref 5–15)
ANISOCYTOSIS BLD QL: ABNORMAL
APTT PPP: 33.8 SECONDS (ref 25.5–35.4)
AST SERPL-CCNC: 24 U/L (ref 1–40)
ATMOSPHERIC PRESS: 731 MMHG
BASE EXCESS BLDV CALC-SCNC: 2.9 MMOL/L (ref 0–2)
BDY SITE: ABNORMAL
BILIRUB SERPL-MCNC: 0.3 MG/DL (ref 0–1.2)
BODY TEMPERATURE: 37 C
BUN SERPL-MCNC: 51 MG/DL (ref 8–23)
BUN/CREAT SERPL: 15.7 (ref 7–25)
CALCIUM SPEC-SCNC: 9.3 MG/DL (ref 8.6–10.5)
CHLORIDE SERPL-SCNC: 100 MMOL/L (ref 98–107)
CO2 BLDA-SCNC: 31.1 MMOL/L (ref 22–33)
CO2 SERPL-SCNC: 25.2 MMOL/L (ref 22–29)
COHGB MFR BLD: 1.1 % (ref 0–5)
CREAT SERPL-MCNC: 3.25 MG/DL (ref 0.76–1.27)
CRP SERPL-MCNC: 4.5 MG/DL (ref 0–0.5)
DEPRECATED RDW RBC AUTO: 52.2 FL (ref 37–54)
EOSINOPHIL # BLD MANUAL: 0.53 10*3/MM3 (ref 0–0.4)
EOSINOPHIL NFR BLD MANUAL: 7 % (ref 0.3–6.2)
ERYTHROCYTE [DISTWIDTH] IN BLOOD BY AUTOMATED COUNT: 15.6 % (ref 12.3–15.4)
GENTAMICIN SERPL-MCNC: 0.7 MCG/ML (ref 0.5–2)
GFR SERPL CREATININE-BSD FRML MDRD: 19 ML/MIN/1.73
GLOBULIN UR ELPH-MCNC: 3.7 GM/DL
GLUCOSE BLDC GLUCOMTR-MCNC: 148 MG/DL (ref 70–130)
GLUCOSE BLDC GLUCOMTR-MCNC: 161 MG/DL (ref 70–130)
GLUCOSE BLDC GLUCOMTR-MCNC: 183 MG/DL (ref 70–130)
GLUCOSE BLDC GLUCOMTR-MCNC: 187 MG/DL (ref 70–130)
GLUCOSE SERPL-MCNC: 168 MG/DL (ref 65–99)
HCO3 BLDV-SCNC: 29.4 MMOL/L (ref 22–28)
HCT VFR BLD AUTO: 27.1 % (ref 37.5–51)
HGB BLD-MCNC: 8.4 G/DL (ref 13–17.7)
HGB BLDA-MCNC: 8.3 G/DL (ref 14–18)
INHALED O2 CONCENTRATION: 40 %
LYMPHOCYTES # BLD MANUAL: 1.74 10*3/MM3 (ref 0.7–3.1)
LYMPHOCYTES NFR BLD MANUAL: 11 % (ref 5–12)
LYMPHOCYTES NFR BLD MANUAL: 23 % (ref 19.6–45.3)
Lab: ABNORMAL
MCH RBC QN AUTO: 28.6 PG (ref 26.6–33)
MCHC RBC AUTO-ENTMCNC: 31 G/DL (ref 31.5–35.7)
MCV RBC AUTO: 92.2 FL (ref 79–97)
METHGB BLD QL: 0.3 % (ref 0–3)
MODALITY: ABNORMAL
MONOCYTES # BLD AUTO: 0.83 10*3/MM3 (ref 0.1–0.9)
NEUTROPHILS # BLD AUTO: 4.47 10*3/MM3 (ref 1.7–7)
NEUTROPHILS NFR BLD MANUAL: 55 % (ref 42.7–76)
NEUTS BAND NFR BLD MANUAL: 4 % (ref 0–5)
OXYHGB MFR BLDV: 59.9 % (ref 45–75)
PCO2 BLDV: 55.5 MM HG (ref 41–51)
PEEP RESPIRATORY: 5 CM[H2O]
PH BLDV: 7.33 PH UNITS (ref 7.32–7.42)
PLAT MORPH BLD: NORMAL
PLATELET # BLD AUTO: 132 10*3/MM3 (ref 140–450)
PMV BLD AUTO: 10.4 FL (ref 6–12)
PO2 BLDV: 35.2 MM HG (ref 27–53)
POTASSIUM SERPL-SCNC: 4.7 MMOL/L (ref 3.5–5.2)
PROT SERPL-MCNC: 7.3 G/DL (ref 6–8.5)
PSV: 8 CMH2O
RBC # BLD AUTO: 2.94 10*6/MM3 (ref 4.14–5.8)
SAO2 % BLDCOV: 60.8 % (ref 45–75)
SODIUM SERPL-SCNC: 139 MMOL/L (ref 136–145)
VENTILATOR MODE: ABNORMAL
WBC # BLD AUTO: 7.57 10*3/MM3 (ref 3.4–10.8)

## 2021-07-17 PROCEDURE — 85730 THROMBOPLASTIN TIME PARTIAL: CPT | Performed by: INTERNAL MEDICINE

## 2021-07-17 PROCEDURE — G0509 CRIT CARE TELEHEA CONSULT 50: HCPCS | Performed by: INTERNAL MEDICINE

## 2021-07-17 PROCEDURE — 93010 ELECTROCARDIOGRAM REPORT: CPT | Performed by: SPECIALIST

## 2021-07-17 PROCEDURE — 85025 COMPLETE CBC W/AUTO DIFF WBC: CPT | Performed by: INTERNAL MEDICINE

## 2021-07-17 PROCEDURE — 85007 BL SMEAR W/DIFF WBC COUNT: CPT | Performed by: INTERNAL MEDICINE

## 2021-07-17 PROCEDURE — 80053 COMPREHEN METABOLIC PANEL: CPT | Performed by: INTERNAL MEDICINE

## 2021-07-17 PROCEDURE — 82805 BLOOD GASES W/O2 SATURATION: CPT

## 2021-07-17 PROCEDURE — 82962 GLUCOSE BLOOD TEST: CPT

## 2021-07-17 PROCEDURE — 93005 ELECTROCARDIOGRAM TRACING: CPT | Performed by: INTERNAL MEDICINE

## 2021-07-17 PROCEDURE — 86140 C-REACTIVE PROTEIN: CPT | Performed by: INTERNAL MEDICINE

## 2021-07-17 PROCEDURE — 82820 HEMOGLOBIN-OXYGEN AFFINITY: CPT

## 2021-07-17 PROCEDURE — 80170 ASSAY OF GENTAMICIN: CPT | Performed by: INTERNAL MEDICINE

## 2021-07-18 LAB
ALBUMIN SERPL-MCNC: 3.2 G/DL (ref 3.5–5.2)
ALBUMIN/GLOB SERPL: 0.8 G/DL
ALP SERPL-CCNC: 410 U/L (ref 39–117)
ALT SERPL W P-5'-P-CCNC: 30 U/L (ref 1–41)
ANION GAP SERPL CALCULATED.3IONS-SCNC: 13.7 MMOL/L (ref 5–15)
ANISOCYTOSIS BLD QL: ABNORMAL
APTT PPP: 33.8 SECONDS (ref 25.5–35.4)
AST SERPL-CCNC: 23 U/L (ref 1–40)
BILIRUB SERPL-MCNC: 0.2 MG/DL (ref 0–1.2)
BUN SERPL-MCNC: 67 MG/DL (ref 8–23)
BUN/CREAT SERPL: 18.4 (ref 7–25)
CALCIUM SPEC-SCNC: 9.3 MG/DL (ref 8.6–10.5)
CHLORIDE SERPL-SCNC: 100 MMOL/L (ref 98–107)
CO2 SERPL-SCNC: 23.3 MMOL/L (ref 22–29)
CREAT SERPL-MCNC: 3.64 MG/DL (ref 0.76–1.27)
CRP SERPL-MCNC: 3.31 MG/DL (ref 0–0.5)
DEPRECATED RDW RBC AUTO: 51.8 FL (ref 37–54)
EOSINOPHIL # BLD MANUAL: 0.6 10*3/MM3 (ref 0–0.4)
EOSINOPHIL NFR BLD MANUAL: 8 % (ref 0.3–6.2)
ERYTHROCYTE [DISTWIDTH] IN BLOOD BY AUTOMATED COUNT: 15.6 % (ref 12.3–15.4)
GFR SERPL CREATININE-BSD FRML MDRD: 17 ML/MIN/1.73
GLOBULIN UR ELPH-MCNC: 3.9 GM/DL
GLUCOSE BLDC GLUCOMTR-MCNC: 160 MG/DL (ref 70–130)
GLUCOSE BLDC GLUCOMTR-MCNC: 173 MG/DL (ref 70–130)
GLUCOSE BLDC GLUCOMTR-MCNC: 201 MG/DL (ref 70–130)
GLUCOSE SERPL-MCNC: 214 MG/DL (ref 65–99)
HCT VFR BLD AUTO: 30.3 % (ref 37.5–51)
HGB BLD-MCNC: 9.5 G/DL (ref 13–17.7)
HYPOCHROMIA BLD QL: ABNORMAL
LYMPHOCYTES # BLD MANUAL: 2.11 10*3/MM3 (ref 0.7–3.1)
LYMPHOCYTES NFR BLD MANUAL: 11 % (ref 5–12)
LYMPHOCYTES NFR BLD MANUAL: 28 % (ref 19.6–45.3)
MCH RBC QN AUTO: 28.8 PG (ref 26.6–33)
MCHC RBC AUTO-ENTMCNC: 31.4 G/DL (ref 31.5–35.7)
MCV RBC AUTO: 91.8 FL (ref 79–97)
MONOCYTES # BLD AUTO: 0.83 10*3/MM3 (ref 0.1–0.9)
MYELOCYTES NFR BLD MANUAL: 2 % (ref 0–0)
NEUTROPHILS # BLD AUTO: 3.84 10*3/MM3 (ref 1.7–7)
NEUTROPHILS NFR BLD MANUAL: 42 % (ref 42.7–76)
NEUTS BAND NFR BLD MANUAL: 9 % (ref 0–5)
PLAT MORPH BLD: NORMAL
PLATELET # BLD AUTO: 150 10*3/MM3 (ref 140–450)
PMV BLD AUTO: 10.1 FL (ref 6–12)
POTASSIUM SERPL-SCNC: 5 MMOL/L (ref 3.5–5.2)
PROT SERPL-MCNC: 7.1 G/DL (ref 6–8.5)
QT INTERVAL: 478 MS
QT INTERVAL: 486 MS
QTC INTERVAL: 408 MS
QTC INTERVAL: 410 MS
RBC # BLD AUTO: 3.3 10*6/MM3 (ref 4.14–5.8)
SODIUM SERPL-SCNC: 137 MMOL/L (ref 136–145)
TSH SERPL DL<=0.05 MIU/L-ACNC: 1.06 UIU/ML (ref 0.27–4.2)
WBC # BLD AUTO: 7.52 10*3/MM3 (ref 3.4–10.8)

## 2021-07-18 PROCEDURE — 80053 COMPREHEN METABOLIC PANEL: CPT | Performed by: INTERNAL MEDICINE

## 2021-07-18 PROCEDURE — 80177 DRUG SCRN QUAN LEVETIRACETAM: CPT | Performed by: INTERNAL MEDICINE

## 2021-07-18 PROCEDURE — 86140 C-REACTIVE PROTEIN: CPT | Performed by: INTERNAL MEDICINE

## 2021-07-18 PROCEDURE — 84443 ASSAY THYROID STIM HORMONE: CPT | Performed by: INTERNAL MEDICINE

## 2021-07-18 PROCEDURE — 85730 THROMBOPLASTIN TIME PARTIAL: CPT | Performed by: INTERNAL MEDICINE

## 2021-07-18 PROCEDURE — 82962 GLUCOSE BLOOD TEST: CPT

## 2021-07-18 PROCEDURE — 85025 COMPLETE CBC W/AUTO DIFF WBC: CPT | Performed by: INTERNAL MEDICINE

## 2021-07-18 PROCEDURE — 85007 BL SMEAR W/DIFF WBC COUNT: CPT | Performed by: INTERNAL MEDICINE

## 2021-07-19 LAB
A-A DO2: 126.7 MMHG (ref 0–300)
ALBUMIN SERPL-MCNC: 3.08 G/DL (ref 3.5–5.2)
ALBUMIN/GLOB SERPL: 0.8 G/DL
ALP SERPL-CCNC: 387 U/L (ref 39–117)
ALT SERPL W P-5'-P-CCNC: 33 U/L (ref 1–41)
ANION GAP SERPL CALCULATED.3IONS-SCNC: 19.6 MMOL/L (ref 5–15)
ANISOCYTOSIS BLD QL: ABNORMAL
APTT PPP: 34.7 SECONDS (ref 25.5–35.4)
ARTERIAL PATENCY WRIST A: POSITIVE
AST SERPL-CCNC: 27 U/L (ref 1–40)
ATMOSPHERIC PRESS: 729 MMHG
BASE EXCESS BLDA CALC-SCNC: -1.7 MMOL/L (ref 0–2)
BDY SITE: ABNORMAL
BILIRUB SERPL-MCNC: 0.3 MG/DL (ref 0–1.2)
BODY TEMPERATURE: 0 C
BUN SERPL-MCNC: 78 MG/DL (ref 8–23)
BUN/CREAT SERPL: 19.7 (ref 7–25)
CALCIUM SPEC-SCNC: 9.2 MG/DL (ref 8.6–10.5)
CHLORIDE SERPL-SCNC: 99 MMOL/L (ref 98–107)
CO2 BLDA-SCNC: 24.3 MMOL/L (ref 22–33)
CO2 SERPL-SCNC: 17.4 MMOL/L (ref 22–29)
COHGB MFR BLD: 0.8 % (ref 0–5)
CREAT SERPL-MCNC: 3.95 MG/DL (ref 0.76–1.27)
CRP SERPL-MCNC: 2.54 MG/DL (ref 0–0.5)
DEPRECATED RDW RBC AUTO: 50.6 FL (ref 37–54)
EOSINOPHIL # BLD MANUAL: 0.5 10*3/MM3 (ref 0–0.4)
EOSINOPHIL NFR BLD MANUAL: 5 % (ref 0.3–6.2)
ERYTHROCYTE [DISTWIDTH] IN BLOOD BY AUTOMATED COUNT: 15.2 % (ref 12.3–15.4)
GFR SERPL CREATININE-BSD FRML MDRD: 15 ML/MIN/1.73
GLOBULIN UR ELPH-MCNC: 4 GM/DL
GLUCOSE BLDC GLUCOMTR-MCNC: 149 MG/DL (ref 70–130)
GLUCOSE BLDC GLUCOMTR-MCNC: 164 MG/DL (ref 70–130)
GLUCOSE BLDC GLUCOMTR-MCNC: 207 MG/DL (ref 70–130)
GLUCOSE BLDC GLUCOMTR-MCNC: 230 MG/DL (ref 70–130)
GLUCOSE BLDC GLUCOMTR-MCNC: 242 MG/DL (ref 70–130)
GLUCOSE SERPL-MCNC: 215 MG/DL (ref 65–99)
HCO3 BLDA-SCNC: 23.1 MMOL/L (ref 20–26)
HCT VFR BLD AUTO: 29.7 % (ref 37.5–51)
HCT VFR BLD CALC: 28.4 % (ref 38–51)
HGB BLD-MCNC: 9.4 G/DL (ref 13–17.7)
HGB BLDA-MCNC: 9.3 G/DL (ref 14–18)
HYPOCHROMIA BLD QL: ABNORMAL
INHALED O2 CONCENTRATION: 40 %
LYMPHOCYTES # BLD MANUAL: 1.99 10*3/MM3 (ref 0.7–3.1)
LYMPHOCYTES NFR BLD MANUAL: 20 % (ref 19.6–45.3)
LYMPHOCYTES NFR BLD MANUAL: 7 % (ref 5–12)
Lab: ABNORMAL
MCH RBC QN AUTO: 29 PG (ref 26.6–33)
MCHC RBC AUTO-ENTMCNC: 31.6 G/DL (ref 31.5–35.7)
MCV RBC AUTO: 91.7 FL (ref 79–97)
METAMYELOCYTES NFR BLD MANUAL: 4 % (ref 0–0)
METHGB BLD QL: 0.1 % (ref 0–3)
MODALITY: ABNORMAL
MONOCYTES # BLD AUTO: 0.7 10*3/MM3 (ref 0.1–0.9)
MYELOCYTES NFR BLD MANUAL: 2 % (ref 0–0)
NEUTROPHILS # BLD AUTO: 6.16 10*3/MM3 (ref 1.7–7)
NEUTROPHILS NFR BLD MANUAL: 62 % (ref 42.7–76)
NOTE: ABNORMAL
NOTIFIED WHO: ABNORMAL
OXYHGB MFR BLDV: 97.6 % (ref 94–99)
PCO2 BLDA: 38.7 MM HG (ref 35–45)
PCO2 TEMP ADJ BLD: ABNORMAL MM[HG]
PEEP RESPIRATORY: 5 CM[H2O]
PH BLDA: 7.38 PH UNITS (ref 7.35–7.45)
PH, TEMP CORRECTED: ABNORMAL
PLAT MORPH BLD: NORMAL
PLATELET # BLD AUTO: 180 10*3/MM3 (ref 140–450)
PMV BLD AUTO: 10.5 FL (ref 6–12)
PO2 BLDA: 104 MM HG (ref 83–108)
PO2 TEMP ADJ BLD: ABNORMAL MM[HG]
POTASSIUM SERPL-SCNC: 5.4 MMOL/L (ref 3.5–5.2)
PROT SERPL-MCNC: 7.1 G/DL (ref 6–8.5)
RBC # BLD AUTO: 3.24 10*6/MM3 (ref 4.14–5.8)
SAO2 % BLDCOA: 98.5 % (ref 94–99)
SCAN SLIDE: NORMAL
SODIUM SERPL-SCNC: 136 MMOL/L (ref 136–145)
VENTILATOR MODE: ABNORMAL
VT ON VENT VENT: 450 ML
WBC # BLD AUTO: 9.94 10*3/MM3 (ref 3.4–10.8)

## 2021-07-19 PROCEDURE — 82375 ASSAY CARBOXYHB QUANT: CPT

## 2021-07-19 PROCEDURE — 85730 THROMBOPLASTIN TIME PARTIAL: CPT | Performed by: INTERNAL MEDICINE

## 2021-07-19 PROCEDURE — 80053 COMPREHEN METABOLIC PANEL: CPT | Performed by: INTERNAL MEDICINE

## 2021-07-19 PROCEDURE — 82805 BLOOD GASES W/O2 SATURATION: CPT

## 2021-07-19 PROCEDURE — 36600 WITHDRAWAL OF ARTERIAL BLOOD: CPT

## 2021-07-19 PROCEDURE — 83050 HGB METHEMOGLOBIN QUAN: CPT

## 2021-07-19 PROCEDURE — 85007 BL SMEAR W/DIFF WBC COUNT: CPT | Performed by: INTERNAL MEDICINE

## 2021-07-19 PROCEDURE — 85025 COMPLETE CBC W/AUTO DIFF WBC: CPT | Performed by: INTERNAL MEDICINE

## 2021-07-19 PROCEDURE — 86140 C-REACTIVE PROTEIN: CPT | Performed by: INTERNAL MEDICINE

## 2021-07-19 PROCEDURE — 82962 GLUCOSE BLOOD TEST: CPT

## 2021-07-20 ENCOUNTER — APPOINTMENT (OUTPATIENT)
Dept: GENERAL RADIOLOGY | Facility: HOSPITAL | Age: 71
End: 2021-07-20

## 2021-07-20 LAB
A-A DO2: 147.3 MMHG (ref 0–300)
ALBUMIN SERPL-MCNC: 3.14 G/DL (ref 3.5–5.2)
ALBUMIN/GLOB SERPL: 0.8 G/DL
ALP SERPL-CCNC: 410 U/L (ref 39–117)
ALT SERPL W P-5'-P-CCNC: 52 U/L (ref 1–41)
ANION GAP SERPL CALCULATED.3IONS-SCNC: 13.1 MMOL/L (ref 5–15)
ANISOCYTOSIS BLD QL: ABNORMAL
APTT PPP: 34.8 SECONDS (ref 25.5–35.4)
ARTERIAL PATENCY WRIST A: POSITIVE
AST SERPL-CCNC: 42 U/L (ref 1–40)
ATMOSPHERIC PRESS: 728 MMHG
BASE EXCESS BLDA CALC-SCNC: 2.5 MMOL/L (ref 0–2)
BDY SITE: ABNORMAL
BILIRUB SERPL-MCNC: 0.3 MG/DL (ref 0–1.2)
BODY TEMPERATURE: 0 C
BUN SERPL-MCNC: 49 MG/DL (ref 8–23)
BUN/CREAT SERPL: 17.9 (ref 7–25)
CALCIUM SPEC-SCNC: 9.1 MG/DL (ref 8.6–10.5)
CHLORIDE SERPL-SCNC: 94 MMOL/L (ref 98–107)
CO2 BLDA-SCNC: 28 MMOL/L (ref 22–33)
CO2 SERPL-SCNC: 24.9 MMOL/L (ref 22–29)
COHGB MFR BLD: 1 % (ref 0–5)
CREAT SERPL-MCNC: 2.73 MG/DL (ref 0.76–1.27)
CRP SERPL-MCNC: 2.27 MG/DL (ref 0–0.5)
DEPRECATED RDW RBC AUTO: 50.7 FL (ref 37–54)
EOSINOPHIL # BLD MANUAL: 0.82 10*3/MM3 (ref 0–0.4)
EOSINOPHIL NFR BLD MANUAL: 8 % (ref 0.3–6.2)
ERYTHROCYTE [DISTWIDTH] IN BLOOD BY AUTOMATED COUNT: 15.5 % (ref 12.3–15.4)
GENTAMICIN SERPL-MCNC: 1.4 MCG/ML (ref 0.5–10)
GFR SERPL CREATININE-BSD FRML MDRD: 23 ML/MIN/1.73
GLOBULIN UR ELPH-MCNC: 3.8 GM/DL
GLUCOSE BLDC GLUCOMTR-MCNC: 114 MG/DL (ref 70–130)
GLUCOSE BLDC GLUCOMTR-MCNC: 162 MG/DL (ref 70–130)
GLUCOSE BLDC GLUCOMTR-MCNC: 168 MG/DL (ref 70–130)
GLUCOSE BLDC GLUCOMTR-MCNC: 193 MG/DL (ref 70–130)
GLUCOSE SERPL-MCNC: 151 MG/DL (ref 65–99)
HCO3 BLDA-SCNC: 26.8 MMOL/L (ref 20–26)
HCT VFR BLD AUTO: 27.6 % (ref 37.5–51)
HCT VFR BLD CALC: 27.8 % (ref 38–51)
HGB BLD-MCNC: 8.8 G/DL (ref 13–17.7)
HGB BLDA-MCNC: 9.1 G/DL (ref 14–18)
HYPOCHROMIA BLD QL: ABNORMAL
INHALED O2 CONCENTRATION: 40 %
LYMPHOCYTES # BLD MANUAL: 1.75 10*3/MM3 (ref 0.7–3.1)
LYMPHOCYTES NFR BLD MANUAL: 17 % (ref 19.6–45.3)
LYMPHOCYTES NFR BLD MANUAL: 7 % (ref 5–12)
Lab: ABNORMAL
MCH RBC QN AUTO: 28.5 PG (ref 26.6–33)
MCHC RBC AUTO-ENTMCNC: 31.9 G/DL (ref 31.5–35.7)
MCV RBC AUTO: 89.3 FL (ref 79–97)
METAMYELOCYTES NFR BLD MANUAL: 4 % (ref 0–0)
METHGB BLD QL: 0.3 % (ref 0–3)
MODALITY: ABNORMAL
MONOCYTES # BLD AUTO: 0.72 10*3/MM3 (ref 0.1–0.9)
MYELOCYTES NFR BLD MANUAL: 2 % (ref 0–0)
NEUTROPHILS # BLD AUTO: 6.38 10*3/MM3 (ref 1.7–7)
NEUTROPHILS NFR BLD MANUAL: 62 % (ref 42.7–76)
NOTE: ABNORMAL
NOTIFIED WHO: ABNORMAL
OXYHGB MFR BLDV: 95.4 % (ref 94–99)
PCO2 BLDA: 39.4 MM HG (ref 35–45)
PCO2 TEMP ADJ BLD: ABNORMAL MM[HG]
PEEP RESPIRATORY: 5 CM[H2O]
PH BLDA: 7.44 PH UNITS (ref 7.35–7.45)
PH, TEMP CORRECTED: ABNORMAL
PLAT MORPH BLD: NORMAL
PLATELET # BLD AUTO: 170 10*3/MM3 (ref 140–450)
PMV BLD AUTO: 10.4 FL (ref 6–12)
PO2 BLDA: 81.8 MM HG (ref 83–108)
PO2 TEMP ADJ BLD: ABNORMAL MM[HG]
POTASSIUM SERPL-SCNC: 4.4 MMOL/L (ref 3.5–5.2)
PROT SERPL-MCNC: 6.9 G/DL (ref 6–8.5)
RBC # BLD AUTO: 3.09 10*6/MM3 (ref 4.14–5.8)
SAO2 % BLDCOA: 96.7 % (ref 94–99)
SCAN SLIDE: NORMAL
SET MECH RESP RATE: 16
SODIUM SERPL-SCNC: 132 MMOL/L (ref 136–145)
VENTILATOR MODE: ABNORMAL
VT ON VENT VENT: 450 ML
WBC # BLD AUTO: 10.29 10*3/MM3 (ref 3.4–10.8)

## 2021-07-20 PROCEDURE — 82375 ASSAY CARBOXYHB QUANT: CPT

## 2021-07-20 PROCEDURE — 80053 COMPREHEN METABOLIC PANEL: CPT | Performed by: INTERNAL MEDICINE

## 2021-07-20 PROCEDURE — 85025 COMPLETE CBC W/AUTO DIFF WBC: CPT | Performed by: INTERNAL MEDICINE

## 2021-07-20 PROCEDURE — 82805 BLOOD GASES W/O2 SATURATION: CPT

## 2021-07-20 PROCEDURE — 80170 ASSAY OF GENTAMICIN: CPT | Performed by: INTERNAL MEDICINE

## 2021-07-20 PROCEDURE — 85730 THROMBOPLASTIN TIME PARTIAL: CPT | Performed by: INTERNAL MEDICINE

## 2021-07-20 PROCEDURE — 36600 WITHDRAWAL OF ARTERIAL BLOOD: CPT

## 2021-07-20 PROCEDURE — 86140 C-REACTIVE PROTEIN: CPT | Performed by: INTERNAL MEDICINE

## 2021-07-20 PROCEDURE — 83050 HGB METHEMOGLOBIN QUAN: CPT

## 2021-07-20 PROCEDURE — 85007 BL SMEAR W/DIFF WBC COUNT: CPT | Performed by: INTERNAL MEDICINE

## 2021-07-20 PROCEDURE — 82962 GLUCOSE BLOOD TEST: CPT

## 2021-07-21 ENCOUNTER — APPOINTMENT (OUTPATIENT)
Dept: GENERAL RADIOLOGY | Facility: HOSPITAL | Age: 71
End: 2021-07-21

## 2021-07-21 LAB
ALBUMIN SERPL-MCNC: 3.36 G/DL (ref 3.5–5.2)
ALBUMIN/GLOB SERPL: 0.9 G/DL
ALP SERPL-CCNC: 416 U/L (ref 39–117)
ALT SERPL W P-5'-P-CCNC: 56 U/L (ref 1–41)
ANION GAP SERPL CALCULATED.3IONS-SCNC: 13.1 MMOL/L (ref 5–15)
ANISOCYTOSIS BLD QL: ABNORMAL
APTT PPP: 33.9 SECONDS (ref 25.5–35.4)
AST SERPL-CCNC: 38 U/L (ref 1–40)
BILIRUB SERPL-MCNC: 0.3 MG/DL (ref 0–1.2)
BUN SERPL-MCNC: 66 MG/DL (ref 8–23)
BUN/CREAT SERPL: 18.2 (ref 7–25)
CALCIUM SPEC-SCNC: 9.3 MG/DL (ref 8.6–10.5)
CHLORIDE SERPL-SCNC: 97 MMOL/L (ref 98–107)
CO2 SERPL-SCNC: 23.9 MMOL/L (ref 22–29)
CREAT SERPL-MCNC: 3.62 MG/DL (ref 0.76–1.27)
CRP SERPL-MCNC: 2.49 MG/DL (ref 0–0.5)
DEPRECATED RDW RBC AUTO: 49.8 FL (ref 37–54)
EOSINOPHIL # BLD MANUAL: 0.45 10*3/MM3 (ref 0–0.4)
EOSINOPHIL NFR BLD MANUAL: 4 % (ref 0.3–6.2)
ERYTHROCYTE [DISTWIDTH] IN BLOOD BY AUTOMATED COUNT: 15.5 % (ref 12.3–15.4)
GFR SERPL CREATININE-BSD FRML MDRD: 17 ML/MIN/1.73
GLOBULIN UR ELPH-MCNC: 3.5 GM/DL
GLUCOSE BLDC GLUCOMTR-MCNC: 148 MG/DL (ref 70–130)
GLUCOSE BLDC GLUCOMTR-MCNC: 153 MG/DL (ref 70–130)
GLUCOSE BLDC GLUCOMTR-MCNC: 158 MG/DL (ref 70–130)
GLUCOSE BLDC GLUCOMTR-MCNC: 161 MG/DL (ref 70–130)
GLUCOSE SERPL-MCNC: 145 MG/DL (ref 65–99)
HCT VFR BLD AUTO: 26.9 % (ref 37.5–51)
HGB BLD-MCNC: 8.9 G/DL (ref 13–17.7)
LEVETIRACETAM SERPL-MCNC: 51.1 UG/ML (ref 10–40)
LYMPHOCYTES # BLD MANUAL: 2.36 10*3/MM3 (ref 0.7–3.1)
LYMPHOCYTES NFR BLD MANUAL: 21 % (ref 19.6–45.3)
LYMPHOCYTES NFR BLD MANUAL: 4 % (ref 5–12)
MCH RBC QN AUTO: 29.4 PG (ref 26.6–33)
MCHC RBC AUTO-ENTMCNC: 33.1 G/DL (ref 31.5–35.7)
MCV RBC AUTO: 88.8 FL (ref 79–97)
METAMYELOCYTES NFR BLD MANUAL: 6 % (ref 0–0)
MONOCYTES # BLD AUTO: 0.45 10*3/MM3 (ref 0.1–0.9)
MYELOCYTES NFR BLD MANUAL: 1 % (ref 0–0)
NEUTROPHILS # BLD AUTO: 7.2 10*3/MM3 (ref 1.7–7)
NEUTROPHILS NFR BLD MANUAL: 60 % (ref 42.7–76)
NEUTS BAND NFR BLD MANUAL: 4 % (ref 0–5)
PLAT MORPH BLD: NORMAL
PLATELET # BLD AUTO: 175 10*3/MM3 (ref 140–450)
PMV BLD AUTO: 10.3 FL (ref 6–12)
POTASSIUM SERPL-SCNC: 4.8 MMOL/L (ref 3.5–5.2)
PROT SERPL-MCNC: 6.9 G/DL (ref 6–8.5)
RBC # BLD AUTO: 3.03 10*6/MM3 (ref 4.14–5.8)
SCAN SLIDE: NORMAL
SODIUM SERPL-SCNC: 134 MMOL/L (ref 136–145)
WBC # BLD AUTO: 11.25 10*3/MM3 (ref 3.4–10.8)

## 2021-07-21 PROCEDURE — 71045 X-RAY EXAM CHEST 1 VIEW: CPT

## 2021-07-21 PROCEDURE — 85730 THROMBOPLASTIN TIME PARTIAL: CPT | Performed by: INTERNAL MEDICINE

## 2021-07-21 PROCEDURE — 82962 GLUCOSE BLOOD TEST: CPT

## 2021-07-21 PROCEDURE — 86140 C-REACTIVE PROTEIN: CPT | Performed by: INTERNAL MEDICINE

## 2021-07-21 PROCEDURE — 71045 X-RAY EXAM CHEST 1 VIEW: CPT | Performed by: RADIOLOGY

## 2021-07-21 PROCEDURE — 85025 COMPLETE CBC W/AUTO DIFF WBC: CPT | Performed by: INTERNAL MEDICINE

## 2021-07-21 PROCEDURE — 80053 COMPREHEN METABOLIC PANEL: CPT | Performed by: INTERNAL MEDICINE

## 2021-07-21 PROCEDURE — 85007 BL SMEAR W/DIFF WBC COUNT: CPT | Performed by: INTERNAL MEDICINE

## 2021-07-22 LAB
ALBUMIN SERPL-MCNC: 3.52 G/DL (ref 3.5–5.2)
ALBUMIN/GLOB SERPL: 0.9 G/DL
ALP SERPL-CCNC: 462 U/L (ref 39–117)
ALT SERPL W P-5'-P-CCNC: 67 U/L (ref 1–41)
ANION GAP SERPL CALCULATED.3IONS-SCNC: 15 MMOL/L (ref 5–15)
ANISOCYTOSIS BLD QL: ABNORMAL
APTT PPP: 34.1 SECONDS (ref 25.5–35.4)
AST SERPL-CCNC: 54 U/L (ref 1–40)
BILIRUB SERPL-MCNC: 0.4 MG/DL (ref 0–1.2)
BUN SERPL-MCNC: 46 MG/DL (ref 8–23)
BUN/CREAT SERPL: 15.3 (ref 7–25)
CALCIUM SPEC-SCNC: 9.1 MG/DL (ref 8.6–10.5)
CHLORIDE SERPL-SCNC: 90 MMOL/L (ref 98–107)
CO2 SERPL-SCNC: 25 MMOL/L (ref 22–29)
CREAT SERPL-MCNC: 3 MG/DL (ref 0.76–1.27)
CRP SERPL-MCNC: 3.28 MG/DL (ref 0–0.5)
DEPRECATED RDW RBC AUTO: 49.7 FL (ref 37–54)
EOSINOPHIL # BLD MANUAL: 0.21 10*3/MM3 (ref 0–0.4)
EOSINOPHIL NFR BLD MANUAL: 2 % (ref 0.3–6.2)
ERYTHROCYTE [DISTWIDTH] IN BLOOD BY AUTOMATED COUNT: 15.6 % (ref 12.3–15.4)
GFR SERPL CREATININE-BSD FRML MDRD: 21 ML/MIN/1.73
GLOBULIN UR ELPH-MCNC: 4.1 GM/DL
GLUCOSE BLDC GLUCOMTR-MCNC: 134 MG/DL (ref 70–130)
GLUCOSE BLDC GLUCOMTR-MCNC: 154 MG/DL (ref 70–130)
GLUCOSE BLDC GLUCOMTR-MCNC: 166 MG/DL (ref 70–130)
GLUCOSE BLDC GLUCOMTR-MCNC: 174 MG/DL (ref 70–130)
GLUCOSE SERPL-MCNC: 147 MG/DL (ref 65–99)
HCT VFR BLD AUTO: 27.7 % (ref 37.5–51)
HGB BLD-MCNC: 9 G/DL (ref 13–17.7)
HYPOCHROMIA BLD QL: ABNORMAL
LYMPHOCYTES # BLD MANUAL: 2.71 10*3/MM3 (ref 0.7–3.1)
LYMPHOCYTES NFR BLD MANUAL: 2 % (ref 5–12)
LYMPHOCYTES NFR BLD MANUAL: 26 % (ref 19.6–45.3)
MCH RBC QN AUTO: 28.8 PG (ref 26.6–33)
MCHC RBC AUTO-ENTMCNC: 32.5 G/DL (ref 31.5–35.7)
MCV RBC AUTO: 88.8 FL (ref 79–97)
METAMYELOCYTES NFR BLD MANUAL: 7 % (ref 0–0)
MONOCYTES # BLD AUTO: 0.21 10*3/MM3 (ref 0.1–0.9)
MYELOCYTES NFR BLD MANUAL: 2 % (ref 0–0)
NEUTROPHILS # BLD AUTO: 6.36 10*3/MM3 (ref 1.7–7)
NEUTROPHILS NFR BLD MANUAL: 56 % (ref 42.7–76)
NEUTS BAND NFR BLD MANUAL: 5 % (ref 0–5)
PLAT MORPH BLD: NORMAL
PLATELET # BLD AUTO: 212 10*3/MM3 (ref 140–450)
PMV BLD AUTO: 10.7 FL (ref 6–12)
POTASSIUM SERPL-SCNC: 4.3 MMOL/L (ref 3.5–5.2)
PROT SERPL-MCNC: 7.6 G/DL (ref 6–8.5)
RBC # BLD AUTO: 3.12 10*6/MM3 (ref 4.14–5.8)
SCAN SLIDE: NORMAL
SODIUM SERPL-SCNC: 130 MMOL/L (ref 136–145)
WBC # BLD AUTO: 10.43 10*3/MM3 (ref 3.4–10.8)

## 2021-07-22 PROCEDURE — 87040 BLOOD CULTURE FOR BACTERIA: CPT | Performed by: NURSE PRACTITIONER

## 2021-07-22 PROCEDURE — 85025 COMPLETE CBC W/AUTO DIFF WBC: CPT | Performed by: INTERNAL MEDICINE

## 2021-07-22 PROCEDURE — 86140 C-REACTIVE PROTEIN: CPT | Performed by: INTERNAL MEDICINE

## 2021-07-22 PROCEDURE — 82962 GLUCOSE BLOOD TEST: CPT

## 2021-07-22 PROCEDURE — 85730 THROMBOPLASTIN TIME PARTIAL: CPT | Performed by: INTERNAL MEDICINE

## 2021-07-22 PROCEDURE — 80053 COMPREHEN METABOLIC PANEL: CPT | Performed by: INTERNAL MEDICINE

## 2021-07-22 PROCEDURE — 85007 BL SMEAR W/DIFF WBC COUNT: CPT | Performed by: INTERNAL MEDICINE

## 2021-07-23 LAB
A-A DO2: 162.4 MMHG (ref 0–300)
ALBUMIN SERPL-MCNC: 3.39 G/DL (ref 3.5–5.2)
ALBUMIN/GLOB SERPL: 0.9 G/DL
ALP SERPL-CCNC: 499 U/L (ref 39–117)
ALT SERPL W P-5'-P-CCNC: 78 U/L (ref 1–41)
ANION GAP SERPL CALCULATED.3IONS-SCNC: 13.5 MMOL/L (ref 5–15)
ANISOCYTOSIS BLD QL: ABNORMAL
APTT PPP: 33.9 SECONDS (ref 25.5–35.4)
ARTERIAL PATENCY WRIST A: POSITIVE
AST SERPL-CCNC: 56 U/L (ref 1–40)
ATMOSPHERIC PRESS: 732 MMHG
BASE EXCESS BLDA CALC-SCNC: 2.2 MMOL/L (ref 0–2)
BDY SITE: ABNORMAL
BILIRUB SERPL-MCNC: 0.3 MG/DL (ref 0–1.2)
BODY TEMPERATURE: 0 C
BUN SERPL-MCNC: 71 MG/DL (ref 8–23)
BUN/CREAT SERPL: 17.5 (ref 7–25)
CALCIUM SPEC-SCNC: 9.3 MG/DL (ref 8.6–10.5)
CHLORIDE SERPL-SCNC: 92 MMOL/L (ref 98–107)
CO2 BLDA-SCNC: 26.3 MMOL/L (ref 22–33)
CO2 SERPL-SCNC: 25.5 MMOL/L (ref 22–29)
COHGB MFR BLD: 1.1 % (ref 0–5)
CREAT SERPL-MCNC: 4.06 MG/DL (ref 0.76–1.27)
CRP SERPL-MCNC: 2.76 MG/DL (ref 0–0.5)
DEPRECATED RDW RBC AUTO: 50.8 FL (ref 37–54)
EOSINOPHIL # BLD MANUAL: 0.37 10*3/MM3 (ref 0–0.4)
EOSINOPHIL NFR BLD MANUAL: 4 % (ref 0.3–6.2)
ERYTHROCYTE [DISTWIDTH] IN BLOOD BY AUTOMATED COUNT: 15.9 % (ref 12.3–15.4)
GFR SERPL CREATININE-BSD FRML MDRD: 15 ML/MIN/1.73
GLOBULIN UR ELPH-MCNC: 3.9 GM/DL
GLUCOSE BLDC GLUCOMTR-MCNC: 154 MG/DL (ref 70–130)
GLUCOSE SERPL-MCNC: 151 MG/DL (ref 65–99)
HCO3 BLDA-SCNC: 25.3 MMOL/L (ref 20–26)
HCT VFR BLD AUTO: 27.7 % (ref 37.5–51)
HCT VFR BLD CALC: 27.5 % (ref 38–51)
HGB BLD-MCNC: 9.1 G/DL (ref 13–17.7)
HGB BLDA-MCNC: 9 G/DL (ref 14–18)
INHALED O2 CONCENTRATION: 40 %
LYMPHOCYTES # BLD MANUAL: 1.68 10*3/MM3 (ref 0.7–3.1)
LYMPHOCYTES NFR BLD MANUAL: 18 % (ref 19.6–45.3)
LYMPHOCYTES NFR BLD MANUAL: 7 % (ref 5–12)
Lab: ABNORMAL
MCH RBC QN AUTO: 29.1 PG (ref 26.6–33)
MCHC RBC AUTO-ENTMCNC: 32.9 G/DL (ref 31.5–35.7)
MCV RBC AUTO: 88.5 FL (ref 79–97)
METAMYELOCYTES NFR BLD MANUAL: 3 % (ref 0–0)
METHGB BLD QL: 0.1 % (ref 0–3)
MODALITY: ABNORMAL
MONOCYTES # BLD AUTO: 0.65 10*3/MM3 (ref 0.1–0.9)
MYELOCYTES NFR BLD MANUAL: 12 % (ref 0–0)
NEUTROPHILS # BLD AUTO: 5.23 10*3/MM3 (ref 1.7–7)
NEUTROPHILS NFR BLD MANUAL: 55 % (ref 42.7–76)
NEUTS BAND NFR BLD MANUAL: 1 % (ref 0–5)
NOTE: ABNORMAL
OXYHGB MFR BLDV: 95.5 % (ref 94–99)
PCO2 BLDA: 32.6 MM HG (ref 35–45)
PCO2 TEMP ADJ BLD: ABNORMAL MM[HG]
PEEP RESPIRATORY: 5 CM[H2O]
PH BLDA: 7.5 PH UNITS (ref 7.35–7.45)
PH, TEMP CORRECTED: ABNORMAL
PLAT MORPH BLD: NORMAL
PLATELET # BLD AUTO: 213 10*3/MM3 (ref 140–450)
PMV BLD AUTO: 11.2 FL (ref 6–12)
PO2 BLDA: 75.7 MM HG (ref 83–108)
PO2 TEMP ADJ BLD: ABNORMAL MM[HG]
POTASSIUM SERPL-SCNC: 4.9 MMOL/L (ref 3.5–5.2)
PROT SERPL-MCNC: 7.3 G/DL (ref 6–8.5)
RBC # BLD AUTO: 3.13 10*6/MM3 (ref 4.14–5.8)
SAO2 % BLDCOA: 96.7 % (ref 94–99)
SCAN SLIDE: NORMAL
SET MECH RESP RATE: 16
SODIUM SERPL-SCNC: 131 MMOL/L (ref 136–145)
VENTILATOR MODE: ABNORMAL
VT ON VENT VENT: 450 ML
WBC # BLD AUTO: 9.34 10*3/MM3 (ref 3.4–10.8)

## 2021-07-23 PROCEDURE — 80053 COMPREHEN METABOLIC PANEL: CPT | Performed by: INTERNAL MEDICINE

## 2021-07-23 PROCEDURE — 85730 THROMBOPLASTIN TIME PARTIAL: CPT | Performed by: INTERNAL MEDICINE

## 2021-07-23 PROCEDURE — 86140 C-REACTIVE PROTEIN: CPT | Performed by: INTERNAL MEDICINE

## 2021-07-23 PROCEDURE — 83050 HGB METHEMOGLOBIN QUAN: CPT

## 2021-07-23 PROCEDURE — 85007 BL SMEAR W/DIFF WBC COUNT: CPT | Performed by: INTERNAL MEDICINE

## 2021-07-23 PROCEDURE — 82375 ASSAY CARBOXYHB QUANT: CPT

## 2021-07-23 PROCEDURE — 82805 BLOOD GASES W/O2 SATURATION: CPT

## 2021-07-23 PROCEDURE — 36600 WITHDRAWAL OF ARTERIAL BLOOD: CPT

## 2021-07-23 PROCEDURE — 85025 COMPLETE CBC W/AUTO DIFF WBC: CPT | Performed by: INTERNAL MEDICINE

## 2021-07-23 PROCEDURE — 82962 GLUCOSE BLOOD TEST: CPT

## 2021-07-24 LAB
ALBUMIN SERPL-MCNC: 3.53 G/DL (ref 3.5–5.2)
ALBUMIN/GLOB SERPL: 0.8 G/DL
ALP SERPL-CCNC: 535 U/L (ref 39–117)
ALT SERPL W P-5'-P-CCNC: 81 U/L (ref 1–41)
ANION GAP SERPL CALCULATED.3IONS-SCNC: 15.9 MMOL/L (ref 5–15)
ANISOCYTOSIS BLD QL: NORMAL
APTT PPP: 34.8 SECONDS (ref 25.5–35.4)
AST SERPL-CCNC: 55 U/L (ref 1–40)
BASOPHILS # BLD AUTO: 0.04 10*3/MM3 (ref 0–0.2)
BASOPHILS NFR BLD AUTO: 0.4 % (ref 0–1.5)
BILIRUB SERPL-MCNC: 0.4 MG/DL (ref 0–1.2)
BUN SERPL-MCNC: 53 MG/DL (ref 8–23)
BUN/CREAT SERPL: 15.5 (ref 7–25)
CALCIUM SPEC-SCNC: 9.2 MG/DL (ref 8.6–10.5)
CHLORIDE SERPL-SCNC: 83 MMOL/L (ref 98–107)
CO2 SERPL-SCNC: 24.1 MMOL/L (ref 22–29)
CREAT SERPL-MCNC: 3.43 MG/DL (ref 0.76–1.27)
CRP SERPL-MCNC: 4.35 MG/DL (ref 0–0.5)
DEPRECATED RDW RBC AUTO: 49.5 FL (ref 37–54)
EOSINOPHIL # BLD AUTO: 0.33 10*3/MM3 (ref 0–0.4)
EOSINOPHIL NFR BLD AUTO: 2.9 % (ref 0.3–6.2)
ERYTHROCYTE [DISTWIDTH] IN BLOOD BY AUTOMATED COUNT: 15.4 % (ref 12.3–15.4)
GFR SERPL CREATININE-BSD FRML MDRD: 18 ML/MIN/1.73
GLOBULIN UR ELPH-MCNC: 4.6 GM/DL
GLUCOSE BLDC GLUCOMTR-MCNC: 143 MG/DL (ref 70–130)
GLUCOSE BLDC GLUCOMTR-MCNC: 159 MG/DL (ref 70–130)
GLUCOSE BLDC GLUCOMTR-MCNC: 162 MG/DL (ref 70–130)
GLUCOSE BLDC GLUCOMTR-MCNC: 175 MG/DL (ref 70–130)
GLUCOSE BLDC GLUCOMTR-MCNC: 196 MG/DL (ref 70–130)
GLUCOSE SERPL-MCNC: 168 MG/DL (ref 65–99)
HCT VFR BLD AUTO: 28.3 % (ref 37.5–51)
HGB BLD-MCNC: 9.4 G/DL (ref 13–17.7)
IMM GRANULOCYTES # BLD AUTO: 0.79 10*3/MM3 (ref 0–0.05)
IMM GRANULOCYTES NFR BLD AUTO: 7 % (ref 0–0.5)
LYMPHOCYTES # BLD AUTO: 2.8 10*3/MM3 (ref 0.7–3.1)
LYMPHOCYTES NFR BLD AUTO: 24.8 % (ref 19.6–45.3)
MCH RBC QN AUTO: 29.2 PG (ref 26.6–33)
MCHC RBC AUTO-ENTMCNC: 33.2 G/DL (ref 31.5–35.7)
MCV RBC AUTO: 87.9 FL (ref 79–97)
MONOCYTES # BLD AUTO: 1.57 10*3/MM3 (ref 0.1–0.9)
MONOCYTES NFR BLD AUTO: 13.9 % (ref 5–12)
NEUTROPHILS NFR BLD AUTO: 5.75 10*3/MM3 (ref 1.7–7)
NEUTROPHILS NFR BLD AUTO: 51 % (ref 42.7–76)
NRBC BLD AUTO-RTO: 0 /100 WBC (ref 0–0.2)
PLAT MORPH BLD: NORMAL
PLATELET # BLD AUTO: 227 10*3/MM3 (ref 140–450)
PMV BLD AUTO: 11.1 FL (ref 6–12)
POTASSIUM SERPL-SCNC: 4 MMOL/L (ref 3.5–5.2)
PROT SERPL-MCNC: 8.1 G/DL (ref 6–8.5)
RBC # BLD AUTO: 3.22 10*6/MM3 (ref 4.14–5.8)
SODIUM SERPL-SCNC: 123 MMOL/L (ref 136–145)
WBC # BLD AUTO: 11.28 10*3/MM3 (ref 3.4–10.8)

## 2021-07-24 PROCEDURE — 85007 BL SMEAR W/DIFF WBC COUNT: CPT | Performed by: INTERNAL MEDICINE

## 2021-07-24 PROCEDURE — 80053 COMPREHEN METABOLIC PANEL: CPT | Performed by: INTERNAL MEDICINE

## 2021-07-24 PROCEDURE — 86140 C-REACTIVE PROTEIN: CPT | Performed by: INTERNAL MEDICINE

## 2021-07-24 PROCEDURE — 85730 THROMBOPLASTIN TIME PARTIAL: CPT | Performed by: INTERNAL MEDICINE

## 2021-07-24 PROCEDURE — 82962 GLUCOSE BLOOD TEST: CPT

## 2021-07-24 PROCEDURE — 85025 COMPLETE CBC W/AUTO DIFF WBC: CPT | Performed by: INTERNAL MEDICINE

## 2021-07-25 LAB
ALBUMIN SERPL-MCNC: 3.58 G/DL (ref 3.5–5.2)
ALBUMIN/GLOB SERPL: 0.8 G/DL
ALP SERPL-CCNC: 446 U/L (ref 39–117)
ALT SERPL W P-5'-P-CCNC: 66 U/L (ref 1–41)
ANION GAP SERPL CALCULATED.3IONS-SCNC: 16.9 MMOL/L (ref 5–15)
ANISOCYTOSIS BLD QL: ABNORMAL
APTT PPP: 33.8 SECONDS (ref 25.5–35.4)
AST SERPL-CCNC: 39 U/L (ref 1–40)
BASOPHILS # BLD MANUAL: 0.1 10*3/MM3 (ref 0–0.2)
BASOPHILS NFR BLD AUTO: 1 % (ref 0–1.5)
BILIRUB SERPL-MCNC: 0.3 MG/DL (ref 0–1.2)
BUN SERPL-MCNC: 77 MG/DL (ref 8–23)
BUN/CREAT SERPL: 16.6 (ref 7–25)
CALCIUM SPEC-SCNC: 9.1 MG/DL (ref 8.6–10.5)
CHLORIDE SERPL-SCNC: 84 MMOL/L (ref 98–107)
CO2 SERPL-SCNC: 24.1 MMOL/L (ref 22–29)
CREAT SERPL-MCNC: 4.65 MG/DL (ref 0.76–1.27)
CRP SERPL-MCNC: 4.43 MG/DL (ref 0–0.5)
DEPRECATED RDW RBC AUTO: 49.7 FL (ref 37–54)
EOSINOPHIL # BLD MANUAL: 0.21 10*3/MM3 (ref 0–0.4)
EOSINOPHIL NFR BLD MANUAL: 2 % (ref 0.3–6.2)
ERYTHROCYTE [DISTWIDTH] IN BLOOD BY AUTOMATED COUNT: 15.5 % (ref 12.3–15.4)
GFR SERPL CREATININE-BSD FRML MDRD: 13 ML/MIN/1.73
GFR SERPL CREATININE-BSD FRML MDRD: ABNORMAL ML/MIN/{1.73_M2}
GLOBULIN UR ELPH-MCNC: 4.2 GM/DL
GLUCOSE BLDC GLUCOMTR-MCNC: 142 MG/DL (ref 70–130)
GLUCOSE BLDC GLUCOMTR-MCNC: 149 MG/DL (ref 70–130)
GLUCOSE BLDC GLUCOMTR-MCNC: 157 MG/DL (ref 70–130)
GLUCOSE BLDC GLUCOMTR-MCNC: 171 MG/DL (ref 70–130)
GLUCOSE BLDC GLUCOMTR-MCNC: 177 MG/DL (ref 70–130)
GLUCOSE SERPL-MCNC: 145 MG/DL (ref 65–99)
HCT VFR BLD AUTO: 26.9 % (ref 37.5–51)
HGB BLD-MCNC: 9 G/DL (ref 13–17.7)
LYMPHOCYTES # BLD MANUAL: 3.1 10*3/MM3 (ref 0.7–3.1)
LYMPHOCYTES NFR BLD MANUAL: 12 % (ref 5–12)
LYMPHOCYTES NFR BLD MANUAL: 30 % (ref 19.6–45.3)
MCH RBC QN AUTO: 29.1 PG (ref 26.6–33)
MCHC RBC AUTO-ENTMCNC: 33.5 G/DL (ref 31.5–35.7)
MCV RBC AUTO: 87.1 FL (ref 79–97)
METAMYELOCYTES NFR BLD MANUAL: 1 % (ref 0–0)
MONOCYTES # BLD AUTO: 1.24 10*3/MM3 (ref 0.1–0.9)
MYELOCYTES NFR BLD MANUAL: 3 % (ref 0–0)
NEUTROPHILS # BLD AUTO: 5.26 10*3/MM3 (ref 1.7–7)
NEUTROPHILS NFR BLD MANUAL: 49 % (ref 42.7–76)
NEUTS BAND NFR BLD MANUAL: 2 % (ref 0–5)
PLAT MORPH BLD: NORMAL
PLATELET # BLD AUTO: 232 10*3/MM3 (ref 140–450)
PMV BLD AUTO: 11 FL (ref 6–12)
POTASSIUM SERPL-SCNC: 4.1 MMOL/L (ref 3.5–5.2)
PROT SERPL-MCNC: 7.8 G/DL (ref 6–8.5)
RBC # BLD AUTO: 3.09 10*6/MM3 (ref 4.14–5.8)
SODIUM SERPL-SCNC: 125 MMOL/L (ref 136–145)
WBC # BLD AUTO: 10.32 10*3/MM3 (ref 3.4–10.8)

## 2021-07-25 PROCEDURE — 80053 COMPREHEN METABOLIC PANEL: CPT | Performed by: INTERNAL MEDICINE

## 2021-07-25 PROCEDURE — 85007 BL SMEAR W/DIFF WBC COUNT: CPT | Performed by: INTERNAL MEDICINE

## 2021-07-25 PROCEDURE — 82962 GLUCOSE BLOOD TEST: CPT

## 2021-07-25 PROCEDURE — 86140 C-REACTIVE PROTEIN: CPT | Performed by: INTERNAL MEDICINE

## 2021-07-25 PROCEDURE — 85025 COMPLETE CBC W/AUTO DIFF WBC: CPT | Performed by: INTERNAL MEDICINE

## 2021-07-25 PROCEDURE — 85730 THROMBOPLASTIN TIME PARTIAL: CPT | Performed by: INTERNAL MEDICINE

## 2021-07-26 LAB
ALBUMIN SERPL-MCNC: 3.3 G/DL (ref 3.5–5.2)
ALBUMIN/GLOB SERPL: 0.9 G/DL
ALP SERPL-CCNC: 402 U/L (ref 39–117)
ALT SERPL W P-5'-P-CCNC: 47 U/L (ref 1–41)
ANION GAP SERPL CALCULATED.3IONS-SCNC: 18.2 MMOL/L (ref 5–15)
APTT PPP: 33 SECONDS (ref 25.5–35.4)
AST SERPL-CCNC: 26 U/L (ref 1–40)
BASOPHILS # BLD AUTO: 0.04 10*3/MM3 (ref 0–0.2)
BASOPHILS NFR BLD AUTO: 0.3 % (ref 0–1.5)
BILIRUB SERPL-MCNC: 0.3 MG/DL (ref 0–1.2)
BUN SERPL-MCNC: 94 MG/DL (ref 8–23)
BUN/CREAT SERPL: 16.9 (ref 7–25)
CALCIUM SPEC-SCNC: 8.8 MG/DL (ref 8.6–10.5)
CHLORIDE SERPL-SCNC: 87 MMOL/L (ref 98–107)
CO2 SERPL-SCNC: 22.8 MMOL/L (ref 22–29)
CREAT SERPL-MCNC: 5.55 MG/DL (ref 0.76–1.27)
CRP SERPL-MCNC: 3.43 MG/DL (ref 0–0.5)
DEPRECATED RDW RBC AUTO: 48.1 FL (ref 37–54)
EOSINOPHIL # BLD AUTO: 0.51 10*3/MM3 (ref 0–0.4)
EOSINOPHIL NFR BLD AUTO: 4.3 % (ref 0.3–6.2)
ERYTHROCYTE [DISTWIDTH] IN BLOOD BY AUTOMATED COUNT: 15.3 % (ref 12.3–15.4)
GENTAMICIN SERPL-MCNC: 2.2 MCG/ML (ref 0.5–10)
GFR SERPL CREATININE-BSD FRML MDRD: 10 ML/MIN/1.73
GFR SERPL CREATININE-BSD FRML MDRD: ABNORMAL ML/MIN/{1.73_M2}
GLOBULIN UR ELPH-MCNC: 3.8 GM/DL
GLUCOSE BLDC GLUCOMTR-MCNC: 138 MG/DL (ref 70–130)
GLUCOSE BLDC GLUCOMTR-MCNC: 153 MG/DL (ref 70–130)
GLUCOSE BLDC GLUCOMTR-MCNC: 154 MG/DL (ref 70–130)
GLUCOSE SERPL-MCNC: 121 MG/DL (ref 65–99)
HCT VFR BLD AUTO: 24.5 % (ref 37.5–51)
HGB BLD-MCNC: 8.1 G/DL (ref 13–17.7)
IMM GRANULOCYTES # BLD AUTO: 0.36 10*3/MM3 (ref 0–0.05)
IMM GRANULOCYTES NFR BLD AUTO: 3 % (ref 0–0.5)
LYMPHOCYTES # BLD AUTO: 3.16 10*3/MM3 (ref 0.7–3.1)
LYMPHOCYTES NFR BLD AUTO: 26.6 % (ref 19.6–45.3)
MCH RBC QN AUTO: 28.3 PG (ref 26.6–33)
MCHC RBC AUTO-ENTMCNC: 33.1 G/DL (ref 31.5–35.7)
MCV RBC AUTO: 85.7 FL (ref 79–97)
MONOCYTES # BLD AUTO: 1.23 10*3/MM3 (ref 0.1–0.9)
MONOCYTES NFR BLD AUTO: 10.4 % (ref 5–12)
NEUTROPHILS NFR BLD AUTO: 55.4 % (ref 42.7–76)
NEUTROPHILS NFR BLD AUTO: 6.57 10*3/MM3 (ref 1.7–7)
NRBC BLD AUTO-RTO: 0 /100 WBC (ref 0–0.2)
PLATELET # BLD AUTO: 205 10*3/MM3 (ref 140–450)
PMV BLD AUTO: 10.8 FL (ref 6–12)
POTASSIUM SERPL-SCNC: 4.3 MMOL/L (ref 3.5–5.2)
PROT SERPL-MCNC: 7.1 G/DL (ref 6–8.5)
RBC # BLD AUTO: 2.86 10*6/MM3 (ref 4.14–5.8)
SODIUM SERPL-SCNC: 128 MMOL/L (ref 136–145)
WBC # BLD AUTO: 11.87 10*3/MM3 (ref 3.4–10.8)

## 2021-07-26 PROCEDURE — 86140 C-REACTIVE PROTEIN: CPT | Performed by: INTERNAL MEDICINE

## 2021-07-26 PROCEDURE — 82962 GLUCOSE BLOOD TEST: CPT

## 2021-07-26 PROCEDURE — 80170 ASSAY OF GENTAMICIN: CPT | Performed by: INTERNAL MEDICINE

## 2021-07-26 PROCEDURE — 85730 THROMBOPLASTIN TIME PARTIAL: CPT | Performed by: INTERNAL MEDICINE

## 2021-07-26 PROCEDURE — 80053 COMPREHEN METABOLIC PANEL: CPT | Performed by: INTERNAL MEDICINE

## 2021-07-26 PROCEDURE — 85025 COMPLETE CBC W/AUTO DIFF WBC: CPT | Performed by: INTERNAL MEDICINE

## 2021-07-27 LAB
ALBUMIN SERPL-MCNC: 3.78 G/DL (ref 3.5–5.2)
ALBUMIN/GLOB SERPL: 1 G/DL
ALP SERPL-CCNC: 364 U/L (ref 39–117)
ALT SERPL W P-5'-P-CCNC: 39 U/L (ref 1–41)
ANION GAP SERPL CALCULATED.3IONS-SCNC: 16 MMOL/L (ref 5–15)
APTT PPP: 39.2 SECONDS (ref 25.5–35.4)
AST SERPL-CCNC: 26 U/L (ref 1–40)
BACTERIA SPEC AEROBE CULT: NORMAL
BACTERIA SPEC AEROBE CULT: NORMAL
BASOPHILS # BLD AUTO: 0.03 10*3/MM3 (ref 0–0.2)
BASOPHILS NFR BLD AUTO: 0.4 % (ref 0–1.5)
BILIRUB SERPL-MCNC: 0.4 MG/DL (ref 0–1.2)
BUN SERPL-MCNC: 52 MG/DL (ref 8–23)
BUN/CREAT SERPL: 14 (ref 7–25)
CALCIUM SPEC-SCNC: 9 MG/DL (ref 8.6–10.5)
CHLORIDE SERPL-SCNC: 89 MMOL/L (ref 98–107)
CO2 SERPL-SCNC: 26 MMOL/L (ref 22–29)
CREAT SERPL-MCNC: 3.72 MG/DL (ref 0.76–1.27)
CRP SERPL-MCNC: 5.58 MG/DL (ref 0–0.5)
DEPRECATED RDW RBC AUTO: 47.9 FL (ref 37–54)
EOSINOPHIL # BLD AUTO: 0.23 10*3/MM3 (ref 0–0.4)
EOSINOPHIL NFR BLD AUTO: 2.7 % (ref 0.3–6.2)
ERYTHROCYTE [DISTWIDTH] IN BLOOD BY AUTOMATED COUNT: 15.2 % (ref 12.3–15.4)
GFR SERPL CREATININE-BSD FRML MDRD: 16 ML/MIN/1.73
GLOBULIN UR ELPH-MCNC: 3.7 GM/DL
GLUCOSE BLDC GLUCOMTR-MCNC: 120 MG/DL (ref 70–130)
GLUCOSE BLDC GLUCOMTR-MCNC: 122 MG/DL (ref 70–130)
GLUCOSE BLDC GLUCOMTR-MCNC: 161 MG/DL (ref 70–130)
GLUCOSE BLDC GLUCOMTR-MCNC: 165 MG/DL (ref 70–130)
GLUCOSE BLDC GLUCOMTR-MCNC: 179 MG/DL (ref 70–130)
GLUCOSE SERPL-MCNC: 144 MG/DL (ref 65–99)
HCT VFR BLD AUTO: 23.1 % (ref 37.5–51)
HGB BLD-MCNC: 7.6 G/DL (ref 13–17.7)
IMM GRANULOCYTES # BLD AUTO: 0.19 10*3/MM3 (ref 0–0.05)
IMM GRANULOCYTES NFR BLD AUTO: 2.3 % (ref 0–0.5)
LYMPHOCYTES # BLD AUTO: 2.75 10*3/MM3 (ref 0.7–3.1)
LYMPHOCYTES NFR BLD AUTO: 32.7 % (ref 19.6–45.3)
MCH RBC QN AUTO: 28.4 PG (ref 26.6–33)
MCHC RBC AUTO-ENTMCNC: 32.9 G/DL (ref 31.5–35.7)
MCV RBC AUTO: 86.2 FL (ref 79–97)
MONOCYTES # BLD AUTO: 1.05 10*3/MM3 (ref 0.1–0.9)
MONOCYTES NFR BLD AUTO: 12.5 % (ref 5–12)
NEUTROPHILS NFR BLD AUTO: 4.16 10*3/MM3 (ref 1.7–7)
NEUTROPHILS NFR BLD AUTO: 49.4 % (ref 42.7–76)
NRBC BLD AUTO-RTO: 0 /100 WBC (ref 0–0.2)
PLATELET # BLD AUTO: 195 10*3/MM3 (ref 140–450)
PMV BLD AUTO: 11.5 FL (ref 6–12)
POTASSIUM SERPL-SCNC: 4 MMOL/L (ref 3.5–5.2)
PROT SERPL-MCNC: 7.5 G/DL (ref 6–8.5)
RBC # BLD AUTO: 2.68 10*6/MM3 (ref 4.14–5.8)
SODIUM SERPL-SCNC: 131 MMOL/L (ref 136–145)
WBC # BLD AUTO: 8.41 10*3/MM3 (ref 3.4–10.8)

## 2021-07-27 PROCEDURE — 85025 COMPLETE CBC W/AUTO DIFF WBC: CPT | Performed by: INTERNAL MEDICINE

## 2021-07-27 PROCEDURE — 86140 C-REACTIVE PROTEIN: CPT | Performed by: INTERNAL MEDICINE

## 2021-07-27 PROCEDURE — 80053 COMPREHEN METABOLIC PANEL: CPT | Performed by: INTERNAL MEDICINE

## 2021-07-27 PROCEDURE — 85730 THROMBOPLASTIN TIME PARTIAL: CPT | Performed by: INTERNAL MEDICINE

## 2021-07-27 PROCEDURE — 82962 GLUCOSE BLOOD TEST: CPT

## 2021-07-28 LAB
ALBUMIN SERPL-MCNC: 3.77 G/DL (ref 3.5–5.2)
ALBUMIN/GLOB SERPL: 1 G/DL
ALP SERPL-CCNC: 351 U/L (ref 39–117)
ALT SERPL W P-5'-P-CCNC: 40 U/L (ref 1–41)
ANION GAP SERPL CALCULATED.3IONS-SCNC: 19.4 MMOL/L (ref 5–15)
APTT PPP: 34.9 SECONDS (ref 25.5–35.4)
APTT PPP: 35.5 SECONDS (ref 25.5–35.4)
AST SERPL-CCNC: 32 U/L (ref 1–40)
BASOPHILS # BLD AUTO: 0.03 10*3/MM3 (ref 0–0.2)
BASOPHILS # BLD AUTO: 0.04 10*3/MM3 (ref 0–0.2)
BASOPHILS NFR BLD AUTO: 0.4 % (ref 0–1.5)
BASOPHILS NFR BLD AUTO: 0.4 % (ref 0–1.5)
BILIRUB SERPL-MCNC: 0.4 MG/DL (ref 0–1.2)
BUN SERPL-MCNC: 72 MG/DL (ref 8–23)
BUN/CREAT SERPL: 13.5 (ref 7–25)
CALCIUM SPEC-SCNC: 9.4 MG/DL (ref 8.6–10.5)
CHLORIDE SERPL-SCNC: 84 MMOL/L (ref 98–107)
CO2 SERPL-SCNC: 25.6 MMOL/L (ref 22–29)
CREAT SERPL-MCNC: 5.34 MG/DL (ref 0.76–1.27)
CRP SERPL-MCNC: 6.31 MG/DL (ref 0–0.5)
DEPRECATED RDW RBC AUTO: 48.1 FL (ref 37–54)
DEPRECATED RDW RBC AUTO: 48.6 FL (ref 37–54)
EOSINOPHIL # BLD AUTO: 0.15 10*3/MM3 (ref 0–0.4)
EOSINOPHIL # BLD AUTO: 0.15 10*3/MM3 (ref 0–0.4)
EOSINOPHIL NFR BLD AUTO: 1.4 % (ref 0.3–6.2)
EOSINOPHIL NFR BLD AUTO: 1.9 % (ref 0.3–6.2)
ERYTHROCYTE [DISTWIDTH] IN BLOOD BY AUTOMATED COUNT: 14.9 % (ref 12.3–15.4)
ERYTHROCYTE [DISTWIDTH] IN BLOOD BY AUTOMATED COUNT: 15.1 % (ref 12.3–15.4)
GFR SERPL CREATININE-BSD FRML MDRD: 11 ML/MIN/1.73
GFR SERPL CREATININE-BSD FRML MDRD: ABNORMAL ML/MIN/{1.73_M2}
GLOBULIN UR ELPH-MCNC: 3.7 GM/DL
GLUCOSE BLDC GLUCOMTR-MCNC: 110 MG/DL (ref 70–130)
GLUCOSE BLDC GLUCOMTR-MCNC: 137 MG/DL (ref 70–130)
GLUCOSE BLDC GLUCOMTR-MCNC: 219 MG/DL (ref 70–130)
GLUCOSE SERPL-MCNC: 123 MG/DL (ref 65–99)
HCT VFR BLD AUTO: 25.1 % (ref 37.5–51)
HCT VFR BLD AUTO: 25.3 % (ref 37.5–51)
HGB BLD-MCNC: 8 G/DL (ref 13–17.7)
HGB BLD-MCNC: 8.1 G/DL (ref 13–17.7)
IMM GRANULOCYTES # BLD AUTO: 0.17 10*3/MM3 (ref 0–0.05)
IMM GRANULOCYTES # BLD AUTO: 0.26 10*3/MM3 (ref 0–0.05)
IMM GRANULOCYTES NFR BLD AUTO: 2.1 % (ref 0–0.5)
IMM GRANULOCYTES NFR BLD AUTO: 2.5 % (ref 0–0.5)
LYMPHOCYTES # BLD AUTO: 1.73 10*3/MM3 (ref 0.7–3.1)
LYMPHOCYTES # BLD AUTO: 2.79 10*3/MM3 (ref 0.7–3.1)
LYMPHOCYTES NFR BLD AUTO: 21.5 % (ref 19.6–45.3)
LYMPHOCYTES NFR BLD AUTO: 26.5 % (ref 19.6–45.3)
MCH RBC QN AUTO: 27.9 PG (ref 26.6–33)
MCH RBC QN AUTO: 28.3 PG (ref 26.6–33)
MCHC RBC AUTO-ENTMCNC: 31.9 G/DL (ref 31.5–35.7)
MCHC RBC AUTO-ENTMCNC: 32 G/DL (ref 31.5–35.7)
MCV RBC AUTO: 87.2 FL (ref 79–97)
MCV RBC AUTO: 88.7 FL (ref 79–97)
MONOCYTES # BLD AUTO: 0.89 10*3/MM3 (ref 0.1–0.9)
MONOCYTES # BLD AUTO: 1.24 10*3/MM3 (ref 0.1–0.9)
MONOCYTES NFR BLD AUTO: 11 % (ref 5–12)
MONOCYTES NFR BLD AUTO: 11.8 % (ref 5–12)
NEUTROPHILS NFR BLD AUTO: 5.09 10*3/MM3 (ref 1.7–7)
NEUTROPHILS NFR BLD AUTO: 57.4 % (ref 42.7–76)
NEUTROPHILS NFR BLD AUTO: 6.04 10*3/MM3 (ref 1.7–7)
NEUTROPHILS NFR BLD AUTO: 63.1 % (ref 42.7–76)
NRBC BLD AUTO-RTO: 0 /100 WBC (ref 0–0.2)
NRBC BLD AUTO-RTO: 0 /100 WBC (ref 0–0.2)
PLATELET # BLD AUTO: 193 10*3/MM3 (ref 140–450)
PLATELET # BLD AUTO: 194 10*3/MM3 (ref 140–450)
PMV BLD AUTO: 11.5 FL (ref 6–12)
PMV BLD AUTO: 11.5 FL (ref 6–12)
POTASSIUM SERPL-SCNC: 4.1 MMOL/L (ref 3.5–5.2)
PROT SERPL-MCNC: 7.5 G/DL (ref 6–8.5)
RBC # BLD AUTO: 2.83 10*6/MM3 (ref 4.14–5.8)
RBC # BLD AUTO: 2.9 10*6/MM3 (ref 4.14–5.8)
SODIUM SERPL-SCNC: 129 MMOL/L (ref 136–145)
WBC # BLD AUTO: 10.52 10*3/MM3 (ref 3.4–10.8)
WBC # BLD AUTO: 8.06 10*3/MM3 (ref 3.4–10.8)

## 2021-07-28 PROCEDURE — 87205 SMEAR GRAM STAIN: CPT | Performed by: INTERNAL MEDICINE

## 2021-07-28 PROCEDURE — 80053 COMPREHEN METABOLIC PANEL: CPT | Performed by: INTERNAL MEDICINE

## 2021-07-28 PROCEDURE — 85025 COMPLETE CBC W/AUTO DIFF WBC: CPT | Performed by: INTERNAL MEDICINE

## 2021-07-28 PROCEDURE — 82962 GLUCOSE BLOOD TEST: CPT

## 2021-07-28 PROCEDURE — 86140 C-REACTIVE PROTEIN: CPT | Performed by: INTERNAL MEDICINE

## 2021-07-28 PROCEDURE — 87077 CULTURE AEROBIC IDENTIFY: CPT | Performed by: INTERNAL MEDICINE

## 2021-07-28 PROCEDURE — 87070 CULTURE OTHR SPECIMN AEROBIC: CPT | Performed by: INTERNAL MEDICINE

## 2021-07-28 PROCEDURE — 87186 SC STD MICRODIL/AGAR DIL: CPT | Performed by: INTERNAL MEDICINE

## 2021-07-28 PROCEDURE — 87181 SC STD AGAR DILUTION PER AGT: CPT | Performed by: INTERNAL MEDICINE

## 2021-07-28 PROCEDURE — 85730 THROMBOPLASTIN TIME PARTIAL: CPT | Performed by: INTERNAL MEDICINE

## 2021-07-29 LAB
ALBUMIN SERPL-MCNC: 3.84 G/DL (ref 3.5–5.2)
ALBUMIN SERPL-MCNC: 4.14 G/DL (ref 3.5–5.2)
ALBUMIN/GLOB SERPL: 1.1 G/DL
ALBUMIN/GLOB SERPL: 1.2 G/DL
ALP SERPL-CCNC: 313 U/L (ref 39–117)
ALP SERPL-CCNC: 319 U/L (ref 39–117)
ALT SERPL W P-5'-P-CCNC: 36 U/L (ref 1–41)
ALT SERPL W P-5'-P-CCNC: 39 U/L (ref 1–41)
ANION GAP SERPL CALCULATED.3IONS-SCNC: 14.4 MMOL/L (ref 5–15)
ANION GAP SERPL CALCULATED.3IONS-SCNC: 16.6 MMOL/L (ref 5–15)
APTT PPP: 35 SECONDS (ref 25.5–35.4)
AST SERPL-CCNC: 27 U/L (ref 1–40)
AST SERPL-CCNC: 32 U/L (ref 1–40)
BASOPHILS # BLD AUTO: 0.03 10*3/MM3 (ref 0–0.2)
BASOPHILS NFR BLD AUTO: 0.3 % (ref 0–1.5)
BILIRUB SERPL-MCNC: 0.4 MG/DL (ref 0–1.2)
BILIRUB SERPL-MCNC: 0.5 MG/DL (ref 0–1.2)
BUN SERPL-MCNC: 36 MG/DL (ref 8–23)
BUN SERPL-MCNC: 57 MG/DL (ref 8–23)
BUN/CREAT SERPL: 10.9 (ref 7–25)
BUN/CREAT SERPL: 11.1 (ref 7–25)
CALCIUM SPEC-SCNC: 9.1 MG/DL (ref 8.6–10.5)
CALCIUM SPEC-SCNC: 9.3 MG/DL (ref 8.6–10.5)
CHLORIDE SERPL-SCNC: 82 MMOL/L (ref 98–107)
CHLORIDE SERPL-SCNC: 84 MMOL/L (ref 98–107)
CO2 SERPL-SCNC: 26.4 MMOL/L (ref 22–29)
CO2 SERPL-SCNC: 28.6 MMOL/L (ref 22–29)
CORTIS SERPL-MCNC: 13.67 MCG/DL
CREAT SERPL-MCNC: 3.23 MG/DL (ref 0.76–1.27)
CREAT SERPL-MCNC: 5.25 MG/DL (ref 0.76–1.27)
CRP SERPL-MCNC: 6.12 MG/DL (ref 0–0.5)
CRP SERPL-MCNC: 8.03 MG/DL (ref 0–0.5)
DEPRECATED RDW RBC AUTO: 48.3 FL (ref 37–54)
EOSINOPHIL # BLD AUTO: 0.12 10*3/MM3 (ref 0–0.4)
EOSINOPHIL NFR BLD AUTO: 1.3 % (ref 0.3–6.2)
ERYTHROCYTE [DISTWIDTH] IN BLOOD BY AUTOMATED COUNT: 15.2 % (ref 12.3–15.4)
GFR SERPL CREATININE-BSD FRML MDRD: 11 ML/MIN/1.73
GFR SERPL CREATININE-BSD FRML MDRD: 19 ML/MIN/1.73
GFR SERPL CREATININE-BSD FRML MDRD: ABNORMAL ML/MIN/{1.73_M2}
GLOBULIN UR ELPH-MCNC: 3.5 GM/DL
GLOBULIN UR ELPH-MCNC: 3.6 GM/DL
GLUCOSE BLDC GLUCOMTR-MCNC: 124 MG/DL (ref 70–130)
GLUCOSE BLDC GLUCOMTR-MCNC: 162 MG/DL (ref 70–130)
GLUCOSE SERPL-MCNC: 135 MG/DL (ref 65–99)
GLUCOSE SERPL-MCNC: 143 MG/DL (ref 65–99)
HCT VFR BLD AUTO: 24.2 % (ref 37.5–51)
HGB BLD-MCNC: 7.9 G/DL (ref 13–17.7)
IMM GRANULOCYTES # BLD AUTO: 0.18 10*3/MM3 (ref 0–0.05)
IMM GRANULOCYTES NFR BLD AUTO: 2 % (ref 0–0.5)
LYMPHOCYTES # BLD AUTO: 2.44 10*3/MM3 (ref 0.7–3.1)
LYMPHOCYTES NFR BLD AUTO: 27.3 % (ref 19.6–45.3)
MCH RBC QN AUTO: 28.6 PG (ref 26.6–33)
MCHC RBC AUTO-ENTMCNC: 32.6 G/DL (ref 31.5–35.7)
MCV RBC AUTO: 87.7 FL (ref 79–97)
MONOCYTES # BLD AUTO: 0.97 10*3/MM3 (ref 0.1–0.9)
MONOCYTES NFR BLD AUTO: 10.8 % (ref 5–12)
NEUTROPHILS NFR BLD AUTO: 5.21 10*3/MM3 (ref 1.7–7)
NEUTROPHILS NFR BLD AUTO: 58.3 % (ref 42.7–76)
NRBC BLD AUTO-RTO: 0 /100 WBC (ref 0–0.2)
PLATELET # BLD AUTO: 192 10*3/MM3 (ref 140–450)
PMV BLD AUTO: 10.8 FL (ref 6–12)
POTASSIUM SERPL-SCNC: 3.7 MMOL/L (ref 3.5–5.2)
POTASSIUM SERPL-SCNC: 3.9 MMOL/L (ref 3.5–5.2)
PROT SERPL-MCNC: 7.3 G/DL (ref 6–8.5)
PROT SERPL-MCNC: 7.7 G/DL (ref 6–8.5)
RBC # BLD AUTO: 2.76 10*6/MM3 (ref 4.14–5.8)
SODIUM SERPL-SCNC: 125 MMOL/L (ref 136–145)
SODIUM SERPL-SCNC: 127 MMOL/L (ref 136–145)
WBC # BLD AUTO: 8.95 10*3/MM3 (ref 3.4–10.8)

## 2021-07-29 PROCEDURE — 86140 C-REACTIVE PROTEIN: CPT | Performed by: INTERNAL MEDICINE

## 2021-07-29 PROCEDURE — 93005 ELECTROCARDIOGRAM TRACING: CPT | Performed by: INTERNAL MEDICINE

## 2021-07-29 PROCEDURE — 85730 THROMBOPLASTIN TIME PARTIAL: CPT | Performed by: INTERNAL MEDICINE

## 2021-07-29 PROCEDURE — 82533 TOTAL CORTISOL: CPT | Performed by: INTERNAL MEDICINE

## 2021-07-29 PROCEDURE — 80053 COMPREHEN METABOLIC PANEL: CPT | Performed by: INTERNAL MEDICINE

## 2021-07-29 PROCEDURE — 82962 GLUCOSE BLOOD TEST: CPT

## 2021-07-29 PROCEDURE — 85025 COMPLETE CBC W/AUTO DIFF WBC: CPT | Performed by: INTERNAL MEDICINE

## 2021-07-30 ENCOUNTER — APPOINTMENT (OUTPATIENT)
Dept: CARDIOLOGY | Facility: HOSPITAL | Age: 71
End: 2021-07-30

## 2021-07-30 LAB
BH CV ECHO MEAS - BSA(HAYCOCK): 2.1 M^2
BH CV ECHO MEAS - BSA: 2.1 M^2
BH CV ECHO MEAS - BZI_BMI: 27.8 KILOGRAMS/M^2
BH CV ECHO MEAS - BZI_METRIC_HEIGHT: 177.8 CM
BH CV ECHO MEAS - BZI_METRIC_WEIGHT: 88 KG
BH CV ECHO MEAS - EDV(MOD-SP4): 88.9 ML
BH CV ECHO MEAS - EF(MOD-SP4): 65.9 %
BH CV ECHO MEAS - ESV(MOD-SP4): 30.3 ML
BH CV ECHO MEAS - LV DIASTOLIC VOL/BSA (35-75): 43.1 ML/M^2
BH CV ECHO MEAS - LV SYSTOLIC VOL/BSA (12-30): 14.7 ML/M^2
BH CV ECHO MEAS - LVLD AP4: 7.8 CM
BH CV ECHO MEAS - LVLS AP4: 7.1 CM
BH CV ECHO MEAS - MV A MAX VEL: 68.1 CM/SEC
BH CV ECHO MEAS - MV E MAX VEL: 67.7 CM/SEC
BH CV ECHO MEAS - MV E/A: 1
BH CV ECHO MEAS - SI(MOD-SP4): 28.4 ML/M^2
BH CV ECHO MEAS - SV(MOD-SP4): 58.6 ML
CK MB SERPL-CCNC: 1.3 NG/ML
CK MB SERPL-CCNC: 1.32 NG/ML
CK SERPL-CCNC: 50 U/L (ref 20–200)
CK SERPL-CCNC: 62 U/L (ref 20–200)
GLUCOSE BLDC GLUCOMTR-MCNC: 137 MG/DL (ref 70–130)
GLUCOSE BLDC GLUCOMTR-MCNC: 161 MG/DL (ref 70–130)
GLUCOSE BLDC GLUCOMTR-MCNC: 166 MG/DL (ref 70–130)
MAXIMAL PREDICTED HEART RATE: 151 BPM
MYOGLOBIN SERPL-MCNC: 205.3 NG/ML (ref 28–72)
MYOGLOBIN SERPL-MCNC: 217.9 NG/ML (ref 28–72)
QT INTERVAL: 380 MS
QTC INTERVAL: 407 MS
STRESS TARGET HR: 128 BPM
TROPONIN T SERPL-MCNC: 0.18 NG/ML (ref 0–0.03)
TROPONIN T SERPL-MCNC: 0.19 NG/ML (ref 0–0.03)

## 2021-07-30 PROCEDURE — 93005 ELECTROCARDIOGRAM TRACING: CPT | Performed by: INTERNAL MEDICINE

## 2021-07-30 PROCEDURE — 82550 ASSAY OF CK (CPK): CPT | Performed by: INTERNAL MEDICINE

## 2021-07-30 PROCEDURE — 93010 ELECTROCARDIOGRAM REPORT: CPT | Performed by: INTERNAL MEDICINE

## 2021-07-30 PROCEDURE — 82553 CREATINE MB FRACTION: CPT | Performed by: INTERNAL MEDICINE

## 2021-07-30 PROCEDURE — 82962 GLUCOSE BLOOD TEST: CPT

## 2021-07-30 PROCEDURE — 84484 ASSAY OF TROPONIN QUANT: CPT | Performed by: INTERNAL MEDICINE

## 2021-07-30 PROCEDURE — 93306 TTE W/DOPPLER COMPLETE: CPT | Performed by: INTERNAL MEDICINE

## 2021-07-30 PROCEDURE — 83874 ASSAY OF MYOGLOBIN: CPT | Performed by: INTERNAL MEDICINE

## 2021-07-30 PROCEDURE — 93306 TTE W/DOPPLER COMPLETE: CPT

## 2021-07-31 LAB
A-A DO2: 130.7 MMHG (ref 0–300)
ALBUMIN SERPL-MCNC: 3.84 G/DL (ref 3.5–5.2)
ALBUMIN/GLOB SERPL: 1.1 G/DL
ALP SERPL-CCNC: 282 U/L (ref 39–117)
ALT SERPL W P-5'-P-CCNC: 32 U/L (ref 1–41)
ANION GAP SERPL CALCULATED.3IONS-SCNC: 11.6 MMOL/L (ref 5–15)
APTT PPP: 34.6 SECONDS (ref 25.5–35.4)
ARTERIAL PATENCY WRIST A: ABNORMAL
AST SERPL-CCNC: 26 U/L (ref 1–40)
ATMOSPHERIC PRESS: 727 MMHG
BASE EXCESS BLDA CALC-SCNC: 5.1 MMOL/L (ref 0–2)
BASOPHILS # BLD AUTO: 0.02 10*3/MM3 (ref 0–0.2)
BASOPHILS NFR BLD AUTO: 0.4 % (ref 0–1.5)
BDY SITE: ABNORMAL
BILIRUB SERPL-MCNC: 0.4 MG/DL (ref 0–1.2)
BODY TEMPERATURE: 0 C
BUN SERPL-MCNC: 31 MG/DL (ref 8–23)
BUN/CREAT SERPL: 8.7 (ref 7–25)
CALCIUM SPEC-SCNC: 9.1 MG/DL (ref 8.6–10.5)
CHLORIDE SERPL-SCNC: 90 MMOL/L (ref 98–107)
CO2 BLDA-SCNC: 31.7 MMOL/L (ref 22–33)
CO2 SERPL-SCNC: 28.4 MMOL/L (ref 22–29)
COHGB MFR BLD: 1 % (ref 0–5)
CREAT SERPL-MCNC: 3.58 MG/DL (ref 0.76–1.27)
CRP SERPL-MCNC: 6.78 MG/DL (ref 0–0.5)
DEPRECATED RDW RBC AUTO: 49.1 FL (ref 37–54)
EOSINOPHIL # BLD AUTO: 0.19 10*3/MM3 (ref 0–0.4)
EOSINOPHIL NFR BLD AUTO: 3.5 % (ref 0.3–6.2)
ERYTHROCYTE [DISTWIDTH] IN BLOOD BY AUTOMATED COUNT: 15.2 % (ref 12.3–15.4)
GFR SERPL CREATININE-BSD FRML MDRD: 17 ML/MIN/1.73
GLOBULIN UR ELPH-MCNC: 3.6 GM/DL
GLUCOSE BLDC GLUCOMTR-MCNC: 167 MG/DL (ref 70–130)
GLUCOSE BLDC GLUCOMTR-MCNC: 169 MG/DL (ref 70–130)
GLUCOSE BLDC GLUCOMTR-MCNC: 205 MG/DL (ref 70–130)
GLUCOSE SERPL-MCNC: 156 MG/DL (ref 65–99)
HCO3 BLDA-SCNC: 30.3 MMOL/L (ref 20–26)
HCT VFR BLD AUTO: 23.6 % (ref 37.5–51)
HCT VFR BLD CALC: 27.5 % (ref 38–51)
HGB BLD-MCNC: 7.5 G/DL (ref 13–17.7)
HGB BLDA-MCNC: 9 G/DL (ref 14–18)
IMM GRANULOCYTES # BLD AUTO: 0.11 10*3/MM3 (ref 0–0.05)
IMM GRANULOCYTES NFR BLD AUTO: 2 % (ref 0–0.5)
INHALED O2 CONCENTRATION: 40 %
LYMPHOCYTES # BLD AUTO: 1.47 10*3/MM3 (ref 0.7–3.1)
LYMPHOCYTES NFR BLD AUTO: 26.8 % (ref 19.6–45.3)
Lab: ABNORMAL
MCH RBC QN AUTO: 28.2 PG (ref 26.6–33)
MCHC RBC AUTO-ENTMCNC: 31.8 G/DL (ref 31.5–35.7)
MCV RBC AUTO: 88.7 FL (ref 79–97)
METHGB BLD QL: 0.4 % (ref 0–3)
MODALITY: ABNORMAL
MONOCYTES # BLD AUTO: 0.73 10*3/MM3 (ref 0.1–0.9)
MONOCYTES NFR BLD AUTO: 13.3 % (ref 5–12)
NEUTROPHILS NFR BLD AUTO: 2.97 10*3/MM3 (ref 1.7–7)
NEUTROPHILS NFR BLD AUTO: 54 % (ref 42.7–76)
NOTE: ABNORMAL
NRBC BLD AUTO-RTO: 0 /100 WBC (ref 0–0.2)
OXYHGB MFR BLDV: 95.9 % (ref 94–99)
PCO2 BLDA: 47.1 MM HG (ref 35–45)
PCO2 TEMP ADJ BLD: ABNORMAL MM[HG]
PEEP RESPIRATORY: 5 CM[H2O]
PH BLDA: 7.42 PH UNITS (ref 7.35–7.45)
PH, TEMP CORRECTED: ABNORMAL
PLATELET # BLD AUTO: 178 10*3/MM3 (ref 140–450)
PMV BLD AUTO: 10.7 FL (ref 6–12)
PO2 BLDA: 89.7 MM HG (ref 83–108)
PO2 TEMP ADJ BLD: ABNORMAL MM[HG]
POTASSIUM SERPL-SCNC: 3.7 MMOL/L (ref 3.5–5.2)
PROT SERPL-MCNC: 7.4 G/DL (ref 6–8.5)
QT INTERVAL: 426 MS
QTC INTERVAL: 388 MS
RBC # BLD AUTO: 2.66 10*6/MM3 (ref 4.14–5.8)
SAO2 % BLDCOA: 97.3 % (ref 94–99)
SET MECH RESP RATE: 14
SODIUM SERPL-SCNC: 130 MMOL/L (ref 136–145)
VENTILATOR MODE: ABNORMAL
VT ON VENT VENT: 450 ML
WBC # BLD AUTO: 5.49 10*3/MM3 (ref 3.4–10.8)

## 2021-07-31 PROCEDURE — 83050 HGB METHEMOGLOBIN QUAN: CPT

## 2021-07-31 PROCEDURE — 82375 ASSAY CARBOXYHB QUANT: CPT

## 2021-07-31 PROCEDURE — 82805 BLOOD GASES W/O2 SATURATION: CPT

## 2021-07-31 PROCEDURE — 85025 COMPLETE CBC W/AUTO DIFF WBC: CPT | Performed by: INTERNAL MEDICINE

## 2021-07-31 PROCEDURE — 36600 WITHDRAWAL OF ARTERIAL BLOOD: CPT

## 2021-07-31 PROCEDURE — 80053 COMPREHEN METABOLIC PANEL: CPT | Performed by: INTERNAL MEDICINE

## 2021-07-31 PROCEDURE — 82962 GLUCOSE BLOOD TEST: CPT

## 2021-07-31 PROCEDURE — 85730 THROMBOPLASTIN TIME PARTIAL: CPT | Performed by: INTERNAL MEDICINE

## 2021-07-31 PROCEDURE — 86140 C-REACTIVE PROTEIN: CPT | Performed by: INTERNAL MEDICINE

## 2021-08-01 ENCOUNTER — OUTSIDE FACILITY SERVICE (OUTPATIENT)
Dept: PULMONOLOGY | Facility: CLINIC | Age: 71
End: 2021-08-01

## 2021-08-01 LAB
ALBUMIN SERPL-MCNC: 3.6 G/DL (ref 3.5–5.2)
ALBUMIN/GLOB SERPL: 1 G/DL
ALP SERPL-CCNC: 279 U/L (ref 39–117)
ALT SERPL W P-5'-P-CCNC: 26 U/L (ref 1–41)
ANION GAP SERPL CALCULATED.3IONS-SCNC: 14.2 MMOL/L (ref 5–15)
APTT PPP: 36.7 SECONDS (ref 25.5–35.4)
AST SERPL-CCNC: 19 U/L (ref 1–40)
BASOPHILS # BLD AUTO: 0.03 10*3/MM3 (ref 0–0.2)
BASOPHILS NFR BLD AUTO: 0.4 % (ref 0–1.5)
BILIRUB SERPL-MCNC: 0.3 MG/DL (ref 0–1.2)
BUN SERPL-MCNC: 53 MG/DL (ref 8–23)
BUN/CREAT SERPL: 10.5 (ref 7–25)
CALCIUM SPEC-SCNC: 9.3 MG/DL (ref 8.6–10.5)
CHLORIDE SERPL-SCNC: 87 MMOL/L (ref 98–107)
CO2 SERPL-SCNC: 26.8 MMOL/L (ref 22–29)
CREAT SERPL-MCNC: 5.05 MG/DL (ref 0.76–1.27)
CRP SERPL-MCNC: 5.68 MG/DL (ref 0–0.5)
DEPRECATED RDW RBC AUTO: 49.6 FL (ref 37–54)
EOSINOPHIL # BLD AUTO: 0.31 10*3/MM3 (ref 0–0.4)
EOSINOPHIL NFR BLD AUTO: 4.3 % (ref 0.3–6.2)
ERYTHROCYTE [DISTWIDTH] IN BLOOD BY AUTOMATED COUNT: 15 % (ref 12.3–15.4)
GFR SERPL CREATININE-BSD FRML MDRD: 11 ML/MIN/1.73
GFR SERPL CREATININE-BSD FRML MDRD: ABNORMAL ML/MIN/{1.73_M2}
GLOBULIN UR ELPH-MCNC: 3.6 GM/DL
GLUCOSE BLDC GLUCOMTR-MCNC: 117 MG/DL (ref 70–130)
GLUCOSE BLDC GLUCOMTR-MCNC: 132 MG/DL (ref 70–130)
GLUCOSE BLDC GLUCOMTR-MCNC: 169 MG/DL (ref 70–130)
GLUCOSE BLDC GLUCOMTR-MCNC: 206 MG/DL (ref 70–130)
GLUCOSE BLDC GLUCOMTR-MCNC: 222 MG/DL (ref 70–130)
GLUCOSE SERPL-MCNC: 138 MG/DL (ref 65–99)
HCT VFR BLD AUTO: 24.3 % (ref 37.5–51)
HGB BLD-MCNC: 7.7 G/DL (ref 13–17.7)
IMM GRANULOCYTES # BLD AUTO: 0.16 10*3/MM3 (ref 0–0.05)
IMM GRANULOCYTES NFR BLD AUTO: 2.2 % (ref 0–0.5)
LYMPHOCYTES # BLD AUTO: 2.29 10*3/MM3 (ref 0.7–3.1)
LYMPHOCYTES NFR BLD AUTO: 31.8 % (ref 19.6–45.3)
MCH RBC QN AUTO: 28.8 PG (ref 26.6–33)
MCHC RBC AUTO-ENTMCNC: 31.7 G/DL (ref 31.5–35.7)
MCV RBC AUTO: 91 FL (ref 79–97)
MONOCYTES # BLD AUTO: 0.79 10*3/MM3 (ref 0.1–0.9)
MONOCYTES NFR BLD AUTO: 11 % (ref 5–12)
NEUTROPHILS NFR BLD AUTO: 3.62 10*3/MM3 (ref 1.7–7)
NEUTROPHILS NFR BLD AUTO: 50.3 % (ref 42.7–76)
NRBC BLD AUTO-RTO: 0 /100 WBC (ref 0–0.2)
PLATELET # BLD AUTO: 197 10*3/MM3 (ref 140–450)
PMV BLD AUTO: 10.8 FL (ref 6–12)
POTASSIUM SERPL-SCNC: 3.8 MMOL/L (ref 3.5–5.2)
PROT SERPL-MCNC: 7.2 G/DL (ref 6–8.5)
RBC # BLD AUTO: 2.67 10*6/MM3 (ref 4.14–5.8)
SODIUM SERPL-SCNC: 128 MMOL/L (ref 136–145)
T4 FREE SERPL-MCNC: 1.29 NG/DL (ref 0.93–1.7)
TSH SERPL DL<=0.05 MIU/L-ACNC: 0.65 UIU/ML (ref 0.27–4.2)
WBC # BLD AUTO: 7.2 10*3/MM3 (ref 3.4–10.8)

## 2021-08-01 PROCEDURE — 86140 C-REACTIVE PROTEIN: CPT | Performed by: INTERNAL MEDICINE

## 2021-08-01 PROCEDURE — 82962 GLUCOSE BLOOD TEST: CPT

## 2021-08-01 PROCEDURE — 80053 COMPREHEN METABOLIC PANEL: CPT | Performed by: INTERNAL MEDICINE

## 2021-08-01 PROCEDURE — 84439 ASSAY OF FREE THYROXINE: CPT | Performed by: INTERNAL MEDICINE

## 2021-08-01 PROCEDURE — 84443 ASSAY THYROID STIM HORMONE: CPT | Performed by: INTERNAL MEDICINE

## 2021-08-01 PROCEDURE — 85025 COMPLETE CBC W/AUTO DIFF WBC: CPT | Performed by: INTERNAL MEDICINE

## 2021-08-01 PROCEDURE — 99291 CRITICAL CARE FIRST HOUR: CPT | Performed by: INTERNAL MEDICINE

## 2021-08-01 PROCEDURE — 85730 THROMBOPLASTIN TIME PARTIAL: CPT | Performed by: INTERNAL MEDICINE

## 2021-08-02 ENCOUNTER — APPOINTMENT (OUTPATIENT)
Dept: GENERAL RADIOLOGY | Facility: HOSPITAL | Age: 71
End: 2021-08-02

## 2021-08-02 ENCOUNTER — OUTSIDE FACILITY SERVICE (OUTPATIENT)
Dept: PULMONOLOGY | Facility: CLINIC | Age: 71
End: 2021-08-02

## 2021-08-02 ENCOUNTER — ANESTHESIA (OUTPATIENT)
Dept: PERIOP | Facility: HOSPITAL | Age: 71
End: 2021-08-02

## 2021-08-02 ENCOUNTER — ANESTHESIA EVENT (OUTPATIENT)
Dept: PERIOP | Facility: HOSPITAL | Age: 71
End: 2021-08-02

## 2021-08-02 VITALS — WEIGHT: 193.34 LBS | BODY MASS INDEX: 27.74 KG/M2

## 2021-08-02 PROBLEM — N18.6 ESRD (END STAGE RENAL DISEASE) (HCC): Status: ACTIVE | Noted: 2021-08-02

## 2021-08-02 LAB
ALBUMIN SERPL-MCNC: 3.81 G/DL (ref 3.5–5.2)
ALBUMIN/GLOB SERPL: 1.1 G/DL
ALP SERPL-CCNC: 254 U/L (ref 39–117)
ALT SERPL W P-5'-P-CCNC: 27 U/L (ref 1–41)
ANION GAP SERPL CALCULATED.3IONS-SCNC: 16 MMOL/L (ref 5–15)
APTT PPP: 34.5 SECONDS (ref 25.5–35.4)
AST SERPL-CCNC: 22 U/L (ref 1–40)
BASOPHILS # BLD AUTO: 0.03 10*3/MM3 (ref 0–0.2)
BASOPHILS NFR BLD AUTO: 0.4 % (ref 0–1.5)
BILIRUB SERPL-MCNC: 0.5 MG/DL (ref 0–1.2)
BUN SERPL-MCNC: 64 MG/DL (ref 8–23)
BUN/CREAT SERPL: 9.9 (ref 7–25)
CALCIUM SPEC-SCNC: 9.8 MG/DL (ref 8.6–10.5)
CHLORIDE SERPL-SCNC: 88 MMOL/L (ref 98–107)
CO2 SERPL-SCNC: 27 MMOL/L (ref 22–29)
CREAT SERPL-MCNC: 6.44 MG/DL (ref 0.76–1.27)
CRP SERPL-MCNC: 5.05 MG/DL (ref 0–0.5)
DEPRECATED RDW RBC AUTO: 49.2 FL (ref 37–54)
EOSINOPHIL # BLD AUTO: 0.29 10*3/MM3 (ref 0–0.4)
EOSINOPHIL NFR BLD AUTO: 3.4 % (ref 0.3–6.2)
ERYTHROCYTE [DISTWIDTH] IN BLOOD BY AUTOMATED COUNT: 15.1 % (ref 12.3–15.4)
GFR SERPL CREATININE-BSD FRML MDRD: 9 ML/MIN/1.73
GFR SERPL CREATININE-BSD FRML MDRD: ABNORMAL ML/MIN/{1.73_M2}
GLOBULIN UR ELPH-MCNC: 3.6 GM/DL
GLUCOSE BLDC GLUCOMTR-MCNC: 133 MG/DL (ref 70–130)
GLUCOSE BLDC GLUCOMTR-MCNC: 136 MG/DL (ref 70–130)
GLUCOSE BLDC GLUCOMTR-MCNC: 136 MG/DL (ref 70–130)
GLUCOSE BLDC GLUCOMTR-MCNC: 140 MG/DL (ref 70–130)
GLUCOSE SERPL-MCNC: 120 MG/DL (ref 65–99)
HCT VFR BLD AUTO: 24.7 % (ref 37.5–51)
HGB BLD-MCNC: 7.7 G/DL (ref 13–17.7)
IMM GRANULOCYTES # BLD AUTO: 0.15 10*3/MM3 (ref 0–0.05)
IMM GRANULOCYTES NFR BLD AUTO: 1.8 % (ref 0–0.5)
INR PPP: 0.98 (ref 0.9–1.1)
LYMPHOCYTES # BLD AUTO: 2.34 10*3/MM3 (ref 0.7–3.1)
LYMPHOCYTES NFR BLD AUTO: 27.4 % (ref 19.6–45.3)
MAGNESIUM SERPL-MCNC: 2.2 MG/DL (ref 1.6–2.4)
MCH RBC QN AUTO: 27.9 PG (ref 26.6–33)
MCHC RBC AUTO-ENTMCNC: 31.2 G/DL (ref 31.5–35.7)
MCV RBC AUTO: 89.5 FL (ref 79–97)
MONOCYTES # BLD AUTO: 0.77 10*3/MM3 (ref 0.1–0.9)
MONOCYTES NFR BLD AUTO: 9 % (ref 5–12)
NEUTROPHILS NFR BLD AUTO: 4.95 10*3/MM3 (ref 1.7–7)
NEUTROPHILS NFR BLD AUTO: 58 % (ref 42.7–76)
NRBC BLD AUTO-RTO: 0 /100 WBC (ref 0–0.2)
PLATELET # BLD AUTO: 197 10*3/MM3 (ref 140–450)
PMV BLD AUTO: 10.8 FL (ref 6–12)
POTASSIUM SERPL-SCNC: 4.1 MMOL/L (ref 3.5–5.2)
PROT SERPL-MCNC: 7.4 G/DL (ref 6–8.5)
PROTHROMBIN TIME: 13.4 SECONDS (ref 12.8–14.5)
QT INTERVAL: 380 MS
QTC INTERVAL: 363 MS
RBC # BLD AUTO: 2.76 10*6/MM3 (ref 4.14–5.8)
SARS-COV-2 RNA RESP QL NAA+PROBE: NOT DETECTED
SODIUM SERPL-SCNC: 131 MMOL/L (ref 136–145)
WBC # BLD AUTO: 8.53 10*3/MM3 (ref 3.4–10.8)

## 2021-08-02 PROCEDURE — S0260 H&P FOR SURGERY: HCPCS | Performed by: SURGERY

## 2021-08-02 PROCEDURE — 25010000002 DOPAMINE PER 40 MG: Performed by: NURSE ANESTHETIST, CERTIFIED REGISTERED

## 2021-08-02 PROCEDURE — 25010000002 HEPARIN LOCK FLUSH PER 10 UNITS: Performed by: SURGERY

## 2021-08-02 PROCEDURE — 80053 COMPREHEN METABOLIC PANEL: CPT | Performed by: INTERNAL MEDICINE

## 2021-08-02 PROCEDURE — 99291 CRITICAL CARE FIRST HOUR: CPT | Performed by: INTERNAL MEDICINE

## 2021-08-02 PROCEDURE — C1751 CATH, INF, PER/CENT/MIDLINE: HCPCS | Performed by: SURGERY

## 2021-08-02 PROCEDURE — U0003 INFECTIOUS AGENT DETECTION BY NUCLEIC ACID (DNA OR RNA); SEVERE ACUTE RESPIRATORY SYNDROME CORONAVIRUS 2 (SARS-COV-2) (CORONAVIRUS DISEASE [COVID-19]), AMPLIFIED PROBE TECHNIQUE, MAKING USE OF HIGH THROUGHPUT TECHNOLOGIES AS DESCRIBED BY CMS-2020-01-R: HCPCS | Performed by: SURGERY

## 2021-08-02 PROCEDURE — 76937 US GUIDE VASCULAR ACCESS: CPT | Performed by: SURGERY

## 2021-08-02 PROCEDURE — 76000 FLUOROSCOPY <1 HR PHYS/QHP: CPT

## 2021-08-02 PROCEDURE — 71045 X-RAY EXAM CHEST 1 VIEW: CPT | Performed by: RADIOLOGY

## 2021-08-02 PROCEDURE — 86140 C-REACTIVE PROTEIN: CPT | Performed by: INTERNAL MEDICINE

## 2021-08-02 PROCEDURE — 85610 PROTHROMBIN TIME: CPT | Performed by: SURGERY

## 2021-08-02 PROCEDURE — 85025 COMPLETE CBC W/AUTO DIFF WBC: CPT | Performed by: INTERNAL MEDICINE

## 2021-08-02 PROCEDURE — 83735 ASSAY OF MAGNESIUM: CPT | Performed by: SURGERY

## 2021-08-02 PROCEDURE — 85730 THROMBOPLASTIN TIME PARTIAL: CPT | Performed by: INTERNAL MEDICINE

## 2021-08-02 PROCEDURE — 36556 INSERT NON-TUNNEL CV CATH: CPT | Performed by: SURGERY

## 2021-08-02 PROCEDURE — C1750 CATH, HEMODIALYSIS,LONG-TERM: HCPCS | Performed by: SURGERY

## 2021-08-02 PROCEDURE — 71045 X-RAY EXAM CHEST 1 VIEW: CPT

## 2021-08-02 PROCEDURE — 76000 FLUOROSCOPY <1 HR PHYS/QHP: CPT | Performed by: RADIOLOGY

## 2021-08-02 PROCEDURE — 82962 GLUCOSE BLOOD TEST: CPT

## 2021-08-02 PROCEDURE — C1894 INTRO/SHEATH, NON-LASER: HCPCS | Performed by: SURGERY

## 2021-08-02 PROCEDURE — 36558 INSERT TUNNELED CV CATH: CPT | Performed by: SURGERY

## 2021-08-02 PROCEDURE — 77001 FLUOROGUIDE FOR VEIN DEVICE: CPT | Performed by: SURGERY

## 2021-08-02 RX ORDER — ROCURONIUM BROMIDE 10 MG/ML
INJECTION, SOLUTION INTRAVENOUS AS NEEDED
Status: DISCONTINUED | OUTPATIENT
Start: 2021-08-02 | End: 2021-08-02 | Stop reason: SURG

## 2021-08-02 RX ORDER — HEPARIN SODIUM (PORCINE) LOCK FLUSH IV SOLN 100 UNIT/ML 100 UNIT/ML
SOLUTION INTRAVENOUS AS NEEDED
Status: DISCONTINUED | OUTPATIENT
Start: 2021-08-02 | End: 2021-08-02 | Stop reason: HOSPADM

## 2021-08-02 RX ORDER — DILTIAZEM HCL/D5W 125 MG/125
5-15 PLASTIC BAG, INJECTION (ML) INTRAVENOUS
COMMUNITY

## 2021-08-02 RX ORDER — NICOTINE POLACRILEX 4 MG
0.5 LOZENGE BUCCAL AS NEEDED
COMMUNITY

## 2021-08-02 RX ORDER — BUPIVACAINE HYDROCHLORIDE AND EPINEPHRINE 5; 5 MG/ML; UG/ML
INJECTION, SOLUTION EPIDURAL; INTRACAUDAL; PERINEURAL AS NEEDED
Status: DISCONTINUED | OUTPATIENT
Start: 2021-08-02 | End: 2021-08-02 | Stop reason: HOSPADM

## 2021-08-02 RX ORDER — VANCOMYCIN
125 KIT EVERY 6 HOURS SCHEDULED
COMMUNITY
Start: 2021-07-06 | End: 2021-08-18

## 2021-08-02 RX ORDER — MULTIPLE VITAMINS W/ MINERALS TAB 9MG-400MCG
1 TAB ORAL DAILY
COMMUNITY

## 2021-08-02 RX ORDER — FAMOTIDINE 20 MG/1
10 TABLET, FILM COATED ORAL DAILY
COMMUNITY

## 2021-08-02 RX ORDER — NICOTINE POLACRILEX 4 MG
1 LOZENGE BUCCAL AS NEEDED
COMMUNITY

## 2021-08-02 RX ORDER — MIDODRINE HYDROCHLORIDE 5 MG/1
10 TABLET ORAL EVERY 8 HOURS
COMMUNITY

## 2021-08-02 RX ORDER — SODIUM CHLORIDE 9 MG/ML
INJECTION, SOLUTION INTRAVENOUS CONTINUOUS PRN
Status: DISCONTINUED | OUTPATIENT
Start: 2021-08-02 | End: 2021-08-02 | Stop reason: SURG

## 2021-08-02 RX ORDER — SODIUM CHLORIDE 9 MG/ML
INJECTION, SOLUTION INTRAVENOUS AS NEEDED
Status: DISCONTINUED | OUTPATIENT
Start: 2021-08-02 | End: 2021-08-02 | Stop reason: HOSPADM

## 2021-08-02 RX ORDER — CHOLECALCIFEROL (VITAMIN D3) 125 MCG
5 CAPSULE ORAL NIGHTLY
COMMUNITY

## 2021-08-02 RX ORDER — 0.9 % SODIUM CHLORIDE 0.9 %
5 VIAL (ML) INJECTION AS NEEDED
COMMUNITY

## 2021-08-02 RX ORDER — DOPAMINE HYDROCHLORIDE 160 MG/100ML
INJECTION, SOLUTION INTRAVENOUS CONTINUOUS PRN
Status: DISCONTINUED | OUTPATIENT
Start: 2021-08-02 | End: 2021-08-02 | Stop reason: SURG

## 2021-08-02 RX ORDER — 0.9 % SODIUM CHLORIDE 0.9 %
10 VIAL (ML) INJECTION EVERY 12 HOURS SCHEDULED
Status: ON HOLD | COMMUNITY
End: 2021-08-02

## 2021-08-02 RX ORDER — DOPAMINE HYDROCHLORIDE 160 MG/100ML
400000 INJECTION, SOLUTION INTRAVENOUS
Status: ON HOLD | COMMUNITY
Start: 2021-07-31 | End: 2021-08-02

## 2021-08-02 RX ORDER — MAGNESIUM HYDROXIDE 1200 MG/15ML
LIQUID ORAL AS NEEDED
Status: DISCONTINUED | OUTPATIENT
Start: 2021-08-02 | End: 2021-08-02 | Stop reason: HOSPADM

## 2021-08-02 RX ADMIN — ROCURONIUM BROMIDE 50 MG: 10 SOLUTION INTRAVENOUS at 19:05

## 2021-08-02 RX ADMIN — DOPAMINE HYDROCHLORIDE 2 MCG/KG/MIN: 160 INJECTION, SOLUTION INTRAVENOUS at 19:02

## 2021-08-02 RX ADMIN — SODIUM CHLORIDE: 9 INJECTION, SOLUTION INTRAVENOUS at 19:05

## 2021-08-02 RX ADMIN — CEFAZOLIN 2 G: 1 INJECTION, POWDER, FOR SOLUTION INTRAMUSCULAR; INTRAVENOUS; PARENTERAL at 19:16

## 2021-08-02 NOTE — ANESTHESIA PREPROCEDURE EVALUATION
Anesthesia Evaluation     NPO Solid Status: > 8 hours  NPO Liquid Status: > 8 hours           Airway   Mallampati: II  TM distance: <3 FB  Neck ROM: full  No difficulty expected  Dental          Pulmonary    (+) rhonchi,   Cardiovascular   Exercise tolerance: good (4-7 METS)    Rhythm: regular  Rate: normal    (+) hypertension,       Neuro/Psych  (+) psychiatric history,     GI/Hepatic/Renal/Endo    (+)   renal disease, diabetes mellitus,     Musculoskeletal     Abdominal     Abdomen: soft.   Substance History      OB/GYN          Other                        Anesthesia Plan    ASA 4     general     intravenous induction     Anesthetic plan, all risks, benefits, and alternatives have been provided, discussed and informed consent has been obtained with: patient.    Plan discussed with CRNA.

## 2021-08-03 ENCOUNTER — APPOINTMENT (OUTPATIENT)
Dept: GENERAL RADIOLOGY | Facility: HOSPITAL | Age: 71
End: 2021-08-03

## 2021-08-03 ENCOUNTER — OUTSIDE FACILITY SERVICE (OUTPATIENT)
Dept: PULMONOLOGY | Facility: CLINIC | Age: 71
End: 2021-08-03

## 2021-08-03 ENCOUNTER — OUTSIDE FACILITY SERVICE (OUTPATIENT)
Dept: CARDIOLOGY | Facility: CLINIC | Age: 71
End: 2021-08-03

## 2021-08-03 LAB
A-A DO2: 130 MMHG (ref 0–300)
ALBUMIN SERPL-MCNC: 3.52 G/DL (ref 3.5–5.2)
ALBUMIN/GLOB SERPL: 1 G/DL
ALP SERPL-CCNC: 223 U/L (ref 39–117)
ALT SERPL W P-5'-P-CCNC: 20 U/L (ref 1–41)
ANION GAP SERPL CALCULATED.3IONS-SCNC: 17.1 MMOL/L (ref 5–15)
APTT PPP: 38 SECONDS (ref 25.5–35.4)
ARTERIAL PATENCY WRIST A: ABNORMAL
AST SERPL-CCNC: 17 U/L (ref 1–40)
ATMOSPHERIC PRESS: 728 MMHG
BASE EXCESS BLDA CALC-SCNC: 2.9 MMOL/L (ref 0–2)
BASOPHILS # BLD AUTO: 0.02 10*3/MM3 (ref 0–0.2)
BASOPHILS NFR BLD AUTO: 0.3 % (ref 0–1.5)
BDY SITE: ABNORMAL
BILIRUB SERPL-MCNC: 0.5 MG/DL (ref 0–1.2)
BODY TEMPERATURE: 0 C
BUN SERPL-MCNC: 74 MG/DL (ref 8–23)
BUN/CREAT SERPL: 10 (ref 7–25)
CALCIUM SPEC-SCNC: 9.5 MG/DL (ref 8.6–10.5)
CHLORIDE SERPL-SCNC: 87 MMOL/L (ref 98–107)
CO2 BLDA-SCNC: 28.9 MMOL/L (ref 22–33)
CO2 SERPL-SCNC: 24.9 MMOL/L (ref 22–29)
COHGB MFR BLD: 1.2 % (ref 0–5)
CREAT SERPL-MCNC: 7.41 MG/DL (ref 0.76–1.27)
CRP SERPL-MCNC: 9.03 MG/DL (ref 0–0.5)
DEPRECATED RDW RBC AUTO: 48.8 FL (ref 37–54)
EOSINOPHIL # BLD AUTO: 0.27 10*3/MM3 (ref 0–0.4)
EOSINOPHIL NFR BLD AUTO: 4 % (ref 0.3–6.2)
ERYTHROCYTE [DISTWIDTH] IN BLOOD BY AUTOMATED COUNT: 15 % (ref 12.3–15.4)
GFR SERPL CREATININE-BSD FRML MDRD: 7 ML/MIN/1.73
GFR SERPL CREATININE-BSD FRML MDRD: ABNORMAL ML/MIN/{1.73_M2}
GLOBULIN UR ELPH-MCNC: 3.6 GM/DL
GLUCOSE BLDC GLUCOMTR-MCNC: 138 MG/DL (ref 70–130)
GLUCOSE BLDC GLUCOMTR-MCNC: 138 MG/DL (ref 70–130)
GLUCOSE BLDC GLUCOMTR-MCNC: 144 MG/DL (ref 70–130)
GLUCOSE BLDC GLUCOMTR-MCNC: 146 MG/DL (ref 70–130)
GLUCOSE BLDC GLUCOMTR-MCNC: 152 MG/DL (ref 70–130)
GLUCOSE SERPL-MCNC: 134 MG/DL (ref 65–99)
HAV IGM SERPL QL IA: NORMAL
HBV CORE IGM SERPL QL IA: NORMAL
HBV SURFACE AG SERPL QL IA: NORMAL
HCO3 BLDA-SCNC: 27.6 MMOL/L (ref 20–26)
HCT VFR BLD AUTO: 23.1 % (ref 37.5–51)
HCT VFR BLD CALC: 31.2 % (ref 38–51)
HCV AB SER DONR QL: NORMAL
HGB BLD-MCNC: 7.2 G/DL (ref 13–17.7)
HGB BLDA-MCNC: 10.2 G/DL (ref 14–18)
IMM GRANULOCYTES # BLD AUTO: 0.1 10*3/MM3 (ref 0–0.05)
IMM GRANULOCYTES NFR BLD AUTO: 1.5 % (ref 0–0.5)
INHALED O2 CONCENTRATION: 40 %
LYMPHOCYTES # BLD AUTO: 2.2 10*3/MM3 (ref 0.7–3.1)
LYMPHOCYTES NFR BLD AUTO: 32.8 % (ref 19.6–45.3)
Lab: ABNORMAL
MCH RBC QN AUTO: 27.8 PG (ref 26.6–33)
MCHC RBC AUTO-ENTMCNC: 31.2 G/DL (ref 31.5–35.7)
MCV RBC AUTO: 89.2 FL (ref 79–97)
METHGB BLD QL: 0.2 % (ref 0–3)
MODALITY: ABNORMAL
MONOCYTES # BLD AUTO: 0.82 10*3/MM3 (ref 0.1–0.9)
MONOCYTES NFR BLD AUTO: 12.2 % (ref 5–12)
NEUTROPHILS NFR BLD AUTO: 3.3 10*3/MM3 (ref 1.7–7)
NEUTROPHILS NFR BLD AUTO: 49.2 % (ref 42.7–76)
NOTE: ABNORMAL
NOTIFIED WHO: ABNORMAL
NRBC BLD AUTO-RTO: 0 /100 WBC (ref 0–0.2)
OXYHGB MFR BLDV: 96.7 % (ref 94–99)
PCO2 BLDA: 42.1 MM HG (ref 35–45)
PCO2 TEMP ADJ BLD: ABNORMAL MM[HG]
PEEP RESPIRATORY: 5 CM[H2O]
PH BLDA: 7.42 PH UNITS (ref 7.35–7.45)
PH, TEMP CORRECTED: ABNORMAL
PLATELET # BLD AUTO: 173 10*3/MM3 (ref 140–450)
PMV BLD AUTO: 10 FL (ref 6–12)
PO2 BLDA: 96.2 MM HG (ref 83–108)
PO2 TEMP ADJ BLD: ABNORMAL MM[HG]
POTASSIUM SERPL-SCNC: 4 MMOL/L (ref 3.5–5.2)
PROT SERPL-MCNC: 7.1 G/DL (ref 6–8.5)
RBC # BLD AUTO: 2.59 10*6/MM3 (ref 4.14–5.8)
SAO2 % BLDCOA: 98.1 % (ref 94–99)
SET MECH RESP RATE: 16
SODIUM SERPL-SCNC: 129 MMOL/L (ref 136–145)
VENTILATOR MODE: ABNORMAL
VT ON VENT VENT: 450 ML
WBC # BLD AUTO: 6.71 10*3/MM3 (ref 3.4–10.8)

## 2021-08-03 PROCEDURE — 83050 HGB METHEMOGLOBIN QUAN: CPT

## 2021-08-03 PROCEDURE — 80053 COMPREHEN METABOLIC PANEL: CPT | Performed by: INTERNAL MEDICINE

## 2021-08-03 PROCEDURE — 85730 THROMBOPLASTIN TIME PARTIAL: CPT | Performed by: INTERNAL MEDICINE

## 2021-08-03 PROCEDURE — 80074 ACUTE HEPATITIS PANEL: CPT | Performed by: INTERNAL MEDICINE

## 2021-08-03 PROCEDURE — 99291 CRITICAL CARE FIRST HOUR: CPT | Performed by: INTERNAL MEDICINE

## 2021-08-03 PROCEDURE — OUTSIDEPOS PR OUTSIDE POS PLACEHOLDER: Performed by: INTERNAL MEDICINE

## 2021-08-03 PROCEDURE — 86140 C-REACTIVE PROTEIN: CPT | Performed by: INTERNAL MEDICINE

## 2021-08-03 PROCEDURE — 82805 BLOOD GASES W/O2 SATURATION: CPT

## 2021-08-03 PROCEDURE — 82962 GLUCOSE BLOOD TEST: CPT

## 2021-08-03 PROCEDURE — 71045 X-RAY EXAM CHEST 1 VIEW: CPT

## 2021-08-03 PROCEDURE — 85025 COMPLETE CBC W/AUTO DIFF WBC: CPT | Performed by: INTERNAL MEDICINE

## 2021-08-03 PROCEDURE — 36600 WITHDRAWAL OF ARTERIAL BLOOD: CPT

## 2021-08-03 PROCEDURE — 71045 X-RAY EXAM CHEST 1 VIEW: CPT | Performed by: RADIOLOGY

## 2021-08-03 PROCEDURE — 82375 ASSAY CARBOXYHB QUANT: CPT

## 2021-08-03 PROCEDURE — 99222 1ST HOSP IP/OBS MODERATE 55: CPT | Performed by: INTERNAL MEDICINE

## 2021-08-03 NOTE — ANESTHESIA POSTPROCEDURE EVALUATION
Patient: Braulio Schwartz    Procedure Summary     Date: 08/02/21 Room / Location: [facility] OR 02 /  [facility] OR    Anesthesia Start: 1902 Anesthesia Stop: 2021    Procedures:       HEMODIALYSIS CATHETER INSERTION (N/A )      CENTRAL VENOUS LINE INSERTION (N/A ) Diagnosis:     Surgeons: Hal Mattson MD Provider: Ministerio Cuello MD    Anesthesia Type: general ASA Status: 4          Anesthesia Type: general    Vitals  No vitals data found for the desired time range.          Post Anesthesia Care and Evaluation    Patient location during evaluation: ICU  Patient participation: complete - patient cannot participate  Level of consciousness: obtunded/minimal responses  Pain score: 0  Pain management: adequate  Airway patency: patent  Anesthetic complications: No anesthetic complications  PONV Status: none  Cardiovascular status: hemodynamically stable  Respiratory status: ventilator  Hydration status: balanced

## 2021-08-03 NOTE — H&P
Patient Name:  Braulio Schwartz  YOB: 1950  [id number]       Patient Care Team:  Yandel Steward MD as PCP - General (Internal Medicine)      General Surgery Consult Note     Date of Consultation: 08/02/21    Consulting Physician - Celestino Nolasco MD    Reason for Consult -tunneled dialysis catheter and central line placement    Subjective     I have been asked to see  Braulio Schwartz , a 70 y.o. male in consultation for tunneled dialysis catheter and central line placement.  The patient has been requiring dialysis since his admission in late June for sepsis.  He underwent tracheostomy and PEG tube placement last month.  He had been receiving his dialysis through a right internal jugular nontunneled dialysis catheter.  It was removed over the weekend because it was no longer functioning.    Patient is a vargas of the [facility]      Allergy:   Allergies   Allergen Reactions   • Haldol [Haloperidol] Unknown (See Comments)     PT FROM [facility], PT UNABLE TO PROVIDED INFO   • Ibuprofen Unknown (See Comments)     PT UNABLE TO GIVE INFO       Medications:        No current facility-administered medications on file prior to encounter.     Current Outpatient Medications on File Prior to Encounter   Medication Sig   • [DISCONTINUED] dextrose 5 % solution with DOPamine 40 MG/ML solution 1,600 mcg/mL Infuse 2 mcg/kg/min into a venous catheter Dose Adjusted By Provider As Needed. Infuse 034820 mcg every hr titrate per protocol   • [DISCONTINUED] sodium chloride 0.9 % injection Infuse 10 mL into a venous catheter Every 12 (Twelve) Hours.   • acetaminophen (TYLENOL) 325 MG tablet Take 650 mg by mouth Every 4 (Four) Hours As Needed for Mild Pain .   • B Complex-C-Folic Acid (NEPHRO-TUYET PO) Take 1 tablet by mouth Daily.   • clonazePAM (KlonoPIN) 0.5 MG tablet Take 0.5 mg by mouth 2 (two) times a day.   • dextrose (D50W) 50 % solution Infuse 50 mL into a venous catheter As Needed for Low Blood Sugar (per  protocol).   • dextrose (GLUTOSE) 40 % gel Take 0.5 g by mouth As Needed for Low Blood Sugar (per protocol).   • dextrose (GLUTOSE) 40 % gel Take 1 g by mouth As Needed for Low Blood Sugar (per protocol).   • dilTIAZem (CARDIZEM) 125-5 MG/125ML-% solution Infuse 5-15 mg/hr into a venous catheter Dose Adjusted By Provider As Needed. Continue per [facility] protocol, titrate and use prn   • docusate sodium (COLACE) 150 MG/15ML liquid Administer 100 mg per G tube 2 (Two) Times a Day As Needed (constipation).   • DOPAMINE IN D5W IV Infuse 2 mcg/kg/min into a venous catheter Dose Adjusted By Provider As Needed. Concentration = 1600mcg/ml-250ml d5w  Infuse 918984 mcg iv every hr. Titrate per protocol   • famotidine (PEPCID) 20 MG tablet Take 10 mg by mouth Daily.   • glucagon (GLUCAGEN) 1 MG injection Inject 1 mg into the appropriate muscle as directed by prescriber Daily As Needed for Low Blood Sugar (per protocol).   • insulin lispro (humaLOG) 100 UNIT/ML injection Inject 0-12 Units under the skin into the appropriate area as directed Every 6 (Six) Hours. Per sliding scale   120-159=2u  160-199=4u  200-239=6u  240-279=8u  280-319=10u  320-349=12u  Above 350=12u and call MD   • LACTOBACILLUS ACID-PECTIN PO Take 1 capsule by mouth 2 (two) times a day.   • levETIRAcetam (KEPPRA) 100 MG/ML solution Take 500 mg by mouth 2 (Two) Times a Day.   • melatonin 5 MG tablet tablet Take 5 mg by mouth Every Night.   • midodrine (PROAMATINE) 5 MG tablet Take 10 mg by mouth Every 8 (Eight) Hours.   • multivitamin with minerals tablet tablet Take 1 tablet by mouth Daily.   • ondansetron (ZOFRAN) 4 MG/2ML injection Infuse 4 mg into a venous catheter Every 6 (Six) Hours As Needed for Nausea or Vomiting.   • oxyCODONE (ROXICODONE) 5 MG immediate release tablet Take 5 mg by mouth 2 (Two) Times a Day As Needed.   • propofol (DIPRIVAN) 10 mg/mL emulsion infusion Infuse 5-50 mcg/kg/min into a venous catheter Dose Adjusted By Provider As Needed.   •  sennosides-docusate (PERICOLACE) 8.6-50 MG per tablet Take 2 tablets by mouth 2 (Two) Times a Day As Needed for Constipation.   • sodium chloride 0.9 % flush Infuse 10 mL into a venous catheter Every 12 (Twelve) Hours.   • sodium chloride 0.9 % flush Infuse 5 mL into a venous catheter Every 8 (Eight) Hours. Int flush   • sodium chloride 0.9 % flush Infuse 10 mL into a venous catheter As Needed for Line Care.   • sodium chloride 0.9 % injection Infuse 5 mL into a venous catheter As Needed for Line Care (INT flush).   • vancomycin 50 MG/ML solution oral solution Take 125 mg by mouth Every 6 (Six) Hours.   • [DISCONTINUED] albuterol (PROAIR HFA) 108 (90 Base) MCG/ACT inhaler Inhale 2 puffs Every 4 (Four) Hours As Needed for Wheezing (90 mcg INH , 2 puffs 4x's daily).   • [DISCONTINUED] aMILoride (MIDAMOR) 5 MG tablet Take 5 mg by mouth Daily.   • [DISCONTINUED] amLODIPine (NORVASC) 5 MG tablet Take 2.5 mg by mouth Daily.   • [DISCONTINUED] cholestyramine light 4 g packet Take 4 g by mouth 3 (Three) Times a Day.   • [DISCONTINUED] dexmedetomidine (PRECEDEX) 400 MCG/100ML solution infusion Infuse 0.2 mcg/kg/hr into a venous catheter Dose Adjusted By Provider As Needed.   • [DISCONTINUED] DEXTROSE, DIABETIC USE, PO Take 1 applicator by mouth As Needed (hypoglycemia).   • [DISCONTINUED] DOPamine (INTROPIN) 1.6-5 MG/ML-% infusion Infuse 400,000 mcg/kg/min into a venous catheter Every 1 (One) Hour. D5W 250 ml   • [DISCONTINUED] Heparin Sodium, Porcine, (heparin, porcine,) 5000 UNIT/ML injection Inject 5,000 Units under the skin into the appropriate area as directed Every 8 (Eight) Hours.   • [DISCONTINUED] lactobacillus acidophilus (RISAQUAD) capsule capsule Take 1 capsule by mouth 2 (Two) Times a Day.   • [DISCONTINUED] micafungin 100 mg/100 mL 0.9% NS IVPB (mbp) Infuse 100 mg into a venous catheter Daily.   • [DISCONTINUED] multivitamin with minerals (B COMPLEX PLUS VITAMIN C PO) Take 1 tablet by mouth Daily.   •  [DISCONTINUED] PANTOPRAZOLE SODIUM IV Infuse 40 mg into a venous catheter Daily.   • [DISCONTINUED] vancomycin 50 MG/ML solution Take 125 mg by mouth Every 6 (Six) Hours.       PMHx:   Past Medical History:   Diagnosis Date   • Anxiety    • Hypertension          Past Surgical History:  Tracheostomy/PEG tube  Right IJ nontunneled dialysis catheter    Family History: Unable to obtain    Social History: Unable to obtain     Review of Systems   Unable to obtain             Objective     Physical Exam:      Vital Signs  Wt 87.7 kg (193 lb 5.5 oz)   BMI 27.74 kg/m²   No intake or output data in the 24 hours ending 08/02/21 2014      Physical Exam:      08/02/21 1928   Weight: 87.7 kg (193 lb 5.5 oz)    Body mass index is 27.74 kg/m².  Constitution: No acute distress  Head: Normocephalic, atraumatic.   Eyes: Aligned without strabismus. Conjunctiva noninjected   Ears, Nose, Mouth: Cuffed Shiley tracheostomy present  CV: Rhythm  and rate regular   Respiratory: Symmetric chest expansion. No respiratory distress.   Gastrointestinal:  soft, nontender, nondistended, PEG tube in place  Skin: Symmetric bilateral lower extremity edema  Lymphatics: No abnormal cervical or supraclavicular adenopathy  Neurologic: Noncommunicative.            Results Review: I have personally reviewed all of the recent lab and imaging results available at this time.     Lab Results (last 24 hours)     Procedure Component Value Units Date/Time    POC Glucose Once [[id number]]  (Abnormal) Collected: 08/01/21 2305    Specimen: Blood Updated: 08/01/21 2312     Glucose 132 mg/dL      Comment: Meter: [id number] : LASHAWN BAEZ       aPTT [[id number]]  (Normal) Collected: 08/02/21 0339    Specimen: Blood Updated: 08/02/21 0424     PTT 34.5 seconds     Narrative:      PTT Heparin Therapeutic Range:  59 - 95 seconds      CBC & Differential [[id number]]  (Abnormal) Collected: 08/02/21 0339    Specimen: Blood Updated: 08/02/21 0425    Narrative:       The following orders were created for panel order CBC & Differential.  Procedure                               Abnormality         Status                     ---------                               -----------         ------                     CBC Auto Differential[[id number]]        Abnormal            Final result                 Please view results for these tests on the individual orders.    CBC Auto Differential [[id number]]  (Abnormal) Collected: 08/02/21 0339    Specimen: Blood Updated: 08/02/21 0425     WBC 8.53 10*3/mm3      RBC 2.76 10*6/mm3      Hemoglobin 7.7 g/dL      Hematocrit 24.7 %      MCV 89.5 fL      MCH 27.9 pg      MCHC 31.2 g/dL      RDW 15.1 %      RDW-SD 49.2 fl      MPV 10.8 fL      Platelets 197 10*3/mm3      Neutrophil % 58.0 %      Lymphocyte % 27.4 %      Monocyte % 9.0 %      Eosinophil % 3.4 %      Basophil % 0.4 %      Immature Grans % 1.8 %      Neutrophils, Absolute 4.95 10*3/mm3      Lymphocytes, Absolute 2.34 10*3/mm3      Monocytes, Absolute 0.77 10*3/mm3      Eosinophils, Absolute 0.29 10*3/mm3      Basophils, Absolute 0.03 10*3/mm3      Immature Grans, Absolute 0.15 10*3/mm3      nRBC 0.0 /100 WBC     Comprehensive Metabolic Panel [[id number]]  (Abnormal) Collected: 08/02/21 0339    Specimen: Blood Updated: 08/02/21 0440     Glucose 120 mg/dL      BUN 64 mg/dL      Creatinine 6.44 mg/dL      Sodium 131 mmol/L      Potassium 4.1 mmol/L      Chloride 88 mmol/L      CO2 27.0 mmol/L      Calcium 9.8 mg/dL      Total Protein 7.4 g/dL      Albumin 3.81 g/dL      ALT (SGPT) 27 U/L      AST (SGOT) 22 U/L      Alkaline Phosphatase 254 U/L      Total Bilirubin 0.5 mg/dL      eGFR Non African Amer 9 mL/min/1.73      Comment: <15 Indicative of kidney failure.        eGFR   Amer --     Comment: <15 Indicative of kidney failure.        Globulin 3.6 gm/dL      A/G Ratio 1.1 g/dL      BUN/Creatinine Ratio 9.9     Anion Gap 16.0 mmol/L     Narrative:      GFR Normal >60  Chronic Kidney  Disease <60  Kidney Failure <15      C-reactive Protein [[id number]]  (Abnormal) Collected: 08/02/21 0339    Specimen: Blood Updated: 08/02/21 0440     C-Reactive Protein 5.05 mg/dL     Protime-INR [[id number]]  (Normal) Collected: 08/02/21 0339    Specimen: Blood Updated: 08/02/21 0922     Protime 13.4 Seconds      INR 0.98    Narrative:      Suggested INR therapeutic range for stable oral anticoagulant therapy:    Low Intensity therapy:   1.5-2.0  Moderate Intensity therapy:   2.0-3.0  High Intensity therapy:   2.5-4.0    Magnesium [[id number]]  (Normal) Collected: 08/02/21 0339    Specimen: Blood Updated: 08/02/21 0930     Magnesium 2.2 mg/dL     POC Glucose Once [[id number]]  (Abnormal) Collected: 08/02/21 0620    Specimen: Blood Updated: 08/02/21 0627     Glucose 133 mg/dL      Comment: Meter: [id number] : LASHAWN BAEZ       COVID PRE-OP / PRE-PROCEDURE SCREENING ORDER (NO ISOLATION) - Swab, Nasopharynx [[id number]]  (Normal) Collected: 08/02/21 0915    Specimen: Swab from Nasopharynx Updated: 08/02/21 1027    Narrative:      The following orders were created for panel order COVID PRE-OP / PRE-PROCEDURE SCREENING ORDER (NO ISOLATION) - Swab, Nasopharynx.  Procedure                               Abnormality         Status                     ---------                               -----------         ------                     COVID-19,CEPHEID,COR/NELL...[[id number]]  Normal              Final result                 Please view results for these tests on the individual orders.    COVID-19,CEPHEID,COR/NELL/PAD/GUADALUPE IN-HOUSE(OR EMERGENT/ADD-ON),NP SWAB IN TRANSPORT MEDIA 3-4 HR TAT, RT-PCR - Swab, Nasopharynx [[id number]]  (Normal) Collected: 08/02/21 0915    Specimen: Swab from Nasopharynx Updated: 08/02/21 1027     COVID19 Not Detected    Narrative:      Fact sheet for providers: https://www.fda.gov/media/158279/download     Fact sheet for patients: https://www.fda.gov/media/831237/download  Fact sheet for  providers: https://www.fda.gov/media/365150/download     Fact sheet for patients: https://www.fda.gov/media/766495/download    POC Glucose Once [[id number]]  (Abnormal) Collected: 08/02/21 1430    Specimen: Blood Updated: 08/02/21 1436     Glucose 136 mg/dL      Comment: Meter: [id number] : JESSICA woodson       POC Glucose Once [[id number]]  (Abnormal) Collected: 08/02/21 1759    Specimen: Blood Updated: 08/02/21 1806     Glucose 136 mg/dL      Comment: Meter: [id number] : JESSICA woodson                 Assessment/Plan     Assessment and Plan:    70 y.o. male with end-stage renal disease and difficult IV access    To the OR for tunneled dialysis catheter and central line placement        Hal Mattson MD  [facility]  08/02/21  20:14 EDT

## 2021-08-03 NOTE — OP NOTE
OPERATIVE NOTE    Patient Name:  Braulio Schwartz  YOB: 1950  [id number]    Date of Surgery:  8/2/2021      PREOPERATIVE DIAGNOSIS: End-stage renal disease, difficult IV access      POSTOPERATIVE DIAGNOSIS: Same        PROCEDURE PERFORMED: Right internal Jugular tunneled dialysis catheter placement under ultrasound and fluoroscopic guidance, left internal jugular central line placement under ultrasound and fluoroscopic guidance       SURGEON: Hal Mattson MD       Circulator: Katie Snyder RN  Radiology Technologist: Thomas Calhoun  Scrub Person: Marielena Davalos LPN; Madelyn Moreno        SPECIMENS: None        ANESTHESIA: General. Local       FINDINGS:   1. 15 Fr x 19 cm dual lumen dialysis catheter placed in the right internal Jugular Vein  2.  7 Ukrainian triple-lumen central line placed in the left internal jugular vein       INDICATIONS: The patient is a 70 y.o. male who is admitted to [facility].  The patient has required dialysis in his prior right internal jugular dialysis catheter was no longer functioning.  He had undergone tracheostomy and PEG tube roughly 1 month ago.  I was consulted for a tunneled dialysis catheter as well as a central line for IV access        DESCRIPTION OF PROCEDURE:      After obtaining informed consent, the patient was taken to the operating room and placed in Trendelenburg position. After appropriate DVT and antibiotic prophylaxis, general anesthesia was induced.    Right neck was prepped and draped in a sterile manner.  A proper timeout was performed.  Right internal jugular vein and carotid artery were identified with ultrasound.  I attempted to avoid the patient's prior line tract.  Local anesthetic was used to anesthetize the surrounding soft tissue as well as the intended subcutaneous tract to the right chest wall.  Right internal jugular vein was accessed with a micropuncture needle.  Wire advanced through the needle.  Position  confirmed with fluoroscopy.  Needle removed and exchanged for a 4 Estonian sheath.  Initial wire was removed and exchanged for the dialysis line kit wire.  Position was confirmed with fluoroscopy.  4 Estonian sheath removed.  1 cm incision was made with 11 blade scalpel at the wire insertion site as well as a counterincision on the right upper chest wall.  Subcutaneous tract was created passing over the clavicle and dilated.  Catheter passed through the tract.  Right neck subcutaneous tissues were serially dilated and a combination dilator and sheath was passed into the right internal jugular vein under fluoroscopic guidance.  Internal dilator and wire were removed.  Preflush catheter placed into the sheath and the outer sheath removed.  Catheter tip position was confirmed with fluoroscopy to be in the SVC.  Hilaria back and flushed appropriately.  Catheter was flushed with heparinized saline.  Sutured in place with nylon sutures.  Right neck skin incision closed with subcuticular 4-0 Monocryl.  Skin glue placed.  Sterile dressing placed    The left neck was then prepped and draped in a sterile manner.  Left internal jugular vein and carotid artery were identified with ultrasound.  Left internal jugular vein was accessed with the kit access needle, this took 2 attempts.  The wire was then advanced into place and position was confirmed with fluoroscopy.  Stab incision was made with a limb blade scalpel.  Subcutaneous tract was dilated.  Preflush catheter was then advanced using Seldinger technique over the wire.  Final positioning was confirmed with fluoroscopy.  All lumens hilaria back and flushed appropriately.  It was sutured in place with silk sutures.  Sterile dressing placed.    All sponge and needle counts were correct times two at the completion of the procedure. The patient recovered from anesthesia, was extubated in the operating room  and was transported to the PACU  in stable condition.    Chest x-ray was pending  at the time of this dictation      Hal Mattson MD  8/2/2021  20:10 EDT

## 2021-08-04 ENCOUNTER — OUTSIDE FACILITY SERVICE (OUTPATIENT)
Dept: CARDIOLOGY | Facility: CLINIC | Age: 71
End: 2021-08-04

## 2021-08-04 ENCOUNTER — OUTSIDE FACILITY SERVICE (OUTPATIENT)
Dept: PULMONOLOGY | Facility: CLINIC | Age: 71
End: 2021-08-04

## 2021-08-04 LAB
027 TOXIN: NORMAL
ABO GROUP BLD: NORMAL
ABO GROUP BLD: NORMAL
ALBUMIN SERPL-MCNC: 3.75 G/DL (ref 3.5–5.2)
ALBUMIN/GLOB SERPL: 1.1 G/DL
ALP SERPL-CCNC: 253 U/L (ref 39–117)
ALT SERPL W P-5'-P-CCNC: 14 U/L (ref 1–41)
ANION GAP SERPL CALCULATED.3IONS-SCNC: 18.6 MMOL/L (ref 5–15)
APTT PPP: 40.8 SECONDS (ref 25.5–35.4)
AST SERPL-CCNC: 21 U/L (ref 1–40)
BASOPHILS # BLD AUTO: 0.02 10*3/MM3 (ref 0–0.2)
BASOPHILS NFR BLD AUTO: 0.2 % (ref 0–1.5)
BILIRUB SERPL-MCNC: 0.4 MG/DL (ref 0–1.2)
BLD GP AB SCN SERPL QL: NEGATIVE
BUN SERPL-MCNC: 83 MG/DL (ref 8–23)
BUN/CREAT SERPL: 10.6 (ref 7–25)
C DIFF TOX GENS STL QL NAA+PROBE: NEGATIVE
CALCIUM SPEC-SCNC: 9.5 MG/DL (ref 8.6–10.5)
CHLORIDE SERPL-SCNC: 87 MMOL/L (ref 98–107)
CO2 SERPL-SCNC: 24.4 MMOL/L (ref 22–29)
CREAT SERPL-MCNC: 7.86 MG/DL (ref 0.76–1.27)
CRP SERPL-MCNC: 9.77 MG/DL (ref 0–0.5)
DEPRECATED RDW RBC AUTO: 48.3 FL (ref 37–54)
EOSINOPHIL # BLD AUTO: 0.24 10*3/MM3 (ref 0–0.4)
EOSINOPHIL NFR BLD AUTO: 2.6 % (ref 0.3–6.2)
ERYTHROCYTE [DISTWIDTH] IN BLOOD BY AUTOMATED COUNT: 14.9 % (ref 12.3–15.4)
GFR SERPL CREATININE-BSD FRML MDRD: 7 ML/MIN/1.73
GFR SERPL CREATININE-BSD FRML MDRD: ABNORMAL ML/MIN/{1.73_M2}
GLOBULIN UR ELPH-MCNC: 3.5 GM/DL
GLUCOSE BLDC GLUCOMTR-MCNC: 150 MG/DL (ref 70–130)
GLUCOSE BLDC GLUCOMTR-MCNC: 163 MG/DL (ref 70–130)
GLUCOSE SERPL-MCNC: 165 MG/DL (ref 65–99)
HCT VFR BLD AUTO: 22.8 % (ref 37.5–51)
HGB BLD-MCNC: 7.1 G/DL (ref 13–17.7)
IMM GRANULOCYTES # BLD AUTO: 0.09 10*3/MM3 (ref 0–0.05)
IMM GRANULOCYTES NFR BLD AUTO: 1 % (ref 0–0.5)
LYMPHOCYTES # BLD AUTO: 2.21 10*3/MM3 (ref 0.7–3.1)
LYMPHOCYTES NFR BLD AUTO: 23.8 % (ref 19.6–45.3)
MCH RBC QN AUTO: 27.7 PG (ref 26.6–33)
MCHC RBC AUTO-ENTMCNC: 31.1 G/DL (ref 31.5–35.7)
MCV RBC AUTO: 89.1 FL (ref 79–97)
MONOCYTES # BLD AUTO: 0.83 10*3/MM3 (ref 0.1–0.9)
MONOCYTES NFR BLD AUTO: 8.9 % (ref 5–12)
NEUTROPHILS NFR BLD AUTO: 5.91 10*3/MM3 (ref 1.7–7)
NEUTROPHILS NFR BLD AUTO: 63.5 % (ref 42.7–76)
NRBC BLD AUTO-RTO: 0 /100 WBC (ref 0–0.2)
PLATELET # BLD AUTO: 183 10*3/MM3 (ref 140–450)
PMV BLD AUTO: 10.4 FL (ref 6–12)
POTASSIUM SERPL-SCNC: 4.7 MMOL/L (ref 3.5–5.2)
PROT SERPL-MCNC: 7.2 G/DL (ref 6–8.5)
RBC # BLD AUTO: 2.56 10*6/MM3 (ref 4.14–5.8)
RH BLD: POSITIVE
RH BLD: POSITIVE
SODIUM SERPL-SCNC: 130 MMOL/L (ref 136–145)
T&S EXPIRATION DATE: NORMAL
WBC # BLD AUTO: 9.3 10*3/MM3 (ref 3.4–10.8)

## 2021-08-04 PROCEDURE — 86900 BLOOD TYPING SEROLOGIC ABO: CPT

## 2021-08-04 PROCEDURE — 86900 BLOOD TYPING SEROLOGIC ABO: CPT | Performed by: INTERNAL MEDICINE

## 2021-08-04 PROCEDURE — 87493 C DIFF AMPLIFIED PROBE: CPT | Performed by: INTERNAL MEDICINE

## 2021-08-04 PROCEDURE — 85730 THROMBOPLASTIN TIME PARTIAL: CPT | Performed by: INTERNAL MEDICINE

## 2021-08-04 PROCEDURE — 93010 ELECTROCARDIOGRAM REPORT: CPT | Performed by: INTERNAL MEDICINE

## 2021-08-04 PROCEDURE — 86901 BLOOD TYPING SEROLOGIC RH(D): CPT | Performed by: INTERNAL MEDICINE

## 2021-08-04 PROCEDURE — 86923 COMPATIBILITY TEST ELECTRIC: CPT

## 2021-08-04 PROCEDURE — 82962 GLUCOSE BLOOD TEST: CPT

## 2021-08-04 PROCEDURE — 86850 RBC ANTIBODY SCREEN: CPT | Performed by: INTERNAL MEDICINE

## 2021-08-04 PROCEDURE — 99232 SBSQ HOSP IP/OBS MODERATE 35: CPT | Performed by: INTERNAL MEDICINE

## 2021-08-04 PROCEDURE — 85025 COMPLETE CBC W/AUTO DIFF WBC: CPT | Performed by: INTERNAL MEDICINE

## 2021-08-04 PROCEDURE — 93005 ELECTROCARDIOGRAM TRACING: CPT | Performed by: INTERNAL MEDICINE

## 2021-08-04 PROCEDURE — 86901 BLOOD TYPING SEROLOGIC RH(D): CPT

## 2021-08-04 PROCEDURE — P9016 RBC LEUKOCYTES REDUCED: HCPCS

## 2021-08-04 PROCEDURE — 99291 CRITICAL CARE FIRST HOUR: CPT | Performed by: INTERNAL MEDICINE

## 2021-08-04 PROCEDURE — 86140 C-REACTIVE PROTEIN: CPT | Performed by: INTERNAL MEDICINE

## 2021-08-04 PROCEDURE — 80053 COMPREHEN METABOLIC PANEL: CPT | Performed by: INTERNAL MEDICINE

## 2021-08-05 LAB
ALBUMIN SERPL-MCNC: 3.61 G/DL (ref 3.5–5.2)
ALBUMIN SERPL-MCNC: 3.87 G/DL (ref 3.5–5.2)
ALBUMIN/GLOB SERPL: 1 G/DL
ALBUMIN/GLOB SERPL: 1 G/DL
ALP SERPL-CCNC: 250 U/L (ref 39–117)
ALP SERPL-CCNC: 277 U/L (ref 39–117)
ALT SERPL W P-5'-P-CCNC: 8 U/L (ref 1–41)
ALT SERPL W P-5'-P-CCNC: 9 U/L (ref 1–41)
ANION GAP SERPL CALCULATED.3IONS-SCNC: 12.3 MMOL/L (ref 5–15)
ANION GAP SERPL CALCULATED.3IONS-SCNC: 13.7 MMOL/L (ref 5–15)
APTT PPP: 36.4 SECONDS (ref 25.5–35.4)
AST SERPL-CCNC: 20 U/L (ref 1–40)
AST SERPL-CCNC: 24 U/L (ref 1–40)
BASOPHILS # BLD AUTO: 0.02 10*3/MM3 (ref 0–0.2)
BASOPHILS NFR BLD AUTO: 0.2 % (ref 0–1.5)
BH BB BLOOD EXPIRATION DATE: NORMAL
BH BB BLOOD TYPE BARCODE: 5100
BH BB DISPENSE STATUS: NORMAL
BH BB PRODUCT CODE: NORMAL
BH BB UNIT NUMBER: NORMAL
BILIRUB SERPL-MCNC: 0.5 MG/DL (ref 0–1.2)
BILIRUB SERPL-MCNC: 0.5 MG/DL (ref 0–1.2)
BUN SERPL-MCNC: 32 MG/DL (ref 8–23)
BUN SERPL-MCNC: 37 MG/DL (ref 8–23)
BUN/CREAT SERPL: 7.2 (ref 7–25)
BUN/CREAT SERPL: 7.7 (ref 7–25)
CALCIUM SPEC-SCNC: 9.3 MG/DL (ref 8.6–10.5)
CALCIUM SPEC-SCNC: 9.4 MG/DL (ref 8.6–10.5)
CHLORIDE SERPL-SCNC: 89 MMOL/L (ref 98–107)
CHLORIDE SERPL-SCNC: 92 MMOL/L (ref 98–107)
CO2 SERPL-SCNC: 27.3 MMOL/L (ref 22–29)
CO2 SERPL-SCNC: 28.7 MMOL/L (ref 22–29)
CREAT SERPL-MCNC: 4.45 MG/DL (ref 0.76–1.27)
CREAT SERPL-MCNC: 4.81 MG/DL (ref 0.76–1.27)
CROSSMATCH INTERPRETATION: NORMAL
CRP SERPL-MCNC: 10.16 MG/DL (ref 0–0.5)
DEPRECATED RDW RBC AUTO: 55.2 FL (ref 37–54)
EOSINOPHIL # BLD AUTO: 0.2 10*3/MM3 (ref 0–0.4)
EOSINOPHIL NFR BLD AUTO: 2.4 % (ref 0.3–6.2)
ERYTHROCYTE [DISTWIDTH] IN BLOOD BY AUTOMATED COUNT: 16.7 % (ref 12.3–15.4)
GFR SERPL CREATININE-BSD FRML MDRD: 12 ML/MIN/1.73
GFR SERPL CREATININE-BSD FRML MDRD: 13 ML/MIN/1.73
GFR SERPL CREATININE-BSD FRML MDRD: ABNORMAL ML/MIN/{1.73_M2}
GFR SERPL CREATININE-BSD FRML MDRD: ABNORMAL ML/MIN/{1.73_M2}
GLOBULIN UR ELPH-MCNC: 3.7 GM/DL
GLOBULIN UR ELPH-MCNC: 3.7 GM/DL
GLUCOSE BLDC GLUCOMTR-MCNC: 122 MG/DL (ref 70–130)
GLUCOSE SERPL-MCNC: 110 MG/DL (ref 65–99)
GLUCOSE SERPL-MCNC: 129 MG/DL (ref 65–99)
HCT VFR BLD AUTO: 26.7 % (ref 37.5–51)
HGB BLD-MCNC: 8.3 G/DL (ref 13–17.7)
IMM GRANULOCYTES # BLD AUTO: 0.1 10*3/MM3 (ref 0–0.05)
IMM GRANULOCYTES NFR BLD AUTO: 1.2 % (ref 0–0.5)
LYMPHOCYTES # BLD AUTO: 2.63 10*3/MM3 (ref 0.7–3.1)
LYMPHOCYTES NFR BLD AUTO: 31.4 % (ref 19.6–45.3)
MCH RBC QN AUTO: 28.1 PG (ref 26.6–33)
MCHC RBC AUTO-ENTMCNC: 31.1 G/DL (ref 31.5–35.7)
MCV RBC AUTO: 90.5 FL (ref 79–97)
MONOCYTES # BLD AUTO: 0.97 10*3/MM3 (ref 0.1–0.9)
MONOCYTES NFR BLD AUTO: 11.6 % (ref 5–12)
NEUTROPHILS NFR BLD AUTO: 4.46 10*3/MM3 (ref 1.7–7)
NEUTROPHILS NFR BLD AUTO: 53.2 % (ref 42.7–76)
NRBC BLD AUTO-RTO: 0 /100 WBC (ref 0–0.2)
PLATELET # BLD AUTO: 188 10*3/MM3 (ref 140–450)
PMV BLD AUTO: 10.5 FL (ref 6–12)
POTASSIUM SERPL-SCNC: 3.8 MMOL/L (ref 3.5–5.2)
POTASSIUM SERPL-SCNC: 4 MMOL/L (ref 3.5–5.2)
PROT SERPL-MCNC: 7.3 G/DL (ref 6–8.5)
PROT SERPL-MCNC: 7.6 G/DL (ref 6–8.5)
PTH-INTACT SERPL-SCNC: 60.1 PG/ML (ref 15–65)
RBC # BLD AUTO: 2.95 10*6/MM3 (ref 4.14–5.8)
SODIUM SERPL-SCNC: 130 MMOL/L (ref 136–145)
SODIUM SERPL-SCNC: 133 MMOL/L (ref 136–145)
UNIT  ABO: NORMAL
UNIT  RH: NORMAL
WBC # BLD AUTO: 8.38 10*3/MM3 (ref 3.4–10.8)

## 2021-08-05 PROCEDURE — 82962 GLUCOSE BLOOD TEST: CPT

## 2021-08-05 PROCEDURE — 85025 COMPLETE CBC W/AUTO DIFF WBC: CPT | Performed by: INTERNAL MEDICINE

## 2021-08-05 PROCEDURE — 80053 COMPREHEN METABOLIC PANEL: CPT | Performed by: INTERNAL MEDICINE

## 2021-08-05 PROCEDURE — 85730 THROMBOPLASTIN TIME PARTIAL: CPT | Performed by: INTERNAL MEDICINE

## 2021-08-05 PROCEDURE — 83970 ASSAY OF PARATHORMONE: CPT | Performed by: INTERNAL MEDICINE

## 2021-08-05 PROCEDURE — 86140 C-REACTIVE PROTEIN: CPT | Performed by: INTERNAL MEDICINE

## 2021-08-07 LAB
BACTERIA SPEC RESP CULT: ABNORMAL
BACTERIA SPEC RESP CULT: ABNORMAL
GRAM STN SPEC: ABNORMAL

## 2021-08-10 LAB
QT INTERVAL: 412 MS
QTC INTERVAL: 469 MS

## 2022-02-28 ENCOUNTER — OUTSIDE FACILITY SERVICE (OUTPATIENT)
Dept: SURGERY | Facility: CLINIC | Age: 72
End: 2022-02-28

## 2022-02-28 ENCOUNTER — OFFICE VISIT (OUTPATIENT)
Dept: SURGERY | Facility: CLINIC | Age: 72
End: 2022-02-28

## 2022-02-28 DIAGNOSIS — Z53.21 PATIENT LEFT WITHOUT BEING SEEN: Primary | ICD-10-CM

## 2022-02-28 PROCEDURE — 99222 1ST HOSP IP/OBS MODERATE 55: CPT | Performed by: SURGERY

## 2022-02-28 PROCEDURE — 36561 INSERT TUNNELED CV CATH: CPT | Performed by: SURGERY

## 2022-02-28 PROCEDURE — 36589 REMOVAL TUNNELED CV CATH: CPT | Performed by: SURGERY

## 2022-03-07 ENCOUNTER — OUTSIDE FACILITY SERVICE (OUTPATIENT)
Dept: SURGERY | Facility: CLINIC | Age: 72
End: 2022-03-07

## 2022-03-07 PROCEDURE — 77001 FLUOROGUIDE FOR VEIN DEVICE: CPT | Performed by: SURGERY

## 2022-03-07 PROCEDURE — 76937 US GUIDE VASCULAR ACCESS: CPT | Performed by: SURGERY

## 2022-03-07 PROCEDURE — 36558 INSERT TUNNELED CV CATH: CPT | Performed by: SURGERY

## 2022-03-25 NOTE — PROGRESS NOTES
The patient left the office before care was provided and did not complete the visit visit was opened in error.

## 2022-03-28 ENCOUNTER — OUTSIDE FACILITY SERVICE (OUTPATIENT)
Dept: SURGERY | Facility: CLINIC | Age: 72
End: 2022-03-28

## (undated) DEVICE — GLV SURG PREMIERPRO MIC LTX PF SZ8 BRN

## (undated) DEVICE — SYR LUERLOK 30CC

## (undated) DEVICE — CONN IV MAXPLUS NDLESS ACC

## (undated) DEVICE — SPNG DRN AMD EXCILON 6PLY 4X4IN PK/2

## (undated) DEVICE — SUT MNCRYL 4/0 PS2 18 IN

## (undated) DEVICE — KT CATH HEMO CANNON2 PLS LNGTRM 15F 9.5IN

## (undated) DEVICE — ST ACC MICROPUNCTURE .018 ECHO/TRANSLSS/PLDM/TP 4F/10CM 21G

## (undated) DEVICE — SUT VIC 3/0 SH 27IN J416H

## (undated) DEVICE — SUT MNCRYL PLS ANTIB UD 4/0 PS2 18IN

## (undated) DEVICE — KT CVR ULTRASND PROB PULL UP LXF 5X8

## (undated) DEVICE — DRAPE,T,LAPARO,TRANS,STERILE: Brand: MEDLINE

## (undated) DEVICE — SUT NLY 2/0 664G

## (undated) DEVICE — DRAPE,UTILTY,TAPE,15X26, 4EA/PK: Brand: MEDLINE

## (undated) DEVICE — HOLDER: Brand: DEROYAL

## (undated) DEVICE — DRP C/ARM W/BAND W/CLIPS 41X74IN

## (undated) DEVICE — UNDERGLV SURG BIOGEL INDICAT PF 8 GRN

## (undated) DEVICE — JELLY,LUBE,STERILE,FLIP TOP,TUBE,4-OZ: Brand: MEDLINE

## (undated) DEVICE — Device

## (undated) DEVICE — INTENDED FOR TISSUE SEPARATION, AND OTHER PROCEDURES THAT REQUIRE A SHARP SURGICAL BLADE TO PUNCTURE OR CUT.: Brand: BARD-PARKER ® CARBON RIB-BACK BLADES

## (undated) DEVICE — SPNG LAP PREWSH SFTPK 18X18IN STRL PK/5

## (undated) DEVICE — SYR 3CC SFTY GLD 25G 5/8IN

## (undated) DEVICE — BIOPATCH™ ANTIMICROBIAL DRESSING WITH CHLORHEXIDINE GLUCONATE IS A HYDROPHILLIC POLYURETHANE ABSORPTIVE FOAM WITH CHLORHEXIDINE GLUCONATE (CHG) WHICH INHIBITS BACTERIAL GROWTH UNDER THE DRESSING. THE DRESSING IS INTENDED TO BE USED TO ABSORB EXUDATE, COVER A WOUND CAUSED BY VASCULAR AND NONVASCULAR PERCUTANEOUS MEDICAL DEVICES DURING SURGERY, AS WELL AS REDUCE LOCAL INFECTION AND COLONIZATION OF MICROORGANISMS.: Brand: BIOPATCH

## (undated) DEVICE — ANTIBACTERIAL UNDYED BRAIDED (POLYGLACTIN 910), SYNTHETIC ABSORBABLE SUTURE: Brand: COATED VICRYL

## (undated) DEVICE — TRY CVC VANTEX PI 7F 3L 20CM CHG TEGADERM 10

## (undated) DEVICE — SUT VIC 2/0 TIES 18IN J111T

## (undated) DEVICE — SUT PROLN 2/0 PC3 8833H

## (undated) DEVICE — SKIN AFFIX SURG ADHESIVE 72/CS 0.55ML: Brand: MEDLINE

## (undated) DEVICE — PATIENT RETURN ELECTRODE, SINGLE-USE, CONTACT QUALITY MONITORING, ADULT, WITH 9FT CORD, FOR PATIENTS WEIGING OVER 33LBS. (15KG): Brand: MEGADYNE

## (undated) DEVICE — PK BASIC 70

## (undated) DEVICE — GLV SURG SENSICARE W/ALOE PF LF 7.5 STRL

## (undated) DEVICE — GLV SURG PREMIERPRO MIC LTX PF SZ7.5 BRN

## (undated) DEVICE — PK HD AND NK 70

## (undated) DEVICE — TRY CVC VANTEX PI PLS 7F 3L 20CM 50